# Patient Record
Sex: FEMALE | Race: BLACK OR AFRICAN AMERICAN | NOT HISPANIC OR LATINO | Employment: UNEMPLOYED | URBAN - METROPOLITAN AREA
[De-identification: names, ages, dates, MRNs, and addresses within clinical notes are randomized per-mention and may not be internally consistent; named-entity substitution may affect disease eponyms.]

---

## 2018-02-09 ENCOUNTER — HOSPITAL ENCOUNTER (INPATIENT)
Facility: HOSPITAL | Age: 78
LOS: 9 days | Discharge: NURSING, REHAB OR LONG-TERM CARE | End: 2018-02-19
Attending: EMERGENCY MEDICINE | Admitting: HOSPITALIST

## 2018-02-09 DIAGNOSIS — N18.9 ACUTE RENAL FAILURE SUPERIMPOSED ON CHRONIC KIDNEY DISEASE, UNSPECIFIED CKD STAGE, UNSPECIFIED ACUTE RENAL FAILURE TYPE (HCC): Primary | ICD-10-CM

## 2018-02-09 DIAGNOSIS — D64.9 ANEMIA, UNSPECIFIED TYPE: ICD-10-CM

## 2018-02-09 DIAGNOSIS — N17.9 ACUTE RENAL FAILURE SUPERIMPOSED ON CHRONIC KIDNEY DISEASE, UNSPECIFIED CKD STAGE, UNSPECIFIED ACUTE RENAL FAILURE TYPE (HCC): Primary | ICD-10-CM

## 2018-02-09 LAB
ALBUMIN SERPL-MCNC: 3.2 G/DL (ref 3.5–5.2)
ALBUMIN/GLOB SERPL: 0.9 G/DL
ALP SERPL-CCNC: 55 U/L (ref 39–117)
ALT SERPL W P-5'-P-CCNC: 13 U/L (ref 1–33)
ANION GAP SERPL CALCULATED.3IONS-SCNC: 10.4 MMOL/L
AST SERPL-CCNC: 11 U/L (ref 1–32)
BASOPHILS # BLD AUTO: 0.03 10*3/MM3 (ref 0–0.2)
BASOPHILS NFR BLD AUTO: 0.5 % (ref 0–1.5)
BILIRUB SERPL-MCNC: <0.2 MG/DL (ref 0.1–1.2)
BUN BLD-MCNC: 75 MG/DL (ref 8–23)
BUN/CREAT SERPL: 45.7 (ref 7–25)
CALCIUM SPEC-SCNC: 8.6 MG/DL (ref 8.6–10.5)
CHLORIDE SERPL-SCNC: 101 MMOL/L (ref 98–107)
CO2 SERPL-SCNC: 25.6 MMOL/L (ref 22–29)
CREAT BLD-MCNC: 1.64 MG/DL (ref 0.57–1)
DEPRECATED RDW RBC AUTO: 47.6 FL (ref 37–54)
EOSINOPHIL # BLD AUTO: 0.3 10*3/MM3 (ref 0–0.7)
EOSINOPHIL NFR BLD AUTO: 4.6 % (ref 0.3–6.2)
ERYTHROCYTE [DISTWIDTH] IN BLOOD BY AUTOMATED COUNT: 13.5 % (ref 11.7–13)
GFR SERPL CREATININE-BSD FRML MDRD: 37 ML/MIN/1.73
GLOBULIN UR ELPH-MCNC: 3.5 GM/DL
GLUCOSE BLD-MCNC: 284 MG/DL (ref 65–99)
HCT VFR BLD AUTO: 32.2 % (ref 35.6–45.5)
HGB BLD-MCNC: 10 G/DL (ref 11.9–15.5)
IMM GRANULOCYTES # BLD: 0.05 10*3/MM3 (ref 0–0.03)
IMM GRANULOCYTES NFR BLD: 0.8 % (ref 0–0.5)
LYMPHOCYTES # BLD AUTO: 3.49 10*3/MM3 (ref 0.9–4.8)
LYMPHOCYTES NFR BLD AUTO: 53.9 % (ref 19.6–45.3)
MCH RBC QN AUTO: 30 PG (ref 26.9–32)
MCHC RBC AUTO-ENTMCNC: 31.1 G/DL (ref 32.4–36.3)
MCV RBC AUTO: 96.7 FL (ref 80.5–98.2)
MONOCYTES # BLD AUTO: 0.33 10*3/MM3 (ref 0.2–1.2)
MONOCYTES NFR BLD AUTO: 5.1 % (ref 5–12)
NEUTROPHILS # BLD AUTO: 2.27 10*3/MM3 (ref 1.9–8.1)
NEUTROPHILS NFR BLD AUTO: 35.1 % (ref 42.7–76)
PLATELET # BLD AUTO: 170 10*3/MM3 (ref 140–500)
PMV BLD AUTO: 10.4 FL (ref 6–12)
POTASSIUM BLD-SCNC: 5.4 MMOL/L (ref 3.5–5.2)
PROT SERPL-MCNC: 6.7 G/DL (ref 6–8.5)
RBC # BLD AUTO: 3.33 10*6/MM3 (ref 3.9–5.2)
SODIUM BLD-SCNC: 137 MMOL/L (ref 136–145)
WBC NRBC COR # BLD: 6.47 10*3/MM3 (ref 4.5–10.7)

## 2018-02-09 PROCEDURE — 99284 EMERGENCY DEPT VISIT MOD MDM: CPT

## 2018-02-09 PROCEDURE — 80053 COMPREHEN METABOLIC PANEL: CPT | Performed by: EMERGENCY MEDICINE

## 2018-02-09 PROCEDURE — 85025 COMPLETE CBC W/AUTO DIFF WBC: CPT | Performed by: EMERGENCY MEDICINE

## 2018-02-09 PROCEDURE — 36415 COLL VENOUS BLD VENIPUNCTURE: CPT | Performed by: EMERGENCY MEDICINE

## 2018-02-09 RX ORDER — SODIUM CHLORIDE 0.9 % (FLUSH) 0.9 %
10 SYRINGE (ML) INJECTION AS NEEDED
Status: DISCONTINUED | OUTPATIENT
Start: 2018-02-09 | End: 2018-02-19 | Stop reason: HOSPADM

## 2018-02-10 ENCOUNTER — APPOINTMENT (OUTPATIENT)
Dept: ULTRASOUND IMAGING | Facility: HOSPITAL | Age: 78
End: 2018-02-10

## 2018-02-10 PROBLEM — N18.9 ACUTE RENAL FAILURE SUPERIMPOSED ON CHRONIC KIDNEY DISEASE (HCC): Status: ACTIVE | Noted: 2018-02-10

## 2018-02-10 PROBLEM — N17.9 ACUTE RENAL FAILURE SUPERIMPOSED ON CHRONIC KIDNEY DISEASE (HCC): Status: ACTIVE | Noted: 2018-02-10

## 2018-02-10 LAB
ANION GAP SERPL CALCULATED.3IONS-SCNC: 11.7 MMOL/L
BACTERIA UR QL AUTO: ABNORMAL /HPF
BILIRUB UR QL STRIP: NEGATIVE
BUN BLD-MCNC: 68 MG/DL (ref 8–23)
BUN/CREAT SERPL: 43.6 (ref 7–25)
CALCIUM SPEC-SCNC: 8.4 MG/DL (ref 8.6–10.5)
CHLORIDE SERPL-SCNC: 99 MMOL/L (ref 98–107)
CLARITY UR: CLEAR
CO2 SERPL-SCNC: 23.3 MMOL/L (ref 22–29)
COLOR UR: YELLOW
CREAT BLD-MCNC: 1.56 MG/DL (ref 0.57–1)
CREAT UR-MCNC: 18.9 MG/DL
DEPRECATED RDW RBC AUTO: 47.4 FL (ref 37–54)
ERYTHROCYTE [DISTWIDTH] IN BLOOD BY AUTOMATED COUNT: 13.7 % (ref 11.7–13)
GFR SERPL CREATININE-BSD FRML MDRD: 39 ML/MIN/1.73
GLUCOSE BLD-MCNC: 197 MG/DL (ref 65–99)
GLUCOSE BLDC GLUCOMTR-MCNC: 164 MG/DL (ref 70–130)
GLUCOSE BLDC GLUCOMTR-MCNC: 181 MG/DL (ref 70–130)
GLUCOSE BLDC GLUCOMTR-MCNC: 198 MG/DL (ref 70–130)
GLUCOSE BLDC GLUCOMTR-MCNC: 214 MG/DL (ref 70–130)
GLUCOSE UR STRIP-MCNC: NEGATIVE MG/DL
HCT VFR BLD AUTO: 31.7 % (ref 35.6–45.5)
HGB BLD-MCNC: 9.8 G/DL (ref 11.9–15.5)
HGB UR QL STRIP.AUTO: ABNORMAL
HYALINE CASTS UR QL AUTO: ABNORMAL /LPF
INR PPP: 2.36 (ref 0.9–1.1)
KETONES UR QL STRIP: NEGATIVE
LEUKOCYTE ESTERASE UR QL STRIP.AUTO: ABNORMAL
MCH RBC QN AUTO: 29.8 PG (ref 26.9–32)
MCHC RBC AUTO-ENTMCNC: 30.9 G/DL (ref 32.4–36.3)
MCV RBC AUTO: 96.4 FL (ref 80.5–98.2)
NITRITE UR QL STRIP: NEGATIVE
PH UR STRIP.AUTO: 5.5 [PH] (ref 5–8)
PLATELET # BLD AUTO: 177 10*3/MM3 (ref 140–500)
PMV BLD AUTO: 10.7 FL (ref 6–12)
POTASSIUM BLD-SCNC: 4.5 MMOL/L (ref 3.5–5.2)
PROT UR QL STRIP: ABNORMAL
PROT UR-MCNC: 65 MG/DL
PROTHROMBIN TIME: 25.4 SECONDS (ref 11.7–14.2)
RBC # BLD AUTO: 3.29 10*6/MM3 (ref 3.9–5.2)
RBC # UR: ABNORMAL /HPF
REF LAB TEST METHOD: ABNORMAL
SODIUM BLD-SCNC: 134 MMOL/L (ref 136–145)
SODIUM UR-SCNC: 100 MMOL/L
SP GR UR STRIP: 1.01 (ref 1–1.03)
SQUAMOUS #/AREA URNS HPF: ABNORMAL /HPF
UROBILINOGEN UR QL STRIP: ABNORMAL
WBC NRBC COR # BLD: 5.86 10*3/MM3 (ref 4.5–10.7)
WBC UR QL AUTO: ABNORMAL /HPF

## 2018-02-10 PROCEDURE — 81001 URINALYSIS AUTO W/SCOPE: CPT | Performed by: HOSPITALIST

## 2018-02-10 PROCEDURE — 76775 US EXAM ABDO BACK WALL LIM: CPT

## 2018-02-10 PROCEDURE — 82962 GLUCOSE BLOOD TEST: CPT

## 2018-02-10 PROCEDURE — 63710000001 INSULIN ASPART PER 5 UNITS: Performed by: HOSPITALIST

## 2018-02-10 PROCEDURE — 82570 ASSAY OF URINE CREATININE: CPT | Performed by: INTERNAL MEDICINE

## 2018-02-10 PROCEDURE — 93005 ELECTROCARDIOGRAM TRACING: CPT | Performed by: HOSPITALIST

## 2018-02-10 PROCEDURE — 80048 BASIC METABOLIC PNL TOTAL CA: CPT | Performed by: HOSPITALIST

## 2018-02-10 PROCEDURE — 85610 PROTHROMBIN TIME: CPT | Performed by: EMERGENCY MEDICINE

## 2018-02-10 PROCEDURE — 93010 ELECTROCARDIOGRAM REPORT: CPT | Performed by: INTERNAL MEDICINE

## 2018-02-10 PROCEDURE — 84156 ASSAY OF PROTEIN URINE: CPT | Performed by: INTERNAL MEDICINE

## 2018-02-10 PROCEDURE — 63710000001 INSULIN DETEMER PER 5 UNITS: Performed by: HOSPITALIST

## 2018-02-10 PROCEDURE — 85027 COMPLETE CBC AUTOMATED: CPT | Performed by: HOSPITALIST

## 2018-02-10 PROCEDURE — 84300 ASSAY OF URINE SODIUM: CPT | Performed by: INTERNAL MEDICINE

## 2018-02-10 RX ORDER — L. ACIDOPHILUS/L.BULGARICUS 1MM CELL
1 TABLET ORAL 2 TIMES DAILY
COMMUNITY
End: 2018-03-23

## 2018-02-10 RX ORDER — CLOPIDOGREL BISULFATE 75 MG/1
75 TABLET ORAL DAILY
COMMUNITY
End: 2018-03-23

## 2018-02-10 RX ORDER — MELATONIN
1000 DAILY
COMMUNITY

## 2018-02-10 RX ORDER — POLYETHYLENE GLYCOL 3350 17 G/17G
17 POWDER, FOR SOLUTION ORAL 2 TIMES DAILY
COMMUNITY
End: 2018-03-23

## 2018-02-10 RX ORDER — WARFARIN SODIUM 4 MG/1
4 TABLET ORAL
Status: DISCONTINUED | OUTPATIENT
Start: 2018-02-10 | End: 2018-02-12

## 2018-02-10 RX ORDER — BISACODYL 10 MG
10 SUPPOSITORY, RECTAL RECTAL AS NEEDED
COMMUNITY
End: 2018-02-19 | Stop reason: HOSPADM

## 2018-02-10 RX ORDER — BUMETANIDE 1 MG/1
1 TABLET ORAL 2 TIMES DAILY
COMMUNITY
End: 2020-01-01 | Stop reason: HOSPADM

## 2018-02-10 RX ORDER — L.ACID,PARA/B.BIFIDUM/S.THERM 8B CELL
1 CAPSULE ORAL DAILY
Status: DISCONTINUED | OUTPATIENT
Start: 2018-02-10 | End: 2018-02-19 | Stop reason: HOSPADM

## 2018-02-10 RX ORDER — CLONIDINE HYDROCHLORIDE 0.1 MG/1
0.3 TABLET ORAL 3 TIMES DAILY
Status: DISCONTINUED | OUTPATIENT
Start: 2018-02-10 | End: 2018-02-19 | Stop reason: HOSPADM

## 2018-02-10 RX ORDER — ACETAMINOPHEN 325 MG/1
650 TABLET ORAL EVERY 4 HOURS PRN
Status: DISCONTINUED | OUTPATIENT
Start: 2018-02-10 | End: 2018-02-19

## 2018-02-10 RX ORDER — SODIUM CHLORIDE 9 MG/ML
50 INJECTION, SOLUTION INTRAVENOUS CONTINUOUS
Status: DISCONTINUED | OUTPATIENT
Start: 2018-02-10 | End: 2018-02-14

## 2018-02-10 RX ORDER — HYDROCODONE BITARTRATE AND ACETAMINOPHEN 7.5; 325 MG/1; MG/1
1 TABLET ORAL EVERY 6 HOURS
COMMUNITY
End: 2018-02-19 | Stop reason: HOSPADM

## 2018-02-10 RX ORDER — MELATONIN
4000 DAILY
Status: DISCONTINUED | OUTPATIENT
Start: 2018-02-10 | End: 2018-02-19 | Stop reason: HOSPADM

## 2018-02-10 RX ORDER — AMOXICILLIN AND CLAVULANATE POTASSIUM 875; 125 MG/1; MG/1
1 TABLET, FILM COATED ORAL 2 TIMES DAILY
COMMUNITY
End: 2018-02-19 | Stop reason: HOSPADM

## 2018-02-10 RX ORDER — METOPROLOL TARTRATE 50 MG/1
50 TABLET, FILM COATED ORAL 3 TIMES DAILY
COMMUNITY
End: 2018-02-19 | Stop reason: HOSPADM

## 2018-02-10 RX ORDER — CLONIDINE HYDROCHLORIDE 0.3 MG/1
0.3 TABLET ORAL 3 TIMES DAILY
COMMUNITY
End: 2020-01-01 | Stop reason: HOSPADM

## 2018-02-10 RX ORDER — POTASSIUM CHLORIDE 750 MG/1
20 TABLET, FILM COATED, EXTENDED RELEASE ORAL
COMMUNITY
End: 2020-01-01 | Stop reason: HOSPADM

## 2018-02-10 RX ORDER — POLYETHYLENE GLYCOL 3350 17 G/17G
17 POWDER, FOR SOLUTION ORAL 2 TIMES DAILY
Status: DISCONTINUED | OUTPATIENT
Start: 2018-02-10 | End: 2018-02-19 | Stop reason: HOSPADM

## 2018-02-10 RX ORDER — NICOTINE POLACRILEX 4 MG
15 LOZENGE BUCCAL
Status: DISCONTINUED | OUTPATIENT
Start: 2018-02-10 | End: 2018-02-10

## 2018-02-10 RX ORDER — METOPROLOL TARTRATE 50 MG/1
50 TABLET, FILM COATED ORAL EVERY 12 HOURS SCHEDULED
Status: DISCONTINUED | OUTPATIENT
Start: 2018-02-10 | End: 2018-02-19 | Stop reason: HOSPADM

## 2018-02-10 RX ORDER — DEXTROSE MONOHYDRATE 25 G/50ML
25 INJECTION, SOLUTION INTRAVENOUS
Status: DISCONTINUED | OUTPATIENT
Start: 2018-02-10 | End: 2018-02-10

## 2018-02-10 RX ORDER — BACLOFEN 10 MG/1
5 TABLET ORAL EVERY 6 HOURS
COMMUNITY
End: 2018-03-23

## 2018-02-10 RX ORDER — AMLODIPINE BESYLATE 10 MG/1
10 TABLET ORAL DAILY
Status: DISCONTINUED | OUTPATIENT
Start: 2018-02-10 | End: 2018-02-19 | Stop reason: HOSPADM

## 2018-02-10 RX ORDER — HYDRALAZINE HYDROCHLORIDE 10 MG/1
10 TABLET, FILM COATED ORAL EVERY 8 HOURS SCHEDULED
Status: DISCONTINUED | OUTPATIENT
Start: 2018-02-10 | End: 2018-02-12

## 2018-02-10 RX ORDER — AMLODIPINE BESYLATE 2.5 MG/1
2.5 TABLET ORAL DAILY
Status: DISCONTINUED | OUTPATIENT
Start: 2018-02-10 | End: 2018-02-10

## 2018-02-10 RX ORDER — AMLODIPINE BESYLATE 2.5 MG/1
2.5 TABLET ORAL DAILY
COMMUNITY
End: 2018-02-19 | Stop reason: HOSPADM

## 2018-02-10 RX ORDER — BENAZEPRIL HYDROCHLORIDE 20 MG/1
40 TABLET ORAL 2 TIMES DAILY
COMMUNITY
End: 2018-02-19 | Stop reason: HOSPADM

## 2018-02-10 RX ORDER — ONDANSETRON 2 MG/ML
4 INJECTION INTRAMUSCULAR; INTRAVENOUS EVERY 4 HOURS PRN
Status: DISCONTINUED | OUTPATIENT
Start: 2018-02-10 | End: 2018-02-19

## 2018-02-10 RX ORDER — HYDROCODONE BITARTRATE AND ACETAMINOPHEN 7.5; 325 MG/1; MG/1
1 TABLET ORAL 2 TIMES DAILY PRN
COMMUNITY
End: 2018-02-19 | Stop reason: HOSPADM

## 2018-02-10 RX ORDER — BACLOFEN 10 MG/1
5 TABLET ORAL EVERY 6 HOURS
Status: DISCONTINUED | OUTPATIENT
Start: 2018-02-10 | End: 2018-02-10

## 2018-02-10 RX ORDER — VIT E ACET/GLY/DIMETH/WATER
1 LOTION (ML) TOPICAL DAILY
COMMUNITY
End: 2018-02-19 | Stop reason: HOSPADM

## 2018-02-10 RX ORDER — SERTRALINE HYDROCHLORIDE 100 MG/1
100 TABLET, FILM COATED ORAL DAILY
Status: DISCONTINUED | OUTPATIENT
Start: 2018-02-10 | End: 2018-02-11

## 2018-02-10 RX ORDER — WARFARIN SODIUM 4 MG/1
5 TABLET ORAL
Status: ON HOLD | COMMUNITY
End: 2020-01-01

## 2018-02-10 RX ORDER — DOCUSATE SODIUM 250 MG
250 CAPSULE ORAL DAILY
COMMUNITY

## 2018-02-10 RX ORDER — HYDRALAZINE HYDROCHLORIDE 50 MG/1
100 TABLET, FILM COATED ORAL 3 TIMES DAILY
COMMUNITY
End: 2018-02-19 | Stop reason: HOSPADM

## 2018-02-10 RX ORDER — AMOXICILLIN 250 MG
1 CAPSULE ORAL NIGHTLY
COMMUNITY

## 2018-02-10 RX ORDER — BACLOFEN 10 MG/1
10 TABLET ORAL EVERY 8 HOURS SCHEDULED
Status: DISCONTINUED | OUTPATIENT
Start: 2018-02-10 | End: 2018-02-19 | Stop reason: HOSPADM

## 2018-02-10 RX ORDER — PANTOPRAZOLE SODIUM 40 MG/1
40 TABLET, DELAYED RELEASE ORAL
Status: DISCONTINUED | OUTPATIENT
Start: 2018-02-11 | End: 2018-02-11

## 2018-02-10 RX ORDER — CLOPIDOGREL BISULFATE 75 MG/1
75 TABLET ORAL DAILY
Status: DISCONTINUED | OUTPATIENT
Start: 2018-02-10 | End: 2018-02-13

## 2018-02-10 RX ORDER — CEFTRIAXONE 1 G/1
1 INJECTION, POWDER, FOR SOLUTION INTRAMUSCULAR; INTRAVENOUS DAILY
COMMUNITY
Start: 2018-02-07 | End: 2018-02-19 | Stop reason: HOSPADM

## 2018-02-10 RX ORDER — ACETAMINOPHEN 325 MG/1
650 TABLET ORAL EVERY 4 HOURS PRN
Status: DISCONTINUED | OUTPATIENT
Start: 2018-02-10 | End: 2018-02-10

## 2018-02-10 RX ORDER — METOPROLOL TARTRATE 50 MG/1
50 TABLET, FILM COATED ORAL 3 TIMES DAILY
Status: DISCONTINUED | OUTPATIENT
Start: 2018-02-10 | End: 2018-02-10

## 2018-02-10 RX ADMIN — SODIUM CHLORIDE 500 ML: 9 INJECTION, SOLUTION INTRAVENOUS at 02:33

## 2018-02-10 RX ADMIN — ACETAMINOPHEN 650 MG: 325 TABLET, FILM COATED ORAL at 05:32

## 2018-02-10 RX ADMIN — CLONIDINE HYDROCHLORIDE 0.3 MG: 0.1 TABLET ORAL at 21:59

## 2018-02-10 RX ADMIN — INSULIN ASPART 2 UNITS: 100 INJECTION, SOLUTION INTRAVENOUS; SUBCUTANEOUS at 12:49

## 2018-02-10 RX ADMIN — CLOPIDOGREL 75 MG: 75 TABLET, FILM COATED ORAL at 22:01

## 2018-02-10 RX ADMIN — BACLOFEN 10 MG: 10 TABLET ORAL at 21:59

## 2018-02-10 RX ADMIN — SERTRALINE 100 MG: 100 TABLET, FILM COATED ORAL at 21:59

## 2018-02-10 RX ADMIN — Medication 1 CAPSULE: at 21:59

## 2018-02-10 RX ADMIN — ACETAMINOPHEN 650 MG: 325 TABLET, FILM COATED ORAL at 23:03

## 2018-02-10 RX ADMIN — INSULIN DETEMIR 10 UNITS: 100 INJECTION, SOLUTION SUBCUTANEOUS at 23:03

## 2018-02-10 RX ADMIN — AMLODIPINE BESYLATE 10 MG: 10 TABLET ORAL at 22:00

## 2018-02-10 RX ADMIN — INSULIN ASPART 2 UNITS: 100 INJECTION, SOLUTION INTRAVENOUS; SUBCUTANEOUS at 22:01

## 2018-02-10 RX ADMIN — SODIUM CHLORIDE 75 ML/HR: 9 INJECTION, SOLUTION INTRAVENOUS at 05:32

## 2018-02-10 RX ADMIN — VITAMIN D, TAB 1000IU (100/BT) 4000 UNITS: 25 TAB at 21:59

## 2018-02-10 RX ADMIN — WARFARIN SODIUM 4 MG: 4 TABLET ORAL at 22:01

## 2018-02-10 RX ADMIN — INSULIN ASPART 2 UNITS: 100 INJECTION, SOLUTION INTRAVENOUS; SUBCUTANEOUS at 08:42

## 2018-02-10 RX ADMIN — METOPROLOL TARTRATE 50 MG: 50 TABLET, FILM COATED ORAL at 22:00

## 2018-02-10 RX ADMIN — HYDRALAZINE HYDROCHLORIDE 10 MG: 10 TABLET, FILM COATED ORAL at 22:01

## 2018-02-10 NOTE — ED PROVIDER NOTES
"Discussed pt's case with Nicholas TAVERAS. Reviewed labs prior to evaluation.   The patient presents from NH facility via EMS complaining of an abnormal lab; elevated kidney function labs. Pt is currently taking coumadin. Pt states she feels normal at this time. Pt denies any acute pain and ha a hx of arthritis. Pt also c/o increased urinary frequency/ urgency secondary to diagnosed UTI, L ear ache, and weight loss over the course of 1 month, but denies cough, recent cold, fever, chills, N/V/D, or any other pertinent symptoms. Family is present at bedside and states that pt appears normal/ at baseline. Family denies recent confusion or changes in mental status. Family states that pt has not had a hospital admission in approximately 5 years. Pt states she has wounds on BLE's; wound care is taking care of pt's reported wounds. Pt is actively receiving Rocephin shots for UTI [this is the 3rd day that pt received an injection]. Pt is currently taking a diuretic medication. Family states they are unaware of any history of kidney insufficiency.     Limited physical exam:  Patient is nontoxic appearing and in no acute distress   Partial obscured TM's due to cerumen, the visible portion of TM\"s are normal   Lungs/cardiovascular: Good air movement bilaterally   Abdomen: Non-tender   Back/extremities: BLE extremities have ACE bandages in place overlying bandages, proximal aspect of wounds clean dry intact     Discussed previous lab results from old medical records are the same as today's lab work. Discussed that pt's kidney function labs are elevated; however, the labs have not changed since 1/28/18. Plan to admit pt.     I supervised care provided by the midlevel provider.  We have discussed this patient's history, physical exam, and treatment plan.  I have reviewed the note and personally saw and examined the patient and agree with the plan of care.    Documentation assistance provided by chiquita Montana.  Information " recorded by the scribe was done at my direction and has been verified and validated by me.         Kota Montana  02/10/18 0136       Shara Streeter MD  02/10/18 6240

## 2018-02-10 NOTE — CONSULTS
Kacy Mistry is a 77 y.o. female      Reason for Consultation: PEGGY        Chief complaint PEGGY    History of present illness:    This is a 76 y/o female with pmh of CVA left sided weakness brought from NH for generalized weakness  Scr 1.6 then 1.5 today prompted renal consult.  No previous Scr available  No c/o now except right LE pain  bumex and benazepril held          Review of Systems  .ro  Pertinent items are noted in HPI    Objective     Vital Signs  Temp:  [98 °F (36.7 °C)-99.7 °F (37.6 °C)] 98.3 °F (36.8 °C)  Heart Rate:  [66-74] 74  Resp:  [18] 18  BP: (125-162)/(78-98) 162/98    Intake/Output Summary (Last 24 hours) at 02/10/18 1501  Last data filed at 02/10/18 0721   Gross per 24 hour   Intake              710 ml   Output                0 ml   Net              710 ml         Physical Exam  Constitutional : well developed ,No acute distress  ENMT lips pink oral mucosa moist   Eyes sclerae white PERRL  Respiratory: Clear to auscultation , No crackles no wheezing respirations are even and un-labored  GI: Soft, Non tender, BS active in all 4 quadrants  CVS: S1 S2 regular, no rub murmur or gallops  Skin warm dry no rashes no petechiae  Neuro grossly nonfocal cranial nerves 2-12 grossly intact sensation intact  Ext: no LE b/l ace wrap edema, Peripheral pulses intact   Heme no cervical  lymphadenopathy no petechiae    Results Review:   I reviewed the patient's new clinical results.    WBC No results found for: WBCS   HGB Hemoglobin   Date Value Ref Range Status   02/10/2018 9.8 (L) 11.9 - 15.5 g/dL Final   02/09/2018 10.0 (L) 11.9 - 15.5 g/dL Final      HCT Hematocrit   Date Value Ref Range Status   02/10/2018 31.7 (L) 35.6 - 45.5 % Final   02/09/2018 32.2 (L) 35.6 - 45.5 % Final      Platlets No results found for: LABPLAT   MCV MCV   Date Value Ref Range Status   02/10/2018 96.4 80.5 - 98.2 fL Final   02/09/2018 96.7 80.5 - 98.2 fL Final          Sodium Sodium   Date Value Ref Range Status   02/10/2018 134 (L)  136 - 145 mmol/L Final   02/09/2018 137 136 - 145 mmol/L Final      Potassium Potassium   Date Value Ref Range Status   02/10/2018 4.5 3.5 - 5.2 mmol/L Final   02/09/2018 5.4 (H) 3.5 - 5.2 mmol/L Final      Chloride Chloride   Date Value Ref Range Status   02/10/2018 99 98 - 107 mmol/L Final   02/09/2018 101 98 - 107 mmol/L Final      CO2 CO2   Date Value Ref Range Status   02/10/2018 23.3 22.0 - 29.0 mmol/L Final   02/09/2018 25.6 22.0 - 29.0 mmol/L Final      BUN BUN   Date Value Ref Range Status   02/10/2018 68 (H) 8 - 23 mg/dL Final   02/09/2018 75 (H) 8 - 23 mg/dL Final      Creatinine Creatinine   Date Value Ref Range Status   02/10/2018 1.56 (H) 0.57 - 1.00 mg/dL Final   02/09/2018 1.64 (H) 0.57 - 1.00 mg/dL Final      Calcium Calcium   Date Value Ref Range Status   02/10/2018 8.4 (L) 8.6 - 10.5 mg/dL Final   02/09/2018 8.6 8.6 - 10.5 mg/dL Final      PO4 No results found for: CAPO4   Albumin Albumin   Date Value Ref Range Status   02/09/2018 3.20 (L) 3.50 - 5.20 g/dL Final      Magnesium No results found for: MG   Uric Acid No results found for: URICACID     Medications   Current Facility-Administered Medications   Medication Dose Route Frequency Provider Last Rate Last Dose   • amLODIPine (NORVASC) tablet 10 mg  10 mg Oral Daily Josias Razo MD       • baclofen (LIORESAL) tablet 10 mg  10 mg Oral Q8H Josias Razo MD       • cholecalciferol (VITAMIN D3) tablet 4,000 Units  4,000 Units Oral Daily Josias Razo MD       • CloNIDine (CATAPRES) tablet 0.3 mg  0.3 mg Oral TID Josias Razo MD       • clopidogrel (PLAVIX) tablet 75 mg  75 mg Oral Daily Josias Razo MD       • docusate sodium (COLACE) 250 mg  250 mg Oral Daily Josias Razo MD       • insulin aspart (novoLOG) injection 0-7 Units  0-7 Units Subcutaneous 4x Daily With Meals & Nightly Josias Razo MD   2 Units at 02/10/18 1249   • insulin aspart (novoLOG) injection 5 Units  5 Units Subcutaneous TID With Meals Josias Razo MD       • insulin detemir  (LEVEMIR) injection 10 Units  10 Units Subcutaneous Nightly Josias Razo MD       • lactobacillus acidophilus (RISAQUAD) capsule 1 capsule  1 capsule Oral Daily Josias Razo MD       • metoprolol tartrate (LOPRESSOR) tablet 50 mg  50 mg Oral Q12H Josias Razo MD       • ondansetron (ZOFRAN) injection 4 mg  4 mg Intravenous Q4H PRN Josias Razo MD       • [START ON 2/11/2018] pantoprazole (PROTONIX) EC tablet 40 mg  40 mg Oral Q AM Josias Razo MD       • polyethylene glycol (MIRALAX) powder 17 g  17 g Oral BID Josias Razo MD       • sertraline (ZOLOFT) tablet 100 mg  100 mg Oral Daily Josias Razo MD       • sodium chloride 0.9 % flush 10 mL  10 mL Intravenous PRN Shara Streeter MD       • sodium chloride 0.9 % infusion  75 mL/hr Intravenous Continuous Josias Razo MD 75 mL/hr at 02/10/18 0532 75 mL/hr at 02/10/18 0532   • warfarin (COUMADIN) tablet 4 mg  4 mg Oral Daily Josias Razo MD             sodium chloride 75 mL/hr Last Rate: 75 mL/hr (02/10/18 0532)       Imaging studies: I have personally reviewed the pertinent imaging studies    2D Echo:    Assessment/Plan     Active Problems:    Acute renal failure superimposed on chronic kidney disease      PEGGY vs CKD around baseline  Hyponatremia  HTN  HF  Anemia  DM  CVA    Renal US UA FENA  Hold benazepril and bumex for now  Can use prn loop diuretic if hypervolemia  Check a spot up/cr too  Avoid nephrotoxins  bp high add hydralazine            The above assessment and plan was discussed at length with the patient who voiced understanding and agrees to proceed with the plan as outlined above.All the patient's questions were answered to his/her satisfaction.  Thank you very much for allowing me to participate in the care of this patient.Please do not hesitate to call if you have any questions or concerns.  Chart reviewed.  Jefe Collins MD  02/10/18  @NOW

## 2018-02-10 NOTE — ED NOTES
"Pt to room 40 via stretcher for c/o abnormal labs.  Family states her \"kidney was low\" so she was sent from nursing home.  BUN/Creatnine elevated per NH labs and labs drawn since arrival, otherwise, pt has no complaints. Pt is alert and oriented X4, PERRLA, respirations are even and unlabored, chest rise and fall is equal in expansion.  Pt does not appear to be in distress at this time.  Bed in low position, call light within reach, side rails up X2.      Shasta Rodriguez RN  02/09/18 9226    "

## 2018-02-10 NOTE — PLAN OF CARE
Problem: Patient Care Overview (Adult)  Goal: Plan of Care Review  Outcome: Ongoing (interventions implemented as appropriate)   02/10/18 0636   Coping/Psychosocial Response Interventions   Plan Of Care Reviewed With patient   Patient Care Overview   Progress improving   Outcome Evaluation   Outcome Summary/Follow up Plan Patient admitted to unit during shift. Daughter at bedside. Rested well. Medication and IVF administered per orders. VSS. No s/s of distress at this time. Will continue to monitor.      Goal: Adult Individualization and Mutuality  Outcome: Ongoing (interventions implemented as appropriate)    Goal: Discharge Needs Assessment  Outcome: Ongoing (interventions implemented as appropriate)      Problem: Fall Risk (Adult)  Goal: Identify Related Risk Factors and Signs and Symptoms  Outcome: Outcome(s) achieved Date Met: 02/10/18    Goal: Absence of Falls  Outcome: Ongoing (interventions implemented as appropriate)      Problem: Skin Integrity Impairment, Risk/Actual (Adult)  Goal: Identify Related Risk Factors and Signs and Symptoms  Outcome: Outcome(s) achieved Date Met: 02/10/18    Goal: Skin Integrity/Wound Healing  Outcome: Ongoing (interventions implemented as appropriate)      Problem: Renal Failure/Kidney Injury, Acute (Adult)  Goal: Signs and Symptoms of Listed Potential Problems Will be Absent or Manageable (Renal Failure/Kidney Injury, Acute)  Outcome: Ongoing (interventions implemented as appropriate)

## 2018-02-10 NOTE — ED PROVIDER NOTES
" EMERGENCY DEPARTMENT ENCOUNTER    CHIEF COMPLAINT  Chief Complaint: abnormal lab  History given by: patient  History limited by: nothing  Room Number: 40/40  PMD: Yousuf Corrigan MD      HPI:  Pt is a 77 y.o. female who presents with an abnormal lab. Pt was advised to come here for abnormal kidney labs as her BUN was elevated (earlier today it was 76 and Creatinine was 1.72, but on 2/4/18 her BUN was 87). She endorses dry mouth and frequent urination. She had a UTI a couple of days ago. She currently resides at a [facility]. Pt reports to chronic swelling and lymphedema. She had UTI sx such as frequent urination and dyspnea, but UTI has since been resolved after taking an abx. When asked how she was feeling, pt reported \"I'm wonderful\".    Duration:  unknown  Onset: gradual  Timing: constant  Intensity/Severity: not severe  Progression: improving  Associated Symptoms: dry mouth, BLE edema (chronic)  Aggravating Factors: none  Alleviating Factors: none  Previous Episodes: Pt does not report any previous episodes.  Treatment before arrival: Pt recently took abx for a UTI that has been resolved.    PAST MEDICAL HISTORY  Active Ambulatory Problems     Diagnosis Date Noted   • No Active Ambulatory Problems     Resolved Ambulatory Problems     Diagnosis Date Noted   • No Resolved Ambulatory Problems     Past Medical History:   Diagnosis Date   • Anemia    • Anxiety    • Arthritis    • Cellulitis of left lower extremity    • CHF (congestive heart failure)    • Depression    • Diabetes mellitus    • DVT (deep venous thrombosis)    • GERD (gastroesophageal reflux disease)    • Hyperlipidemia    • Hypertension    • Lymphedema    • Obesity    • Osteoporosis    • Renal disorder    • Stroke    • Venous insufficiency (chronic) (peripheral)        PAST SURGICAL HISTORY  History reviewed. No pertinent surgical history.    FAMILY HISTORY  History reviewed. No pertinent family history.    SOCIAL HISTORY  Social " History     Social History   • Marital status:      Spouse name: N/A   • Number of children: N/A   • Years of education: N/A     Occupational History   • Not on file.     Social History Main Topics   • Smoking status: Unknown If Ever Smoked   • Smokeless tobacco: Not on file   • Alcohol use No   • Drug use: Defer   • Sexual activity: Defer     Other Topics Concern   • Not on file     Social History Narrative   • No narrative on file       ALLERGIES  Contrast dye and Iodine    REVIEW OF SYSTEMS  Review of Systems   Constitutional: Negative for fever.   HENT: Negative for sore throat.         Dry mouth   Eyes: Negative.    Respiratory: Negative for cough and shortness of breath.    Cardiovascular: Positive for leg swelling (BLE, chronic). Negative for chest pain.   Gastrointestinal: Negative for abdominal pain, diarrhea and vomiting.   Genitourinary: Positive for frequency. Negative for dysuria.   Musculoskeletal: Negative for neck pain.   Skin: Negative for rash.   Allergic/Immunologic: Negative.    Neurological: Negative for weakness, numbness and headaches.   Hematological: Negative.    Psychiatric/Behavioral: Negative.    All other systems reviewed and are negative.      PHYSICAL EXAM  ED Triage Vitals   Temp Heart Rate Resp BP SpO2   02/09/18 1934 02/09/18 1934 02/09/18 1934 02/09/18 2058 02/09/18 1934   98 °F (36.7 °C) 72 18 125/87 98 %      Temp src Heart Rate Source Patient Position BP Location FiO2 (%)   02/09/18 1934 02/09/18 1934 -- -- --   Tympanic Monitor          Physical Exam   Constitutional: She is oriented to person, place, and time and well-developed, well-nourished, and in no distress. No distress.   HENT:   Head: Normocephalic and atraumatic.   Eyes: EOM are normal. Pupils are equal, round, and reactive to light.   Neck: Normal range of motion. Neck supple.   Cardiovascular: Normal rate, regular rhythm and normal heart sounds.    Pulmonary/Chest: Effort normal and breath sounds normal. No  respiratory distress.   Abdominal: Soft. There is no tenderness. There is no rebound and no guarding.   Obese   Musculoskeletal: Normal range of motion. She exhibits edema (BLE).   Neurological: She is alert and oriented to person, place, and time. She has normal sensation and normal strength.   Skin: Skin is warm and dry. No rash noted.   Psychiatric: Mood and affect normal.   Nursing note and vitals reviewed.      LAB RESULTS  Lab Results (last 24 hours)     Procedure Component Value Units Date/Time    CBC & Differential [[id number]] Collected:  02/09/18 2046    Specimen:  Blood Updated:  02/09/18 2100    Narrative:       The following orders were created for panel order CBC & Differential.  Procedure                               Abnormality         Status                     ---------                               -----------         ------                     CBC Auto Differential[[id number]]         Abnormal            Final result                 Please view results for these tests on the individual orders.    Comprehensive Metabolic Panel [[id number]]  (Abnormal) Collected:  02/09/18 2046    Specimen:  Blood Updated:  02/09/18 2120     Glucose 284 (H) mg/dL      BUN 75 (H) mg/dL      Creatinine 1.64 (H) mg/dL      Sodium 137 mmol/L      Potassium 5.4 (H) mmol/L      Chloride 101 mmol/L      CO2 25.6 mmol/L      Calcium 8.6 mg/dL      Total Protein 6.7 g/dL      Albumin 3.20 (L) g/dL      ALT (SGPT) 13 U/L      AST (SGOT) 11 U/L      Alkaline Phosphatase 55 U/L      Total Bilirubin <0.2 mg/dL      eGFR  African Amer 37 (L) mL/min/1.73      Globulin 3.5 gm/dL      A/G Ratio 0.9 g/dL      BUN/Creatinine Ratio 45.7 (H)     Anion Gap 10.4 mmol/L     Narrative:       The MDRD GFR formula is only valid for adults with stable renal function between ages 18 and 70.    CBC Auto Differential [[id number]]  (Abnormal) Collected:  02/09/18 2046    Specimen:  Blood Updated:  02/09/18 2100     WBC 6.47 10*3/mm3      RBC 3.33 (L)  10*6/mm3      Hemoglobin 10.0 (L) g/dL      Hematocrit 32.2 (L) %      MCV 96.7 fL      MCH 30.0 pg      MCHC 31.1 (L) g/dL      RDW 13.5 (H) %      RDW-SD 47.6 fl      MPV 10.4 fL      Platelets 170 10*3/mm3      Neutrophil % 35.1 (L) %      Lymphocyte % 53.9 (H) %      Monocyte % 5.1 %      Eosinophil % 4.6 %      Basophil % 0.5 %      Immature Grans % 0.8 (H) %      Neutrophils, Absolute 2.27 10*3/mm3      Lymphocytes, Absolute 3.49 10*3/mm3      Monocytes, Absolute 0.33 10*3/mm3      Eosinophils, Absolute 0.30 10*3/mm3      Basophils, Absolute 0.03 10*3/mm3      Immature Grans, Absolute 0.05 (H) 10*3/mm3     Protime-INR [[id number]]  (Abnormal) Collected:  02/10/18 0042    Specimen:  Blood from Arm, Right Updated:  02/10/18 0101     Protime 25.4 (H) Seconds      INR 2.36 (H)          I ordered the above labs and reviewed the results    RADIOLOGY  No orders to display        I ordered the above noted radiological studies. Interpreted by radiologist. Reviewed by me in PACS.       PROCEDURES  Procedures      PROGRESS AND CONSULTS  ED Course     2326- Initial evaluation of the pt. I endorsed that the pt kidney related blood levels are elevated, but I will check her previous records to view what baseline is. I will f/u with results.    2356- Reviewed pt previous labs. On 1/28 her BUN was 80. We will plan to order her IV fluid.     2358- Protime-INR ordered.    0227- Ordered pt NaCl Bolus.    0245- Placed consult to [facility] for pt.    0250- Received a call from Dr. Razo and discussed pt's case. Dr. Razo agreed with plan to admit the pt.      MEDICAL DECISION MAKING  Results were reviewed/discussed with the patient and they were also made aware of online access. Pt also made aware that some labs, such as cultures, will not be resulted during ER visit and follow up with PMD is necessary.     MDM  Number of Diagnoses or Management Options  Acute renal failure superimposed on chronic kidney disease, unspecified CKD stage,  unspecified acute renal failure type:      Amount and/or Complexity of Data Reviewed  Clinical lab tests: ordered and reviewed (BUN- 75, Creatinine- 1.64, Albumin- 3.2, Glucose- 284, Hgb- 10.0, Hematocrit- 32.2, Protime- 25.4, INR- 2.36)  Decide to obtain previous medical records or to obtain history from someone other than the patient: yes  Obtain history from someone other than the patient: yes (Pt daughter)  Review and summarize past medical records: yes (Hx of chronic kidney disease. 2010: BUN-34, Creatinine-1.43)  Discuss the patient with other providers: yes (Dr. Razo)    Patient Progress  Patient progress: stable         DIAGNOSIS  Final diagnoses:   Acute renal failure superimposed on chronic kidney disease, unspecified CKD stage, unspecified acute renal failure type       DISPOSITION  ADMISSION    Discussed treatment plan and reason for admission with pt/family and admitting physician.  Pt/family voiced understanding of the plan for admission for further testing/treatment as needed.         Latest Documented Vital Signs:  As of 3:24 AM  BP- 138/81 HR- 66 Temp- 98.1 °F (36.7 °C) (Oral) O2 sat- 98%    --  Documentation assistance provided by chiquita Lala for Kwasi Peterson.  Information recorded by the chiquita was done at my direction and has been verified and validated by me.       Marshal Lala  02/10/18 0252       BEV Bourne  02/10/18 0324

## 2018-02-10 NOTE — H&P
History and physical    Primary care physician  Dr. Corrigan    Chief complaint  Generalized weakness    History of present illness  77-year-old -American female with history of CVA with left hemiparesis also have diabetes hypertension hyperlipidemia lymphedema morbidly obese anxiety depression chronic kidney disease stage III with a nursing home resident presented to [facility] emergency room with generalized weakness.  Patient evaluated found to be in acute kidney injury on top of chronic kidney disease admitted for management.  Patient denies any headache chest pain shortness of breath abdominal pain vomiting diarrhea.  Patient does have nausea but denies fever chills night sweats weight loss.    PAST MEDICAL HISTORY    • Anemia     • Anxiety     • Arthritis     • Cellulitis of left lower extremity     • CHF (congestive heart failure)     • Depression     • Diabetes mellitus     • DVT (deep venous thrombosis)     • GERD (gastroesophageal reflux disease)     • Hyperlipidemia     • Hypertension     • Lymphedema     • Obesity     • Osteoporosis     • Renal disorder     • Stroke     • Venous insufficiency (chronic) (peripheral)        PAST SURGICAL HISTORY   Surgical History    History reviewed. No pertinent surgical history.     FAMILY HISTORY  History reviewed. No pertinent family history.     SOCIAL HISTORY   Social History    Social History            Social History   • Marital status:        Spouse name: N/A   • Number of children: N/A   • Years of education: N/A          Occupational History   • Not on file.           Social History Main Topics   • Smoking status: Unknown If Ever Smoked   • Smokeless tobacco: Not on file   • Alcohol use No   • Drug use: Defer   • Sexual activity: Defer           Other Topics Concern   • Not on file          Social History Narrative   • No narrative on file         ALLERGIES  Contrast dye and Iodine    Nursing home medications reviewed     REVIEW OF  "SYSTEMS  Review of Systems   Constitutional: Negative for fever.   HENT: Negative for sore throat.         Dry mouth   Eyes: Negative.    Respiratory: Negative for cough and shortness of breath.    Cardiovascular: Positive for leg swelling (BLE, chronic). Negative for chest pain.   Gastrointestinal: Negative for abdominal pain, diarrhea and vomiting.   Genitourinary: Positive for frequency. Negative for dysuria.   Musculoskeletal: Negative for neck pain.   Skin: Negative for rash.   Allergic/Immunologic: Negative.    Neurological: Negative for weakness, numbness and headaches.   Hematological: Negative.    Psychiatric/Behavioral: Negative.    All other systems reviewed and are negative.     PHYSICAL EXAM  Blood pressure 162/98, pulse 74, temperature 98.3 °F (36.8 °C), temperature source Oral, resp. rate 18, height 175.3 cm (69\"), weight 116 kg (256 lb 6.4 oz), SpO2 97 %.    Constitutional: She is oriented to person, place, and time and well-developed, well-nourished, and in no distress. No distress.   Head: Normocephalic and atraumatic.   Eyes: EOM are normal. Pupils are equal, round, and reactive to light.   Neck: Normal range of motion. Neck supple.   Cardiovascular: Normal rate, regular rhythm and normal heart sounds.    Pulmonary/Chest: Effort normal and breath sounds normal. No respiratory distress.   Abdominal: Soft. There is no tenderness. There is no rebound and no guarding.   Musculoskeletal: Normal range of motion. She exhibits edema (BLE).   Neurological: She is alert and oriented to person, place, and time. She has normal sensation and normal strength.   Skin: Skin is warm and dry. No rash noted.   Psychiatric: Mood and affect normal.     LAB RESULTS  Lab Results (last 24 hours)     Procedure Component Value Units Date/Time    CBC & Differential [[id number]] Collected:  02/09/18 2046    Specimen:  Blood Updated:  02/09/18 2100    Narrative:       The following orders were created for panel order CBC & " Differential.  Procedure                               Abnormality         Status                     ---------                               -----------         ------                     CBC Auto Differential[[id number]]         Abnormal            Final result                 Please view results for these tests on the individual orders.    CBC Auto Differential [[id number]]  (Abnormal) Collected:  02/09/18 2046    Specimen:  Blood Updated:  02/09/18 2100     WBC 6.47 10*3/mm3      RBC 3.33 (L) 10*6/mm3      Hemoglobin 10.0 (L) g/dL      Hematocrit 32.2 (L) %      MCV 96.7 fL      MCH 30.0 pg      MCHC 31.1 (L) g/dL      RDW 13.5 (H) %      RDW-SD 47.6 fl      MPV 10.4 fL      Platelets 170 10*3/mm3      Neutrophil % 35.1 (L) %      Lymphocyte % 53.9 (H) %      Monocyte % 5.1 %      Eosinophil % 4.6 %      Basophil % 0.5 %      Immature Grans % 0.8 (H) %      Neutrophils, Absolute 2.27 10*3/mm3      Lymphocytes, Absolute 3.49 10*3/mm3      Monocytes, Absolute 0.33 10*3/mm3      Eosinophils, Absolute 0.30 10*3/mm3      Basophils, Absolute 0.03 10*3/mm3      Immature Grans, Absolute 0.05 (H) 10*3/mm3     Comprehensive Metabolic Panel [[id number]]  (Abnormal) Collected:  02/09/18 2046    Specimen:  Blood Updated:  02/09/18 2120     Glucose 284 (H) mg/dL      BUN 75 (H) mg/dL      Creatinine 1.64 (H) mg/dL      Sodium 137 mmol/L      Potassium 5.4 (H) mmol/L      Chloride 101 mmol/L      CO2 25.6 mmol/L      Calcium 8.6 mg/dL      Total Protein 6.7 g/dL      Albumin 3.20 (L) g/dL      ALT (SGPT) 13 U/L      AST (SGOT) 11 U/L      Alkaline Phosphatase 55 U/L      Total Bilirubin <0.2 mg/dL      eGFR  African Amer 37 (L) mL/min/1.73      Globulin 3.5 gm/dL      A/G Ratio 0.9 g/dL      BUN/Creatinine Ratio 45.7 (H)     Anion Gap 10.4 mmol/L     Narrative:       The MDRD GFR formula is only valid for adults with stable renal function between ages 18 and 70.    Protime-INR [[id number]]  (Abnormal) Collected:  02/10/18 0042     Specimen:  Blood from Arm, Right Updated:  02/10/18 0101     Protime 25.4 (H) Seconds      INR 2.36 (H)    CBC (No Diff) [[id number]]  (Abnormal) Collected:  02/10/18 0543    Specimen:  Blood Updated:  02/10/18 0646     WBC 5.86 10*3/mm3      RBC 3.29 (L) 10*6/mm3      Hemoglobin 9.8 (L) g/dL      Hematocrit 31.7 (L) %      MCV 96.4 fL      MCH 29.8 pg      MCHC 30.9 (L) g/dL      RDW 13.7 (H) %      RDW-SD 47.4 fl      MPV 10.7 fL      Platelets 177 10*3/mm3     Basic Metabolic Panel [[id number]]  (Abnormal) Collected:  02/10/18 0543    Specimen:  Blood Updated:  02/10/18 0712     Glucose 197 (H) mg/dL      BUN 68 (H) mg/dL      Creatinine 1.56 (H) mg/dL      Sodium 134 (L) mmol/L      Potassium 4.5 mmol/L      Chloride 99 mmol/L      CO2 23.3 mmol/L      Calcium 8.4 (L) mg/dL      eGFR   Amer 39 (L) mL/min/1.73      BUN/Creatinine Ratio 43.6 (H)     Anion Gap 11.7 mmol/L     Narrative:       The MDRD GFR formula is only valid for adults with stable renal function between ages 18 and 70.    POC Glucose Once [[id number]]  (Abnormal) Collected:  02/10/18 0728    Specimen:  Blood Updated:  02/10/18 0729     Glucose 198 (H) mg/dL     Narrative:       Meter: [id number] : [id number] Natalia STEWART    POC Glucose Once [[id number]]  (Abnormal) Collected:  02/10/18 1141    Specimen:  Blood Updated:  02/10/18 1142     Glucose 181 (H) mg/dL     Narrative:       Meter: [id number] : [id number] Julius STEWART        Imaging Results (last 24 hours)     ** No results found for the last 24 hours. **          Current Facility-Administered Medications:   •  acetaminophen (TYLENOL) tablet 650 mg, 650 mg, Oral, Q4H PRN, Josias Razo MD, 650 mg at 02/10/18 0532  •  amLODIPine (NORVASC) tablet 10 mg, 10 mg, Oral, Daily, Josias Razo MD  •  baclofen (LIORESAL) tablet 10 mg, 10 mg, Oral, Q8H, Josias Razo MD  •  cholecalciferol (VITAMIN D3) tablet 4,000 Units, 4,000 Units, Oral, Daily, Josias Razo MD  •  CloNIDine (CATAPRES) tablet  0.3 mg, 0.3 mg, Oral, TID, Josias Razo MD  •  clopidogrel (PLAVIX) tablet 75 mg, 75 mg, Oral, Daily, Josias Razo MD  •  dextrose (D50W) solution 25 g, 25 g, Intravenous, Q15 Min PRN, Josias Razo MD  •  dextrose (GLUTOSE) oral gel 15 g, 15 g, Oral, Q15 Min PRN, Josias Razo MD  •  docusate sodium (COLACE) capsule 250 mg, 250 mg, Oral, Daily, Josias Razo MD  •  glucagon (human recombinant) (GLUCAGEN DIAGNOSTIC) injection 1 mg, 1 mg, Subcutaneous, PRN, Josias Razo MD  •  insulin aspart (novoLOG) injection 0-7 Units, 0-7 Units, Subcutaneous, 4x Daily With Meals & Nightly, Josias Razo MD, 2 Units at 02/10/18 1249  •  insulin aspart (novoLOG) injection 5 Units, 5 Units, Subcutaneous, TID With Meals, Josias Razo MD  •  insulin detemir (LEVEMIR) injection 10 Units, 10 Units, Subcutaneous, Nightly, Josias Razo MD  •  lactobacillus acidophilus (RISAQUAD) capsule 1 capsule, 1 capsule, Oral, Daily, Josias Razo MD  •  metoprolol tartrate (LOPRESSOR) tablet 50 mg, 50 mg, Oral, Q12H, Josias Razo MD  •  [START ON 2/11/2018] pantoprazole (PROTONIX) EC tablet 40 mg, 40 mg, Oral, Q AM, Josias Razo MD  •  polyethylene glycol (MIRALAX) powder 17 g, 17 g, Oral, BID, Josias Razo MD  •  sertraline (ZOLOFT) tablet 100 mg, 100 mg, Oral, Daily, Josias Razo MD  •  Insert peripheral IV, , , Once **AND** sodium chloride 0.9 % flush 10 mL, 10 mL, Intravenous, PRN, Shara Streeter MD  •  sodium chloride 0.9 % infusion, 75 mL/hr, Intravenous, Continuous, Josias Razo MD, Last Rate: 75 mL/hr at 02/10/18 0532, 75 mL/hr at 02/10/18 0532  •  warfarin (COUMADIN) tablet 4 mg, 4 mg, Oral, Daily, Josias Razo MD     ASSESSMENT  Acute kidney injury with hyperkalemia  Chronic kidney disease stage III  Insulin-dependent diabetes mellitus  Hypertension  Hyperlipidemia  Chronic anemia  Gastroesophageal reflux disease  Chronic lymphedema  Status post CVA with left jaja-  Chronic anticoagulation  Immobilization syndrome    PLAN  Admit  IV fluid  Stop  all diuretics and ACE inhibitor  Nephrology consult  Check renal ultrasound and 2-D echo  Continue nursing home meds except for diuretics and ACE inhibitor  Supportive care  Stress ulcer DVT prophylaxis  Continue nursing home  dose of Coumadin  Recheck labs in the morning  PTOT  Follow closely further recommendation according to course    MELODY JOHNSON MD

## 2018-02-10 NOTE — NURSING NOTE
Per Linda from u/s there were two orders placed for renal ultrasound they only need the bilateral not the limited. D/C limited u/s.

## 2018-02-11 ENCOUNTER — APPOINTMENT (OUTPATIENT)
Dept: GENERAL RADIOLOGY | Facility: HOSPITAL | Age: 78
End: 2018-02-11

## 2018-02-11 LAB
ALBUMIN SERPL-MCNC: 3.4 G/DL (ref 3.5–5.2)
ALBUMIN/GLOB SERPL: 0.9 G/DL
ALP SERPL-CCNC: 55 U/L (ref 39–117)
ALT SERPL W P-5'-P-CCNC: 11 U/L (ref 1–33)
ANION GAP SERPL CALCULATED.3IONS-SCNC: 12.1 MMOL/L
AST SERPL-CCNC: 16 U/L (ref 1–32)
BASOPHILS # BLD AUTO: 0.03 10*3/MM3 (ref 0–0.2)
BASOPHILS NFR BLD AUTO: 0.4 % (ref 0–1.5)
BILIRUB SERPL-MCNC: 0.2 MG/DL (ref 0.1–1.2)
BUN BLD-MCNC: 47 MG/DL (ref 8–23)
BUN/CREAT SERPL: 38.8 (ref 7–25)
CALCIUM SPEC-SCNC: 8.7 MG/DL (ref 8.6–10.5)
CHLORIDE SERPL-SCNC: 102 MMOL/L (ref 98–107)
CHOLEST SERPL-MCNC: 235 MG/DL (ref 0–200)
CO2 SERPL-SCNC: 24.9 MMOL/L (ref 22–29)
CREAT BLD-MCNC: 1.21 MG/DL (ref 0.57–1)
DEPRECATED RDW RBC AUTO: 47 FL (ref 37–54)
EOSINOPHIL # BLD AUTO: 0.24 10*3/MM3 (ref 0–0.7)
EOSINOPHIL NFR BLD AUTO: 3.2 % (ref 0.3–6.2)
ERYTHROCYTE [DISTWIDTH] IN BLOOD BY AUTOMATED COUNT: 13.6 % (ref 11.7–13)
GFR SERPL CREATININE-BSD FRML MDRD: 52 ML/MIN/1.73
GLOBULIN UR ELPH-MCNC: 4 GM/DL
GLUCOSE BLD-MCNC: 129 MG/DL (ref 65–99)
GLUCOSE BLDC GLUCOMTR-MCNC: 142 MG/DL (ref 70–130)
GLUCOSE BLDC GLUCOMTR-MCNC: 169 MG/DL (ref 70–130)
GLUCOSE BLDC GLUCOMTR-MCNC: 172 MG/DL (ref 70–130)
GLUCOSE BLDC GLUCOMTR-MCNC: 176 MG/DL (ref 70–130)
HBA1C MFR BLD: 8.7 % (ref 4.8–5.6)
HCT VFR BLD AUTO: 35.5 % (ref 35.6–45.5)
HDLC SERPL-MCNC: 27 MG/DL (ref 40–60)
HGB BLD-MCNC: 11.3 G/DL (ref 11.9–15.5)
IMM GRANULOCYTES # BLD: 0.05 10*3/MM3 (ref 0–0.03)
IMM GRANULOCYTES NFR BLD: 0.7 % (ref 0–0.5)
INR PPP: 2.15 (ref 0.9–1.1)
LDLC SERPL CALC-MCNC: 154 MG/DL (ref 0–100)
LDLC/HDLC SERPL: 5.69 {RATIO}
LYMPHOCYTES # BLD AUTO: 3.38 10*3/MM3 (ref 0.9–4.8)
LYMPHOCYTES NFR BLD AUTO: 45.4 % (ref 19.6–45.3)
MCH RBC QN AUTO: 30.5 PG (ref 26.9–32)
MCHC RBC AUTO-ENTMCNC: 31.8 G/DL (ref 32.4–36.3)
MCV RBC AUTO: 95.9 FL (ref 80.5–98.2)
MONOCYTES # BLD AUTO: 0.45 10*3/MM3 (ref 0.2–1.2)
MONOCYTES NFR BLD AUTO: 6 % (ref 5–12)
NEUTROPHILS # BLD AUTO: 3.29 10*3/MM3 (ref 1.9–8.1)
NEUTROPHILS NFR BLD AUTO: 44.3 % (ref 42.7–76)
NT-PROBNP SERPL-MCNC: 941.3 PG/ML (ref 0–1800)
PLATELET # BLD AUTO: 186 10*3/MM3 (ref 140–500)
PMV BLD AUTO: 10.4 FL (ref 6–12)
POTASSIUM BLD-SCNC: 4 MMOL/L (ref 3.5–5.2)
PROT SERPL-MCNC: 7.4 G/DL (ref 6–8.5)
PROTHROMBIN TIME: 23.6 SECONDS (ref 11.7–14.2)
RBC # BLD AUTO: 3.7 10*6/MM3 (ref 3.9–5.2)
SODIUM BLD-SCNC: 139 MMOL/L (ref 136–145)
TRIGL SERPL-MCNC: 272 MG/DL (ref 0–150)
TSH SERPL DL<=0.05 MIU/L-ACNC: 0.93 MIU/ML (ref 0.27–4.2)
VLDLC SERPL-MCNC: 54.4 MG/DL (ref 5–40)
WBC NRBC COR # BLD: 7.44 10*3/MM3 (ref 4.5–10.7)

## 2018-02-11 PROCEDURE — 85025 COMPLETE CBC W/AUTO DIFF WBC: CPT | Performed by: HOSPITALIST

## 2018-02-11 PROCEDURE — 85610 PROTHROMBIN TIME: CPT | Performed by: HOSPITALIST

## 2018-02-11 PROCEDURE — 80053 COMPREHEN METABOLIC PANEL: CPT | Performed by: HOSPITALIST

## 2018-02-11 PROCEDURE — 84443 ASSAY THYROID STIM HORMONE: CPT | Performed by: HOSPITALIST

## 2018-02-11 PROCEDURE — 82962 GLUCOSE BLOOD TEST: CPT

## 2018-02-11 PROCEDURE — 71045 X-RAY EXAM CHEST 1 VIEW: CPT

## 2018-02-11 PROCEDURE — 83036 HEMOGLOBIN GLYCOSYLATED A1C: CPT | Performed by: HOSPITALIST

## 2018-02-11 PROCEDURE — 80061 LIPID PANEL: CPT | Performed by: HOSPITALIST

## 2018-02-11 PROCEDURE — 83880 ASSAY OF NATRIURETIC PEPTIDE: CPT | Performed by: HOSPITALIST

## 2018-02-11 PROCEDURE — 63710000001 INSULIN ASPART PER 5 UNITS: Performed by: HOSPITALIST

## 2018-02-11 PROCEDURE — 63710000001 INSULIN DETEMER PER 5 UNITS: Performed by: HOSPITALIST

## 2018-02-11 RX ORDER — ATORVASTATIN CALCIUM 20 MG/1
40 TABLET, FILM COATED ORAL NIGHTLY
Status: DISCONTINUED | OUTPATIENT
Start: 2018-02-11 | End: 2018-02-11

## 2018-02-11 RX ORDER — ATORVASTATIN CALCIUM 80 MG/1
80 TABLET, FILM COATED ORAL NIGHTLY
Status: DISCONTINUED | OUTPATIENT
Start: 2018-02-11 | End: 2018-02-19 | Stop reason: HOSPADM

## 2018-02-11 RX ORDER — ALLOPURINOL 100 MG/1
100 TABLET ORAL DAILY
Status: DISCONTINUED | OUTPATIENT
Start: 2018-02-11 | End: 2018-02-19 | Stop reason: HOSPADM

## 2018-02-11 RX ORDER — SERTRALINE HYDROCHLORIDE 100 MG/1
100 TABLET, FILM COATED ORAL NIGHTLY
Status: DISCONTINUED | OUTPATIENT
Start: 2018-02-11 | End: 2018-02-19 | Stop reason: HOSPADM

## 2018-02-11 RX ORDER — PANTOPRAZOLE SODIUM 40 MG/1
40 TABLET, DELAYED RELEASE ORAL
Status: DISCONTINUED | OUTPATIENT
Start: 2018-02-11 | End: 2018-02-19

## 2018-02-11 RX ADMIN — INSULIN ASPART 5 UNITS: 100 INJECTION, SOLUTION INTRAVENOUS; SUBCUTANEOUS at 12:17

## 2018-02-11 RX ADMIN — INSULIN ASPART 5 UNITS: 100 INJECTION, SOLUTION INTRAVENOUS; SUBCUTANEOUS at 09:15

## 2018-02-11 RX ADMIN — HYDRALAZINE HYDROCHLORIDE 10 MG: 10 TABLET, FILM COATED ORAL at 17:41

## 2018-02-11 RX ADMIN — ATORVASTATIN CALCIUM 80 MG: 80 TABLET, FILM COATED ORAL at 20:47

## 2018-02-11 RX ADMIN — INSULIN ASPART 5 UNITS: 100 INJECTION, SOLUTION INTRAVENOUS; SUBCUTANEOUS at 17:42

## 2018-02-11 RX ADMIN — HYDRALAZINE HYDROCHLORIDE 10 MG: 10 TABLET, FILM COATED ORAL at 20:46

## 2018-02-11 RX ADMIN — Medication 1 CAPSULE: at 09:12

## 2018-02-11 RX ADMIN — SERTRALINE 100 MG: 100 TABLET, FILM COATED ORAL at 09:12

## 2018-02-11 RX ADMIN — ACETAMINOPHEN 650 MG: 325 TABLET, FILM COATED ORAL at 04:50

## 2018-02-11 RX ADMIN — CLONIDINE HYDROCHLORIDE 0.3 MG: 0.1 TABLET ORAL at 20:47

## 2018-02-11 RX ADMIN — CLOPIDOGREL 75 MG: 75 TABLET, FILM COATED ORAL at 09:14

## 2018-02-11 RX ADMIN — AMLODIPINE BESYLATE 10 MG: 10 TABLET ORAL at 09:11

## 2018-02-11 RX ADMIN — INSULIN ASPART 2 UNITS: 100 INJECTION, SOLUTION INTRAVENOUS; SUBCUTANEOUS at 17:40

## 2018-02-11 RX ADMIN — POLYETHYLENE GLYCOL 3350 17 G: 17 POWDER, FOR SOLUTION ORAL at 22:18

## 2018-02-11 RX ADMIN — HYDRALAZINE HYDROCHLORIDE 10 MG: 10 TABLET, FILM COATED ORAL at 05:41

## 2018-02-11 RX ADMIN — METOPROLOL TARTRATE 50 MG: 50 TABLET, FILM COATED ORAL at 20:47

## 2018-02-11 RX ADMIN — POLYETHYLENE GLYCOL 3350 17 G: 17 POWDER, FOR SOLUTION ORAL at 09:10

## 2018-02-11 RX ADMIN — METOPROLOL TARTRATE 50 MG: 50 TABLET, FILM COATED ORAL at 09:12

## 2018-02-11 RX ADMIN — INSULIN ASPART 2 UNITS: 100 INJECTION, SOLUTION INTRAVENOUS; SUBCUTANEOUS at 22:20

## 2018-02-11 RX ADMIN — PANTOPRAZOLE SODIUM 40 MG: 40 TABLET, DELAYED RELEASE ORAL at 05:42

## 2018-02-11 RX ADMIN — INSULIN ASPART 2 UNITS: 100 INJECTION, SOLUTION INTRAVENOUS; SUBCUTANEOUS at 12:17

## 2018-02-11 RX ADMIN — BACLOFEN 10 MG: 10 TABLET ORAL at 17:41

## 2018-02-11 RX ADMIN — VITAMIN D, TAB 1000IU (100/BT) 4000 UNITS: 25 TAB at 09:12

## 2018-02-11 RX ADMIN — SERTRALINE 100 MG: 100 TABLET, FILM COATED ORAL at 20:47

## 2018-02-11 RX ADMIN — BACLOFEN 10 MG: 10 TABLET ORAL at 20:47

## 2018-02-11 RX ADMIN — PANTOPRAZOLE SODIUM 40 MG: 40 TABLET, DELAYED RELEASE ORAL at 17:40

## 2018-02-11 RX ADMIN — INSULIN DETEMIR 15 UNITS: 100 INJECTION, SOLUTION SUBCUTANEOUS at 22:19

## 2018-02-11 RX ADMIN — WARFARIN SODIUM 4 MG: 4 TABLET ORAL at 17:40

## 2018-02-11 RX ADMIN — BACLOFEN 10 MG: 10 TABLET ORAL at 09:11

## 2018-02-11 RX ADMIN — ACETAMINOPHEN 650 MG: 325 TABLET, FILM COATED ORAL at 09:10

## 2018-02-11 RX ADMIN — ALLOPURINOL 100 MG: 100 TABLET ORAL at 17:40

## 2018-02-11 RX ADMIN — DOCUSATE SODIUM 250 MG: 50 CAPSULE, LIQUID FILLED ORAL at 09:14

## 2018-02-11 RX ADMIN — CLONIDINE HYDROCHLORIDE 0.3 MG: 0.1 TABLET ORAL at 09:11

## 2018-02-11 RX ADMIN — CLONIDINE HYDROCHLORIDE 0.3 MG: 0.1 TABLET ORAL at 17:40

## 2018-02-11 NOTE — PROGRESS NOTES
Subjective no c/o no cp/sob   Chief complaint:         Objective:nad      Vital Signs  Temp:  [98.1 °F (36.7 °C)-98.7 °F (37.1 °C)] 98.3 °F (36.8 °C)  Heart Rate:  [] 82  Resp:  [14-16] 14  BP: (143-181)/() 143/93        Intake/Output Summary (Last 24 hours) at 02/11/18 1750  Last data filed at 02/11/18 0700   Gross per 24 hour   Intake              240 ml   Output                0 ml   Net              240 ml           Physical Exam    Constitutional : well developed ,No acute distress  ENMT lips pink oral mucosa moist   Eyes sclerae white PERRL  Respiratory: Clear to auscultation , No crackles no wheezing respirations are even and un-labored  GI: Soft, Non tender, BS active in all 4 quadrants  CVS: S1 S2 regular, no rub murmur or gallops  Skin warm dry no rashes no petechiae  Neuro left weakness cranial nerves 2-12 grossly intact sensation intact  Ext:no LE edema, Peripheral pulses intact   Heme no cervical  lymphadenopathy no petechiae         Results Review:      Lab Results   Component Value Date    GLUCOSE 129 (H) 02/11/2018    CALCIUM 8.7 02/11/2018     02/11/2018    K 4.0 02/11/2018    CO2 24.9 02/11/2018     02/11/2018    BUN 47 (H) 02/11/2018    CREATININE 1.21 (H) 02/11/2018    EGFRIFAFRI 52 (L) 02/11/2018    BCR 38.8 (H) 02/11/2018    ANIONGAP 12.1 02/11/2018       Lab Results   Component Value Date    WBC 7.44 02/11/2018    HGB 11.3 (L) 02/11/2018    HCT 35.5 (L) 02/11/2018    MCV 95.9 02/11/2018     02/11/2018       Pertinent Imaging studies were reviewed      Medication Review:       Current Facility-Administered Medications:   •  acetaminophen (TYLENOL) tablet 650 mg, 650 mg, Oral, Q4H PRN, Josias Razo MD, 650 mg at 02/11/18 0910  •  allopurinol (ZYLOPRIM) tablet 100 mg, 100 mg, Oral, Daily, Josias Razo MD, 100 mg at 02/11/18 1740  •  amLODIPine (NORVASC) tablet 10 mg, 10 mg, Oral, Daily, Josias Razo MD, 10 mg at 02/11/18 0911  •  atorvastatin (LIPITOR) tablet 80 mg,  80 mg, Oral, Nightly, Josias Razo MD  •  baclofen (LIORESAL) tablet 10 mg, 10 mg, Oral, Q8H, Josias Razo MD, 10 mg at 02/11/18 1741  •  cholecalciferol (VITAMIN D3) tablet 4,000 Units, 4,000 Units, Oral, Daily, Josias Razo MD, 4,000 Units at 02/11/18 0912  •  CloNIDine (CATAPRES) tablet 0.3 mg, 0.3 mg, Oral, TID, Josias Razo MD, 0.3 mg at 02/11/18 1740  •  clopidogrel (PLAVIX) tablet 75 mg, 75 mg, Oral, Daily, Josias Razo MD, 75 mg at 02/11/18 0914  •  docusate sodium (COLACE) 250 mg, 250 mg, Oral, Daily, Josias Razo MD, 250 mg at 02/11/18 0914  •  hydrALAZINE (APRESOLINE) tablet 10 mg, 10 mg, Oral, Q8H, Jefe Collins MD, 10 mg at 02/11/18 1741  •  insulin aspart (novoLOG) injection 0-7 Units, 0-7 Units, Subcutaneous, 4x Daily With Meals & Nightly, Josias Razo MD, 2 Units at 02/11/18 1740  •  insulin aspart (novoLOG) injection 5 Units, 5 Units, Subcutaneous, TID With Meals, Josias Razo MD, 5 Units at 02/11/18 1742  •  insulin detemir (LEVEMIR) injection 15 Units, 15 Units, Subcutaneous, Nightly, Josias Razo MD  •  lactobacillus acidophilus (RISAQUAD) capsule 1 capsule, 1 capsule, Oral, Daily, Josias Raoz MD, 1 capsule at 02/11/18 0912  •  metoprolol tartrate (LOPRESSOR) tablet 50 mg, 50 mg, Oral, Q12H, Josias Razo MD, 50 mg at 02/11/18 0912  •  ondansetron (ZOFRAN) injection 4 mg, 4 mg, Intravenous, Q4H PRN, Josias Razo MD  •  pantoprazole (PROTONIX) EC tablet 40 mg, 40 mg, Oral, BID AC, Josias Razo MD, 40 mg at 02/11/18 1740  •  polyethylene glycol (MIRALAX) powder 17 g, 17 g, Oral, BID, Josias Razo MD, 17 g at 02/11/18 0910  •  sertraline (ZOLOFT) tablet 100 mg, 100 mg, Oral, Nightly, Josias Razo MD  •  Insert peripheral IV, , , Once **AND** sodium chloride 0.9 % flush 10 mL, 10 mL, Intravenous, PRN, Shara Streeter MD  •  sodium chloride 0.9 % infusion, 50 mL/hr, Intravenous, Continuous, Josias Razo MD, Last Rate: 50 mL/hr at 02/11/18 1700, 50 mL/hr at 02/11/18 1700  •  warfarin (COUMADIN) tablet 4  mg, 4 mg, Oral, Daily, Josias Razo MD, 4 mg at 02/11/18 1740    sodium chloride 50 mL/hr Last Rate: 50 mL/hr (02/11/18 1700)       Assesment    PEGGY with CKD   Renal cysts  Hyponatremia  HTN  HF  Anemia  DM  CVA      Plan:      Scr 1.21 better  Renal cysts will need f/u in 3 months  US suggestive of CKD  Urology tomorrow for poorly defined cyst  Sna mormalized  Bp ok    Jefe Collins MD  02/11/18  5:50 PM  Tel: [phone]  Fax: [phone]

## 2018-02-11 NOTE — PLAN OF CARE
Problem: Patient Care Overview (Adult)  Goal: Plan of Care Review  Outcome: Ongoing (interventions implemented as appropriate)   02/11/18 3905   Coping/Psychosocial Response Interventions   Plan Of Care Reviewed With patient;family   Patient Care Overview   Progress no change   Outcome Evaluation   Outcome Summary/Follow up Plan c/o generalized ha pain, tylenol for pain, family stayed at bedside all day, pt independent feed, BS mgmt, monitoring     Goal: Adult Individualization and Mutuality  Outcome: Ongoing (interventions implemented as appropriate)    Goal: Discharge Needs Assessment  Outcome: Ongoing (interventions implemented as appropriate)      Problem: Fall Risk (Adult)  Goal: Absence of Falls  Outcome: Ongoing (interventions implemented as appropriate)      Problem: Skin Integrity Impairment, Risk/Actual (Adult)  Goal: Skin Integrity/Wound Healing  Outcome: Ongoing (interventions implemented as appropriate)      Problem: Renal Failure/Kidney Injury, Acute (Adult)  Goal: Signs and Symptoms of Listed Potential Problems Will be Absent or Manageable (Renal Failure/Kidney Injury, Acute)  Outcome: Ongoing (interventions implemented as appropriate)

## 2018-02-11 NOTE — PROGRESS NOTES
"Daily progress note    Chief complaint  Doing same  Complain of joint pain     History of present illness  77-year-old -American female with history of CVA with left hemiparesis also have diabetes hypertension hyperlipidemia lymphedema morbidly obese anxiety depression chronic kidney disease stage III with a nursing home resident presented to [facility] emergency room with generalized weakness.  Patient evaluated found to be in acute kidney injury on top of chronic kidney disease admitted for management.  Patient denies any headache chest pain shortness of breath abdominal pain vomiting diarrhea.  Patient does have nausea but denies fever chills night sweats weight loss.     REVIEW OF SYSTEMS  Review of Systems   Constitutional: Negative for fever.   HENT: Negative for sore throat.         Dry mouth   Eyes: Negative.    Respiratory: Negative for cough and shortness of breath.    Cardiovascular: Positive for leg swelling (BLE, chronic). Negative for chest pain.   Gastrointestinal: Negative for abdominal pain, diarrhea and vomiting.   Genitourinary: Positive for frequency. Negative for dysuria.   Musculoskeletal: Negative for neck pain.   Skin: Negative for rash.   Allergic/Immunologic: Negative.    Neurological: Negative for weakness, numbness and headaches.   Hematological: Negative.    Psychiatric/Behavioral: Negative.    All other systems reviewed and are negative.     PHYSICAL EXAM  Blood pressure 143/93, pulse 82, temperature 98.3 °F (36.8 °C), temperature source Oral, resp. rate 14, height 175.3 cm (69\"), weight 116 kg (256 lb 6.4 oz), SpO2 98 %.    Constitutional: She is oriented to person, place, and time and well-developed, well-nourished, and in no distress. No distress.   Head: Normocephalic and atraumatic.   Eyes: EOM are normal. Pupils are equal, round, and reactive to light.   Neck: Normal range of motion. Neck supple.   Cardiovascular: Normal rate, regular rhythm and normal heart sounds.  "   Pulmonary/Chest: Effort normal and breath sounds normal. No respiratory distress.   Abdominal: Soft. There is no tenderness. There is no rebound and no guarding.   Musculoskeletal: Normal range of motion. She exhibits edema (BLE).   Neurological: She is alert and oriented to person, place, and time. She has normal sensation and normal strength.   Skin: Skin is warm and dry. No rash noted.   Psychiatric: Mood and affect normal.     LAB RESULTS  Lab Results (last 24 hours)     Procedure Component Value Units Date/Time    POC Glucose Once [[id number]]  (Abnormal) Collected:  02/10/18 1604    Specimen:  Blood Updated:  02/10/18 1606     Glucose 214 (H) mg/dL     Narrative:       Meter: [id number] : [id number] Ba Carina NA    Urinalysis With / Culture If Indicated - Urine, Clean Catch [[id number]]  (Abnormal) Collected:  02/10/18 2006    Specimen:  Urine from Urine, Clean Catch Updated:  02/10/18 2043     Color, UA Yellow     Appearance, UA Clear     pH, UA 5.5     Specific Gravity, UA 1.010     Glucose, UA Negative     Ketones, UA Negative     Bilirubin, UA Negative     Blood, UA Trace (A)     Protein,  mg/dL (2+) (A)     Leuk Esterase, UA Small (1+) (A)     Nitrite, UA Negative     Urobilinogen, UA 0.2 E.U./dL    Urinalysis, Microscopic Only - Urine, Clean Catch [[id number]]  (Abnormal) Collected:  02/10/18 2006    Specimen:  Urine from Urine, Clean Catch Updated:  02/10/18 2043     RBC, UA 0-2 /HPF      WBC, UA 3-5 (A) /HPF      Bacteria, UA None Seen /HPF      Squamous Epithelial Cells, UA 3-6 (A) /HPF      Hyaline Casts, UA 0-2 /LPF      Methodology Manual Light Microscopy    Creatinine, Urine, Random - [[id number]] Collected:  02/10/18 2006    Specimen:  Urine Updated:  02/10/18 2050     Creatinine, Urine 18.9 mg/dL     Narrative:       Reference intervals for random urine have not been established.  Clinical usage is dependent upon physician's interpretation in combination with other laboratory tests.      Protein, Urine, Random - [[id number]] Collected:  02/10/18 2006    Specimen:  Urine Updated:  02/10/18 2050     Total Protein, Urine 65.0 mg/dL     Narrative:       Reference intervals for random urine have not been established.  Clinical usage is dependent upon physician's interpretation in combination with other laboratory tests.     Sodium, Urine, Random - [[id number]] Collected:  02/10/18 2006    Specimen:  Urine Updated:  02/10/18 2050     Sodium, Urine 100 mmol/L     Narrative:       Reference intervals for random urine have not been established.  Clinical usage is dependent upon physician's interpretation in combination with other laboratory tests.     POC Glucose Once [[id number]]  (Abnormal) Collected:  02/10/18 2139    Specimen:  Blood Updated:  02/10/18 2148     Glucose 164 (H) mg/dL     Narrative:       Meter: [id number] : [id number] Jamesberenice Pavictor hugo HERNANDEZ    CBC & Differential [[id number]] Collected:  02/11/18 0543    Specimen:  Blood Updated:  02/11/18 0631    Narrative:       The following orders were created for panel order CBC & Differential.  Procedure                               Abnormality         Status                     ---------                               -----------         ------                     CBC Auto Differential[[id number]]        Abnormal            Final result                 Please view results for these tests on the individual orders.    CBC Auto Differential [[id number]]  (Abnormal) Collected:  02/11/18 0543    Specimen:  Blood Updated:  02/11/18 0631     WBC 7.44 10*3/mm3      RBC 3.70 (L) 10*6/mm3      Hemoglobin 11.3 (L) g/dL      Hematocrit 35.5 (L) %      MCV 95.9 fL      MCH 30.5 pg      MCHC 31.8 (L) g/dL      RDW 13.6 (H) %      RDW-SD 47.0 fl      MPV 10.4 fL      Platelets 186 10*3/mm3      Neutrophil % 44.3 %      Lymphocyte % 45.4 (H) %      Monocyte % 6.0 %      Eosinophil % 3.2 %      Basophil % 0.4 %      Immature Grans % 0.7 (H) %      Neutrophils, Absolute  3.29 10*3/mm3      Lymphocytes, Absolute 3.38 10*3/mm3      Monocytes, Absolute 0.45 10*3/mm3      Eosinophils, Absolute 0.24 10*3/mm3      Basophils, Absolute 0.03 10*3/mm3      Immature Grans, Absolute 0.05 (H) 10*3/mm3     Hemoglobin A1c [[id number]]  (Abnormal) Collected:  02/11/18 0543    Specimen:  Blood Updated:  02/11/18 0638     Hemoglobin A1C 8.70 (H) %     Narrative:       Hemoglobin A1C Ranges:    Increased Risk for Diabetes  5.7% to 6.4%  Diabetes                     >= 6.5%  Diabetic Goal                < 7.0%    Protime-INR [[id number]]  (Abnormal) Collected:  02/11/18 0543    Specimen:  Blood Updated:  02/11/18 0644     Protime 23.6 (H) Seconds      INR 2.15 (H)    Comprehensive Metabolic Panel [[id number]]  (Abnormal) Collected:  02/11/18 0543    Specimen:  Blood Updated:  02/11/18 0651     Glucose 129 (H) mg/dL      BUN 47 (H) mg/dL      Creatinine 1.21 (H) mg/dL      Sodium 139 mmol/L      Potassium 4.0 mmol/L      Chloride 102 mmol/L      CO2 24.9 mmol/L      Calcium 8.7 mg/dL      Total Protein 7.4 g/dL      Albumin 3.40 (L) g/dL      ALT (SGPT) 11 U/L      AST (SGOT) 16 U/L      Alkaline Phosphatase 55 U/L      Total Bilirubin 0.2 mg/dL      eGFR  African Amer 52 (L) mL/min/1.73      Globulin 4.0 gm/dL      A/G Ratio 0.9 g/dL      BUN/Creatinine Ratio 38.8 (H)     Anion Gap 12.1 mmol/L     Narrative:       The MDRD GFR formula is only valid for adults with stable renal function between ages 18 and 70.    Lipid Panel [[id number]]  (Abnormal) Collected:  02/11/18 0543    Specimen:  Blood Updated:  02/11/18 0651     Total Cholesterol 235 (H) mg/dL      Triglycerides 272 (H) mg/dL      HDL Cholesterol 27 (L) mg/dL      LDL Cholesterol  154 (H) mg/dL      VLDL Cholesterol 54.4 (H) mg/dL      LDL/HDL Ratio 5.69    Narrative:       Cholesterol Reference Ranges  (U.S. Department of Health and Human Services ATP III Classifications)    Desirable          <200 mg/dL  Borderline High    200-239 mg/dL  High  Risk          >240 mg/dL      Triglyceride Reference Ranges  (U.S. Department of Health and Human Services ATP III Classifications)    Normal           <150 mg/dL  Borderline High  150-199 mg/dL  High             200-499 mg/dL  Very High        >500 mg/dL    HDL Reference Ranges  (U.S. Department of Health and Human Services ATP III Classifcations)    Low     <40 mg/dl (major risk factor for CHD)  High    >60 mg/dl ('negative' risk factor for CHD)        LDL Reference Ranges  (U.S. Department of Health and Human Services ATP III Classifcations)    Optimal          <100 mg/dL  Near Optimal     100-129 mg/dL  Borderline High  130-159 mg/dL  High             160-189 mg/dL  Very High        >189 mg/dL    BNP [[id number]]  (Normal) Collected:  02/11/18 0543    Specimen:  Blood Updated:  02/11/18 0703     proBNP 941.3 pg/mL     Narrative:       Among patients with dyspnea, NT-proBNP is highly sensitive for the detection of acute congestive heart failure. In addition NT-proBNP of <300 pg/ml effectively rules out acute congestive heart failure with 99% negative predictive value.    TSH [[id number]]  (Normal) Collected:  02/11/18 0543    Specimen:  Blood Updated:  02/11/18 0703     TSH 0.933 mIU/mL     POC Glucose Once [[id number]]  (Abnormal) Collected:  02/11/18 0720    Specimen:  Blood Updated:  02/11/18 0722     Glucose 142 (H) mg/dL     Narrative:       Meter: [id number] : [id number] [facility] Sonya TERRY    POC Glucose Once [[id number]]  (Abnormal) Collected:  02/11/18 1144    Specimen:  Blood Updated:  02/11/18 1146     Glucose 169 (H) mg/dL     Narrative:       Meter: [id number] : [id number] [facility] NA        Imaging Results (last 24 hours)     Procedure Component Value Units Date/Time    US Renal Bilateral [[id number]] Collected:  02/10/18 1942     Updated:  02/10/18 1942    Narrative:       ULTRASOUND RENAL BILATERAL.     TECHNIQUE: Grayscale and color images of the kidneys were obtained.     HISTORY: Acute  renal insufficiency.     COMPARISON: No prior studies for comparison.     FINDINGS:  Right kidney measures 8.6 x 3.9 x 4.2 cm, cortical thickness measures  1.1 cm. 1.2 x 1.3 x 1.1 cm cyst at the inferior pole right kidney. Left  kidney measures 8.8 x 4.6 x 4.2 cm, cortical thickness measures 1.2 cm.  At the inferior pole there is poorly visualized 2 cm lesion likely to be  a cyst however follow-up is recommended. There is no hydronephrosis,  stone or mass.     Urinary bladder is distended otherwise unremarkable. No calculus, sludge  or mass.       Impression:       1.  No acute findings, no hydronephrosis.  2.  1.3 cm cyst of the right kidney.  3.  Poorly visualized suspected 2 cm cyst at the inferior pole left  kidney. Follow-up is recommended.        XR Chest 1 View [[id number]] Collected:  02/11/18 0533     Updated:  02/11/18 0533    Narrative:       X-RAY CHEST 1 VIEW.     HISTORY: CHF, hypertension.     COMPARISON: 09/17/2009.     FINDINGS:  Cardiomediastinal silhouette is within normal limits.         There is no consolidation or effusion.              Impression:       No acute findings. No significant change.             Current Facility-Administered Medications:   •  acetaminophen (TYLENOL) tablet 650 mg, 650 mg, Oral, Q4H PRN, Josias Razo MD, 650 mg at 02/11/18 0910  •  amLODIPine (NORVASC) tablet 10 mg, 10 mg, Oral, Daily, Josias Razo MD, 10 mg at 02/11/18 0911  •  baclofen (LIORESAL) tablet 10 mg, 10 mg, Oral, Q8H, Josias Razo MD, 10 mg at 02/11/18 0911  •  cholecalciferol (VITAMIN D3) tablet 4,000 Units, 4,000 Units, Oral, Daily, Josias Razo MD, 4,000 Units at 02/11/18 0912  •  CloNIDine (CATAPRES) tablet 0.3 mg, 0.3 mg, Oral, TID, Josias Razo MD, 0.3 mg at 02/11/18 0911  •  clopidogrel (PLAVIX) tablet 75 mg, 75 mg, Oral, Daily, Josias Razo MD, 75 mg at 02/11/18 0914  •  docusate sodium (COLACE) 250 mg, 250 mg, Oral, Daily, Josias Razo MD, 250 mg at 02/11/18 0914  •  hydrALAZINE (APRESOLINE)  tablet 10 mg, 10 mg, Oral, Q8H, Jefe Collins MD, 10 mg at 02/11/18 0541  •  insulin aspart (novoLOG) injection 0-7 Units, 0-7 Units, Subcutaneous, 4x Daily With Meals & Nightly, Josias Razo MD, 2 Units at 02/11/18 1217  •  insulin aspart (novoLOG) injection 5 Units, 5 Units, Subcutaneous, TID With Meals, Josias Razo MD, 5 Units at 02/11/18 1217  •  insulin detemir (LEVEMIR) injection 10 Units, 10 Units, Subcutaneous, Nightly, Josias Razo MD, 10 Units at 02/10/18 2303  •  lactobacillus acidophilus (RISAQUAD) capsule 1 capsule, 1 capsule, Oral, Daily, Josias Razo MD, 1 capsule at 02/11/18 0912  •  metoprolol tartrate (LOPRESSOR) tablet 50 mg, 50 mg, Oral, Q12H, Josias Razo MD, 50 mg at 02/11/18 0912  •  ondansetron (ZOFRAN) injection 4 mg, 4 mg, Intravenous, Q4H PRN, Josias Razo MD  •  pantoprazole (PROTONIX) EC tablet 40 mg, 40 mg, Oral, Q AM, Josias Razo MD, 40 mg at 02/11/18 0542  •  polyethylene glycol (MIRALAX) powder 17 g, 17 g, Oral, BID, Josias Razo MD, 17 g at 02/11/18 0910  •  sertraline (ZOLOFT) tablet 100 mg, 100 mg, Oral, Daily, Josias Razo MD, 100 mg at 02/11/18 0912  •  Insert peripheral IV, , , Once **AND** sodium chloride 0.9 % flush 10 mL, 10 mL, Intravenous, PRN, Shara Streeter MD  •  sodium chloride 0.9 % infusion, 75 mL/hr, Intravenous, Continuous, Josias Razo MD, Last Rate: 75 mL/hr at 02/10/18 0532, 75 mL/hr at 02/10/18 0532  •  warfarin (COUMADIN) tablet 4 mg, 4 mg, Oral, Daily, Josias Razo MD, 4 mg at 02/10/18 2201     ASSESSMENT  Acute kidney injury with hyperkalemia  Chronic kidney disease stage III  Insulin-dependent diabetes mellitus  Hypertension  Hyperlipidemia  Chronic anemia  Gastroesophageal reflux disease  Chronic lymphedema  Status post CVA with left jaja-  Chronic anticoagulation  Immobilization syndrome    PLAN  CPM  IV fluid  Stop all diuretics and ACE inhibitor  Nephrology consult appreciated  Continue nursing home meds except for diuretics and ACE  inhibitor  Supportive care  Stress ulcer DVT prophylaxis  Continue nursing home  dose of Coumadin  PTOT  Follow closely further recommendation according to course    MELODY JOHNSON MD

## 2018-02-11 NOTE — PLAN OF CARE
Problem: Patient Care Overview (Adult)  Goal: Plan of Care Review  Outcome: Ongoing (interventions implemented as appropriate)   02/10/18 2046   Coping/Psychosocial Response Interventions   Plan Of Care Reviewed With patient   Patient Care Overview   Progress no change   Outcome Evaluation   Outcome Summary/Follow up Plan no c/o pain, renal us today, urine sent to lab, BS mgmt, left side residual, pt sleep most of day monitoring     Goal: Adult Individualization and Mutuality  Outcome: Ongoing (interventions implemented as appropriate)    Goal: Discharge Needs Assessment  Outcome: Ongoing (interventions implemented as appropriate)      Problem: Fall Risk (Adult)  Goal: Absence of Falls  Outcome: Ongoing (interventions implemented as appropriate)      Problem: Skin Integrity Impairment, Risk/Actual (Adult)  Goal: Skin Integrity/Wound Healing  Outcome: Ongoing (interventions implemented as appropriate)      Problem: Renal Failure/Kidney Injury, Acute (Adult)  Goal: Signs and Symptoms of Listed Potential Problems Will be Absent or Manageable (Renal Failure/Kidney Injury, Acute)  Outcome: Ongoing (interventions implemented as appropriate)

## 2018-02-11 NOTE — PLAN OF CARE
Problem: Patient Care Overview (Adult)  Goal: Plan of Care Review  Outcome: Ongoing (interventions implemented as appropriate)   02/11/18 0310   Coping/Psychosocial Response Interventions   Plan Of Care Reviewed With patient   Patient Care Overview   Progress improving   Outcome Evaluation   Outcome Summary/Follow up Plan Medicated once for pain with relief. Rested well. Daughter at bedside. Medications and IVF adminstered per orders. No s/s of distress at this time. Will continue to monitor.      Goal: Adult Individualization and Mutuality  Outcome: Ongoing (interventions implemented as appropriate)    Goal: Discharge Needs Assessment  Outcome: Ongoing (interventions implemented as appropriate)      Problem: Fall Risk (Adult)  Goal: Absence of Falls  Outcome: Ongoing (interventions implemented as appropriate)      Problem: Skin Integrity Impairment, Risk/Actual (Adult)  Goal: Skin Integrity/Wound Healing  Outcome: Ongoing (interventions implemented as appropriate)      Problem: Renal Failure/Kidney Injury, Acute (Adult)  Goal: Signs and Symptoms of Listed Potential Problems Will be Absent or Manageable (Renal Failure/Kidney Injury, Acute)  Outcome: Ongoing (interventions implemented as appropriate)

## 2018-02-12 ENCOUNTER — APPOINTMENT (OUTPATIENT)
Dept: CT IMAGING | Facility: HOSPITAL | Age: 78
End: 2018-02-12
Attending: UROLOGY

## 2018-02-12 ENCOUNTER — APPOINTMENT (OUTPATIENT)
Dept: CARDIOLOGY | Facility: HOSPITAL | Age: 78
End: 2018-02-12
Attending: HOSPITALIST

## 2018-02-12 LAB
ANION GAP SERPL CALCULATED.3IONS-SCNC: 9.6 MMOL/L
AORTIC DIMENSIONLESS INDEX: 0.8 (DI)
BASOPHILS # BLD AUTO: 0.02 10*3/MM3 (ref 0–0.2)
BASOPHILS NFR BLD AUTO: 0.3 % (ref 0–1.5)
BH CV ECHO MEAS - ACS: 1.9 CM
BH CV ECHO MEAS - AO MAX PG (FULL): 0.89 MMHG
BH CV ECHO MEAS - AO MAX PG: 8.2 MMHG
BH CV ECHO MEAS - AO MEAN PG (FULL): 1 MMHG
BH CV ECHO MEAS - AO MEAN PG: 5 MMHG
BH CV ECHO MEAS - AO ROOT AREA (BSA CORRECTED): 1.8
BH CV ECHO MEAS - AO ROOT AREA: 13.9 CM^2
BH CV ECHO MEAS - AO ROOT DIAM: 4.2 CM
BH CV ECHO MEAS - AO V2 MAX: 143 CM/SEC
BH CV ECHO MEAS - AO V2 MEAN: 106 CM/SEC
BH CV ECHO MEAS - AO V2 VTI: 34 CM
BH CV ECHO MEAS - ASC AORTA: 3.6 CM
BH CV ECHO MEAS - AVA(I,A): 3.1 CM^2
BH CV ECHO MEAS - AVA(I,D): 3.1 CM^2
BH CV ECHO MEAS - AVA(V,A): 3.6 CM^2
BH CV ECHO MEAS - AVA(V,D): 3.6 CM^2
BH CV ECHO MEAS - BSA(HAYCOCK): 2.4 M^2
BH CV ECHO MEAS - BSA: 2.3 M^2
BH CV ECHO MEAS - BZI_BMI: 37.9 KILOGRAMS/M^2
BH CV ECHO MEAS - BZI_METRIC_HEIGHT: 175 CM
BH CV ECHO MEAS - BZI_METRIC_WEIGHT: 116 KG
BH CV ECHO MEAS - CONTRAST EF (2CH): 65.4 ML/M^2
BH CV ECHO MEAS - CONTRAST EF 4CH: 71.9 ML/M^2
BH CV ECHO MEAS - EDV(CUBED): 50.7 ML
BH CV ECHO MEAS - EDV(MOD-SP2): 52 ML
BH CV ECHO MEAS - EDV(MOD-SP4): 64 ML
BH CV ECHO MEAS - EDV(TEICH): 58.1 ML
BH CV ECHO MEAS - EF(CUBED): 65.3 %
BH CV ECHO MEAS - EF(MOD-SP2): 65.4 %
BH CV ECHO MEAS - EF(MOD-SP4): 71.9 %
BH CV ECHO MEAS - EF(TEICH): 57.7 %
BH CV ECHO MEAS - ESV(CUBED): 17.6 ML
BH CV ECHO MEAS - ESV(MOD-SP2): 18 ML
BH CV ECHO MEAS - ESV(MOD-SP4): 18 ML
BH CV ECHO MEAS - ESV(TEICH): 24.6 ML
BH CV ECHO MEAS - FS: 29.7 %
BH CV ECHO MEAS - IVS/LVPW: 0.89
BH CV ECHO MEAS - IVSD: 1.6 CM
BH CV ECHO MEAS - LAT PEAK E' VEL: 5 CM/SEC
BH CV ECHO MEAS - LV DIASTOLIC VOL/BSA (35-75): 27.9 ML/M^2
BH CV ECHO MEAS - LV MASS(C)D: 256.2 GRAMS
BH CV ECHO MEAS - LV MASS(C)DI: 111.9 GRAMS/M^2
BH CV ECHO MEAS - LV MAX PG: 7.3 MMHG
BH CV ECHO MEAS - LV MEAN PG: 4 MMHG
BH CV ECHO MEAS - LV SYSTOLIC VOL/BSA (12-30): 7.9 ML/M^2
BH CV ECHO MEAS - LV V1 MAX: 135 CM/SEC
BH CV ECHO MEAS - LV V1 MEAN: 95.6 CM/SEC
BH CV ECHO MEAS - LV V1 VTI: 28.1 CM
BH CV ECHO MEAS - LVIDD: 3.7 CM
BH CV ECHO MEAS - LVIDS: 2.6 CM
BH CV ECHO MEAS - LVLD AP2: 6.1 CM
BH CV ECHO MEAS - LVLD AP4: 6.9 CM
BH CV ECHO MEAS - LVLS AP2: 4.5 CM
BH CV ECHO MEAS - LVLS AP4: 5.5 CM
BH CV ECHO MEAS - LVOT AREA (M): 3.8 CM^2
BH CV ECHO MEAS - LVOT AREA: 3.8 CM^2
BH CV ECHO MEAS - LVOT DIAM: 2.2 CM
BH CV ECHO MEAS - LVPWD: 1.8 CM
BH CV ECHO MEAS - MED PEAK E' VEL: 3 CM/SEC
BH CV ECHO MEAS - MV A DUR: 158 SEC
BH CV ECHO MEAS - MV A MAX VEL: 99.4 CM/SEC
BH CV ECHO MEAS - MV DEC SLOPE: 247 CM/SEC^2
BH CV ECHO MEAS - MV DEC TIME: 303 SEC
BH CV ECHO MEAS - MV E MAX VEL: 61.6 CM/SEC
BH CV ECHO MEAS - MV E/A: 0.62
BH CV ECHO MEAS - MV MAX PG: 5.7 MMHG
BH CV ECHO MEAS - MV MEAN PG: 2 MMHG
BH CV ECHO MEAS - MV P1/2T MAX VEL: 74.7 CM/SEC
BH CV ECHO MEAS - MV P1/2T: 88.6 MSEC
BH CV ECHO MEAS - MV V2 MAX: 119 CM/SEC
BH CV ECHO MEAS - MV V2 MEAN: 66.8 CM/SEC
BH CV ECHO MEAS - MV V2 VTI: 30 CM
BH CV ECHO MEAS - MVA P1/2T LCG: 2.9 CM^2
BH CV ECHO MEAS - MVA(P1/2T): 2.5 CM^2
BH CV ECHO MEAS - MVA(VTI): 3.6 CM^2
BH CV ECHO MEAS - PA ACC TIME: 0.1 SEC
BH CV ECHO MEAS - PA MAX PG (FULL): 1.4 MMHG
BH CV ECHO MEAS - PA MAX PG: 2.5 MMHG
BH CV ECHO MEAS - PA PR(ACCEL): 33.1 MMHG
BH CV ECHO MEAS - PA V2 MAX: 78.7 CM/SEC
BH CV ECHO MEAS - PULM A REVS DUR: 123 SEC
BH CV ECHO MEAS - PULM A REVS VEL: 31 CM/SEC
BH CV ECHO MEAS - PULM DIAS VEL: 40.3 CM/SEC
BH CV ECHO MEAS - PULM S/D: 1.7
BH CV ECHO MEAS - PULM SYS VEL: 68.4 CM/SEC
BH CV ECHO MEAS - PVA(V,A): 3.3 CM^2
BH CV ECHO MEAS - PVA(V,D): 3.3 CM^2
BH CV ECHO MEAS - QP/QS: 0.49
BH CV ECHO MEAS - RAP SYSTOLE: 3 MMHG
BH CV ECHO MEAS - RV MAX PG: 1.1 MMHG
BH CV ECHO MEAS - RV MEAN PG: 1 MMHG
BH CV ECHO MEAS - RV V1 MAX: 52.4 CM/SEC
BH CV ECHO MEAS - RV V1 MEAN: 35.4 CM/SEC
BH CV ECHO MEAS - RV V1 VTI: 10.6 CM
BH CV ECHO MEAS - RVOT AREA: 4.9 CM^2
BH CV ECHO MEAS - RVOT DIAM: 2.5 CM
BH CV ECHO MEAS - RVSP: 9 MMHG
BH CV ECHO MEAS - SI(AO): 205.6 ML/M^2
BH CV ECHO MEAS - SI(CUBED): 14.4 ML/M^2
BH CV ECHO MEAS - SI(LVOT): 46.6 ML/M^2
BH CV ECHO MEAS - SI(MOD-SP2): 14.8 ML/M^2
BH CV ECHO MEAS - SI(MOD-SP4): 20.1 ML/M^2
BH CV ECHO MEAS - SI(TEICH): 14.6 ML/M^2
BH CV ECHO MEAS - SV(AO): 471.1 ML
BH CV ECHO MEAS - SV(CUBED): 33.1 ML
BH CV ECHO MEAS - SV(LVOT): 106.8 ML
BH CV ECHO MEAS - SV(MOD-SP2): 34 ML
BH CV ECHO MEAS - SV(MOD-SP4): 46 ML
BH CV ECHO MEAS - SV(RVOT): 52 ML
BH CV ECHO MEAS - SV(TEICH): 33.5 ML
BH CV ECHO MEAS - TR MAX VEL: 126 CM/SEC
BH CV VAS BP RIGHT ARM: NORMAL MMHG
BH CV XLRA - RV BASE: 3 CM
BH CV XLRA - TDI S': 10 CM/SEC
BUN BLD-MCNC: 36 MG/DL (ref 8–23)
BUN/CREAT SERPL: 29.3 (ref 7–25)
CALCIUM SPEC-SCNC: 8.4 MG/DL (ref 8.6–10.5)
CHLORIDE SERPL-SCNC: 105 MMOL/L (ref 98–107)
CO2 SERPL-SCNC: 26.4 MMOL/L (ref 22–29)
CREAT BLD-MCNC: 1.23 MG/DL (ref 0.57–1)
DEPRECATED RDW RBC AUTO: 47.9 FL (ref 37–54)
E/E' RATIO: 15
EOSINOPHIL # BLD AUTO: 0.19 10*3/MM3 (ref 0–0.7)
EOSINOPHIL NFR BLD AUTO: 2.9 % (ref 0.3–6.2)
ERYTHROCYTE [DISTWIDTH] IN BLOOD BY AUTOMATED COUNT: 13.9 % (ref 11.7–13)
GFR SERPL CREATININE-BSD FRML MDRD: 51 ML/MIN/1.73
GLUCOSE BLD-MCNC: 164 MG/DL (ref 65–99)
GLUCOSE BLDC GLUCOMTR-MCNC: 157 MG/DL (ref 70–130)
GLUCOSE BLDC GLUCOMTR-MCNC: 166 MG/DL (ref 70–130)
GLUCOSE BLDC GLUCOMTR-MCNC: 169 MG/DL (ref 70–130)
GLUCOSE BLDC GLUCOMTR-MCNC: 187 MG/DL (ref 70–130)
HCT VFR BLD AUTO: 32.7 % (ref 35.6–45.5)
HGB BLD-MCNC: 10.3 G/DL (ref 11.9–15.5)
IMM GRANULOCYTES # BLD: 0.05 10*3/MM3 (ref 0–0.03)
IMM GRANULOCYTES NFR BLD: 0.8 % (ref 0–0.5)
INR PPP: 1.98 (ref 0.9–1.1)
LEFT ATRIUM VOLUME INDEX: 15.9 ML/M2
LYMPHOCYTES # BLD AUTO: 3.35 10*3/MM3 (ref 0.9–4.8)
LYMPHOCYTES NFR BLD AUTO: 51.9 % (ref 19.6–45.3)
MAXIMAL PREDICTED HEART RATE: 144 BPM
MCH RBC QN AUTO: 30.1 PG (ref 26.9–32)
MCHC RBC AUTO-ENTMCNC: 31.5 G/DL (ref 32.4–36.3)
MCV RBC AUTO: 95.6 FL (ref 80.5–98.2)
MONOCYTES # BLD AUTO: 0.41 10*3/MM3 (ref 0.2–1.2)
MONOCYTES NFR BLD AUTO: 6.3 % (ref 5–12)
NEUTROPHILS # BLD AUTO: 2.44 10*3/MM3 (ref 1.9–8.1)
NEUTROPHILS NFR BLD AUTO: 37.8 % (ref 42.7–76)
PLATELET # BLD AUTO: 181 10*3/MM3 (ref 140–500)
PMV BLD AUTO: 10.4 FL (ref 6–12)
POTASSIUM BLD-SCNC: 4.2 MMOL/L (ref 3.5–5.2)
PROTHROMBIN TIME: 22.2 SECONDS (ref 11.7–14.2)
RBC # BLD AUTO: 3.42 10*6/MM3 (ref 3.9–5.2)
SODIUM BLD-SCNC: 141 MMOL/L (ref 136–145)
STRESS TARGET HR: 122 BPM
URATE SERPL-MCNC: 6.9 MG/DL (ref 2.4–5.7)
WBC NRBC COR # BLD: 6.46 10*3/MM3 (ref 4.5–10.7)

## 2018-02-12 PROCEDURE — 85610 PROTHROMBIN TIME: CPT | Performed by: HOSPITALIST

## 2018-02-12 PROCEDURE — 82962 GLUCOSE BLOOD TEST: CPT

## 2018-02-12 PROCEDURE — 93306 TTE W/DOPPLER COMPLETE: CPT

## 2018-02-12 PROCEDURE — 85025 COMPLETE CBC W/AUTO DIFF WBC: CPT | Performed by: HOSPITALIST

## 2018-02-12 PROCEDURE — 63710000001 INSULIN ASPART PER 5 UNITS: Performed by: HOSPITALIST

## 2018-02-12 PROCEDURE — 84550 ASSAY OF BLOOD/URIC ACID: CPT | Performed by: HOSPITALIST

## 2018-02-12 PROCEDURE — 80048 BASIC METABOLIC PNL TOTAL CA: CPT | Performed by: HOSPITALIST

## 2018-02-12 PROCEDURE — 74176 CT ABD & PELVIS W/O CONTRAST: CPT

## 2018-02-12 PROCEDURE — 93306 TTE W/DOPPLER COMPLETE: CPT | Performed by: INTERNAL MEDICINE

## 2018-02-12 PROCEDURE — 99222 1ST HOSP IP/OBS MODERATE 55: CPT | Performed by: INTERNAL MEDICINE

## 2018-02-12 RX ORDER — DOCUSATE SODIUM 100 MG/1
200 CAPSULE, LIQUID FILLED ORAL 2 TIMES DAILY
Status: DISCONTINUED | OUTPATIENT
Start: 2018-02-12 | End: 2018-02-19 | Stop reason: HOSPADM

## 2018-02-12 RX ORDER — HYDRALAZINE HYDROCHLORIDE 25 MG/1
25 TABLET, FILM COATED ORAL EVERY 8 HOURS SCHEDULED
Status: DISCONTINUED | OUTPATIENT
Start: 2018-02-12 | End: 2018-02-13

## 2018-02-12 RX ORDER — WARFARIN SODIUM 7.5 MG/1
7.5 TABLET ORAL
Status: COMPLETED | OUTPATIENT
Start: 2018-02-12 | End: 2018-02-12

## 2018-02-12 RX ORDER — DOCUSATE SODIUM 100 MG/1
200 CAPSULE, LIQUID FILLED ORAL DAILY
Status: DISCONTINUED | OUTPATIENT
Start: 2018-02-13 | End: 2018-02-12

## 2018-02-12 RX ADMIN — PANTOPRAZOLE SODIUM 40 MG: 40 TABLET, DELAYED RELEASE ORAL at 08:02

## 2018-02-12 RX ADMIN — CLOPIDOGREL 75 MG: 75 TABLET, FILM COATED ORAL at 10:28

## 2018-02-12 RX ADMIN — CLONIDINE HYDROCHLORIDE 0.3 MG: 0.1 TABLET ORAL at 10:28

## 2018-02-12 RX ADMIN — AMLODIPINE BESYLATE 10 MG: 10 TABLET ORAL at 10:29

## 2018-02-12 RX ADMIN — HYDRALAZINE HYDROCHLORIDE 10 MG: 10 TABLET, FILM COATED ORAL at 14:10

## 2018-02-12 RX ADMIN — Medication 1 CAPSULE: at 10:29

## 2018-02-12 RX ADMIN — PANTOPRAZOLE SODIUM 40 MG: 40 TABLET, DELAYED RELEASE ORAL at 18:32

## 2018-02-12 RX ADMIN — SODIUM CHLORIDE 75 ML/HR: 9 INJECTION, SOLUTION INTRAVENOUS at 14:06

## 2018-02-12 RX ADMIN — CLONIDINE HYDROCHLORIDE 0.3 MG: 0.1 TABLET ORAL at 21:21

## 2018-02-12 RX ADMIN — ATORVASTATIN CALCIUM 80 MG: 80 TABLET, FILM COATED ORAL at 21:21

## 2018-02-12 RX ADMIN — BACLOFEN 10 MG: 10 TABLET ORAL at 05:19

## 2018-02-12 RX ADMIN — SERTRALINE 100 MG: 100 TABLET, FILM COATED ORAL at 21:21

## 2018-02-12 RX ADMIN — WARFARIN SODIUM 7.5 MG: 7.5 TABLET ORAL at 18:32

## 2018-02-12 RX ADMIN — ALLOPURINOL 100 MG: 100 TABLET ORAL at 10:29

## 2018-02-12 RX ADMIN — INSULIN ASPART 2 UNITS: 100 INJECTION, SOLUTION INTRAVENOUS; SUBCUTANEOUS at 08:08

## 2018-02-12 RX ADMIN — VITAMIN D, TAB 1000IU (100/BT) 4000 UNITS: 25 TAB at 10:29

## 2018-02-12 RX ADMIN — HYDRALAZINE HYDROCHLORIDE 25 MG: 25 TABLET, FILM COATED ORAL at 21:20

## 2018-02-12 RX ADMIN — DOCUSATE SODIUM 200 MG: 100 CAPSULE, LIQUID FILLED ORAL at 21:21

## 2018-02-12 RX ADMIN — INSULIN ASPART 5 UNITS: 100 INJECTION, SOLUTION INTRAVENOUS; SUBCUTANEOUS at 18:33

## 2018-02-12 RX ADMIN — METOPROLOL TARTRATE 50 MG: 50 TABLET, FILM COATED ORAL at 10:29

## 2018-02-12 RX ADMIN — METOPROLOL TARTRATE 50 MG: 50 TABLET, FILM COATED ORAL at 21:21

## 2018-02-12 RX ADMIN — INSULIN ASPART 2 UNITS: 100 INJECTION, SOLUTION INTRAVENOUS; SUBCUTANEOUS at 22:52

## 2018-02-12 RX ADMIN — INSULIN ASPART 2 UNITS: 100 INJECTION, SOLUTION INTRAVENOUS; SUBCUTANEOUS at 18:33

## 2018-02-12 RX ADMIN — INSULIN ASPART 5 UNITS: 100 INJECTION, SOLUTION INTRAVENOUS; SUBCUTANEOUS at 12:21

## 2018-02-12 RX ADMIN — BACLOFEN 10 MG: 10 TABLET ORAL at 21:20

## 2018-02-12 RX ADMIN — CLONIDINE HYDROCHLORIDE 0.3 MG: 0.1 TABLET ORAL at 16:38

## 2018-02-12 RX ADMIN — INSULIN ASPART 5 UNITS: 100 INJECTION, SOLUTION INTRAVENOUS; SUBCUTANEOUS at 08:07

## 2018-02-12 RX ADMIN — POLYETHYLENE GLYCOL 3350 17 G: 17 POWDER, FOR SOLUTION ORAL at 21:21

## 2018-02-12 RX ADMIN — HYDRALAZINE HYDROCHLORIDE 10 MG: 10 TABLET, FILM COATED ORAL at 05:19

## 2018-02-12 RX ADMIN — BACLOFEN 10 MG: 10 TABLET ORAL at 14:10

## 2018-02-12 RX ADMIN — INSULIN ASPART 2 UNITS: 100 INJECTION, SOLUTION INTRAVENOUS; SUBCUTANEOUS at 12:22

## 2018-02-12 RX ADMIN — DOCUSATE SODIUM 250 MG: 50 CAPSULE, LIQUID FILLED ORAL at 10:30

## 2018-02-12 NOTE — CONSULTS
Referring ProviderCosmin  Reason for Consultation: renal cyst    Patient Care Team:  Yousuf Corrigan MD as PCP - General (Internal Medicine)    Chief complaint generalized weakness    Subjective .     History of present illness:  77 year old  female with history of CVA and left hemiparesis  Admitted with weakness  Kidney disease   Creat now 1.5  US shows a renal cyst and another probable cyst poorly seen on US  These cysts are 2cm or less in size  No flank pain  No palpable mass  No fever or chills  No weight loss    Review of Systems  gen no fever or chills  Skin dry and no rash  GI neg   renal cysts, mild renal insufficiency  Neuro left hemiparesis  Psych neg  Hematological neg  Endocrine diabetes  Cardiovascular  Lower ext edema, HTN history  Pulm no SOB    History  CHF history  Diabetes  GERD  HTN  Obesity  Previous stroke  Left hemiparesis    Allergies contrast dye and iodine  Social History  Lives in Nursing home  Non smoker,        Objective     Vital Signs   Temp:  [97.9 °F (36.6 °C)-98.3 °F (36.8 °C)] 97.9 °F (36.6 °C)  Heart Rate:  [] 78  Resp:  [14-16] 16  BP: (143-181)/() 150/98    Physical Exam:  Alert and oriented  Head ATNC  Neck supple  Eyes EOM normal pupils reactive  Heart RRR  Lungs normal effort, clear  Abd soft and nt  Ext edema of LE  Neuro alert and oriented, left hemiparesis  Skin warm and dry  Psych normal    Results Review:   Creat 1.5              prob cysts on US 2cm and less in size              Hgb 10.0     Hct 32.2      Assessment/Plan     Active Problems:    Acute renal failure superimposed on chronic kidney disease  renal cysts  Diabetes  Left hemiparesis  Chronic anticoagulation  Immobilization syndrome  GERD  Hypertension    Will get CT as next step (without contrast due to allergy and CKD)    I discussed the patients findings and my recommendations with patient    Laron Schuster Jr., MD  02/11/18  7:14 PM    Time: 30 min

## 2018-02-12 NOTE — DISCHARGE PLACEMENT REQUEST
"Kacy Mistry (77 y.o. Female)     Date of Birth Social Security Number Address Home Phone MRN    1940  [facility]  [address] [address] [phone] [id number]    [place] Marital Status          None        Admission Date Admission Type Admitting Provider Attending Provider Department, Room/Bed    2/9/18 Emergency Josias Razo MD Ahmed, Aftab, MD [address], 648/1    Discharge Date Discharge Disposition Discharge Destination                      Attending Provider: Josias Razo MD     Allergies:  Contrast Dye, Iodine    Isolation:  None   Infection:  None   Code Status:  Not on file    Ht:  175.3 cm (69.02\")   Wt:  116 kg (255 lb 11.7 oz)    Admission Cmt:  None   Principal Problem:  None                Active Insurance as of 2/9/2018     Primary Coverage     Payor Plan Insurance Group Employer/Plan Group    MEDICARE MEDICARE A & B      Payor Plan Address Payor Plan Phone Number Effective From Effective To    [address] [phone] 2/1/2006     [place] [address]       Subscriber Name Subscriber Birth Date Member ID       KACY MISTRY 1940 [id number]           Secondary Coverage     Payor Plan Insurance Group Employer/Plan Group    [place]      Payor Plan Address Payor Plan Phone Number Effective From Effective To    [address] [phone] 2/9/2018     [place] [address]       Subscriber Name Subscriber Birth Date Member ID       KACY MISTRY 1940 [id number]                 Emergency Contacts      (Rel.) Home Phone Work Phone Mobile Phone    Cindy Teague (Sister) (Power of ) [phone] -- --    NadeemJosé Manuel (Daughter) [phone] -- --          "

## 2018-02-12 NOTE — PROGRESS NOTES
Discharge Planning Assessment  [facility]     Patient Name: Kacy Mistry  MRN: [id number]  Today's Date: 2/12/2018    Admit Date: 2/9/2018          Discharge Needs Assessment       02/12/18 0941    Living Environment    Lives With other (see comments)   Resident of [facility]    Living Arrangements extended care facility   Resident of [facility]    Home Accessibility no concerns    Stair Railings at Home none    Type of Financial/Environmental Concern none    Transportation Available ambulance    Living Environment    Provides Primary Care For no one    Primary Care Provided By other (see comments)   Staff at [facility]    Quality Of Family Relationships supportive    Able to Return to Prior Living Arrangements yes    Living Arrangement Comments Pt is from [facility]    Discharge Needs Assessment    Concerns To Be Addressed denies needs/concerns at this time    Anticipated Changes Related to Illness none    Equipment Currently Used at Home other (see comments)   Pt from [facility]    Equipment Needed After Discharge other (see comments)   [facility]    Discharge Disposition nursing facility            Discharge Plan       02/12/18 0926    Case Management/Social Work Plan    Plan Plan return to [facility].   AMY Taylor    Additional Comments FACE SHEET VERIFIED/ IM LETTER SIGNED.  Spoke to pt's  (Sister/POA  Cindy Mcdermotter [phone]) per phone.  She states pt is a resident of [facility] and would return at discharge.   [facility]  ([phone]) called and she states pt is from LT with paid Medicaid Bed Hold.  Plan return to [facility].  AMY Taylor        Discharge Placement     Facility/Agency Request Status Selected? Address Phone Number Fax Number    [facility] Pending - Request Sent     [address] [address] [phone]  [phone]                Demographic Summary       02/12/18 0939    Referral Information    Admission Type inpatient    Arrived From nursing facility    Contact Information    Permission Granted to Share Information With family/designee    Comments POA / Sister   (Cindy Teague  [phone])    Primary Care Physician Information    Name Dr. Yousuf Corrigan            Functional Status       02/12/18 0941    Functional Status Current    Ambulation 4-->completely dependent    Transferring 4-->completely dependent    Toileting 4-->completely dependent    Bathing 4-->completely dependent    Dressing 4-->completely dependent    Eating 0-->independent    Communication 0-->understands/communicates without difficulty    Functional Status Prior    Ambulation 4-->completely dependent    Transferring 4-->completely dependent    Toileting 4-->completely dependent    Bathing 4-->completely dependent    Dressing 4-->completely dependent    Eating 0-->independent    Communication 0-->understands/communicates without difficulty            Psychosocial     None            Abuse/Neglect     None            Legal     None            Substance Abuse     None            Patient Forms     None          Radha Chu RN

## 2018-02-12 NOTE — CONSULTS
[facility]  Initial Inpatient Consult Note    Referring Provider: ALEX     Reason for Consultation: anemia and abn imaging    Subjective     History of present illness:    77 y.o. female  with history of CVA with left hemiparesis also have diabetes hypertension hyperlipidemia lymphedema morbidly obese anxiety depression chronic kidney disease stage III with a nursing home resident presented to [facility], [place], operated by [facility] emergency room with generalized weakness. Found to   have acute kidney injury.  Nephrology is following.  Found to be constipated, MiraLAX initiated, had a bowel movement yesterday.  CAT scan ordered showing possible ulcer in the rectum, fecal impaction.  Patient's had no previous colonoscopy EGD.  Also found to be anemic.  Today she did not denies nausea, vomiting or abdominal pain.    Past Medical History:  Past Medical History:   Diagnosis Date   • Anemia    • Anxiety    • Arthritis    • Cellulitis of left lower extremity    • CHF (congestive heart failure)    • Depression    • Diabetes mellitus    • DVT (deep venous thrombosis)    • GERD (gastroesophageal reflux disease)    • Hyperlipidemia    • Hypertension    • Lymphedema    • Obesity    • Osteoporosis    • Renal disorder     chronic kidney disease   • Stroke     with left hemiplegia and hemiparesis   • Venous insufficiency (chronic) (peripheral)      Past Surgical History:  History reviewed. No pertinent surgical history.   Social History:   Social History   Substance Use Topics   • Smoking status: Unknown If Ever Smoked   • Smokeless tobacco: Not on file   • Alcohol use No      Family History:  History reviewed. No pertinent family history.    Home Meds:  Prescriptions Prior to Admission   Medication Sig Dispense Refill Last Dose   • acidophilus (FLORANEX) tablet tablet Take 1 tablet by mouth 2 (Two) Times a Day.      • amLODIPine (NORVASC) 2.5 MG tablet Take 2.5 mg by mouth Daily.      • baclofen (LIORESAL) 10 MG tablet Take  5 mg by mouth Every 6 (Six) Hours.      • benazepril (LOTENSIN) 20 MG tablet Take 40 mg by mouth 2 (Two) Times a Day.      • bisacodyl (BISACODYL LAXATIVE) 10 MG suppository Insert 10 mg into the rectum As Needed for Constipation.      • bumetanide (BUMEX) 1 MG tablet Take 1 mg by mouth 2 (Two) Times a Day.      • [] cefTRIAXone (ROCEPHIN) 1 g injection Inject 1 g into the shoulder, thigh, or buttocks Daily.      • cetaphil (CETAPHIL) lotion Apply 1 application topically Daily.      • cholecalciferol (VITAMIN D3) 1000 units tablet Take 4,000 Units by mouth Daily.      • CloNIDine (CATAPRES) 0.3 MG tablet Take 0.3 mg by mouth 3 (Three) Times a Day.      • clopidogrel (PLAVIX) 75 MG tablet Take 75 mg by mouth Daily.      • diclofenac (VOLTAREN) 1 % gel gel Apply 4 g topically 2 (Two) Times a Day. Apply to bilateral knees and hips      • docusate sodium (COLACE) 250 MG capsule Take 250 mg by mouth Daily.      • hydrALAZINE (APRESOLINE) 50 MG tablet Take 100 mg by mouth 3 (Three) Times a Day. Hold if SBP <110 OR pulse <50      • HYDROcodone-acetaminophen (NORCO) 7.5-325 MG per tablet Take 1 tablet by mouth 2 (Two) Times a Day As Needed for Moderate Pain .      • HYDROcodone-acetaminophen (NORCO) 7.5-325 MG per tablet Take 1 tablet by mouth Every 6 (Six) Hours.      • Insulin Glargine (TOUJEO SOLOSTAR) 300 UNIT/ML solution pen-injector Inject 75 Units under the skin Daily.      • linagliptin (TRADJENTA) 5 MG tablet tablet Take 5 mg by mouth Daily.      • metoprolol tartrate (LOPRESSOR) 50 MG tablet Take 50 mg by mouth 3 (Three) Times a Day. Call for SBP <110 OR DBP <50 OR pulse <50      • polyethylene glycol (MIRALAX) packet Take 17 g by mouth 2 (Two) Times a Day.      • potassium chloride (K-DUR) 10 MEQ CR tablet Take 20 mEq by mouth 3 (Three) Times a Day With Meals.      • senna-docusate (SENNALAX-S) 8.6-50 MG per tablet Take 1 tablet by mouth 2 (Two) Times a Day.      • sertraline (ZOLOFT) 50 MG tablet Take 100  mg by mouth Daily.      • warfarin (COUMADIN) 4 MG tablet Take 4 mg by mouth Daily.      • amoxicillin-clavulanate (AUGMENTIN) 875-125 MG per tablet Take 1 tablet by mouth 2 (Two) Times a Day.        Current Meds:     allopurinol 100 mg Oral Daily   amLODIPine 10 mg Oral Daily   atorvastatin 80 mg Oral Nightly   baclofen 10 mg Oral Q8H   cholecalciferol 4,000 Units Oral Daily   CloNIDine 0.3 mg Oral TID   clopidogrel 75 mg Oral Daily   docusate sodium 200 mg Oral BID   hydrALAZINE 25 mg Oral Q8H   insulin aspart 0-7 Units Subcutaneous 4x Daily With Meals & Nightly   insulin aspart 5 Units Subcutaneous TID With Meals   insulin detemir 20 Units Subcutaneous Nightly   lactobacillus acidophilus 1 capsule Oral Daily   metoprolol tartrate 50 mg Oral Q12H   pantoprazole 40 mg Oral BID AC   polyethylene glycol 17 g Oral BID   sertraline 100 mg Oral Nightly   warfarin 7.5 mg Oral Once     Allergies:  Allergies   Allergen Reactions   • Contrast Dye Unknown (See Comments)     n/a   • Iodine Unknown (See Comments)     unknown     Review of Systems  She has pain in her left foot, weakness all other systems reviewed and negative     Objective     Vital Signs  Temp:  [97.9 °F (36.6 °C)-98.8 °F (37.1 °C)] 98.2 °F (36.8 °C)  Heart Rate:  [61-78] 61  Resp:  [16] 16  BP: (132-151)/(81-98) 136/81  Physical Exam:  General Appearance:    Alert, cooperative, in no acute distress   Head:    Normocephalic, without obvious abnormality, atraumatic   Eyes:            Lids and lashes normal, conjunctivae and sclerae normal, no   icterus   Throat:   No oral lesions, no thrush, oral mucosa moist   Neck:   No adenopathy, supple, trachea midline, no thyromegaly, no   carotid bruit, no JVD   Lungs:     Clear to auscultation,respirations regular, even and                   unlabored    Heart:    Regular rhythm and normal rate, normal S1 and S2, no            murmur, no gallop, no rub, no click   Chest Wall:    No abnormalities observed   Abdomen:      Normal bowel sounds, no masses, no organomegaly, soft        non-tender, non-distended, no guarding, no rebound                 tenderness   Rectal:     Deferred   Extremities:   no edema, no cyanosis, no redness   Skin:   No bleeding, bruising or rash   Lymph nodes:   No palpable adenopathy   Psychiatric:  Judgement and insight: normal   Orientation to person place and time: normal   Mood and affect: normal   Results Review:   I reviewed the patient's new clinical results.      Results from last 7 days  Lab Units 02/12/18  0654 02/11/18  0543 02/10/18  0543   WBC 10*3/mm3 6.46 7.44 5.86   HEMOGLOBIN g/dL 10.3* 11.3* 9.8*   HEMATOCRIT % 32.7* 35.5* 31.7*   PLATELETS 10*3/mm3 181 186 177       Results from last 7 days  Lab Units 02/12/18  0654 02/11/18  0543 02/10/18  0543 02/09/18  2046   SODIUM mmol/L 141 139 134* 137   POTASSIUM mmol/L 4.2 4.0 4.5 5.4*   CHLORIDE mmol/L 105 102 99 101   CO2 mmol/L 26.4 24.9 23.3 25.6   BUN mg/dL 36* 47* 68* 75*   CREATININE mg/dL 1.23* 1.21* 1.56* 1.64*   CALCIUM mg/dL 8.4* 8.7 8.4* 8.6   BILIRUBIN mg/dL  --  0.2  --  <0.2   ALK PHOS U/L  --  55  --  55   ALT (SGPT) U/L  --  11  --  13   AST (SGOT) U/L  --  16  --  11   GLUCOSE mg/dL 164* 129* 197* 284*       Results from last 7 days  Lab Units 02/12/18  0654 02/11/18  0543 02/10/18  0042   INR  1.98* 2.15* 2.36*     No results found for: LIPASE    Radiology:  CT Abdomen Pelvis Without Contrast   Preliminary Result   1. Limited secondary to absence of IV contrast and motion artifact. The   hypoechoic lesion seen on the prior sonogram is not convincingly   characterized on this current CT. The recommendation would be to follow   up with sonogram in 3 months to reevaluate for size and internal   character.   2. Cholelithiasis.   3. Large amount of stool is seen within the rectal wall with mild   circumferential rectal wall thickening. This could represent stercoral   colitis.       Radiation dose reduction techniques were utilized,  including automated   exposure control and exposure modulation based on body size.              XR Chest 1 View   Final Result   No acute findings. No significant change.       This report was finalized on 2/11/2018 9:49 PM by Dr. Robinson Stallworth MD.          US Renal Bilateral   Final Result   1.  No acute findings, no hydronephrosis.   2.  1.3 cm cyst of the right kidney.   3.  Poorly visualized suspected 2 cm cyst at the inferior pole left   kidney. CT scan is recommended for further evaluation.        This report was finalized on 2/11/2018 7:11 PM by Dr. Robinson Stallworth MD.              Assessment/Plan   Patient Active Problem List   Diagnosis   • Acute renal failure superimposed on chronic kidney disease     Acute kidney injury  Chronic kidney disease  Anemia  Abnormal imaging  Colitis found on imaging  Patient on Plavix    Plan:    This patient will need endoscopic evaluation in the form of upper endoscopy and colonoscopy to evaluate abnormal imaging, anemia.  I would only do these procedures after she has been off Plavix 5 days.  I defer to the primary team, Dr. Razo, to stop plavix. If the procedures are to done as an inpatient he should stop the Plavix today.  If he wants her evaluated as an outpatient we can see her in the office and determine timing of upper and lower endoscopy at that time.  We will await Dr. Razo's decision on inpatient versus outpatient endoscopy/colonoscopy.    We will check iron studies, B12 and folate.  Follow hemoglobin    I discussed the patients findings and my recommendations with patient and nursing staff.    You Shaw MD

## 2018-02-12 NOTE — PROGRESS NOTES
Continued Stay Note  [facility]     Patient Name: Kacy Mistry  MRN: [id number]  Today's Date: 2/12/2018    Admit Date: 2/9/2018          Discharge Plan       02/12/18 0944    Case Management/Social Work Plan    Plan Plan return to [facility].   AMY Taylor    Additional Comments FACE SHEET VERIFIED/ IM LETTER SIGNED.  Spoke to pt's  (Sister/POA  Cindy Jumper [phone]) per phone.  She states pt is a resident of [facility] and would return at discharge.   [facility]  [phone]) called and she states pt is from LT with paid Medicaid Bed Hold.  Plan return to [facility].  Brandon RN              Discharge Codes     None            Radha Chu, RN

## 2018-02-12 NOTE — PLAN OF CARE
Problem: Skin Integrity Impairment, Risk/Actual (Adult)  Intervention: Promote/Optimize Nutrition  Ordered Glucerna daily to promote wound healing.    Also recommend addition of MVI with minerals to promote wound healing.

## 2018-02-12 NOTE — PROGRESS NOTES
Patient is off the floor.Scr 1.2 stable likely her baseline CKD.Check bmp in am  CT reviewed nonconclusive.Will reevaluate in am.

## 2018-02-12 NOTE — PROGRESS NOTES
"Daily progress note    Chief complaint  Doing better  No new complaints    History of present illness  77-year-old -American female with history of CVA with left hemiparesis also have diabetes hypertension hyperlipidemia lymphedema morbidly obese anxiety depression chronic kidney disease stage III with a nursing home resident presented to [facility] emergency room with generalized weakness.  Patient evaluated found to be in acute kidney injury on top of chronic kidney disease admitted for management.  Patient denies any headache chest pain shortness of breath abdominal pain vomiting diarrhea.  Patient does have nausea but denies fever chills night sweats weight loss.     REVIEW OF SYSTEMS  Review of Systems   Constitutional: Negative for fever.   HENT: Negative for sore throat.         Dry mouth   Eyes: Negative.    Respiratory: Negative for cough and shortness of breath.    Cardiovascular: Positive for leg swelling (BLE, chronic). Negative for chest pain.   Gastrointestinal: Negative for abdominal pain, diarrhea and vomiting.   Genitourinary: Positive for frequency. Negative for dysuria.   Musculoskeletal: Negative for neck pain.   Skin: Negative for rash.   Allergic/Immunologic: Negative.    Neurological: Negative for weakness, numbness and headaches.   Hematological: Negative.    Psychiatric/Behavioral: Negative.    All other systems reviewed and are negative.     PHYSICAL EXAM  Blood pressure 151/98, pulse 71, temperature 98.2 °F (36.8 °C), temperature source Oral, resp. rate 16, height 175.3 cm (69.02\"), weight 116 kg (255 lb 11.7 oz), SpO2 95 %.    Constitutional: She is oriented to person, place, and time and well-developed, well-nourished, and in no distress. No distress.   Head: Normocephalic and atraumatic.   Eyes: EOM are normal. Pupils are equal, round, and reactive to light.   Neck: Normal range of motion. Neck supple.   Cardiovascular: Normal rate, regular rhythm and normal heart sounds.  "   Pulmonary/Chest: Effort normal and breath sounds normal. No respiratory distress.   Abdominal: Soft. There is no tenderness. There is no rebound and no guarding.   Musculoskeletal: Normal range of motion. She exhibits edema (BLE).   Neurological: She is alert and oriented to person, place, and time. She has normal sensation and normal strength.   Skin: Skin is warm and dry. No rash noted.   Psychiatric: Mood and affect normal.     LAB RESULTS  Lab Results (last 24 hours)     Procedure Component Value Units Date/Time    POC Glucose Once [[id number]]  (Abnormal) Collected:  02/11/18 1639    Specimen:  Blood Updated:  02/11/18 1641     Glucose 176 (H) mg/dL     Narrative:       Meter: [id number] : [id number] Levy STEWART    POC Glucose Once [[id number]]  (Abnormal) Collected:  02/11/18 2057    Specimen:  Blood Updated:  02/11/18 2059     Glucose 172 (H) mg/dL     Narrative:       Meter: [id number] : [id number] Dane STEWART    CBC & Differential [[id number]] Collected:  02/12/18 0654    Specimen:  Blood Updated:  02/12/18 0724    Narrative:       The following orders were created for panel order CBC & Differential.  Procedure                               Abnormality         Status                     ---------                               -----------         ------                     CBC Auto Differential[[id number]]        Abnormal            Final result                 Please view results for these tests on the individual orders.    CBC Auto Differential [[id number]]  (Abnormal) Collected:  02/12/18 0654    Specimen:  Blood Updated:  02/12/18 0724     WBC 6.46 10*3/mm3      RBC 3.42 (L) 10*6/mm3      Hemoglobin 10.3 (L) g/dL      Hematocrit 32.7 (L) %      MCV 95.6 fL      MCH 30.1 pg      MCHC 31.5 (L) g/dL      RDW 13.9 (H) %      RDW-SD 47.9 fl      MPV 10.4 fL      Platelets 181 10*3/mm3      Neutrophil % 37.8 (L) %      Lymphocyte % 51.9 (H) %      Monocyte % 6.3 %      Eosinophil % 2.9 %       Basophil % 0.3 %      Immature Grans % 0.8 (H) %      Neutrophils, Absolute 2.44 10*3/mm3      Lymphocytes, Absolute 3.35 10*3/mm3      Monocytes, Absolute 0.41 10*3/mm3      Eosinophils, Absolute 0.19 10*3/mm3      Basophils, Absolute 0.02 10*3/mm3      Immature Grans, Absolute 0.05 (H) 10*3/mm3     Protime-INR [[id number]]  (Abnormal) Collected:  02/12/18 0654    Specimen:  Blood Updated:  02/12/18 0733     Protime 22.2 (H) Seconds      INR 1.98 (H)    Basic Metabolic Panel [[id number]]  (Abnormal) Collected:  02/12/18 0654    Specimen:  Blood Updated:  02/12/18 0751     Glucose 164 (H) mg/dL      BUN 36 (H) mg/dL      Creatinine 1.23 (H) mg/dL      Sodium 141 mmol/L      Potassium 4.2 mmol/L      Chloride 105 mmol/L      CO2 26.4 mmol/L      Calcium 8.4 (L) mg/dL      eGFR   Amer 51 (L) mL/min/1.73      BUN/Creatinine Ratio 29.3 (H)     Anion Gap 9.6 mmol/L     Narrative:       The MDRD GFR formula is only valid for adults with stable renal function between ages 18 and 70.    Uric Acid [[id number]]  (Abnormal) Collected:  02/12/18 0654    Specimen:  Blood Updated:  02/12/18 0751     Uric Acid 6.9 (H) mg/dL     POC Glucose Once [[id number]]  (Abnormal) Collected:  02/12/18 0802    Specimen:  Blood Updated:  02/12/18 0804     Glucose 169 (H) mg/dL     Narrative:       Meter: [id number] : [id number] Ba Carina NA    POC Glucose Once [[id number]]  (Abnormal) Collected:  02/12/18 1135    Specimen:  Blood Updated:  02/12/18 1137     Glucose 166 (H) mg/dL     Narrative:       Meter: [id number] : [id number] Ba Carina NA        Imaging Results (last 24 hours)     Procedure Component Value Units Date/Time    US Renal Bilateral [[id number]] Collected:  02/10/18 1942     Updated:  02/11/18 1914    Narrative:       ULTRASOUND RENAL BILATERAL.     TECHNIQUE: Grayscale and color images of the kidneys were obtained.     HISTORY: Acute renal insufficiency.     COMPARISON: No prior studies for comparison.     FINDINGS:  Right  kidney measures 8.6 x 3.9 x 4.2 cm, cortical thickness measures  1.1 cm. 1.2 x 1.3 x 1.1 cm cyst at the inferior pole right kidney.      Left kidney measures 8.8 x 4.6 x 4.2 cm, cortical thickness measures 1.2  cm. At the inferior pole there is poorly visualized 2 cm lesion likely  to be a cyst however follow-up is recommended. There is no  hydronephrosis, stone or mass.     Urinary bladder is distended otherwise unremarkable. No calculus, sludge  or mass.       Impression:       1.  No acute findings, no hydronephrosis.  2.  1.3 cm cyst of the right kidney.  3.  Poorly visualized suspected 2 cm cyst at the inferior pole left  kidney. CT scan is recommended for further evaluation.      This report was finalized on 2/11/2018 7:11 PM by Dr. Robinson Stallworth MD.       XR Chest 1 View [[id number]] Collected:  02/11/18 0533     Updated:  02/11/18 2152    Narrative:       X-RAY CHEST 1 VIEW.     HISTORY: CHF, hypertension.     COMPARISON: 09/17/2009.     FINDINGS:  Cardiomediastinal silhouette is within normal limits.         There is no consolidation or effusion.              Impression:       No acute findings. No significant change.     This report was finalized on 2/11/2018 9:49 PM by Dr. Robinson Stallworth MD.       CT Abdomen Pelvis Without Contrast [[id number]] Collected:  02/12/18 0901     Updated:  02/12/18 0901    Narrative:       CT ABDOMEN AND PELVIS WITHOUT CONTRAST     HISTORY: Rule out renal cyst and mass.     TECHNIQUE: Axial CT images of the abdomen and pelvis were obtained  without administration of intravenous contrast. The patient was not  given oral contrast. Coronal and sagittal reformats were obtained.     COMPARISON: Renal sonogram from 02/11/2018.     FINDINGS: The study is limited due to absence of IV contrast and motion  artifact. Bilateral adrenal glands are normal. Both kidneys are normal  in size and attenuation. The previously identified cystic lesion at the  midpole of the left kidney is poorly  imaged currently secondary to  extensive motion artifact at this level. The attenuation measurements  are not diagnostic of a simple cyst.     The visualized lung bases are normal. The liver demonstrates normal  noncontrast attenuation. Cholelithiasis is present. The spleen and the  pancreas are normal. The small and large bowel loops demonstrate normal  caliber. Moderate amount of stool is seen throughout the colon with  fecal impaction in the rectal vault. Mild circumferential wall  thickening of the rectum is seen at this level. No pathological  retroperitoneal lymphadenopathy. Moderate calcified atherosclerotic  plaque is seen within the abdominal aorta and its branches. No ascites  is seen. The uterus is not identified and is likely surgically absent.  Significant osteopenia is identified. Advanced degenerative arthritis is  seen within the right greater than left hips.       Impression:       1. Limited secondary to absence of IV contrast and motion artifact. The  hypoechoic lesion seen on the prior sonogram is not convincingly  characterized on this current CT. The recommendation would be to follow  up with sonogram in 3 months to reevaluate for size and internal  character.  2. Cholelithiasis.  3. Large amount of stool is seen within the rectal wall with mild  circumferential rectal wall thickening. This could represent stercoral  colitis.     Radiation dose reduction techniques were utilized, including automated  exposure control and exposure modulation based on body size.                Current Facility-Administered Medications:   •  acetaminophen (TYLENOL) tablet 650 mg, 650 mg, Oral, Q4H PRN, Josias Razo MD, 650 mg at 02/11/18 0910  •  allopurinol (ZYLOPRIM) tablet 100 mg, 100 mg, Oral, Daily, Josias Razo MD, 100 mg at 02/12/18 1029  •  amLODIPine (NORVASC) tablet 10 mg, 10 mg, Oral, Daily, Josias Razo MD, 10 mg at 02/12/18 1029  •  atorvastatin (LIPITOR) tablet 80 mg, 80 mg, Oral, Nightly, Josias  MD Dung, 80 mg at 02/11/18 2047  •  baclofen (LIORESAL) tablet 10 mg, 10 mg, Oral, Q8H, Josias Razo MD, 10 mg at 02/12/18 0519  •  cholecalciferol (VITAMIN D3) tablet 4,000 Units, 4,000 Units, Oral, Daily, Josias Razo MD, 4,000 Units at 02/12/18 1029  •  CloNIDine (CATAPRES) tablet 0.3 mg, 0.3 mg, Oral, TID, Josias Razo MD, 0.3 mg at 02/12/18 1028  •  clopidogrel (PLAVIX) tablet 75 mg, 75 mg, Oral, Daily, Josias Razo MD, 75 mg at 02/12/18 1028  •  [START ON 2/13/2018] docusate sodium (COLACE) capsule 200 mg, 200 mg, Oral, Daily **AND** [START ON 2/13/2018] docusate sodium (COLACE) capsule 50 mg, 50 mg, Oral, Daily, Josias Razo MD  •  hydrALAZINE (APRESOLINE) tablet 10 mg, 10 mg, Oral, Q8H, Jefe Collins MD, 10 mg at 02/12/18 0519  •  insulin aspart (novoLOG) injection 0-7 Units, 0-7 Units, Subcutaneous, 4x Daily With Meals & Nightly, Josias Razo MD, 2 Units at 02/12/18 1222  •  insulin aspart (novoLOG) injection 5 Units, 5 Units, Subcutaneous, TID With Meals, Josias Razo MD, 5 Units at 02/12/18 1221  •  insulin detemir (LEVEMIR) injection 15 Units, 15 Units, Subcutaneous, Nightly, Josias Razo MD, 15 Units at 02/11/18 2219  •  lactobacillus acidophilus (RISAQUAD) capsule 1 capsule, 1 capsule, Oral, Daily, Josias Razo MD, 1 capsule at 02/12/18 1029  •  metoprolol tartrate (LOPRESSOR) tablet 50 mg, 50 mg, Oral, Q12H, Josias Razo MD, 50 mg at 02/12/18 1029  •  ondansetron (ZOFRAN) injection 4 mg, 4 mg, Intravenous, Q4H PRN, Josias Razo MD  •  pantoprazole (PROTONIX) EC tablet 40 mg, 40 mg, Oral, BID AC, Josias Razo MD, 40 mg at 02/12/18 0802  •  polyethylene glycol (MIRALAX) powder 17 g, 17 g, Oral, BID, Josias Razo MD, 17 g at 02/11/18 2218  •  sertraline (ZOLOFT) tablet 100 mg, 100 mg, Oral, Nightly, Josias Razo MD, 100 mg at 02/11/18 2047  •  Insert peripheral IV, , , Once **AND** sodium chloride 0.9 % flush 10 mL, 10 mL, Intravenous, PRN, Shara Streeter MD  •  sodium chloride 0.9 % infusion, 50 mL/hr,  Intravenous, Continuous, Melody Razo MD, Last Rate: 50 mL/hr at 02/11/18 1700, 50 mL/hr at 02/11/18 1700  •  warfarin (COUMADIN) tablet 4 mg, 4 mg, Oral, Daily, Melody Razo MD, 4 mg at 02/11/18 1740     ASSESSMENT  Acute kidney injury with hyperkalemia  Chronic kidney disease stage III  Renal cyst  Questionable colitis will get GI consult  Insulin-dependent diabetes mellitus  Hypertension  Hyperlipidemia  Chronic anemia  Gastroesophageal reflux disease  Chronic lymphedema  Status post CVA with left jaja-  Chronic anticoagulation  Immobilization syndrome    PLAN  CPM  IV fluid  Stop all diuretics and ACE inhibitor  GI CONSULT  Continue nursing home meds except for diuretics and ACE inhibitor  Supportive care  Stress ulcer DVT prophylaxis  Continue nursing home  dose of Coumadin  PTOT  Follow closely further recommendation according to course    MELODY RAZO MD

## 2018-02-12 NOTE — PROGRESS NOTES
"Adult Nutrition  Assessment/PES    Patient Name:  Kacy Mistry  YOB: 1940  MRN: [id number]  Admit Date:  2/9/2018    Assessment Date:  2/12/2018    Comments:  Assessment triggered by skin report.  Patient with stage I pressure ulcer to buttock.  Increased kcal and protein needs r/t this.  Patient sleeping, but family at bedside reports she is eating well.  88% x 4 meals per chart PO data.  Agree to Glucerna daily to promote wound healing.      Also recommend MVI with minerals to promote wound healing.    Will continue to follow.          Reason for Assessment       02/12/18 1534    Reason for Assessment    Reason For Assessment/Visit identified at risk by screening criteria;skin risk    Diagnosis Diagnosis    Cardiac HTN;CHF    Endocrine DM    Gastrointestinal GERD/Reflux    Neurological CVA    Ortho Osteoarthritis;Osteoporosis    Psychosocial Depression    Renal ARF;CKD    Skin Pressure ulcer              Nutrition/Diet History       02/12/18 1540    Nutrition/Diet History    Typical Food/Fluid Intake patient sleeping, family at bedside says patient ate very well today            Anthropometrics       02/12/18 1540    Anthropometrics    Height 175.3 cm (69.02\")    Weight 116 kg (255 lb 11.7 oz)    RD Documented Current Weight  116 kg (255 lb 11.7 oz)    Anthropometrics (Special Considerations)    RD Calculated BMI (kg/m2) 37.74    Ideal Body Weight (IBW)    Ideal Body Weight (IBW), Female 66.87    % Ideal Body Weight 173.83    Body Mass Index (BMI)    BMI (kg/m2) 37.83    BMI Grade 35 - 39.9 - obesity - grade II            Labs/Tests/Procedures/Meds       02/12/18 1541    Labs/Tests/Procedures/Meds    Diagnostic Test/Procedure Review reviewed, pertinent    Labs/Tests Review Reviewed;Glucose;Creat;BUN;GFR;Hgb Hct    Medication Review Reviewed, pertinent;Insulin;Antacid;Laxative;Anticoag   vit D3    Significant Vitals reviewed, pertinent            Physical Findings       02/12/18 1542    Physical " "Findings/Assessment    Additional Documentation Physical Appearance (Group)    Physical Appearance    Overall Physical Appearance obese    Skin pressure ulcer(s)            Estimated/Assessed Needs       02/12/18 1543    Calculation Measurements    Weight Used For Calculations 66 kg (145 lb 8.1 oz)   IBW    Height Used for Calculations 1.753 m (5' 9.02\")    Estimated/Assessed Energy Needs    Energy Need Method Kcal/kg    kcal/kg 35    35 Kcal/Kg (kcal) 2310    Estimated Kcal Range  1799-7918    Estimated/Assessed Protein Needs    Weight Used for Protein Calculation 116 kg (255 lb 11.7 oz)    Protein (gm/kg) 0.8    0.8 Gm Protein (gm) 92.8    Estimated Protein Range 93    Estimated/Assessed Fluid Needs    Fluid Need Method RDA method    RDA Method (mL)  1980            Nutrition Prescription Ordered       02/12/18 1544    Nutrition Prescription PO    Current PO Diet Regular    Common Modifiers Consistent Carbohydrate            Evaluation of Received Nutrient/Fluid Intake       02/12/18 1545    PO Evaluation    Number of Meals 4    % PO Intake 88            Problem/Interventions:        Problem 1       02/12/18 1545    Nutrition Diagnoses Problem 1    Problem 1 Increased Nutrient Needs    Macronutrient Kcal;Protein    Etiology (related to) Medical Diagnosis    Skin Pressure ulcer    Signs/Symptoms (evidenced by) Report/Observation                    Intervention Goal       02/12/18 1547    Intervention Goal    General Maintain nutrition;Disease management/therapy;Reduce/improve symptoms    PO Maintain intake    Weight No significant weight loss            Nutrition Intervention       02/12/18 1547    Nutrition Intervention    RD/Tech Action Follow Tx progress;Care plan reviewd;Recommend/ordered    Recommended/Ordered Supplement            Nutrition Prescription       02/12/18 1548    Nutrition Prescription PO    PO Prescription Begin/change supplement    Supplement Glucerna Shake    Supplement Frequency Daily    New " PO Prescription Ordered? Yes            Education/Evaluation       02/12/18 1548    Education    Education Will Instruct as appropriate    Monitor/Evaluation    Monitor Per protocol;PO intake;Supplement intake;Pertinent labs;Weight;Skin status        Electronically signed by:  Aida Roman RD  02/12/18 3:48 PM

## 2018-02-12 NOTE — NURSING NOTE
WOCN consult: Patient states she has legs wrapped daily per HH agency. HIstory of lymphedema.  Pedal edema 3-4. Vaseline gauze applied to discoloration fragile skin areas of shins.   Wrapped Kerlix gauze and ace wraps. Change every day     02/12/18 1439   Pressure Ulcer 02/10/18 0400 Left other (see comments) Stage I   Date first assessed/Time first assessed: 02/10/18 0400   Present On Admission (Pressure Ulcer): yes  Side: Left  Location: (c) other (see comments)  Stage: Stage I   Dressing Appearance dry;intact   Pressure Ulcer Appearance dry  (old pressure injury/ light pigmented skin)   Periwound Area pink   Length (Pressure Ulcer) (cm) 4   Width (Pressure Ulcer) (cm) 4   Pressure Ulcer Therapeutic Interventions (apply silicone border dressing)   Low air loss mattress ordered

## 2018-02-12 NOTE — PLAN OF CARE
Problem: Patient Care Overview (Adult)  Goal: Plan of Care Review  Outcome: Ongoing (interventions implemented as appropriate)   02/12/18 0357   Coping/Psychosocial Response Interventions   Plan Of Care Reviewed With patient;daughter   Patient Care Overview   Progress no change   Outcome Evaluation   Outcome Summary/Follow up Plan VSS, IVF infusing, monitoring labs, ace wrap to BLE CDI, mepilex to Left buttock CDI, prefers to stay in one position even after skin risks explained, Left side flaccid, family at bedside, will continue to monitor.     Goal: Adult Individualization and Mutuality  Outcome: Ongoing (interventions implemented as appropriate)    Goal: Discharge Needs Assessment  Outcome: Ongoing (interventions implemented as appropriate)      Problem: Fall Risk (Adult)  Goal: Absence of Falls  Outcome: Ongoing (interventions implemented as appropriate)      Problem: Skin Integrity Impairment, Risk/Actual (Adult)  Goal: Skin Integrity/Wound Healing  Outcome: Ongoing (interventions implemented as appropriate)      Problem: Renal Failure/Kidney Injury, Acute (Adult)  Goal: Signs and Symptoms of Listed Potential Problems Will be Absent or Manageable (Renal Failure/Kidney Injury, Acute)  Outcome: Ongoing (interventions implemented as appropriate)

## 2018-02-13 LAB
ALBUMIN SERPL-MCNC: 2.6 G/DL (ref 3.5–5.2)
ALBUMIN/GLOB SERPL: 0.7 G/DL
ALP SERPL-CCNC: 47 U/L (ref 39–117)
ALT SERPL W P-5'-P-CCNC: 9 U/L (ref 1–33)
ANION GAP SERPL CALCULATED.3IONS-SCNC: 6.3 MMOL/L
APTT PPP: 32.4 SECONDS (ref 22.7–35.4)
AST SERPL-CCNC: 13 U/L (ref 1–32)
BASOPHILS # BLD AUTO: 0.03 10*3/MM3 (ref 0–0.2)
BASOPHILS NFR BLD AUTO: 0.5 % (ref 0–1.5)
BILIRUB SERPL-MCNC: 0.2 MG/DL (ref 0.1–1.2)
BUN BLD-MCNC: 32 MG/DL (ref 8–23)
BUN/CREAT SERPL: 28.8 (ref 7–25)
CALCIUM SPEC-SCNC: 8.1 MG/DL (ref 8.6–10.5)
CHLORIDE SERPL-SCNC: 105 MMOL/L (ref 98–107)
CO2 SERPL-SCNC: 28.7 MMOL/L (ref 22–29)
CREAT BLD-MCNC: 1.11 MG/DL (ref 0.57–1)
DEPRECATED RDW RBC AUTO: 47.3 FL (ref 37–54)
EOSINOPHIL # BLD AUTO: 0.22 10*3/MM3 (ref 0–0.7)
EOSINOPHIL NFR BLD AUTO: 3.5 % (ref 0.3–6.2)
ERYTHROCYTE [DISTWIDTH] IN BLOOD BY AUTOMATED COUNT: 13.6 % (ref 11.7–13)
FERRITIN SERPL-MCNC: 87.04 NG/ML (ref 13–150)
FOLATE SERPL-MCNC: 14.05 NG/ML (ref 4.78–24.2)
GFR SERPL CREATININE-BSD FRML MDRD: 58 ML/MIN/1.73
GLOBULIN UR ELPH-MCNC: 3.8 GM/DL
GLUCOSE BLD-MCNC: 160 MG/DL (ref 65–99)
GLUCOSE BLDC GLUCOMTR-MCNC: 155 MG/DL (ref 70–130)
GLUCOSE BLDC GLUCOMTR-MCNC: 163 MG/DL (ref 70–130)
GLUCOSE BLDC GLUCOMTR-MCNC: 169 MG/DL (ref 70–130)
GLUCOSE BLDC GLUCOMTR-MCNC: 195 MG/DL (ref 70–130)
HCT VFR BLD AUTO: 31.4 % (ref 35.6–45.5)
HGB BLD-MCNC: 9.7 G/DL (ref 11.9–15.5)
IMM GRANULOCYTES # BLD: 0.03 10*3/MM3 (ref 0–0.03)
IMM GRANULOCYTES NFR BLD: 0.5 % (ref 0–0.5)
INR PPP: 1.92 (ref 0.9–1.1)
INR PPP: 1.95 (ref 0.9–1.1)
IRON 24H UR-MRATE: 52 MCG/DL (ref 37–145)
IRON SATN MFR SERPL: 24 % (ref 20–50)
LYMPHOCYTES # BLD AUTO: 2.98 10*3/MM3 (ref 0.9–4.8)
LYMPHOCYTES NFR BLD AUTO: 47.5 % (ref 19.6–45.3)
MCH RBC QN AUTO: 29.8 PG (ref 26.9–32)
MCHC RBC AUTO-ENTMCNC: 30.9 G/DL (ref 32.4–36.3)
MCV RBC AUTO: 96.6 FL (ref 80.5–98.2)
MONOCYTES # BLD AUTO: 0.58 10*3/MM3 (ref 0.2–1.2)
MONOCYTES NFR BLD AUTO: 9.2 % (ref 5–12)
NEUTROPHILS # BLD AUTO: 2.44 10*3/MM3 (ref 1.9–8.1)
NEUTROPHILS NFR BLD AUTO: 38.8 % (ref 42.7–76)
PLATELET # BLD AUTO: 162 10*3/MM3 (ref 140–500)
PMV BLD AUTO: 10.3 FL (ref 6–12)
POTASSIUM BLD-SCNC: 3.9 MMOL/L (ref 3.5–5.2)
PROT SERPL-MCNC: 6.4 G/DL (ref 6–8.5)
PROTHROMBIN TIME: 21.7 SECONDS (ref 11.7–14.2)
PROTHROMBIN TIME: 21.9 SECONDS (ref 11.7–14.2)
RBC # BLD AUTO: 3.25 10*6/MM3 (ref 3.9–5.2)
SODIUM BLD-SCNC: 140 MMOL/L (ref 136–145)
TIBC SERPL-MCNC: 219 MCG/DL (ref 298–536)
TRANSFERRIN SERPL-MCNC: 147 MG/DL (ref 200–360)
VIT B12 BLD-MCNC: 918 PG/ML (ref 211–946)
WBC NRBC COR # BLD: 6.28 10*3/MM3 (ref 4.5–10.7)

## 2018-02-13 PROCEDURE — 82728 ASSAY OF FERRITIN: CPT | Performed by: INTERNAL MEDICINE

## 2018-02-13 PROCEDURE — 85730 THROMBOPLASTIN TIME PARTIAL: CPT | Performed by: INTERNAL MEDICINE

## 2018-02-13 PROCEDURE — 83540 ASSAY OF IRON: CPT | Performed by: INTERNAL MEDICINE

## 2018-02-13 PROCEDURE — 82746 ASSAY OF FOLIC ACID SERUM: CPT | Performed by: INTERNAL MEDICINE

## 2018-02-13 PROCEDURE — 85610 PROTHROMBIN TIME: CPT | Performed by: HOSPITALIST

## 2018-02-13 PROCEDURE — 80053 COMPREHEN METABOLIC PANEL: CPT | Performed by: INTERNAL MEDICINE

## 2018-02-13 PROCEDURE — 99232 SBSQ HOSP IP/OBS MODERATE 35: CPT | Performed by: INTERNAL MEDICINE

## 2018-02-13 PROCEDURE — 84466 ASSAY OF TRANSFERRIN: CPT | Performed by: INTERNAL MEDICINE

## 2018-02-13 PROCEDURE — 82962 GLUCOSE BLOOD TEST: CPT

## 2018-02-13 PROCEDURE — 63710000001 INSULIN ASPART PER 5 UNITS: Performed by: HOSPITALIST

## 2018-02-13 PROCEDURE — 82607 VITAMIN B-12: CPT | Performed by: INTERNAL MEDICINE

## 2018-02-13 PROCEDURE — 85025 COMPLETE CBC W/AUTO DIFF WBC: CPT | Performed by: INTERNAL MEDICINE

## 2018-02-13 RX ORDER — WARFARIN SODIUM 7.5 MG/1
7.5 TABLET ORAL
Status: DISCONTINUED | OUTPATIENT
Start: 2018-02-13 | End: 2018-02-13

## 2018-02-13 RX ORDER — HYDRALAZINE HYDROCHLORIDE 50 MG/1
50 TABLET, FILM COATED ORAL EVERY 8 HOURS SCHEDULED
Status: DISCONTINUED | OUTPATIENT
Start: 2018-02-13 | End: 2018-02-17

## 2018-02-13 RX ADMIN — INSULIN ASPART 2 UNITS: 100 INJECTION, SOLUTION INTRAVENOUS; SUBCUTANEOUS at 21:04

## 2018-02-13 RX ADMIN — HYDRALAZINE HYDROCHLORIDE 25 MG: 25 TABLET, FILM COATED ORAL at 06:43

## 2018-02-13 RX ADMIN — POLYETHYLENE GLYCOL 3350 17 G: 17 POWDER, FOR SOLUTION ORAL at 10:00

## 2018-02-13 RX ADMIN — PANTOPRAZOLE SODIUM 40 MG: 40 TABLET, DELAYED RELEASE ORAL at 17:26

## 2018-02-13 RX ADMIN — Medication 1 CAPSULE: at 10:09

## 2018-02-13 RX ADMIN — ATORVASTATIN CALCIUM 80 MG: 80 TABLET, FILM COATED ORAL at 21:03

## 2018-02-13 RX ADMIN — DOCUSATE SODIUM 200 MG: 100 CAPSULE, LIQUID FILLED ORAL at 21:14

## 2018-02-13 RX ADMIN — ALLOPURINOL 100 MG: 100 TABLET ORAL at 10:07

## 2018-02-13 RX ADMIN — AMLODIPINE BESYLATE 10 MG: 10 TABLET ORAL at 10:07

## 2018-02-13 RX ADMIN — PANTOPRAZOLE SODIUM 40 MG: 40 TABLET, DELAYED RELEASE ORAL at 10:08

## 2018-02-13 RX ADMIN — POLYETHYLENE GLYCOL 3350 17 G: 17 POWDER, FOR SOLUTION ORAL at 21:14

## 2018-02-13 RX ADMIN — VITAMIN D, TAB 1000IU (100/BT) 4000 UNITS: 25 TAB at 10:07

## 2018-02-13 RX ADMIN — INSULIN ASPART 5 UNITS: 100 INJECTION, SOLUTION INTRAVENOUS; SUBCUTANEOUS at 10:08

## 2018-02-13 RX ADMIN — DOCUSATE SODIUM 200 MG: 100 CAPSULE, LIQUID FILLED ORAL at 10:06

## 2018-02-13 RX ADMIN — INSULIN ASPART 2 UNITS: 100 INJECTION, SOLUTION INTRAVENOUS; SUBCUTANEOUS at 17:26

## 2018-02-13 RX ADMIN — CLONIDINE HYDROCHLORIDE 0.3 MG: 0.1 TABLET ORAL at 21:04

## 2018-02-13 RX ADMIN — METOPROLOL TARTRATE 50 MG: 50 TABLET, FILM COATED ORAL at 10:07

## 2018-02-13 RX ADMIN — BACLOFEN 10 MG: 10 TABLET ORAL at 21:03

## 2018-02-13 RX ADMIN — HYDRALAZINE HYDROCHLORIDE 50 MG: 50 TABLET, FILM COATED ORAL at 21:03

## 2018-02-13 RX ADMIN — INSULIN ASPART 2 UNITS: 100 INJECTION, SOLUTION INTRAVENOUS; SUBCUTANEOUS at 10:08

## 2018-02-13 RX ADMIN — BACLOFEN 10 MG: 10 TABLET ORAL at 15:27

## 2018-02-13 RX ADMIN — BACLOFEN 10 MG: 10 TABLET ORAL at 06:43

## 2018-02-13 RX ADMIN — INSULIN ASPART 2 UNITS: 100 INJECTION, SOLUTION INTRAVENOUS; SUBCUTANEOUS at 12:57

## 2018-02-13 RX ADMIN — CLONIDINE HYDROCHLORIDE 0.3 MG: 0.1 TABLET ORAL at 17:26

## 2018-02-13 RX ADMIN — METOPROLOL TARTRATE 50 MG: 50 TABLET, FILM COATED ORAL at 21:03

## 2018-02-13 RX ADMIN — CLONIDINE HYDROCHLORIDE 0.3 MG: 0.1 TABLET ORAL at 10:06

## 2018-02-13 RX ADMIN — INSULIN ASPART 5 UNITS: 100 INJECTION, SOLUTION INTRAVENOUS; SUBCUTANEOUS at 12:58

## 2018-02-13 RX ADMIN — INSULIN ASPART 5 UNITS: 100 INJECTION, SOLUTION INTRAVENOUS; SUBCUTANEOUS at 17:26

## 2018-02-13 RX ADMIN — SERTRALINE 100 MG: 100 TABLET, FILM COATED ORAL at 21:02

## 2018-02-13 NOTE — PLAN OF CARE
Problem: Patient Care Overview (Adult)  Goal: Plan of Care Review  Outcome: Ongoing (interventions implemented as appropriate)   02/12/18 2103   Coping/Psychosocial Response Interventions   Plan Of Care Reviewed With patient   Patient Care Overview   Progress improving   Outcome Evaluation   Outcome Summary/Follow up Plan Wound care consult with tonia changes. CT and Echo done. Left side flaccid but able to do many ADL's. Denies pain. Telemtry NSR. Will continue to monitor.       Problem: Fall Risk (Adult)  Goal: Absence of Falls  Outcome: Ongoing (interventions implemented as appropriate)      Problem: Skin Integrity Impairment, Risk/Actual (Adult)  Goal: Skin Integrity/Wound Healing  Outcome: Ongoing (interventions implemented as appropriate)      Problem: Renal Failure/Kidney Injury, Acute (Adult)  Goal: Signs and Symptoms of Listed Potential Problems Will be Absent or Manageable (Renal Failure/Kidney Injury, Acute)  Outcome: Ongoing (interventions implemented as appropriate)   02/12/18 2103   Renal Failure/Kidney Injury, Acute   Problems Assessed (Acute Renal Failure/Kidney Injury) all   Problems Present (Acute Renal Failure/Kidney Injury) electrolyte imbalance;fluid imbalance;hypertension;situational response

## 2018-02-13 NOTE — PROGRESS NOTES
2-13-18  Urology  Urologic issue: renal cyst on US  S resting comfortably  O  160/90  75   16   98.4  Alert and oriented  Left hemiparesis  abd soft and nt  CT without IV contrast (allergic to contrast): inconclusive cystic area of kidney  rec repeat US in 3 months    A: renal cyst      CKD, creat down to 1.2      Hemiparesis    Plan  Follow up with repeat US in 3 months

## 2018-02-13 NOTE — PROGRESS NOTES
Subjective no c/o no cp/sob   Chief complaint:         Objective:nad      Vital Signs  Temp:  [98.3 °F (36.8 °C)-98.6 °F (37 °C)] 98.4 °F (36.9 °C)  Heart Rate:  [58-75] 75  Resp:  [16] 16  BP: (130-161)/(81-97) 161/97      No intake or output data in the 24 hours ending 02/13/18 0934        Physical Exam    Constitutional : well developed ,No acute distress  ENMT lips pink oral mucosa moist   Eyes sclerae white PERRL  Respiratory: Clear to auscultation , No crackles no wheezing respirations are even and un-labored  GI: Soft, Non tender, BS active in all 4 quadrants  CVS: S1 S2 regular, no rub murmur or gallops  Skin warm dry no rashes no petechiae  Neuro left weakness cranial nerves 2-12 grossly intact sensation intact  Ext:no LE edema, Peripheral pulses intact   Heme no cervical  lymphadenopathy no petechiae         Results Review:      Lab Results   Component Value Date    GLUCOSE 160 (H) 02/13/2018    CALCIUM 8.1 (L) 02/13/2018     02/13/2018    K 3.9 02/13/2018    CO2 28.7 02/13/2018     02/13/2018    BUN 32 (H) 02/13/2018    CREATININE 1.11 (H) 02/13/2018    EGFRIFAFRI 58 (L) 02/13/2018    BCR 28.8 (H) 02/13/2018    ANIONGAP 6.3 02/13/2018       Lab Results   Component Value Date    WBC 6.28 02/13/2018    HGB 9.7 (L) 02/13/2018    HCT 31.4 (L) 02/13/2018    MCV 96.6 02/13/2018     02/13/2018       Pertinent Imaging studies were reviewed      Medication Review:       Current Facility-Administered Medications:   •  acetaminophen (TYLENOL) tablet 650 mg, 650 mg, Oral, Q4H PRN, Josias Razo MD, 650 mg at 02/11/18 0910  •  allopurinol (ZYLOPRIM) tablet 100 mg, 100 mg, Oral, Daily, Josias Razo MD, 100 mg at 02/12/18 1029  •  amLODIPine (NORVASC) tablet 10 mg, 10 mg, Oral, Daily, Josias Razo MD, 10 mg at 02/12/18 1029  •  atorvastatin (LIPITOR) tablet 80 mg, 80 mg, Oral, Nightly, Josias Razo MD, 80 mg at 02/12/18 2121  •  baclofen (LIORESAL) tablet 10 mg, 10 mg, Oral, Q8H, Josias Razo MD, 10 mg  at 02/13/18 0643  •  cholecalciferol (VITAMIN D3) tablet 4,000 Units, 4,000 Units, Oral, Daily, Josias Razo MD, 4,000 Units at 02/12/18 1029  •  CloNIDine (CATAPRES) tablet 0.3 mg, 0.3 mg, Oral, TID, Josias Razo MD, 0.3 mg at 02/12/18 2121  •  clopidogrel (PLAVIX) tablet 75 mg, 75 mg, Oral, Daily, Josias Razo MD, 75 mg at 02/12/18 1028  •  docusate sodium (COLACE) capsule 200 mg, 200 mg, Oral, BID, 200 mg at 02/12/18 2121 **AND** [DISCONTINUED] docusate sodium (COLACE) capsule 50 mg, 50 mg, Oral, Daily, Josias Razo MD  •  hydrALAZINE (APRESOLINE) tablet 25 mg, 25 mg, Oral, Q8H, Josias Razo MD, 25 mg at 02/13/18 0643  •  insulin aspart (novoLOG) injection 0-7 Units, 0-7 Units, Subcutaneous, 4x Daily With Meals & Nightly, Josias Razo MD, 2 Units at 02/12/18 2252  •  insulin aspart (novoLOG) injection 5 Units, 5 Units, Subcutaneous, TID With Meals, Josias Razo MD, 5 Units at 02/12/18 1833  •  insulin detemir (LEVEMIR) injection 20 Units, 20 Units, Subcutaneous, Nightly, Josias Razo MD, 20 Units at 02/12/18 2253  •  lactobacillus acidophilus (RISAQUAD) capsule 1 capsule, 1 capsule, Oral, Daily, Josias Razo MD, 1 capsule at 02/12/18 1029  •  metoprolol tartrate (LOPRESSOR) tablet 50 mg, 50 mg, Oral, Q12H, Josias Razo MD, 50 mg at 02/12/18 2121  •  ondansetron (ZOFRAN) injection 4 mg, 4 mg, Intravenous, Q4H PRN, Josias Razo MD  •  pantoprazole (PROTONIX) EC tablet 40 mg, 40 mg, Oral, BID AC, Josias Razo MD, 40 mg at 02/12/18 1832  •  polyethylene glycol (MIRALAX) powder 17 g, 17 g, Oral, BID, Josias Razo MD, 17 g at 02/12/18 2121  •  sertraline (ZOLOFT) tablet 100 mg, 100 mg, Oral, Nightly, Josias Razo MD, 100 mg at 02/12/18 2121  •  Insert peripheral IV, , , Once **AND** sodium chloride 0.9 % flush 10 mL, 10 mL, Intravenous, PRN, Shara Streeter MD  •  sodium chloride 0.9 % infusion, 75 mL/hr, Intravenous, Continuous, oJsias Razo MD, Last Rate: 75 mL/hr at 02/12/18 1406, 75 mL/hr at 02/12/18 1406    sodium  chloride 75 mL/hr Last Rate: 75 mL/hr (02/12/18 1406)       Assesment    PEGGY resolved   CKD   Renal cysts  HTN  HF  Anemia  DM  CVA      Plan:      Scr 1.11 better   Urology f/u in 3 months with repeat renal US  Increase hydralazine if confirmed high outliers manually by RN    Jefe Collins MD  02/13/18  9:34 AM  Tel: [phone]  Fax: [phone]

## 2018-02-13 NOTE — PROGRESS NOTES
"Daily progress note    Chief complaint  Doing better  No new complaints  Wants endoscopy done prior to discharge back to nursing home    History of present illness  77-year-old -American female with history of CVA with left hemiparesis also have diabetes hypertension hyperlipidemia lymphedema morbidly obese anxiety depression chronic kidney disease stage III with a nursing home resident presented to [facility] emergency room with generalized weakness.  Patient evaluated found to be in acute kidney injury on top of chronic kidney disease admitted for management.  Patient denies any headache chest pain shortness of breath abdominal pain vomiting diarrhea.  Patient does have nausea but denies fever chills night sweats weight loss.     REVIEW OF SYSTEMS  Review of Systems   Constitutional: Negative for fever.   HENT: Negative for sore throat.         Dry mouth   Eyes: Negative.    Respiratory: Negative for cough and shortness of breath.    Cardiovascular: Positive for leg swelling (BLE, chronic). Negative for chest pain.   Gastrointestinal: Negative for abdominal pain, diarrhea and vomiting.   Genitourinary: Positive for frequency. Negative for dysuria.   Musculoskeletal: Negative for neck pain.   Skin: Negative for rash.   Allergic/Immunologic: Negative.    Neurological: Negative for weakness, numbness and headaches.   Hematological: Negative.    Psychiatric/Behavioral: Negative.    All other systems reviewed and are negative.     PHYSICAL EXAM  Blood pressure 161/97, pulse 75, temperature 98.4 °F (36.9 °C), temperature source Oral, resp. rate 16, height 175.3 cm (69.02\"), weight 121 kg (267 lb 1.6 oz), SpO2 95 %.    Constitutional: She is oriented to person, place, and time and well-developed, well-nourished, and in no distress. No distress.   Head: Normocephalic and atraumatic.   Eyes: EOM are normal. Pupils are equal, round, and reactive to light.   Neck: Normal range of motion. Neck supple. "   Cardiovascular: Normal rate, regular rhythm and normal heart sounds.    Pulmonary/Chest: Effort normal and breath sounds normal. No respiratory distress.   Abdominal: Soft. There is no tenderness. There is no rebound and no guarding.   Musculoskeletal: Normal range of motion. She exhibits edema (BLE).   Neurological: She is alert and oriented to person, place, and time. She has normal sensation and normal strength.   Skin: Skin is warm and dry. No rash noted.   Psychiatric: Mood and affect normal.     LAB RESULTS  Lab Results (last 24 hours)     Procedure Component Value Units Date/Time    POC Glucose Once [[id number]]  (Abnormal) Collected:  02/12/18 1648    Specimen:  Blood Updated:  02/12/18 1650     Glucose 157 (H) mg/dL     Narrative:       Meter: [id number] : [id number] Julius STEWART    POC Glucose Once [[id number]]  (Abnormal) Collected:  02/12/18 2244    Specimen:  Blood Updated:  02/12/18 2245     Glucose 187 (H) mg/dL     Narrative:       Meter: [id number] : [id number] Paty STEWART    CBC & Differential [[id number]] Collected:  02/13/18 0549    Specimen:  Blood Updated:  02/13/18 0637    Narrative:       The following orders were created for panel order CBC & Differential.  Procedure                               Abnormality         Status                     ---------                               -----------         ------                     CBC Auto Differential[[id number]]        Abnormal            Final result                 Please view results for these tests on the individual orders.    CBC Auto Differential [[id number]]  (Abnormal) Collected:  02/13/18 0549    Specimen:  Blood Updated:  02/13/18 0637     WBC 6.28 10*3/mm3      RBC 3.25 (L) 10*6/mm3      Hemoglobin 9.7 (L) g/dL      Hematocrit 31.4 (L) %      MCV 96.6 fL      MCH 29.8 pg      MCHC 30.9 (L) g/dL      RDW 13.6 (H) %      RDW-SD 47.3 fl      MPV 10.3 fL      Platelets 162 10*3/mm3      Neutrophil % 38.8 (L) %      Lymphocyte %  47.5 (H) %      Monocyte % 9.2 %      Eosinophil % 3.5 %      Basophil % 0.5 %      Immature Grans % 0.5 %      Neutrophils, Absolute 2.44 10*3/mm3      Lymphocytes, Absolute 2.98 10*3/mm3      Monocytes, Absolute 0.58 10*3/mm3      Eosinophils, Absolute 0.22 10*3/mm3      Basophils, Absolute 0.03 10*3/mm3      Immature Grans, Absolute 0.03 10*3/mm3     Protime-INR [[id number]]  (Abnormal) Collected:  02/13/18 0549    Specimen:  Blood Updated:  02/13/18 0646     Protime 21.9 (H) Seconds      INR 1.95 (H)    aPTT [[id number]]  (Normal) Collected:  02/13/18 0549    Specimen:  Blood Updated:  02/13/18 0646     PTT 32.4 seconds     Comprehensive Metabolic Panel [[id number]]  (Abnormal) Collected:  02/13/18 0549    Specimen:  Blood Updated:  02/13/18 0701     Glucose 160 (H) mg/dL      BUN 32 (H) mg/dL      Creatinine 1.11 (H) mg/dL      Sodium 140 mmol/L      Potassium 3.9 mmol/L      Chloride 105 mmol/L      CO2 28.7 mmol/L      Calcium 8.1 (L) mg/dL      Total Protein 6.4 g/dL      Albumin 2.60 (L) g/dL      ALT (SGPT) 9 U/L      AST (SGOT) 13 U/L      Alkaline Phosphatase 47 U/L      Total Bilirubin 0.2 mg/dL      eGFR   Amer 58 (L) mL/min/1.73      Globulin 3.8 gm/dL      A/G Ratio 0.7 g/dL      BUN/Creatinine Ratio 28.8 (H)     Anion Gap 6.3 mmol/L     Narrative:       The MDRD GFR formula is only valid for adults with stable renal function between ages 18 and 70.    Iron Profile [[id number]]  (Abnormal) Collected:  02/13/18 0549    Specimen:  Blood Updated:  02/13/18 0701     Iron 52 mcg/dL      Iron Saturation 24 %      Transferrin 147 (L) mg/dL      TIBC 219 mcg/dL     Ferritin [[id number]]  (Normal) Collected:  02/13/18 0549    Specimen:  Blood Updated:  02/13/18 0707     Ferritin 87.04 ng/mL     Folate [[id number]]  (Normal) Collected:  02/13/18 0549    Specimen:  Blood Updated:  02/13/18 0715     Folate 14.05 ng/mL     Vitamin B12 [[id number]]  (Normal) Collected:  02/13/18 0549    Specimen:  Blood Updated:   02/13/18 0715     Vitamin B-12 918 pg/mL     POC Glucose Once [[id number]]  (Abnormal) Collected:  02/13/18 0726    Specimen:  Blood Updated:  02/13/18 0727     Glucose 163 (H) mg/dL     Narrative:       Meter: [id number] : [id number] Robert Gaines    POC Glucose Once [[id number]]  (Abnormal) Collected:  02/13/18 1128    Specimen:  Blood Updated:  02/13/18 1129     Glucose 155 (H) mg/dL     Narrative:       Meter: [id number] : [id number] Robert Gaines        Imaging Results (last 24 hours)     Procedure Component Value Units Date/Time    CT Abdomen Pelvis Without Contrast [[id number]] Collected:  02/12/18 0901     Updated:  02/13/18 1325    Narrative:       CT ABDOMEN AND PELVIS WITHOUT CONTRAST     HISTORY: Rule out renal cyst and mass.     TECHNIQUE: Axial CT images of the abdomen and pelvis were obtained  without administration of intravenous contrast. The patient was not  given oral contrast. Coronal and sagittal reformats were obtained.     COMPARISON: Renal sonogram from 02/11/2018.     FINDINGS: The study is limited due to absence of IV contrast and motion  artifact. Bilateral adrenal glands are normal. Both kidneys are normal  in size and attenuation. The previously identified cystic lesion at the  midpole of the left kidney is poorly imaged currently secondary to  extensive motion artifact at this level. The attenuation measurements  are not diagnostic of a simple cyst.     The visualized lung bases are normal. The liver demonstrates normal  noncontrast attenuation. Cholelithiasis is present. The spleen and the  pancreas are normal. The small and large bowel loops demonstrate normal  caliber. Moderate amount of stool is seen throughout the colon with  fecal impaction in the rectal vault. Mild circumferential wall  thickening of the rectum is seen at this level. No pathological  retroperitoneal lymphadenopathy. Moderate calcified atherosclerotic  plaque is seen within the abdominal aorta and  its branches. No ascites  is seen. The uterus is not identified and is likely surgically absent.  Significant osteopenia is identified. Advanced degenerative arthritis is  seen within the right greater than left hips.       Impression:       1. Limited secondary to absence of IV contrast and motion artifact. The  hypoechoic lesion seen on the prior sonogram is not convincingly  characterized on this current CT. The recommendation would be to follow  up with sonogram in 3 months to reevaluate for size and internal  character.  2. Cholelithiasis.  3. Large amount of stool is seen within the rectal wall with mild  circumferential rectal wall thickening. This could represent stercoral  colitis.     Radiation dose reduction techniques were utilized, including automated  exposure control and exposure modulation based on body size.     This report was finalized on 2/13/2018 1:22 PM by Dr. Sofie Wooten MD.             Current Facility-Administered Medications:   •  acetaminophen (TYLENOL) tablet 650 mg, 650 mg, Oral, Q4H PRN, Josias Razo MD, 650 mg at 02/11/18 0910  •  allopurinol (ZYLOPRIM) tablet 100 mg, 100 mg, Oral, Daily, Josias Razo MD, 100 mg at 02/13/18 1007  •  amLODIPine (NORVASC) tablet 10 mg, 10 mg, Oral, Daily, Josias Razo MD, 10 mg at 02/13/18 1007  •  atorvastatin (LIPITOR) tablet 80 mg, 80 mg, Oral, Nightly, Josias Razo MD, 80 mg at 02/12/18 2121  •  baclofen (LIORESAL) tablet 10 mg, 10 mg, Oral, Q8H, Josias Razo MD, 10 mg at 02/13/18 0643  •  cholecalciferol (VITAMIN D3) tablet 4,000 Units, 4,000 Units, Oral, Daily, Josias Razo MD, 4,000 Units at 02/13/18 1007  •  CloNIDine (CATAPRES) tablet 0.3 mg, 0.3 mg, Oral, TID, Josias Razo MD, 0.3 mg at 02/13/18 1006  •  clopidogrel (PLAVIX) tablet 75 mg, 75 mg, Oral, Daily, Josias Razo MD, Stopped at 02/13/18 1004  •  docusate sodium (COLACE) capsule 200 mg, 200 mg, Oral, BID, 200 mg at 02/13/18 1006 **AND** [DISCONTINUED] docusate sodium (COLACE)  capsule 50 mg, 50 mg, Oral, Daily, Josias Razo MD  •  hydrALAZINE (APRESOLINE) tablet 25 mg, 25 mg, Oral, Q8H, Josias Razo MD, 25 mg at 02/13/18 0643  •  insulin aspart (novoLOG) injection 0-7 Units, 0-7 Units, Subcutaneous, 4x Daily With Meals & Nightly, Josias Razo MD, 2 Units at 02/13/18 1257  •  insulin aspart (novoLOG) injection 5 Units, 5 Units, Subcutaneous, TID With Meals, Josias Razo MD, 5 Units at 02/13/18 1258  •  insulin detemir (LEVEMIR) injection 20 Units, 20 Units, Subcutaneous, Nightly, Josias Razo MD, 20 Units at 02/12/18 2253  •  lactobacillus acidophilus (RISAQUAD) capsule 1 capsule, 1 capsule, Oral, Daily, Josias Razo MD, 1 capsule at 02/13/18 1009  •  metoprolol tartrate (LOPRESSOR) tablet 50 mg, 50 mg, Oral, Q12H, Josias Razo MD, 50 mg at 02/13/18 1007  •  ondansetron (ZOFRAN) injection 4 mg, 4 mg, Intravenous, Q4H PRN, Josias Razo MD  •  pantoprazole (PROTONIX) EC tablet 40 mg, 40 mg, Oral, BID AC, Josias Razo MD, 40 mg at 02/13/18 1008  •  pneumococcal polysaccharide 23-valent (PNEUMOVAX-23) vaccine 0.5 mL, 0.5 mL, Intramuscular, During Hospitalization, Josias Razo MD  •  polyethylene glycol (MIRALAX) powder 17 g, 17 g, Oral, BID, Josias Razo MD, 17 g at 02/13/18 1000  •  sertraline (ZOLOFT) tablet 100 mg, 100 mg, Oral, Nightly, Josias Razo MD, 100 mg at 02/12/18 2121  •  Insert peripheral IV, , , Once **AND** sodium chloride 0.9 % flush 10 mL, 10 mL, Intravenous, PRN, Shara Streeter MD  •  sodium chloride 0.9 % infusion, 75 mL/hr, Intravenous, Continuous, Josias Razo MD, Last Rate: 75 mL/hr at 02/12/18 1406, 75 mL/hr at 02/12/18 1406     ASSESSMENT  Acute kidney injury with hyperkalemia  Chronic kidney disease stage III  Renal cyst  Questionable colitis will get GI consult  Insulin-dependent diabetes mellitus  Hypertension  Hyperlipidemia  Chronic anemia  Gastroesophageal reflux disease  Chronic lymphedema  Status post CVA with left jaja-  Chronic anticoagulation  Immobilization  syndrome    PLAN  CPM  Hold aspirin and Plavix  GI consult appreciated  Continue nursing home meds except for diuretics and ACE inhibitor  Upper and lower endoscopy on 2/17  Supportive care  Stress ulcer DVT prophylaxis  PTOT  Follow closely further recommendation according to course    MELODY JOHNSON MD

## 2018-02-13 NOTE — PLAN OF CARE
Problem: Patient Care Overview (Adult)  Goal: Plan of Care Review  Outcome: Ongoing (interventions implemented as appropriate)   02/13/18 0831   Coping/Psychosocial Response Interventions   Plan Of Care Reviewed With patient   Patient Care Overview   Progress improving   Outcome Evaluation   Outcome Summary/Follow up Plan Pt denies pain/ discomfort. Denies SOA, chest discomfort. Continues on NS @ 75. No acute s/s of distress noted.      Goal: Adult Individualization and Mutuality  Outcome: Ongoing (interventions implemented as appropriate)    Goal: Discharge Needs Assessment  Outcome: Ongoing (interventions implemented as appropriate)      Problem: Fall Risk (Adult)  Goal: Absence of Falls  Outcome: Ongoing (interventions implemented as appropriate)      Problem: Skin Integrity Impairment, Risk/Actual (Adult)  Goal: Skin Integrity/Wound Healing  Outcome: Ongoing (interventions implemented as appropriate)      Problem: Renal Failure/Kidney Injury, Acute (Adult)  Goal: Signs and Symptoms of Listed Potential Problems Will be Absent or Manageable (Renal Failure/Kidney Injury, Acute)  Outcome: Ongoing (interventions implemented as appropriate)

## 2018-02-13 NOTE — PROGRESS NOTES
BGA/GI Progress Note   Chief Complaint:  Anemia, abnormal CT abd/pelvis    Subjective     Interval History: No abdominal pain, no n/v.  Hx of chronic constipation, has had two BM's since admission.  Discussed with pt and family possible endoscopy procedures and would prefer being done during hospitalization and she resides in nursing facility and transportation and administration of bowel prep a concern if done in pt.  DIscussed with RN and will hold this am dose of Plavix until Dr. Razo gives ok to stop AC.    History taken from: patient chart family RN    Review of Systems:    All systems were reviewed and negative except for:  Gastrointestinal: postitive for  constipation    Objective     Vital Signs  Temp:  [98.3 °F (36.8 °C)-98.6 °F (37 °C)] 98.4 °F (36.9 °C)  Heart Rate:  [58-75] 75  Resp:  [16] 16  BP: (130-161)/(81-97) 161/97  Body mass index is 39.43 kg/(m^2).  No intake or output data in the 24 hours ending 02/13/18 1010       Physical Exam:   General: patient awake, alert and cooperative   Eyes: Normal lids and lashes, no scleral icterus   Neck: supple, normal ROM, no tracheal deviation   Skin: warm and dry, not jaundiced   Cardiovascular: regular rhythm and rate, no murmurs auscultated   Pulm: clear to auscultation bilaterally, regular and unlabored   Abdomen: morbidly obese, soft, nontender, nondistended; normal bowel sounds   Rectal: deferred   Extremities: no rash or edema   Neurologic: Normal mood and behavior, left hemiparesis from prior CVA    All Medications Have Been Reviewed     Results Review:       Results from last 7 days  Lab Units 02/13/18  0549 02/12/18  0654 02/11/18  0543   WBC 10*3/mm3 6.28 6.46 7.44   HEMOGLOBIN g/dL 9.7* 10.3* 11.3*   HEMATOCRIT % 31.4* 32.7* 35.5*   PLATELETS 10*3/mm3 162 181 186         Results from last 7 days  Lab Units 02/13/18  0549 02/12/18  0654 02/11/18  0543  02/09/18  2046   SODIUM mmol/L 140 141 139  < > 137   POTASSIUM mmol/L 3.9 4.2 4.0  < >  5.4*   CHLORIDE mmol/L 105 105 102  < > 101   CO2 mmol/L 28.7 26.4 24.9  < > 25.6   BUN mg/dL 32* 36* 47*  < > 75*   CREATININE mg/dL 1.11* 1.23* 1.21*  < > 1.64*   CALCIUM mg/dL 8.1* 8.4* 8.7  < > 8.6   BILIRUBIN mg/dL 0.2  --  0.2  --  <0.2   ALK PHOS U/L 47  --  55  --  55   ALT (SGPT) U/L 9  --  11  --  13   AST (SGOT) U/L 13  --  16  --  11   GLUCOSE mg/dL 160* 164* 129*  < > 284*   < > = values in this interval not displayed.      Results from last 7 days  Lab Units 02/13/18  0549 02/12/18  0654 02/11/18  0543   INR  1.95* 1.98* 2.15*       RADIOLOGY:    Imaging Results (last 72 hours)     Procedure Component Value Units Date/Time    US Renal Bilateral [[id number]] Collected:  02/10/18 1942     Updated:  02/11/18 1914    Narrative:       ULTRASOUND RENAL BILATERAL.     TECHNIQUE: Grayscale and color images of the kidneys were obtained.     HISTORY: Acute renal insufficiency.     COMPARISON: No prior studies for comparison.     FINDINGS:  Right kidney measures 8.6 x 3.9 x 4.2 cm, cortical thickness measures  1.1 cm. 1.2 x 1.3 x 1.1 cm cyst at the inferior pole right kidney.      Left kidney measures 8.8 x 4.6 x 4.2 cm, cortical thickness measures 1.2  cm. At the inferior pole there is poorly visualized 2 cm lesion likely  to be a cyst however follow-up is recommended. There is no  hydronephrosis, stone or mass.     Urinary bladder is distended otherwise unremarkable. No calculus, sludge  or mass.       Impression:       1.  No acute findings, no hydronephrosis.  2.  1.3 cm cyst of the right kidney.  3.  Poorly visualized suspected 2 cm cyst at the inferior pole left  kidney. CT scan is recommended for further evaluation.      This report was finalized on 2/11/2018 7:11 PM by Dr. Robinson Stallworth MD.       XR Chest 1 View [[id number]] Collected:  02/11/18 0533     Updated:  02/11/18 2152    Narrative:       X-RAY CHEST 1 VIEW.     HISTORY: CHF, hypertension.     COMPARISON: 09/17/2009.      FINDINGS:  Cardiomediastinal silhouette is within normal limits.         There is no consolidation or effusion.              Impression:       No acute findings. No significant change.     This report was finalized on 2/11/2018 9:49 PM by Dr. Robinson Stallworth MD.       CT Abdomen Pelvis Without Contrast [[id number]] Collected:  02/12/18 0901     Updated:  02/12/18 0901    Narrative:       CT ABDOMEN AND PELVIS WITHOUT CONTRAST     HISTORY: Rule out renal cyst and mass.     TECHNIQUE: Axial CT images of the abdomen and pelvis were obtained  without administration of intravenous contrast. The patient was not  given oral contrast. Coronal and sagittal reformats were obtained.     COMPARISON: Renal sonogram from 02/11/2018.     FINDINGS: The study is limited due to absence of IV contrast and motion  artifact. Bilateral adrenal glands are normal. Both kidneys are normal  in size and attenuation. The previously identified cystic lesion at the  midpole of the left kidney is poorly imaged currently secondary to  extensive motion artifact at this level. The attenuation measurements  are not diagnostic of a simple cyst.     The visualized lung bases are normal. The liver demonstrates normal  noncontrast attenuation. Cholelithiasis is present. The spleen and the  pancreas are normal. The small and large bowel loops demonstrate normal  caliber. Moderate amount of stool is seen throughout the colon with  fecal impaction in the rectal vault. Mild circumferential wall  thickening of the rectum is seen at this level. No pathological  retroperitoneal lymphadenopathy. Moderate calcified atherosclerotic  plaque is seen within the abdominal aorta and its branches. No ascites  is seen. The uterus is not identified and is likely surgically absent.  Significant osteopenia is identified. Advanced degenerative arthritis is  seen within the right greater than left hips.       Impression:       1. Limited secondary to absence of IV contrast  and motion artifact. The  hypoechoic lesion seen on the prior sonogram is not convincingly  characterized on this current CT. The recommendation would be to follow  up with sonogram in 3 months to reevaluate for size and internal  character.  2. Cholelithiasis.  3. Large amount of stool is seen within the rectal wall with mild  circumferential rectal wall thickening. This could represent stercoral  colitis.     Radiation dose reduction techniques were utilized, including automated  exposure control and exposure modulation based on body size.                Assessment/Plan     Patient Active Problem List   Diagnosis Code   • Acute renal failure superimposed on chronic kidney disease N17.9, N18.9     Cate NURIS Ceja, APRN  02/13/18  10:10 AM        We are following for anemia, abnormal imaging        General Appearance:    Alert, cooperative, in no acute distress   Head:    Normocephalic, without obvious abnormality, atraumatic   Eyes:            Lids and lashes normal, conjunctivae and sclerae normal, no   icterus   Throat:   No oral lesions, no thrush, oral mucosa moist   Neck:   No adenopathy, supple, trachea midline, no thyromegaly, no   carotid bruit, no JVD   Lungs:     Clear to auscultation,respirations regular, even and                   unlabored    Heart:    Regular rhythm and normal rate, normal S1 and S2, no            murmur, no gallop, no rub, no click   Chest Wall:    No abnormalities observed   Abdomen:     Normal bowel sounds, no masses, no organomegaly, soft        non-tender, non-distended, no guarding, no rebound                 tenderness   Rectal:     Deferred   Extremities:   no edema, no cyanosis, no redness   Skin:   No bleeding, bruising or rash   Lymph nodes:   No palpable adenopathy   Psychiatric:  Judgement and insight: normal   Orientation to person place and time: normal   Mood and affect: normal     Assessment:  1) Anemia- in the setting of Plavix.  Not iron deficient per lab review.   No overt GI bleeding  2) Abnormal imaging- Abd/pelvis CT w/o contrast with moderate amount of stool in colon, fecal impaction in rectal vault, mild circumferential wall thickening of the rectum, possible rectal ulcer  3) Acute on CKD- renal following  4) CVA- on Plavix as out pt, currently receiving as in pt    - Monitor H and H  - if OK with Dr. Razo hold Plavix and plan for in pt endoscopy procedures, last dose of Plavix 2/12, have asked RN to hold this am's dose until she speaks with Dr. Razo  - increase Miralax with hx of chronic constipation in prep for possible endoscopy    Family is  agreeable to holding Plavix 5 days and colonoscopy and egd  befor transfer back to nursing home.  That would be scopes on February 17 or 18

## 2018-02-14 LAB
ANION GAP SERPL CALCULATED.3IONS-SCNC: 7.4 MMOL/L
BASOPHILS # BLD AUTO: 0.03 10*3/MM3 (ref 0–0.2)
BASOPHILS NFR BLD AUTO: 0.4 % (ref 0–1.5)
BUN BLD-MCNC: 26 MG/DL (ref 8–23)
BUN/CREAT SERPL: 22.2 (ref 7–25)
CALCIUM SPEC-SCNC: 8.8 MG/DL (ref 8.6–10.5)
CHLORIDE SERPL-SCNC: 104 MMOL/L (ref 98–107)
CO2 SERPL-SCNC: 28.6 MMOL/L (ref 22–29)
CREAT BLD-MCNC: 1.17 MG/DL (ref 0.57–1)
DEPRECATED RDW RBC AUTO: 48.3 FL (ref 37–54)
EOSINOPHIL # BLD AUTO: 0.26 10*3/MM3 (ref 0–0.7)
EOSINOPHIL NFR BLD AUTO: 3.8 % (ref 0.3–6.2)
ERYTHROCYTE [DISTWIDTH] IN BLOOD BY AUTOMATED COUNT: 13.9 % (ref 11.7–13)
GFR SERPL CREATININE-BSD FRML MDRD: 55 ML/MIN/1.73
GLUCOSE BLD-MCNC: 192 MG/DL (ref 65–99)
GLUCOSE BLDC GLUCOMTR-MCNC: 175 MG/DL (ref 70–130)
GLUCOSE BLDC GLUCOMTR-MCNC: 207 MG/DL (ref 70–130)
GLUCOSE BLDC GLUCOMTR-MCNC: 209 MG/DL (ref 70–130)
GLUCOSE BLDC GLUCOMTR-MCNC: 219 MG/DL (ref 70–130)
HCT VFR BLD AUTO: 30.1 % (ref 35.6–45.5)
HGB BLD-MCNC: 9.5 G/DL (ref 11.9–15.5)
IMM GRANULOCYTES # BLD: 0.04 10*3/MM3 (ref 0–0.03)
IMM GRANULOCYTES NFR BLD: 0.6 % (ref 0–0.5)
INR PPP: 1.99 (ref 0.9–1.1)
LYMPHOCYTES # BLD AUTO: 3.36 10*3/MM3 (ref 0.9–4.8)
LYMPHOCYTES NFR BLD AUTO: 48.9 % (ref 19.6–45.3)
MCH RBC QN AUTO: 30.2 PG (ref 26.9–32)
MCHC RBC AUTO-ENTMCNC: 31.6 G/DL (ref 32.4–36.3)
MCV RBC AUTO: 95.6 FL (ref 80.5–98.2)
MONOCYTES # BLD AUTO: 0.45 10*3/MM3 (ref 0.2–1.2)
MONOCYTES NFR BLD AUTO: 6.6 % (ref 5–12)
NEUTROPHILS # BLD AUTO: 2.73 10*3/MM3 (ref 1.9–8.1)
NEUTROPHILS NFR BLD AUTO: 39.7 % (ref 42.7–76)
PLATELET # BLD AUTO: 170 10*3/MM3 (ref 140–500)
PMV BLD AUTO: 10.4 FL (ref 6–12)
POTASSIUM BLD-SCNC: 4.1 MMOL/L (ref 3.5–5.2)
PROTHROMBIN TIME: 22.2 SECONDS (ref 11.7–14.2)
RBC # BLD AUTO: 3.15 10*6/MM3 (ref 3.9–5.2)
SODIUM BLD-SCNC: 140 MMOL/L (ref 136–145)
WBC NRBC COR # BLD: 6.87 10*3/MM3 (ref 4.5–10.7)

## 2018-02-14 PROCEDURE — 85610 PROTHROMBIN TIME: CPT | Performed by: HOSPITALIST

## 2018-02-14 PROCEDURE — 85025 COMPLETE CBC W/AUTO DIFF WBC: CPT | Performed by: HOSPITALIST

## 2018-02-14 PROCEDURE — 99232 SBSQ HOSP IP/OBS MODERATE 35: CPT | Performed by: INTERNAL MEDICINE

## 2018-02-14 PROCEDURE — 80048 BASIC METABOLIC PNL TOTAL CA: CPT | Performed by: INTERNAL MEDICINE

## 2018-02-14 PROCEDURE — 82962 GLUCOSE BLOOD TEST: CPT

## 2018-02-14 PROCEDURE — 63710000001 INSULIN ASPART PER 5 UNITS: Performed by: HOSPITALIST

## 2018-02-14 RX ADMIN — INSULIN ASPART 5 UNITS: 100 INJECTION, SOLUTION INTRAVENOUS; SUBCUTANEOUS at 07:43

## 2018-02-14 RX ADMIN — POLYETHYLENE GLYCOL 3350 17 G: 17 POWDER, FOR SOLUTION ORAL at 20:37

## 2018-02-14 RX ADMIN — ALLOPURINOL 100 MG: 100 TABLET ORAL at 09:04

## 2018-02-14 RX ADMIN — HYDRALAZINE HYDROCHLORIDE 50 MG: 50 TABLET, FILM COATED ORAL at 22:15

## 2018-02-14 RX ADMIN — CLONIDINE HYDROCHLORIDE 0.3 MG: 0.1 TABLET ORAL at 17:38

## 2018-02-14 RX ADMIN — SERTRALINE 100 MG: 100 TABLET, FILM COATED ORAL at 20:37

## 2018-02-14 RX ADMIN — CLONIDINE HYDROCHLORIDE 0.3 MG: 0.1 TABLET ORAL at 20:37

## 2018-02-14 RX ADMIN — BACLOFEN 10 MG: 10 TABLET ORAL at 06:32

## 2018-02-14 RX ADMIN — PANTOPRAZOLE SODIUM 40 MG: 40 TABLET, DELAYED RELEASE ORAL at 06:32

## 2018-02-14 RX ADMIN — METOPROLOL TARTRATE 50 MG: 50 TABLET, FILM COATED ORAL at 20:37

## 2018-02-14 RX ADMIN — POLYETHYLENE GLYCOL 3350 17 G: 17 POWDER, FOR SOLUTION ORAL at 09:04

## 2018-02-14 RX ADMIN — AMLODIPINE BESYLATE 10 MG: 10 TABLET ORAL at 09:04

## 2018-02-14 RX ADMIN — INSULIN ASPART 2 UNITS: 100 INJECTION, SOLUTION INTRAVENOUS; SUBCUTANEOUS at 17:39

## 2018-02-14 RX ADMIN — DOCUSATE SODIUM 200 MG: 100 CAPSULE, LIQUID FILLED ORAL at 20:37

## 2018-02-14 RX ADMIN — INSULIN ASPART 3 UNITS: 100 INJECTION, SOLUTION INTRAVENOUS; SUBCUTANEOUS at 12:14

## 2018-02-14 RX ADMIN — BACLOFEN 10 MG: 10 TABLET ORAL at 14:23

## 2018-02-14 RX ADMIN — INSULIN ASPART 7 UNITS: 100 INJECTION, SOLUTION INTRAVENOUS; SUBCUTANEOUS at 17:39

## 2018-02-14 RX ADMIN — VITAMIN D, TAB 1000IU (100/BT) 4000 UNITS: 25 TAB at 09:04

## 2018-02-14 RX ADMIN — PANTOPRAZOLE SODIUM 40 MG: 40 TABLET, DELAYED RELEASE ORAL at 17:38

## 2018-02-14 RX ADMIN — CLONIDINE HYDROCHLORIDE 0.3 MG: 0.1 TABLET ORAL at 09:03

## 2018-02-14 RX ADMIN — BACLOFEN 10 MG: 10 TABLET ORAL at 22:15

## 2018-02-14 RX ADMIN — Medication 1 CAPSULE: at 09:03

## 2018-02-14 RX ADMIN — ATORVASTATIN CALCIUM 80 MG: 80 TABLET, FILM COATED ORAL at 20:37

## 2018-02-14 RX ADMIN — INSULIN ASPART 3 UNITS: 100 INJECTION, SOLUTION INTRAVENOUS; SUBCUTANEOUS at 21:43

## 2018-02-14 RX ADMIN — INSULIN ASPART 3 UNITS: 100 INJECTION, SOLUTION INTRAVENOUS; SUBCUTANEOUS at 07:42

## 2018-02-14 RX ADMIN — HYDRALAZINE HYDROCHLORIDE 50 MG: 50 TABLET, FILM COATED ORAL at 14:23

## 2018-02-14 RX ADMIN — METOPROLOL TARTRATE 50 MG: 50 TABLET, FILM COATED ORAL at 09:03

## 2018-02-14 RX ADMIN — Medication 10 ML: at 20:39

## 2018-02-14 RX ADMIN — INSULIN ASPART 5 UNITS: 100 INJECTION, SOLUTION INTRAVENOUS; SUBCUTANEOUS at 12:15

## 2018-02-14 RX ADMIN — DOCUSATE SODIUM 200 MG: 100 CAPSULE, LIQUID FILLED ORAL at 09:04

## 2018-02-14 RX ADMIN — HYDRALAZINE HYDROCHLORIDE 50 MG: 50 TABLET, FILM COATED ORAL at 06:33

## 2018-02-14 NOTE — PROGRESS NOTES
Subjective no c/o no cp/sob   Chief complaint: Acute renal failure/chronic kidney disease         Objective:nad      Vital Signs  Temp:  [97.6 °F (36.4 °C)-98.2 °F (36.8 °C)] 97.6 °F (36.4 °C)  Heart Rate:  [66-98] 70  Resp:  [16-20] 20  BP: (136-164)/(50-97) 164/84      No intake or output data in the 24 hours ending 02/14/18 1313        Physical Exam    Constitutional : well developed ,No acute distress  ENMT lips pink oral mucosa moist   Eyes sclerae white PERRL  Respiratory: Clear to auscultation , No crackles no wheezing respirations are even and un-labored  GI: Soft, Non tender, BS active in all 4 quadrants  CVS: S1 S2 regular, no rub murmur or gallops  Skin warm dry no rashes no petechiae  Neuro left weakness cranial nerves 2-12 grossly intact sensation intact  Ext:no LE edema, Peripheral pulses intact   Heme no cervical  lymphadenopathy no petechiae         Results Review:      Lab Results   Component Value Date    GLUCOSE 192 (H) 02/14/2018    CALCIUM 8.8 02/14/2018     02/14/2018    K 4.1 02/14/2018    CO2 28.6 02/14/2018     02/14/2018    BUN 26 (H) 02/14/2018    CREATININE 1.17 (H) 02/14/2018    EGFRIFAFRI 55 (L) 02/14/2018    BCR 22.2 02/14/2018    ANIONGAP 7.4 02/14/2018       Lab Results   Component Value Date    WBC 6.87 02/14/2018    HGB 9.5 (L) 02/14/2018    HCT 30.1 (L) 02/14/2018    MCV 95.6 02/14/2018     02/14/2018       Pertinent Imaging studies were reviewed      Medication Review:       Current Facility-Administered Medications:   •  acetaminophen (TYLENOL) tablet 650 mg, 650 mg, Oral, Q4H PRN, Josias Razo MD, 650 mg at 02/11/18 0910  •  allopurinol (ZYLOPRIM) tablet 100 mg, 100 mg, Oral, Daily, Josias Razo MD, 100 mg at 02/14/18 0904  •  amLODIPine (NORVASC) tablet 10 mg, 10 mg, Oral, Daily, Josias Razo MD, 10 mg at 02/14/18 0904  •  atorvastatin (LIPITOR) tablet 80 mg, 80 mg, Oral, Nightly, Josias Razo MD, 80 mg at 02/13/18 2103  •  baclofen (LIORESAL) tablet 10 mg,  10 mg, Oral, Q8H, Josias Razo MD, 10 mg at 02/14/18 0632  •  cholecalciferol (VITAMIN D3) tablet 4,000 Units, 4,000 Units, Oral, Daily, Josias Razo MD, 4,000 Units at 02/14/18 0904  •  CloNIDine (CATAPRES) tablet 0.3 mg, 0.3 mg, Oral, TID, Josias Razo MD, 0.3 mg at 02/14/18 0903  •  docusate sodium (COLACE) capsule 200 mg, 200 mg, Oral, BID, 200 mg at 02/14/18 0904 **AND** [DISCONTINUED] docusate sodium (COLACE) capsule 50 mg, 50 mg, Oral, Daily, Josias Razo MD  •  hydrALAZINE (APRESOLINE) tablet 50 mg, 50 mg, Oral, Q8H, Josias Razo MD, 50 mg at 02/14/18 0633  •  insulin aspart (novoLOG) injection 0-7 Units, 0-7 Units, Subcutaneous, 4x Daily With Meals & Nightly, Josias Razo MD, 3 Units at 02/14/18 1214  •  insulin aspart (novoLOG) injection 5 Units, 5 Units, Subcutaneous, TID With Meals, Josias Razo MD, 5 Units at 02/14/18 1215  •  insulin detemir (LEVEMIR) injection 25 Units, 25 Units, Subcutaneous, Nightly, Josias Razo MD, 25 Units at 02/13/18 2114  •  lactobacillus acidophilus (RISAQUAD) capsule 1 capsule, 1 capsule, Oral, Daily, Josias Razo MD, 1 capsule at 02/14/18 0903  •  metoprolol tartrate (LOPRESSOR) tablet 50 mg, 50 mg, Oral, Q12H, Josias Razo MD, 50 mg at 02/14/18 0903  •  ondansetron (ZOFRAN) injection 4 mg, 4 mg, Intravenous, Q4H PRN, Josias Razo MD  •  pantoprazole (PROTONIX) EC tablet 40 mg, 40 mg, Oral, BID AC, Josias Razo MD, 40 mg at 02/14/18 0632  •  pneumococcal polysaccharide 23-valent (PNEUMOVAX-23) vaccine 0.5 mL, 0.5 mL, Intramuscular, During Hospitalization, Josias Razo MD  •  polyethylene glycol (MIRALAX) powder 17 g, 17 g, Oral, BID, Josias Razo MD, 17 g at 02/14/18 0904  •  sertraline (ZOLOFT) tablet 100 mg, 100 mg, Oral, Nightly, Josias Razo MD, 100 mg at 02/13/18 2102  •  Insert peripheral IV, , , Once **AND** sodium chloride 0.9 % flush 10 mL, 10 mL, Intravenous, PRN, Shara Streeter MD       Assesment    PEGGY resolved   CKD , creatinine stable and near baseline  Renal  cysts  HTN  HF  Anemia  DM  CVA      Plan:  Her renal function is improved, stable and near baseline  Monitor blood pressure off IV fluids  Discontinue IV fluids, encourage oral fluid intake  Continue current antihypertensive regimen      Sourav Doe MD  02/14/18  1:13 PM  Tel: [phone]  Fax: [phone]

## 2018-02-14 NOTE — PROGRESS NOTES
"Daily progress note    Chief complaint  Doing better  No new complaints      History of present illness  77-year-old -American female with history of CVA with left hemiparesis also have diabetes hypertension hyperlipidemia lymphedema morbidly obese anxiety depression chronic kidney disease stage III with a nursing home resident presented to [facility] emergency room with generalized weakness.  Patient evaluated found to be in acute kidney injury on top of chronic kidney disease admitted for management.  Patient denies any headache chest pain shortness of breath abdominal pain vomiting diarrhea.  Patient does have nausea but denies fever chills night sweats weight loss.     REVIEW OF SYSTEMS  Review of Systems   Constitutional: Negative for fever.   HENT: Negative for sore throat.         Dry mouth   Eyes: Negative.    Respiratory: Negative for cough and shortness of breath.    Cardiovascular: Positive for leg swelling (BLE, chronic). Negative for chest pain.   Gastrointestinal: Negative for abdominal pain, diarrhea and vomiting.   Genitourinary: Positive for frequency. Negative for dysuria.   Musculoskeletal: Negative for neck pain.   Skin: Negative for rash.   Allergic/Immunologic: Negative.    Neurological: Negative for weakness, numbness and headaches.   Hematological: Negative.    Psychiatric/Behavioral: Negative.    All other systems reviewed and are negative.     PHYSICAL EXAM  Blood pressure 164/84, pulse 68, temperature (P) 98.6 °F (37 °C), temperature source (P) Oral, resp. rate 20, height 175.3 cm (69.02\"), weight 121 kg (267 lb 1.6 oz), SpO2 96 %.    Constitutional: She is oriented to person, place, and time and well-developed, well-nourished, and in no distress. No distress.   Head: Normocephalic and atraumatic.   Eyes: EOM are normal. Pupils are equal, round, and reactive to light.   Neck: Normal range of motion. Neck supple.   Cardiovascular: Normal rate, regular rhythm and normal heart " sounds.    Pulmonary/Chest: Effort normal and breath sounds normal. No respiratory distress.   Abdominal: Soft. There is no tenderness. There is no rebound and no guarding.   Musculoskeletal: Normal range of motion. She exhibits edema (BLE).   Neurological: She is alert and oriented to person, place, and time. She has normal sensation and normal strength.   Skin: Skin is warm and dry. No rash noted.   Psychiatric: Mood and affect normal.     LAB RESULTS  Lab Results (last 24 hours)     Procedure Component Value Units Date/Time    Protime-INR [[id number]]  (Abnormal) Collected:  02/13/18 1543    Specimen:  Blood Updated:  02/13/18 1607     Protime 21.7 (H) Seconds      INR 1.92 (H)    POC Glucose Once [[id number]]  (Abnormal) Collected:  02/13/18 1654    Specimen:  Blood Updated:  02/13/18 1659     Glucose 169 (H) mg/dL     Narrative:       Meter: [id number] : [id number] Marleny Ruiz    POC Glucose Once [[id number]]  (Abnormal) Collected:  02/13/18 2050    Specimen:  Blood Updated:  02/13/18 2052     Glucose 195 (H) mg/dL     Narrative:       Meter: [id number] : [id number] Roderick STEWART    Protime-INR [[id number]]  (Abnormal) Collected:  02/14/18 0446    Specimen:  Blood Updated:  02/14/18 0613     Protime 22.2 (H) Seconds      INR 1.99 (H)    CBC & Differential [[id number]] Collected:  02/14/18 0446    Specimen:  Blood Updated:  02/14/18 0620    Narrative:       The following orders were created for panel order CBC & Differential.  Procedure                               Abnormality         Status                     ---------                               -----------         ------                     CBC Auto Differential[[id number]]        Abnormal            Final result                 Please view results for these tests on the individual orders.    CBC Auto Differential [[id number]]  (Abnormal) Collected:  02/14/18 0446    Specimen:  Blood Updated:  02/14/18 0620     WBC 6.87 10*3/mm3      RBC 3.15 (L)  10*6/mm3      Hemoglobin 9.5 (L) g/dL      Hematocrit 30.1 (L) %      MCV 95.6 fL      MCH 30.2 pg      MCHC 31.6 (L) g/dL      RDW 13.9 (H) %      RDW-SD 48.3 fl      MPV 10.4 fL      Platelets 170 10*3/mm3      Neutrophil % 39.7 (L) %      Lymphocyte % 48.9 (H) %      Monocyte % 6.6 %      Eosinophil % 3.8 %      Basophil % 0.4 %      Immature Grans % 0.6 (H) %      Neutrophils, Absolute 2.73 10*3/mm3      Lymphocytes, Absolute 3.36 10*3/mm3      Monocytes, Absolute 0.45 10*3/mm3      Eosinophils, Absolute 0.26 10*3/mm3      Basophils, Absolute 0.03 10*3/mm3      Immature Grans, Absolute 0.04 (H) 10*3/mm3     Basic Metabolic Panel [[id number]]  (Abnormal) Collected:  02/14/18 0446    Specimen:  Blood Updated:  02/14/18 0648     Glucose 192 (H) mg/dL      BUN 26 (H) mg/dL      Creatinine 1.17 (H) mg/dL      Sodium 140 mmol/L      Potassium 4.1 mmol/L      Chloride 104 mmol/L      CO2 28.6 mmol/L      Calcium 8.8 mg/dL      eGFR  African Amer 55 (L) mL/min/1.73      BUN/Creatinine Ratio 22.2     Anion Gap 7.4 mmol/L     Narrative:       The MDRD GFR formula is only valid for adults with stable renal function between ages 18 and 70.    POC Glucose Once [[id number]]  (Abnormal) Collected:  02/14/18 0728    Specimen:  Blood Updated:  02/14/18 0730     Glucose 209 (H) mg/dL     Narrative:       Meter: [id number] : [id number] Exarao CNA    POC Glucose Once [[id number]]  (Abnormal) Collected:  02/14/18 1201    Specimen:  Blood Updated:  02/14/18 1203     Glucose 219 (H) mg/dL     Narrative:       Meter: [id number] : [id number] Exarao CNA        Imaging Results (last 24 hours)     ** No results found for the last 24 hours. **          Current Facility-Administered Medications:   •  acetaminophen (TYLENOL) tablet 650 mg, 650 mg, Oral, Q4H PRN, Josias Ahmed, MD, 650 mg at 02/11/18 0910  •  allopurinol (ZYLOPRIM) tablet 100 mg, 100 mg, Oral, Daily, Josias Razo MD, 100 mg at 02/14/18 0904  •   amLODIPine (NORVASC) tablet 10 mg, 10 mg, Oral, Daily, Josias Razo MD, 10 mg at 02/14/18 0904  •  atorvastatin (LIPITOR) tablet 80 mg, 80 mg, Oral, Nightly, Josias Razo MD, 80 mg at 02/13/18 2103  •  baclofen (LIORESAL) tablet 10 mg, 10 mg, Oral, Q8H, Josias Razo MD, 10 mg at 02/14/18 0632  •  cholecalciferol (VITAMIN D3) tablet 4,000 Units, 4,000 Units, Oral, Daily, Josias Razo MD, 4,000 Units at 02/14/18 0904  •  CloNIDine (CATAPRES) tablet 0.3 mg, 0.3 mg, Oral, TID, Josias Razo MD, 0.3 mg at 02/14/18 0903  •  docusate sodium (COLACE) capsule 200 mg, 200 mg, Oral, BID, 200 mg at 02/14/18 0904 **AND** [DISCONTINUED] docusate sodium (COLACE) capsule 50 mg, 50 mg, Oral, Daily, Josias Razo MD  •  hydrALAZINE (APRESOLINE) tablet 50 mg, 50 mg, Oral, Q8H, Josias Razo MD, 50 mg at 02/14/18 0633  •  insulin aspart (novoLOG) injection 0-7 Units, 0-7 Units, Subcutaneous, 4x Daily With Meals & Nightly, Josias Razo MD, 3 Units at 02/14/18 1214  •  insulin aspart (novoLOG) injection 5 Units, 5 Units, Subcutaneous, TID With Meals, Josias Razo MD, 5 Units at 02/14/18 1215  •  insulin detemir (LEVEMIR) injection 25 Units, 25 Units, Subcutaneous, Nightly, Josias Razo MD, 25 Units at 02/13/18 2114  •  lactobacillus acidophilus (RISAQUAD) capsule 1 capsule, 1 capsule, Oral, Daily, Josias Razo MD, 1 capsule at 02/14/18 0903  •  metoprolol tartrate (LOPRESSOR) tablet 50 mg, 50 mg, Oral, Q12H, Josias Razo MD, 50 mg at 02/14/18 0903  •  ondansetron (ZOFRAN) injection 4 mg, 4 mg, Intravenous, Q4H PRN, Josias Razo MD  •  pantoprazole (PROTONIX) EC tablet 40 mg, 40 mg, Oral, BID AC, Josias Razo MD, 40 mg at 02/14/18 0632  •  pneumococcal polysaccharide 23-valent (PNEUMOVAX-23) vaccine 0.5 mL, 0.5 mL, Intramuscular, During Hospitalization, Josias Razo MD  •  polyethylene glycol (MIRALAX) powder 17 g, 17 g, Oral, BID, Josias Razo MD, 17 g at 02/14/18 0904  •  sertraline (ZOLOFT) tablet 100 mg, 100 mg, Oral, Nightly, Josias  MD Dung, 100 mg at 02/13/18 2102  •  Insert peripheral IV, , , Once **AND** sodium chloride 0.9 % flush 10 mL, 10 mL, Intravenous, PRN, Shara Streeter MD     ASSESSMENT  Acute kidney injury with hyperkalemia  Chronic kidney disease stage III  Renal cyst  Questionable colitis will get GI consult  Insulin-dependent diabetes mellitus  Hypertension  Hyperlipidemia  Chronic anemia  Gastroesophageal reflux disease  Chronic lymphedema  Status post CVA with left jaja-  Chronic anticoagulation  Immobilization syndrome    PLAN  CPM  Hold Coumadin and Plavix  GI consult appreciated  Continue nursing home meds except for diuretics and ACE inhibitor  Upper and lower endoscopy on 2/17  Supportive care  Stress ulcer DVT prophylaxis  PTOT  Follow closely further recommendation according to course    MELODY JOHNSON MD

## 2018-02-14 NOTE — PLAN OF CARE
Problem: Patient Care Overview (Adult)  Goal: Plan of Care Review  Outcome: Ongoing (interventions implemented as appropriate)   02/14/18 0506   Coping/Psychosocial Response Interventions   Plan Of Care Reviewed With patient;daughter     Goal: Adult Individualization and Mutuality  Outcome: Ongoing (interventions implemented as appropriate)    Goal: Discharge Needs Assessment  Outcome: Ongoing (interventions implemented as appropriate)      Problem: Fall Risk (Adult)  Goal: Absence of Falls  Outcome: Ongoing (interventions implemented as appropriate)      Problem: Skin Integrity Impairment, Risk/Actual (Adult)  Goal: Skin Integrity/Wound Healing  Outcome: Ongoing (interventions implemented as appropriate)      Problem: Renal Failure/Kidney Injury, Acute (Adult)  Goal: Signs and Symptoms of Listed Potential Problems Will be Absent or Manageable (Renal Failure/Kidney Injury, Acute)  Outcome: Ongoing (interventions implemented as appropriate)      Problem: Pressure Ulcer (Adult)  Goal: Signs and Symptoms of Listed Potential Problems Will be Absent or Manageable (Pressure Ulcer)  Outcome: Ongoing (interventions implemented as appropriate)

## 2018-02-14 NOTE — PLAN OF CARE
Problem: Patient Care Overview (Adult)  Goal: Plan of Care Review  Outcome: Ongoing (interventions implemented as appropriate)   02/13/18 8730   Coping/Psychosocial Response Interventions   Plan Of Care Reviewed With patient   Patient Care Overview   Progress improving   Outcome Evaluation   Outcome Summary/Follow up Plan patient comfortable, NS at 50cc hr, dressing changes done to bilateral legs. holding plavix and keeping until satuarday to scope per GI. no c/o pain, nausea. family helps at BS, aides in care. refuses turns at times. waffle boots in place, accumax on bed. VSS. will cont to monitor

## 2018-02-14 NOTE — PROGRESS NOTES
Continued Stay Note  [facility]     Patient Name: Kacy Mistry  MRN: [id number]  Today's Date: 2/14/2018    Admit Date: 2/9/2018          Discharge Plan       02/14/18 1321    Case Management/Social Work Plan    Plan Plan return to [facility].   AMY Taylor    Additional Comments Spoke with pt at bedside.  Packet in CCP office.   Plan return to [facility].   AMY Taylor              Discharge Codes     None            Radha Chu RN

## 2018-02-14 NOTE — PROGRESS NOTES
"        BGA/GI Progress Note   Chief Complaint:  Anemia, abnormal abd/pelvis CT    Subjective     Interval History: No abdominal pain, no n/v.  Small BM\"s with hx of chronic constipation, on bowel regimen.  Plan for EGD and c-scope 2/17/18, last dose of Plavix 2/12/18    History taken from: patient chart family    Review of Systems:    The following systems were reviewed and negative;  gastrointestinal    Objective     Vital Signs  Temp:  [97.6 °F (36.4 °C)-98.2 °F (36.8 °C)] 97.6 °F (36.4 °C)  Heart Rate:  [66-98] 70  Resp:  [16-20] 20  BP: (136-164)/(50-97) 164/84  Body mass index is 39.43 kg/(m^2).    Intake/Output Summary (Last 24 hours) at 02/14/18 0918  Last data filed at 02/13/18 1300   Gross per 24 hour   Intake              240 ml   Output                0 ml   Net              240 ml          Physical Exam:   General: patient awake, alert and cooperative.  Lying in bed, no distress   Eyes: Normal lids and lashes, no scleral icterus   Neck: supple, normal ROM, no tracheal deviation   Skin: warm and dry, not jaundiced, dressings to bilateral LE's   Cardiovascular: regular rhythm and rate, no murmurs auscultated   Pulm: clear to auscultation bilaterally, regular and unlabored   Abdomen: morbidly obese, round, soft, nontender, nondistended; normal bowel sounds   Rectal: deferred   Extremities:  Edema bilateral LE's   Neurologic: Normal mood and behavior, left jaja from prior CVA    All Medications Have Been Reviewed     Results Review:       Results from last 7 days  Lab Units 02/14/18  0446 02/13/18  0549 02/12/18  0654   WBC 10*3/mm3 6.87 6.28 6.46   HEMOGLOBIN g/dL 9.5* 9.7* 10.3*   HEMATOCRIT % 30.1* 31.4* 32.7*   PLATELETS 10*3/mm3 170 162 181         Results from last 7 days  Lab Units 02/14/18  0446 02/13/18  0549 02/12/18  0654 02/11/18  0543  02/09/18  2046   SODIUM mmol/L 140 140 141 139  < > 137   POTASSIUM mmol/L 4.1 3.9 4.2 4.0  < > 5.4*   CHLORIDE mmol/L 104 105 105 102  < > 101   CO2 mmol/L 28.6 " 28.7 26.4 24.9  < > 25.6   BUN mg/dL 26* 32* 36* 47*  < > 75*   CREATININE mg/dL 1.17* 1.11* 1.23* 1.21*  < > 1.64*   CALCIUM mg/dL 8.8 8.1* 8.4* 8.7  < > 8.6   BILIRUBIN mg/dL  --  0.2  --  0.2  --  <0.2   ALK PHOS U/L  --  47  --  55  --  55   ALT (SGPT) U/L  --  9  --  11  --  13   AST (SGOT) U/L  --  13  --  16  --  11   GLUCOSE mg/dL 192* 160* 164* 129*  < > 284*   < > = values in this interval not displayed.      Results from last 7 days  Lab Units 02/14/18  0446 02/13/18  1543 02/13/18  0549   INR  1.99* 1.92* 1.95*       RADIOLOGY:    Imaging Results (last 72 hours)     Procedure Component Value Units Date/Time    US Renal Bilateral [[id number]] Collected:  02/10/18 1942     Updated:  02/11/18 1914    Narrative:       ULTRASOUND RENAL BILATERAL.     TECHNIQUE: Grayscale and color images of the kidneys were obtained.     HISTORY: Acute renal insufficiency.     COMPARISON: No prior studies for comparison.     FINDINGS:  Right kidney measures 8.6 x 3.9 x 4.2 cm, cortical thickness measures  1.1 cm. 1.2 x 1.3 x 1.1 cm cyst at the inferior pole right kidney.      Left kidney measures 8.8 x 4.6 x 4.2 cm, cortical thickness measures 1.2  cm. At the inferior pole there is poorly visualized 2 cm lesion likely  to be a cyst however follow-up is recommended. There is no  hydronephrosis, stone or mass.     Urinary bladder is distended otherwise unremarkable. No calculus, sludge  or mass.       Impression:       1.  No acute findings, no hydronephrosis.  2.  1.3 cm cyst of the right kidney.  3.  Poorly visualized suspected 2 cm cyst at the inferior pole left  kidney. CT scan is recommended for further evaluation.      This report was finalized on 2/11/2018 7:11 PM by Dr. Robinson Stallworth MD.       XR Chest 1 View [[id number]] Collected:  02/11/18 0533     Updated:  02/11/18 2152    Narrative:       X-RAY CHEST 1 VIEW.     HISTORY: CHF, hypertension.     COMPARISON: 09/17/2009.     FINDINGS:  Cardiomediastinal silhouette is  within normal limits.         There is no consolidation or effusion.              Impression:       No acute findings. No significant change.     This report was finalized on 2/11/2018 9:49 PM by Dr. Robinson Stallworth MD.       CT Abdomen Pelvis Without Contrast [[id number]] Collected:  02/12/18 0901     Updated:  02/13/18 1325    Narrative:       CT ABDOMEN AND PELVIS WITHOUT CONTRAST     HISTORY: Rule out renal cyst and mass.     TECHNIQUE: Axial CT images of the abdomen and pelvis were obtained  without administration of intravenous contrast. The patient was not  given oral contrast. Coronal and sagittal reformats were obtained.     COMPARISON: Renal sonogram from 02/11/2018.     FINDINGS: The study is limited due to absence of IV contrast and motion  artifact. Bilateral adrenal glands are normal. Both kidneys are normal  in size and attenuation. The previously identified cystic lesion at the  midpole of the left kidney is poorly imaged currently secondary to  extensive motion artifact at this level. The attenuation measurements  are not diagnostic of a simple cyst.     The visualized lung bases are normal. The liver demonstrates normal  noncontrast attenuation. Cholelithiasis is present. The spleen and the  pancreas are normal. The small and large bowel loops demonstrate normal  caliber. Moderate amount of stool is seen throughout the colon with  fecal impaction in the rectal vault. Mild circumferential wall  thickening of the rectum is seen at this level. No pathological  retroperitoneal lymphadenopathy. Moderate calcified atherosclerotic  plaque is seen within the abdominal aorta and its branches. No ascites  is seen. The uterus is not identified and is likely surgically absent.  Significant osteopenia is identified. Advanced degenerative arthritis is  seen within the right greater than left hips.       Impression:       1. Limited secondary to absence of IV contrast and motion artifact. The  hypoechoic lesion  seen on the prior sonogram is not convincingly  characterized on this current CT. The recommendation would be to follow  up with sonogram in 3 months to reevaluate for size and internal  character.  2. Cholelithiasis.  3. Large amount of stool is seen within the rectal wall with mild  circumferential rectal wall thickening. This could represent stercoral  colitis.     Radiation dose reduction techniques were utilized, including automated  exposure control and exposure modulation based on body size.     This report was finalized on 2/13/2018 1:22 PM by Dr. Sofie Wooten MD.             Assessment/Plan     Patient Active Problem List   Diagnosis Code   • Acute renal failure superimposed on chronic kidney disease N17.9, N18.9     Cate Ceja, APRN  02/14/18  9:18 AM      We are following for anemia, abnormal imaging, proctitis    Constitutional: She is oriented to person, place, and time. She appears well-developed and well-nourished.   HENT: anicteric, no thyromegaly  Head: Normocephalic and atraumatic.   Eyes: Conjunctivae and EOM are normal.   Neck: Normal range of motion. No tracheal deviation present. Cardiovascular: Normal rate and regular rhythm.    Pulmonary/Chest: Effort normal and breath sounds normal. No respiratory distress.   Abdominal: Soft. Bowel sounds are normal. She exhibits no distension and no mass. There is no tenderness. There is no rebound and no guarding.   Musculoskeletal: Normal range of motion.   Neurological: She is alert and oriented to person, place, and time.   Skin: Skin is warm and dry.   Psychiatric: She has a normal mood and affect. Judgment normal.   Nursing note and vitals reviewed.    Assessment:  1) Anemia- in the setting of Plavix.  Not iron deficient per lab review.  No overt GI bleeding.  Hemoglobin stable  2) Abnormal imaging- Abd/pelvis CT w/o contrast with moderate amount of stool in colon, fecal impaction in rectal vault, mild circumferential wall thickening of the  rectum, possible rectal ulcer/ stercoral ulcer  3) Acute on CKD- renal following  4) CVA- on Plavix as out pt, last dose 2/12/18    - Monitor H and H, no indication for transfusion today  - continue Miralax with hx of chronic constipation in prep for endoscopy, would also place on clear liquid day prior to planned procedure.  Egd and colonoscopy planned 2/17/18

## 2018-02-14 NOTE — PLAN OF CARE
Problem: Patient Care Overview (Adult)  Goal: Plan of Care Review  Outcome: Ongoing (interventions implemented as appropriate)   02/14/18 0439   Coping/Psychosocial Response Interventions   Plan Of Care Reviewed With patient;daughter   Patient Care Overview   Progress progress toward functional goals as expected   Outcome Evaluation   Outcome Summary/Follow up Plan Patient is alert, oriented and cooperative with care. Her daughter is at bedside and staying overnight with patient. Patient has vital signs within normal limits and denies pain except for chronic right hip discomfort with repositioning /movement . Her lungs are clear to diminished bases with +bowel sounds in all [id number]. She continues with 3+ edema to bilateral lower extremeties. Dressings to all prior skin issues are clean ,dry, and intact .     Goal: Adult Individualization and Mutuality  Outcome: Ongoing (interventions implemented as appropriate)    Goal: Discharge Needs Assessment  Outcome: Ongoing (interventions implemented as appropriate)      Problem: Fall Risk (Adult)  Goal: Absence of Falls  Outcome: Ongoing (interventions implemented as appropriate)      Problem: Skin Integrity Impairment, Risk/Actual (Adult)  Goal: Skin Integrity/Wound Healing  Outcome: Ongoing (interventions implemented as appropriate)      Problem: Renal Failure/Kidney Injury, Acute (Adult)  Goal: Signs and Symptoms of Listed Potential Problems Will be Absent or Manageable (Renal Failure/Kidney Injury, Acute)  Outcome: Ongoing (interventions implemented as appropriate)      Problem: Pressure Ulcer (Adult)  Goal: Signs and Symptoms of Listed Potential Problems Will be Absent or Manageable (Pressure Ulcer)  Outcome: Ongoing (interventions implemented as appropriate)

## 2018-02-15 LAB
ANION GAP SERPL CALCULATED.3IONS-SCNC: 9.6 MMOL/L
BASOPHILS # BLD AUTO: 0.03 10*3/MM3 (ref 0–0.2)
BASOPHILS NFR BLD AUTO: 0.4 % (ref 0–1.5)
BUN BLD-MCNC: 26 MG/DL (ref 8–23)
BUN/CREAT SERPL: 24.1 (ref 7–25)
CALCIUM SPEC-SCNC: 8.2 MG/DL (ref 8.6–10.5)
CHLORIDE SERPL-SCNC: 104 MMOL/L (ref 98–107)
CO2 SERPL-SCNC: 26.4 MMOL/L (ref 22–29)
CREAT BLD-MCNC: 1.08 MG/DL (ref 0.57–1)
DEPRECATED RDW RBC AUTO: 47.6 FL (ref 37–54)
EOSINOPHIL # BLD AUTO: 0.24 10*3/MM3 (ref 0–0.7)
EOSINOPHIL NFR BLD AUTO: 3.5 % (ref 0.3–6.2)
ERYTHROCYTE [DISTWIDTH] IN BLOOD BY AUTOMATED COUNT: 13.7 % (ref 11.7–13)
GFR SERPL CREATININE-BSD FRML MDRD: 60 ML/MIN/1.73
GLUCOSE BLD-MCNC: 157 MG/DL (ref 65–99)
GLUCOSE BLDC GLUCOMTR-MCNC: 158 MG/DL (ref 70–130)
GLUCOSE BLDC GLUCOMTR-MCNC: 166 MG/DL (ref 70–130)
GLUCOSE BLDC GLUCOMTR-MCNC: 169 MG/DL (ref 70–130)
GLUCOSE BLDC GLUCOMTR-MCNC: 185 MG/DL (ref 70–130)
HCT VFR BLD AUTO: 31.5 % (ref 35.6–45.5)
HGB BLD-MCNC: 9.7 G/DL (ref 11.9–15.5)
IMM GRANULOCYTES # BLD: 0.04 10*3/MM3 (ref 0–0.03)
IMM GRANULOCYTES NFR BLD: 0.6 % (ref 0–0.5)
INR PPP: 1.7 (ref 0.9–1.1)
LYMPHOCYTES # BLD AUTO: 3.29 10*3/MM3 (ref 0.9–4.8)
LYMPHOCYTES NFR BLD AUTO: 48.6 % (ref 19.6–45.3)
MCH RBC QN AUTO: 29.8 PG (ref 26.9–32)
MCHC RBC AUTO-ENTMCNC: 30.8 G/DL (ref 32.4–36.3)
MCV RBC AUTO: 96.6 FL (ref 80.5–98.2)
MONOCYTES # BLD AUTO: 0.39 10*3/MM3 (ref 0.2–1.2)
MONOCYTES NFR BLD AUTO: 5.8 % (ref 5–12)
NEUTROPHILS # BLD AUTO: 2.78 10*3/MM3 (ref 1.9–8.1)
NEUTROPHILS NFR BLD AUTO: 41.1 % (ref 42.7–76)
PLATELET # BLD AUTO: 151 10*3/MM3 (ref 140–500)
PMV BLD AUTO: 10 FL (ref 6–12)
POTASSIUM BLD-SCNC: 4.2 MMOL/L (ref 3.5–5.2)
PROTHROMBIN TIME: 19.7 SECONDS (ref 11.7–14.2)
RBC # BLD AUTO: 3.26 10*6/MM3 (ref 3.9–5.2)
SODIUM BLD-SCNC: 140 MMOL/L (ref 136–145)
WBC NRBC COR # BLD: 6.77 10*3/MM3 (ref 4.5–10.7)

## 2018-02-15 PROCEDURE — 90732 PPSV23 VACC 2 YRS+ SUBQ/IM: CPT | Performed by: HOSPITALIST

## 2018-02-15 PROCEDURE — 63710000001 INSULIN ASPART PER 5 UNITS: Performed by: HOSPITALIST

## 2018-02-15 PROCEDURE — 85610 PROTHROMBIN TIME: CPT | Performed by: HOSPITALIST

## 2018-02-15 PROCEDURE — 85025 COMPLETE CBC W/AUTO DIFF WBC: CPT | Performed by: NURSE PRACTITIONER

## 2018-02-15 PROCEDURE — 99232 SBSQ HOSP IP/OBS MODERATE 35: CPT | Performed by: INTERNAL MEDICINE

## 2018-02-15 PROCEDURE — G0009 ADMIN PNEUMOCOCCAL VACCINE: HCPCS | Performed by: HOSPITALIST

## 2018-02-15 PROCEDURE — 25010000002 PNEUMOCOCCAL VAC POLYVALENT PER 0.5 ML: Performed by: HOSPITALIST

## 2018-02-15 PROCEDURE — 80048 BASIC METABOLIC PNL TOTAL CA: CPT | Performed by: HOSPITALIST

## 2018-02-15 PROCEDURE — 82962 GLUCOSE BLOOD TEST: CPT

## 2018-02-15 RX ADMIN — AMLODIPINE BESYLATE 10 MG: 10 TABLET ORAL at 09:39

## 2018-02-15 RX ADMIN — BACLOFEN 10 MG: 10 TABLET ORAL at 06:32

## 2018-02-15 RX ADMIN — POLYETHYLENE GLYCOL 3350 17 G: 17 POWDER, FOR SOLUTION ORAL at 09:39

## 2018-02-15 RX ADMIN — INSULIN ASPART 2 UNITS: 100 INJECTION, SOLUTION INTRAVENOUS; SUBCUTANEOUS at 07:56

## 2018-02-15 RX ADMIN — VITAMIN D, TAB 1000IU (100/BT) 4000 UNITS: 25 TAB at 09:39

## 2018-02-15 RX ADMIN — CLONIDINE HYDROCHLORIDE 0.3 MG: 0.1 TABLET ORAL at 09:39

## 2018-02-15 RX ADMIN — INSULIN ASPART 2 UNITS: 100 INJECTION, SOLUTION INTRAVENOUS; SUBCUTANEOUS at 21:08

## 2018-02-15 RX ADMIN — PANTOPRAZOLE SODIUM 40 MG: 40 TABLET, DELAYED RELEASE ORAL at 07:56

## 2018-02-15 RX ADMIN — CLONIDINE HYDROCHLORIDE 0.3 MG: 0.1 TABLET ORAL at 21:07

## 2018-02-15 RX ADMIN — HYDRALAZINE HYDROCHLORIDE 50 MG: 50 TABLET, FILM COATED ORAL at 14:37

## 2018-02-15 RX ADMIN — HYDRALAZINE HYDROCHLORIDE 50 MG: 50 TABLET, FILM COATED ORAL at 06:32

## 2018-02-15 RX ADMIN — PNEUMOCOCCAL VACCINE POLYVALENT 0.5 ML
25; 25; 25; 25; 25; 25; 25; 25; 25; 25; 25; 25; 25; 25; 25; 25; 25; 25; 25; 25; 25; 25; 25 INJECTION, SOLUTION INTRAMUSCULAR; SUBCUTANEOUS at 12:02

## 2018-02-15 RX ADMIN — POLYETHYLENE GLYCOL 3350 17 G: 17 POWDER, FOR SOLUTION ORAL at 21:08

## 2018-02-15 RX ADMIN — DOCUSATE SODIUM 200 MG: 100 CAPSULE, LIQUID FILLED ORAL at 09:39

## 2018-02-15 RX ADMIN — INSULIN ASPART 7 UNITS: 100 INJECTION, SOLUTION INTRAVENOUS; SUBCUTANEOUS at 07:56

## 2018-02-15 RX ADMIN — DOCUSATE SODIUM 200 MG: 100 CAPSULE, LIQUID FILLED ORAL at 21:07

## 2018-02-15 RX ADMIN — METOPROLOL TARTRATE 50 MG: 50 TABLET, FILM COATED ORAL at 09:39

## 2018-02-15 RX ADMIN — BACLOFEN 10 MG: 10 TABLET ORAL at 22:55

## 2018-02-15 RX ADMIN — CLONIDINE HYDROCHLORIDE 0.3 MG: 0.1 TABLET ORAL at 16:53

## 2018-02-15 RX ADMIN — INSULIN ASPART 7 UNITS: 100 INJECTION, SOLUTION INTRAVENOUS; SUBCUTANEOUS at 12:02

## 2018-02-15 RX ADMIN — INSULIN ASPART 2 UNITS: 100 INJECTION, SOLUTION INTRAVENOUS; SUBCUTANEOUS at 12:01

## 2018-02-15 RX ADMIN — HYDRALAZINE HYDROCHLORIDE 50 MG: 50 TABLET, FILM COATED ORAL at 21:07

## 2018-02-15 RX ADMIN — METOPROLOL TARTRATE 50 MG: 50 TABLET, FILM COATED ORAL at 21:07

## 2018-02-15 RX ADMIN — ALLOPURINOL 100 MG: 100 TABLET ORAL at 09:39

## 2018-02-15 RX ADMIN — Medication 1 CAPSULE: at 09:39

## 2018-02-15 RX ADMIN — BACLOFEN 10 MG: 10 TABLET ORAL at 14:37

## 2018-02-15 RX ADMIN — SERTRALINE 100 MG: 100 TABLET, FILM COATED ORAL at 21:07

## 2018-02-15 RX ADMIN — ATORVASTATIN CALCIUM 80 MG: 80 TABLET, FILM COATED ORAL at 21:07

## 2018-02-15 RX ADMIN — PANTOPRAZOLE SODIUM 40 MG: 40 TABLET, DELAYED RELEASE ORAL at 16:53

## 2018-02-15 NOTE — PROGRESS NOTES
Subjective no c/o no cp/sob   Chief complaint: Acute renal failure/chronic kidney disease         Objective:nad      Vital Signs  Temp:  [97.9 °F (36.6 °C)-99.8 °F (37.7 °C)] 99.1 °F (37.3 °C)  Heart Rate:  [61-76] 61  Resp:  [18] 18  BP: (128-168)/(80-96) 147/88        Intake/Output Summary (Last 24 hours) at 02/15/18 1523  Last data filed at 02/15/18 1326   Gross per 24 hour   Intake              810 ml   Output                0 ml   Net              810 ml           Physical Exam    Constitutional : well developed ,No acute distress  ENMT lips pink oral mucosa moist   Eyes sclerae white PERRL  Respiratory: Clear to auscultation , No crackles no wheezing respirations are even and un-labored  GI: Soft, Non tender, BS active in all 4 quadrants  CVS: S1 S2 regular, no rub murmur or gallops  Skin warm dry no rashes no petechiae  Neuro left weakness cranial nerves 2-12 grossly intact sensation intact  Ext:no LE edema, Peripheral pulses intact   Heme no cervical  lymphadenopathy no petechiae         Results Review:    The following labs personally reviewed by me  Interval chart and notes reviewed  Old records reviewed showing stage III chronic kidney disease  Discussed with family at bedside, additional history obtained from them regarding patient's symptoms overnight    Lab Results   Component Value Date    GLUCOSE 157 (H) 02/15/2018    CALCIUM 8.2 (L) 02/15/2018     02/15/2018    K 4.2 02/15/2018    CO2 26.4 02/15/2018     02/15/2018    BUN 26 (H) 02/15/2018    CREATININE 1.08 (H) 02/15/2018    EGFRIFAFRI 60 (L) 02/15/2018    BCR 24.1 02/15/2018    ANIONGAP 9.6 02/15/2018       Lab Results   Component Value Date    WBC 6.77 02/15/2018    HGB 9.7 (L) 02/15/2018    HCT 31.5 (L) 02/15/2018    MCV 96.6 02/15/2018     02/15/2018       Pertinent Imaging studies were reviewed      Medication Review:       Current Facility-Administered Medications:   •  acetaminophen (TYLENOL) tablet 650 mg, 650 mg, Oral,  Q4H PRN, Josias Razo MD, 650 mg at 02/11/18 0910  •  allopurinol (ZYLOPRIM) tablet 100 mg, 100 mg, Oral, Daily, Josias Razo MD, 100 mg at 02/15/18 0939  •  amLODIPine (NORVASC) tablet 10 mg, 10 mg, Oral, Daily, Josias Razo MD, 10 mg at 02/15/18 0939  •  atorvastatin (LIPITOR) tablet 80 mg, 80 mg, Oral, Nightly, Josias Razo MD, 80 mg at 02/14/18 2037  •  baclofen (LIORESAL) tablet 10 mg, 10 mg, Oral, Q8H, Josias Razo MD, 10 mg at 02/15/18 1437  •  cholecalciferol (VITAMIN D3) tablet 4,000 Units, 4,000 Units, Oral, Daily, Josias Razo MD, 4,000 Units at 02/15/18 0939  •  CloNIDine (CATAPRES) tablet 0.3 mg, 0.3 mg, Oral, TID, Josias Razo MD, 0.3 mg at 02/15/18 0939  •  docusate sodium (COLACE) capsule 200 mg, 200 mg, Oral, BID, 200 mg at 02/15/18 0939 **AND** [DISCONTINUED] docusate sodium (COLACE) capsule 50 mg, 50 mg, Oral, Daily, Josias Razo MD  •  hydrALAZINE (APRESOLINE) tablet 50 mg, 50 mg, Oral, Q8H, Josias Razo MD, 50 mg at 02/15/18 1437  •  insulin aspart (novoLOG) injection 0-7 Units, 0-7 Units, Subcutaneous, 4x Daily With Meals & Nightly, Josias Razo MD, 2 Units at 02/15/18 1201  •  insulin aspart (novoLOG) injection 7 Units, 7 Units, Subcutaneous, TID With Meals, Josias Razo MD, 7 Units at 02/15/18 1202  •  insulin detemir (LEVEMIR) injection 35 Units, 35 Units, Subcutaneous, Nightly, Josias Razo MD  •  lactobacillus acidophilus (RISAQUAD) capsule 1 capsule, 1 capsule, Oral, Daily, Josias Razo MD, 1 capsule at 02/15/18 0939  •  metoprolol tartrate (LOPRESSOR) tablet 50 mg, 50 mg, Oral, Q12H, Josias Razo MD, 50 mg at 02/15/18 0939  •  ondansetron (ZOFRAN) injection 4 mg, 4 mg, Intravenous, Q4H PRN, Josias Razo MD  •  pantoprazole (PROTONIX) EC tablet 40 mg, 40 mg, Oral, BID AC, Jsoias Razo MD, 40 mg at 02/15/18 0756  •  polyethylene glycol (MIRALAX) powder 17 g, 17 g, Oral, BID, Josias Razo MD, 17 g at 02/15/18 0939  •  sertraline (ZOLOFT) tablet 100 mg, 100 mg, Oral, Nightly, Josias Razo,  MD, 100 mg at 02/14/18 2037  •  Insert peripheral IV, , , Once **AND** sodium chloride 0.9 % flush 10 mL, 10 mL, Intravenous, PRN, Shara Streeter MD, 10 mL at 02/14/18 2039       Assesment    PEGGY resolved   CKD stage III , creatinine stable and near baseline  Renal cysts  Acute colitis, plans for colonoscopy per GI  HTN  HF  Anemia  DM  CVA      Plan:  Her renal function is improved, stable and near baseline  Monitor blood pressure off IV fluids, encourage oral fluid intake  Continue current antihypertensive regimen  Will restart IV fluids tomorrow night during bowel prep      Sourav Doe MD  02/15/18  3:23 PM  Tel: [phone]  Fax: [phone]

## 2018-02-15 NOTE — PROGRESS NOTES
"Daily progress note    Chief complaint  Doing better  No new complaints    History of present illness  77-year-old -American female with history of CVA with left hemiparesis also have diabetes hypertension hyperlipidemia lymphedema morbidly obese anxiety depression chronic kidney disease stage III with a nursing home resident presented to [facility] emergency room with generalized weakness.  Patient evaluated found to be in acute kidney injury on top of chronic kidney disease admitted for management.  Patient denies any headache chest pain shortness of breath abdominal pain vomiting diarrhea.  Patient does have nausea but denies fever chills night sweats weight loss.     REVIEW OF SYSTEMS  Review of Systems   Constitutional: Negative for fever.   HENT: Negative for sore throat.         Dry mouth   Eyes: Negative.    Respiratory: Negative for cough and shortness of breath.    Cardiovascular: Positive for leg swelling (BLE, chronic). Negative for chest pain.   Gastrointestinal: Negative for abdominal pain, diarrhea and vomiting.   Genitourinary: Positive for frequency. Negative for dysuria.   Musculoskeletal: Negative for neck pain.   Skin: Negative for rash.   Allergic/Immunologic: Negative.    Neurological: Negative for weakness, numbness and headaches.   Hematological: Negative.    Psychiatric/Behavioral: Negative.    All other systems reviewed and are negative.     PHYSICAL EXAM  Blood pressure 168/96, pulse 71, temperature 97.9 °F (36.6 °C), temperature source Oral, resp. rate 18, height 175.3 cm (69.02\"), weight 117 kg (257 lb 12.8 oz), SpO2 95 %.    Constitutional: She is oriented to person, place, and time and well-developed, well-nourished, and in no distress. No distress.   Head: Normocephalic and atraumatic.   Eyes: EOM are normal. Pupils are equal, round, and reactive to light.   Neck: Normal range of motion. Neck supple.   Cardiovascular: Normal rate, regular rhythm and normal heart sounds.  "   Pulmonary/Chest: Effort normal and breath sounds normal. No respiratory distress.   Abdominal: Soft. There is no tenderness. There is no rebound and no guarding.   Musculoskeletal: Normal range of motion. She exhibits edema (BLE).   Neurological: She is alert and oriented to person, place, and time. She has normal sensation and normal strength.   Skin: Skin is warm and dry. No rash noted.   Psychiatric: Mood and affect normal.     LAB RESULTS  Lab Results (last 24 hours)     Procedure Component Value Units Date/Time    POC Glucose Once [[id number]]  (Abnormal) Collected:  02/14/18 1626    Specimen:  Blood Updated:  02/14/18 1627     Glucose 175 (H) mg/dL     Narrative:       Meter: [id number] : [id number] Charlie STEWART    POC Glucose Once [[id number]]  (Abnormal) Collected:  02/14/18 2025    Specimen:  Blood Updated:  02/14/18 2026     Glucose 207 (H) mg/dL     Narrative:       Meter: [id number] : [id number] Roderick STEWART    CBC & Differential [[id number]] Collected:  02/15/18 0513    Specimen:  Blood Updated:  02/15/18 0551    Narrative:       The following orders were created for panel order CBC & Differential.  Procedure                               Abnormality         Status                     ---------                               -----------         ------                     CBC Auto Differential[[id number]]        Abnormal            Final result                 Please view results for these tests on the individual orders.    CBC Auto Differential [[id number]]  (Abnormal) Collected:  02/15/18 0513    Specimen:  Blood Updated:  02/15/18 0551     WBC 6.77 10*3/mm3      RBC 3.26 (L) 10*6/mm3      Hemoglobin 9.7 (L) g/dL      Hematocrit 31.5 (L) %      MCV 96.6 fL      MCH 29.8 pg      MCHC 30.8 (L) g/dL      RDW 13.7 (H) %      RDW-SD 47.6 fl      MPV 10.0 fL      Platelets 151 10*3/mm3      Neutrophil % 41.1 (L) %      Lymphocyte % 48.6 (H) %      Monocyte % 5.8 %      Eosinophil % 3.5 %       Basophil % 0.4 %      Immature Grans % 0.6 (H) %      Neutrophils, Absolute 2.78 10*3/mm3      Lymphocytes, Absolute 3.29 10*3/mm3      Monocytes, Absolute 0.39 10*3/mm3      Eosinophils, Absolute 0.24 10*3/mm3      Basophils, Absolute 0.03 10*3/mm3      Immature Grans, Absolute 0.04 (H) 10*3/mm3     Protime-INR [[id number]]  (Abnormal) Collected:  02/15/18 0513    Specimen:  Blood Updated:  02/15/18 0601     Protime 19.7 (H) Seconds      INR 1.70 (H)    Basic Metabolic Panel [[id number]]  (Abnormal) Collected:  02/15/18 0513    Specimen:  Blood Updated:  02/15/18 0612     Glucose 157 (H) mg/dL      BUN 26 (H) mg/dL      Creatinine 1.08 (H) mg/dL      Sodium 140 mmol/L      Potassium 4.2 mmol/L      Chloride 104 mmol/L      CO2 26.4 mmol/L      Calcium 8.2 (L) mg/dL      eGFR  African Amer 60 (L) mL/min/1.73      BUN/Creatinine Ratio 24.1     Anion Gap 9.6 mmol/L     Narrative:       The MDRD GFR formula is only valid for adults with stable renal function between ages 18 and 70.    POC Glucose Once [[id number]]  (Abnormal) Collected:  02/15/18 0715    Specimen:  Blood Updated:  02/15/18 0717     Glucose 166 (H) mg/dL     Narrative:       Meter: [id number] : [id number] Natalia STEWART    POC Glucose Once [[id number]]  (Abnormal) Collected:  02/15/18 1114    Specimen:  Blood Updated:  02/15/18 1115     Glucose 169 (H) mg/dL     Narrative:       Meter: [id number] : [id number] Natalia STEWART        Imaging Results (last 24 hours)     ** No results found for the last 24 hours. **          Current Facility-Administered Medications:   •  acetaminophen (TYLENOL) tablet 650 mg, 650 mg, Oral, Q4H PRN, Josias Razo MD, 650 mg at 02/11/18 0910  •  allopurinol (ZYLOPRIM) tablet 100 mg, 100 mg, Oral, Daily, Josias Razo MD, 100 mg at 02/15/18 0939  •  amLODIPine (NORVASC) tablet 10 mg, 10 mg, Oral, Daily, Josias Razo MD, 10 mg at 02/15/18 0939  •  atorvastatin (LIPITOR) tablet 80 mg, 80 mg, Oral, Nightly, Josias Razo MD, 80  mg at 02/14/18 2037  •  baclofen (LIORESAL) tablet 10 mg, 10 mg, Oral, Q8H, Josias Razo MD, 10 mg at 02/15/18 0632  •  cholecalciferol (VITAMIN D3) tablet 4,000 Units, 4,000 Units, Oral, Daily, Josias Razo MD, 4,000 Units at 02/15/18 0939  •  CloNIDine (CATAPRES) tablet 0.3 mg, 0.3 mg, Oral, TID, Josias Razo MD, 0.3 mg at 02/15/18 0939  •  docusate sodium (COLACE) capsule 200 mg, 200 mg, Oral, BID, 200 mg at 02/15/18 0939 **AND** [DISCONTINUED] docusate sodium (COLACE) capsule 50 mg, 50 mg, Oral, Daily, Josias Razo MD  •  hydrALAZINE (APRESOLINE) tablet 50 mg, 50 mg, Oral, Q8H, Josias Razo MD, 50 mg at 02/15/18 0632  •  insulin aspart (novoLOG) injection 0-7 Units, 0-7 Units, Subcutaneous, 4x Daily With Meals & Nightly, Josias Razo MD, 2 Units at 02/15/18 1201  •  insulin aspart (novoLOG) injection 7 Units, 7 Units, Subcutaneous, TID With Meals, Josias Razo MD, 7 Units at 02/15/18 1202  •  insulin detemir (LEVEMIR) injection 30 Units, 30 Units, Subcutaneous, Nightly, Josias Razo MD, 30 Units at 02/14/18 2144  •  lactobacillus acidophilus (RISAQUAD) capsule 1 capsule, 1 capsule, Oral, Daily, Josias Razo MD, 1 capsule at 02/15/18 0939  •  metoprolol tartrate (LOPRESSOR) tablet 50 mg, 50 mg, Oral, Q12H, Josias Razo MD, 50 mg at 02/15/18 0939  •  ondansetron (ZOFRAN) injection 4 mg, 4 mg, Intravenous, Q4H PRN, Josias Razo MD  •  pantoprazole (PROTONIX) EC tablet 40 mg, 40 mg, Oral, BID AC, Josias Razo MD, 40 mg at 02/15/18 0756  •  polyethylene glycol (MIRALAX) powder 17 g, 17 g, Oral, BID, Josias Razo MD, 17 g at 02/15/18 0939  •  sertraline (ZOLOFT) tablet 100 mg, 100 mg, Oral, Nightly, Josias Razo MD, 100 mg at 02/14/18 2037  •  Insert peripheral IV, , , Once **AND** sodium chloride 0.9 % flush 10 mL, 10 mL, Intravenous, PRN, Shara Streeter MD, 10 mL at 02/14/18 2039     ASSESSMENT  Acute kidney injury with hyperkalemia  Chronic kidney disease stage III  Renal cyst  Questionable colitis will get GI  consult  Insulin-dependent diabetes mellitus  Hypertension  Hyperlipidemia  Chronic anemia  Gastroesophageal reflux disease  Chronic lymphedema  Status post CVA with left jaja-  Chronic anticoagulation  Immobilization syndrome    PLAN  CPM  Hold Coumadin and Plavix  GI consult appreciated  Continue nursing home meds except for diuretics and ACE inhibitor  Upper and lower endoscopy on 2/17  Supportive care  Stress ulcer DVT prophylaxis  PTOT  Follow closely further recommendation according to course    MELODY JOHNSON MD

## 2018-02-15 NOTE — PLAN OF CARE
Problem: Patient Care Overview (Adult)  Goal: Plan of Care Review   02/14/18 1956   Coping/Psychosocial Response Interventions   Plan Of Care Reviewed With patient   Patient Care Overview   Progress improving   Outcome Evaluation   Outcome Summary/Follow up Plan Left side flaccid from old CVA. Lies supine in bed. Pt refuses turns. Anticoags held for future scopes. Edema of lower extremities. Refused leg drsgs. changed. Telemtetry NSR.       Problem: Fall Risk (Adult)  Goal: Absence of Falls  Outcome: Unable to achieve outcome(s) by discharge Date Met: 02/14/18      Problem: Skin Integrity Impairment, Risk/Actual (Adult)  Goal: Skin Integrity/Wound Healing  Outcome: Outcome(s) achieved Date Met: 02/14/18      Problem: Renal Failure/Kidney Injury, Acute (Adult)  Goal: Signs and Symptoms of Listed Potential Problems Will be Absent or Manageable (Renal Failure/Kidney Injury, Acute)  Outcome: Outcome(s) achieved Date Met: 02/14/18 02/14/18 1956   Renal Failure/Kidney Injury, Acute   Problems Assessed (Acute Renal Failure/Kidney Injury) all   Problems Present (Acute Renal Failure/Kidney Injury) hypertension;situational response       Problem: Pressure Ulcer (Adult)  Goal: Signs and Symptoms of Listed Potential Problems Will be Absent or Manageable (Pressure Ulcer)  Outcome: Outcome(s) achieved Date Met: 02/14/18

## 2018-02-15 NOTE — PROGRESS NOTES
[facility]/GI Progress Note   Chief Complaint:  Anemia, abnormal CT    Subjective     Interval History: Dozing, awakens easily.  No GI complaints, no bleeding reported.  Plavix on hold and still planning for endoscopy on 2/17.    History taken from: patient chart    Review of Systems:    The following systems were reviewed and negative;  gastrointestinal    Objective     Vital Signs  Temp:  [97.9 °F (36.6 °C)-99.8 °F (37.7 °C)] 97.9 °F (36.6 °C)  Heart Rate:  [68-76] 71  Resp:  [18-21] 18  BP: (128-168)/(72-96) 168/96  Body mass index is 38.05 kg/(m^2).    Intake/Output Summary (Last 24 hours) at 02/15/18 1037  Last data filed at 02/15/18 0922   Gross per 24 hour   Intake              570 ml   Output                0 ml   Net              570 ml     I/O this shift:  In: 210 [P.O.:210]  Out: -     Physical Exam:   General: patient awake, alert and cooperative   Eyes: Normal lids and lashes, no scleral icterus   Neck: supple, normal ROM, no tracheal deviation   Skin: warm and dry, not jaundiced   Cardiovascular: regular rhythm and rate, no murmurs auscultated   Pulm: clear to auscultation bilaterally, regular and unlabored   Abdomen: obese, round, soft, nontender, nondistended; normal bowel sounds   Rectal: deferred   Extremities: edema bilateral LE   Neurologic: Normal mood and behavior, left hemiparesis from prior CVA    All Medications Have Been Reviewed     Results Review:       Results from last 7 days  Lab Units 02/15/18  0513 02/14/18  0446 02/13/18  0549   WBC 10*3/mm3 6.77 6.87 6.28   HEMOGLOBIN g/dL 9.7* 9.5* 9.7*   HEMATOCRIT % 31.5* 30.1* 31.4*   PLATELETS 10*3/mm3 151 170 162         Results from last 7 days  Lab Units 02/15/18  0513 02/14/18  0446 02/13/18  0549  02/11/18  0543  02/09/18  2046   SODIUM mmol/L 140 140 140  < > 139  < > 137   POTASSIUM mmol/L 4.2 4.1 3.9  < > 4.0  < > 5.4*   CHLORIDE mmol/L 104 104 105  < > 102  < > 101   CO2 mmol/L 26.4 28.6 28.7  < > 24.9  < > 25.6   BUN mg/dL 26* 26*  32*  < > 47*  < > 75*   CREATININE mg/dL 1.08* 1.17* 1.11*  < > 1.21*  < > 1.64*   CALCIUM mg/dL 8.2* 8.8 8.1*  < > 8.7  < > 8.6   BILIRUBIN mg/dL  --   --  0.2  --  0.2  --  <0.2   ALK PHOS U/L  --   --  47  --  55  --  55   ALT (SGPT) U/L  --   --  9  --  11  --  13   AST (SGOT) U/L  --   --  13  --  16  --  11   GLUCOSE mg/dL 157* 192* 160*  < > 129*  < > 284*   < > = values in this interval not displayed.      Results from last 7 days  Lab Units 02/15/18  0513 02/14/18  0446 02/13/18  1543   INR  1.70* 1.99* 1.92*       RADIOLOGY:    Imaging Results (last 72 hours)     Procedure Component Value Units Date/Time    CT Abdomen Pelvis Without Contrast [[id number]] Collected:  02/12/18 0901     Updated:  02/13/18 1325    Narrative:       CT ABDOMEN AND PELVIS WITHOUT CONTRAST     HISTORY: Rule out renal cyst and mass.     TECHNIQUE: Axial CT images of the abdomen and pelvis were obtained  without administration of intravenous contrast. The patient was not  given oral contrast. Coronal and sagittal reformats were obtained.     COMPARISON: Renal sonogram from 02/11/2018.     FINDINGS: The study is limited due to absence of IV contrast and motion  artifact. Bilateral adrenal glands are normal. Both kidneys are normal  in size and attenuation. The previously identified cystic lesion at the  midpole of the left kidney is poorly imaged currently secondary to  extensive motion artifact at this level. The attenuation measurements  are not diagnostic of a simple cyst.     The visualized lung bases are normal. The liver demonstrates normal  noncontrast attenuation. Cholelithiasis is present. The spleen and the  pancreas are normal. The small and large bowel loops demonstrate normal  caliber. Moderate amount of stool is seen throughout the colon with  fecal impaction in the rectal vault. Mild circumferential wall  thickening of the rectum is seen at this level. No pathological  retroperitoneal lymphadenopathy. Moderate calcified  atherosclerotic  plaque is seen within the abdominal aorta and its branches. No ascites  is seen. The uterus is not identified and is likely surgically absent.  Significant osteopenia is identified. Advanced degenerative arthritis is  seen within the right greater than left hips.       Impression:       1. Limited secondary to absence of IV contrast and motion artifact. The  hypoechoic lesion seen on the prior sonogram is not convincingly  characterized on this current CT. The recommendation would be to follow  up with sonogram in 3 months to reevaluate for size and internal  character.  2. Cholelithiasis.  3. Large amount of stool is seen within the rectal wall with mild  circumferential rectal wall thickening. This could represent stercoral  colitis.     Radiation dose reduction techniques were utilized, including automated  exposure control and exposure modulation based on body size.     This report was finalized on 2/13/2018 1:22 PM by Dr. Sofie Wooten MD.             Assessment/Plan     Patient Active Problem List   Diagnosis Code   • Acute renal failure superimposed on chronic kidney disease N17.9, N18.9     Cate Ceja, APRN  02/15/18  10:37 AM    Denies abd pain - having BMs with miralax - now on clears in preparation for c/s Sat due to hx chronic constipation    abd - obese, soft, nontender    Labs reviewed by me      Assessment:  1) Anemia- in the setting of Plavix.  Not iron deficient per lab review.  No overt GI bleeding  2) Abnormal imaging- Abd/pelvis CT w/o contrast with moderate amount of stool in colon, fecal impaction in rectal vault, mild circumferential wall thickening of the rectum, possible rectal ulcer  3) Acute on CKD- renal following  4) CVA- on Plavix as out pt, last dose 2/12/18    Plan:  - Hb stable - continue to follow  - continue to hold plavix/coumaind with plans for egd/colonoscopy 2/17  - continue Miralax BID with hx of chronic constipation in prep for endoscopy

## 2018-02-15 NOTE — PLAN OF CARE
Problem: Patient Care Overview (Adult)  Goal: Plan of Care Review  Outcome: Ongoing (interventions implemented as appropriate)   02/15/18 1371   Coping/Psychosocial Response Interventions   Plan Of Care Reviewed With patient;family   Patient Care Overview   Progress improving   Outcome Evaluation   Outcome Summary/Follow up Plan No complaints of pain, flaccid on left side, prefers to stay in one postion after skin care education, patient will drink and eat laying back in bed, patient to have EGD and colonoscopy on Saturday, refuses drsg changes to BLE, VSS, turned every 2 hours, will continue to monitor.     Goal: Adult Individualization and Mutuality  Outcome: Ongoing (interventions implemented as appropriate)    Goal: Discharge Needs Assessment  Outcome: Ongoing (interventions implemented as appropriate)

## 2018-02-15 NOTE — PLAN OF CARE
Problem: Patient Care Overview (Adult)  Goal: Plan of Care Review  Outcome: Ongoing (interventions implemented as appropriate)   02/15/18 1713   Coping/Psychosocial Response Interventions   Plan Of Care Reviewed With patient;family   Patient Care Overview   Progress no change   Outcome Evaluation   Outcome Summary/Follow up Plan Anticoags on hold for pending scopes on Sat. Started on clear liqs. Refuses turns. Drsg changes done. Telemetry NSR. Will continue to monitor.       Problem: Pressure Ulcer (Adult)  Goal: Signs and Symptoms of Listed Potential Problems Will be Absent or Manageable (Pressure Ulcer)  Outcome: Ongoing (interventions implemented as appropriate)   02/15/18 1713   Pressure Ulcer   Problems Assessed (Pressure Ulcer) all   Problems Present (Pressure Ulcer) none

## 2018-02-16 PROBLEM — D64.9 ANEMIA: Status: ACTIVE | Noted: 2018-02-09

## 2018-02-16 LAB
ANION GAP SERPL CALCULATED.3IONS-SCNC: 6 MMOL/L
BASOPHILS # BLD AUTO: 0.02 10*3/MM3 (ref 0–0.2)
BASOPHILS NFR BLD AUTO: 0.3 % (ref 0–1.5)
BUN BLD-MCNC: 21 MG/DL (ref 8–23)
BUN/CREAT SERPL: 20 (ref 7–25)
CALCIUM SPEC-SCNC: 8.5 MG/DL (ref 8.6–10.5)
CHLORIDE SERPL-SCNC: 103 MMOL/L (ref 98–107)
CO2 SERPL-SCNC: 31 MMOL/L (ref 22–29)
CREAT BLD-MCNC: 1.05 MG/DL (ref 0.57–1)
DEPRECATED RDW RBC AUTO: 47.2 FL (ref 37–54)
EOSINOPHIL # BLD AUTO: 0.32 10*3/MM3 (ref 0–0.7)
EOSINOPHIL NFR BLD AUTO: 4.6 % (ref 0.3–6.2)
ERYTHROCYTE [DISTWIDTH] IN BLOOD BY AUTOMATED COUNT: 13.8 % (ref 11.7–13)
GFR SERPL CREATININE-BSD FRML MDRD: 62 ML/MIN/1.73
GLUCOSE BLD-MCNC: 77 MG/DL (ref 65–99)
GLUCOSE BLDC GLUCOMTR-MCNC: 117 MG/DL (ref 70–130)
GLUCOSE BLDC GLUCOMTR-MCNC: 128 MG/DL (ref 70–130)
GLUCOSE BLDC GLUCOMTR-MCNC: 165 MG/DL (ref 70–130)
GLUCOSE BLDC GLUCOMTR-MCNC: 80 MG/DL (ref 70–130)
HCT VFR BLD AUTO: 32.8 % (ref 35.6–45.5)
HGB BLD-MCNC: 10.3 G/DL (ref 11.9–15.5)
IMM GRANULOCYTES # BLD: 0.03 10*3/MM3 (ref 0–0.03)
IMM GRANULOCYTES NFR BLD: 0.4 % (ref 0–0.5)
INR PPP: 1.48 (ref 0.9–1.1)
LYMPHOCYTES # BLD AUTO: 3.33 10*3/MM3 (ref 0.9–4.8)
LYMPHOCYTES NFR BLD AUTO: 47.9 % (ref 19.6–45.3)
MCH RBC QN AUTO: 29.9 PG (ref 26.9–32)
MCHC RBC AUTO-ENTMCNC: 31.4 G/DL (ref 32.4–36.3)
MCV RBC AUTO: 95.3 FL (ref 80.5–98.2)
MONOCYTES # BLD AUTO: 0.46 10*3/MM3 (ref 0.2–1.2)
MONOCYTES NFR BLD AUTO: 6.6 % (ref 5–12)
NEUTROPHILS # BLD AUTO: 2.79 10*3/MM3 (ref 1.9–8.1)
NEUTROPHILS NFR BLD AUTO: 40.2 % (ref 42.7–76)
PLATELET # BLD AUTO: 173 10*3/MM3 (ref 140–500)
PMV BLD AUTO: 10.2 FL (ref 6–12)
POTASSIUM BLD-SCNC: 3.8 MMOL/L (ref 3.5–5.2)
PROTHROMBIN TIME: 17.7 SECONDS (ref 11.7–14.2)
RBC # BLD AUTO: 3.44 10*6/MM3 (ref 3.9–5.2)
SODIUM BLD-SCNC: 140 MMOL/L (ref 136–145)
WBC NRBC COR # BLD: 6.95 10*3/MM3 (ref 4.5–10.7)

## 2018-02-16 PROCEDURE — 85610 PROTHROMBIN TIME: CPT | Performed by: HOSPITALIST

## 2018-02-16 PROCEDURE — 63710000001 INSULIN ASPART PER 5 UNITS: Performed by: HOSPITALIST

## 2018-02-16 PROCEDURE — 80048 BASIC METABOLIC PNL TOTAL CA: CPT | Performed by: HOSPITALIST

## 2018-02-16 PROCEDURE — 99232 SBSQ HOSP IP/OBS MODERATE 35: CPT | Performed by: INTERNAL MEDICINE

## 2018-02-16 PROCEDURE — 82962 GLUCOSE BLOOD TEST: CPT

## 2018-02-16 PROCEDURE — 85025 COMPLETE CBC W/AUTO DIFF WBC: CPT | Performed by: INTERNAL MEDICINE

## 2018-02-16 RX ORDER — SODIUM CHLORIDE 9 MG/ML
75 INJECTION, SOLUTION INTRAVENOUS CONTINUOUS
Status: DISCONTINUED | OUTPATIENT
Start: 2018-02-16 | End: 2018-02-18

## 2018-02-16 RX ORDER — POLYETHYLENE GLYCOL 3350, SODIUM CHLORIDE, POTASSIUM CHLORIDE, SODIUM BICARBONATE, AND SODIUM SULFATE 240; 5.84; 2.98; 6.72; 22.72 G/4L; G/4L; G/4L; G/4L; G/4L
2000 POWDER, FOR SOLUTION ORAL 2 TIMES DAILY
Status: DISCONTINUED | OUTPATIENT
Start: 2018-02-16 | End: 2018-02-17

## 2018-02-16 RX ADMIN — ALLOPURINOL 100 MG: 100 TABLET ORAL at 08:06

## 2018-02-16 RX ADMIN — BACLOFEN 10 MG: 10 TABLET ORAL at 22:12

## 2018-02-16 RX ADMIN — HYDRALAZINE HYDROCHLORIDE 50 MG: 50 TABLET, FILM COATED ORAL at 08:07

## 2018-02-16 RX ADMIN — HYDRALAZINE HYDROCHLORIDE 50 MG: 50 TABLET, FILM COATED ORAL at 22:12

## 2018-02-16 RX ADMIN — METOPROLOL TARTRATE 50 MG: 50 TABLET, FILM COATED ORAL at 20:40

## 2018-02-16 RX ADMIN — PANTOPRAZOLE SODIUM 40 MG: 40 TABLET, DELAYED RELEASE ORAL at 07:58

## 2018-02-16 RX ADMIN — POLYETHYLENE GLYCOL 3350 17 G: 17 POWDER, FOR SOLUTION ORAL at 08:08

## 2018-02-16 RX ADMIN — SERTRALINE 100 MG: 100 TABLET, FILM COATED ORAL at 20:40

## 2018-02-16 RX ADMIN — BACLOFEN 10 MG: 10 TABLET ORAL at 13:47

## 2018-02-16 RX ADMIN — POLYETHYLENE GLYCOL 3350 17 G: 17 POWDER, FOR SOLUTION ORAL at 20:41

## 2018-02-16 RX ADMIN — DOCUSATE SODIUM 200 MG: 100 CAPSULE, LIQUID FILLED ORAL at 08:05

## 2018-02-16 RX ADMIN — METOPROLOL TARTRATE 50 MG: 50 TABLET, FILM COATED ORAL at 08:06

## 2018-02-16 RX ADMIN — Medication 1 CAPSULE: at 08:06

## 2018-02-16 RX ADMIN — AMLODIPINE BESYLATE 10 MG: 10 TABLET ORAL at 08:06

## 2018-02-16 RX ADMIN — CLONIDINE HYDROCHLORIDE 0.3 MG: 0.1 TABLET ORAL at 08:06

## 2018-02-16 RX ADMIN — SODIUM CHLORIDE 75 ML/HR: 9 INJECTION, SOLUTION INTRAVENOUS at 18:26

## 2018-02-16 RX ADMIN — INSULIN ASPART 7 UNITS: 100 INJECTION, SOLUTION INTRAVENOUS; SUBCUTANEOUS at 17:20

## 2018-02-16 RX ADMIN — PANTOPRAZOLE SODIUM 40 MG: 40 TABLET, DELAYED RELEASE ORAL at 17:15

## 2018-02-16 RX ADMIN — CLONIDINE HYDROCHLORIDE 0.3 MG: 0.1 TABLET ORAL at 16:44

## 2018-02-16 RX ADMIN — HYDRALAZINE HYDROCHLORIDE 50 MG: 50 TABLET, FILM COATED ORAL at 13:46

## 2018-02-16 RX ADMIN — CLONIDINE HYDROCHLORIDE 0.3 MG: 0.1 TABLET ORAL at 20:40

## 2018-02-16 RX ADMIN — ACETAMINOPHEN 650 MG: 325 TABLET, FILM COATED ORAL at 13:34

## 2018-02-16 RX ADMIN — POLYETHYLENE GLYCOL 3350, SODIUM CHLORIDE, POTASSIUM CHLORIDE, SODIUM BICARBONATE, AND SODIUM SULFATE 2000 ML: 240; 5.84; 2.98; 6.72; 22.72 POWDER, FOR SOLUTION ORAL at 16:06

## 2018-02-16 RX ADMIN — ATORVASTATIN CALCIUM 80 MG: 80 TABLET, FILM COATED ORAL at 20:40

## 2018-02-16 RX ADMIN — VITAMIN D, TAB 1000IU (100/BT) 4000 UNITS: 25 TAB at 08:05

## 2018-02-16 RX ADMIN — DOCUSATE SODIUM 200 MG: 100 CAPSULE, LIQUID FILLED ORAL at 20:40

## 2018-02-16 RX ADMIN — BACLOFEN 10 MG: 10 TABLET ORAL at 08:06

## 2018-02-16 NOTE — PLAN OF CARE
Problem: Patient Care Overview (Adult)  Goal: Plan of Care Review  Outcome: Ongoing (interventions implemented as appropriate)   02/16/18 0556   Coping/Psychosocial Response Interventions   Plan Of Care Reviewed With patient   Patient Care Overview   Progress no change   Outcome Evaluation   Outcome Summary/Follow up Plan VSS and on RA. NSR. Bedrest. Refused turns during the night. No BM. Possible EGD and Colonoscopy on Saturday. Patient denies pain. No distress noted. Will continue to monitor.

## 2018-02-16 NOTE — PROGRESS NOTES
BGA/GI Progress Note   Chief Complaint:  Anemia, abnormal CT    Subjective     Interval History: Sleeping, awakens easily.  Plans for EGD and c-scope tomorrow discussed with pt and remains agreeable.  Has had no GI bleeding since arrival, Hgb has remained stable.  No BM yesterday, one day before.  Started on clears yesterday in prep for bowel prep with hx of constipation.    History taken from: patient chart    Review of Systems:    All systems were reviewed and negative except for:  Gastrointestinal: postitive for  hx on constipation  Neurological: positive for  deficits related to prior CVA    Objective     Vital Signs  Temp:  [97.9 °F (36.6 °C)-99.1 °F (37.3 °C)] 98.4 °F (36.9 °C)  Heart Rate:  [61-70] 70  Resp:  [18] 18  BP: (138-160)/(77-96) 138/77  Body mass index is 38.05 kg/(m^2).    Intake/Output Summary (Last 24 hours) at 02/16/18 0727  Last data filed at 02/15/18 1326   Gross per 24 hour   Intake              570 ml   Output                0 ml   Net              570 ml          Physical Exam:   General: patient dozing but awakens easily to voice   Eyes: Normal lids and lashes, no scleral icterus, no conjunctival pallor   Neck: supple, normal ROM, no tracheal deviation   Skin: warm and dry, not jaundiced   Cardiovascular: regular rhythm and rate, no murmurs auscultated   Pulm: clear to auscultation bilaterally, regular and unlabored   Abdomen: obese, round, soft, nontender, nondistended; normal bowel sounds   Rectal: deferred   Extremities:  Edema bilateral LE's   Neurologic: Normal mood and behavior, left hemiparesis from prior CVA    All Medications Have Been Reviewed     Results Review:       Results from last 7 days  Lab Units 02/16/18 0533 02/15/18  0513 02/14/18  0446   WBC 10*3/mm3 6.95 6.77 6.87   HEMOGLOBIN g/dL 10.3* 9.7* 9.5*   HEMATOCRIT % 32.8* 31.5* 30.1*   PLATELETS 10*3/mm3 173 151 170         Results from last 7 days  Lab Units 02/16/18  0533 02/15/18  0513 02/14/18  0446  02/13/18  0549  02/11/18  0543  02/09/18  2046   SODIUM mmol/L 140 140 140 140  < > 139  < > 137   POTASSIUM mmol/L 3.8 4.2 4.1 3.9  < > 4.0  < > 5.4*   CHLORIDE mmol/L 103 104 104 105  < > 102  < > 101   CO2 mmol/L 31.0* 26.4 28.6 28.7  < > 24.9  < > 25.6   BUN mg/dL 21 26* 26* 32*  < > 47*  < > 75*   CREATININE mg/dL 1.05* 1.08* 1.17* 1.11*  < > 1.21*  < > 1.64*   CALCIUM mg/dL 8.5* 8.2* 8.8 8.1*  < > 8.7  < > 8.6   BILIRUBIN mg/dL  --   --   --  0.2  --  0.2  --  <0.2   ALK PHOS U/L  --   --   --  47  --  55  --  55   ALT (SGPT) U/L  --   --   --  9  --  11  --  13   AST (SGOT) U/L  --   --   --  13  --  16  --  11   GLUCOSE mg/dL 77 157* 192* 160*  < > 129*  < > 284*   < > = values in this interval not displayed.      Results from last 7 days  Lab Units 02/16/18  0532 02/15/18  0513 02/14/18  0446   INR  1.48* 1.70* 1.99*       RADIOLOGY:    Imaging Results (last 72 hours)     Procedure Component Value Units Date/Time    CT Abdomen Pelvis Without Contrast [[id number]] Collected:  02/12/18 0901     Updated:  02/13/18 1325    Narrative:       CT ABDOMEN AND PELVIS WITHOUT CONTRAST     HISTORY: Rule out renal cyst and mass.     TECHNIQUE: Axial CT images of the abdomen and pelvis were obtained  without administration of intravenous contrast. The patient was not  given oral contrast. Coronal and sagittal reformats were obtained.     COMPARISON: Renal sonogram from 02/11/2018.     FINDINGS: The study is limited due to absence of IV contrast and motion  artifact. Bilateral adrenal glands are normal. Both kidneys are normal  in size and attenuation. The previously identified cystic lesion at the  midpole of the left kidney is poorly imaged currently secondary to  extensive motion artifact at this level. The attenuation measurements  are not diagnostic of a simple cyst.     The visualized lung bases are normal. The liver demonstrates normal  noncontrast attenuation. Cholelithiasis is present. The spleen and the  pancreas  are normal. The small and large bowel loops demonstrate normal  caliber. Moderate amount of stool is seen throughout the colon with  fecal impaction in the rectal vault. Mild circumferential wall  thickening of the rectum is seen at this level. No pathological  retroperitoneal lymphadenopathy. Moderate calcified atherosclerotic  plaque is seen within the abdominal aorta and its branches. No ascites  is seen. The uterus is not identified and is likely surgically absent.  Significant osteopenia is identified. Advanced degenerative arthritis is  seen within the right greater than left hips.       Impression:       1. Limited secondary to absence of IV contrast and motion artifact. The  hypoechoic lesion seen on the prior sonogram is not convincingly  characterized on this current CT. The recommendation would be to follow  up with sonogram in 3 months to reevaluate for size and internal  character.  2. Cholelithiasis.  3. Large amount of stool is seen within the rectal wall with mild  circumferential rectal wall thickening. This could represent stercoral  colitis.     Radiation dose reduction techniques were utilized, including automated  exposure control and exposure modulation based on body size.     This report was finalized on 2/13/2018 1:22 PM by Dr. Sofie Wooten MD.             Assessment/Plan     Patient Active Problem List   Diagnosis Code   • Acute renal failure superimposed on chronic kidney disease N17.9, N18.9     Cate Ceja, APRN  02/16/18  7:27 AM    No overnight events - had a BM this am - no abd pain    abd - obese, nt. Soft    Labs reviewed by me      Assessment:  1) Anemia- in the setting of Plavix.  Not iron deficient per lab review.  No overt GI bleeding  2) Abnormal imaging- Abd/pelvis CT w/o contrast with moderate amount of stool in colon, fecal impaction in rectal vault, mild circumferential wall thickening of the rectum, possible rectal ulcer  3) Acute on CKD- renal following  4) CVA-  on Plavix as out pt, last dose 2/12/18     Plan:  - Hb stable - continue to follow  - continue to hold plavix/coumain with plans for egd/colonoscopy 2/17 by Dr Villavicencio - will be off plavix x 5 days at this point  - prep to being today

## 2018-02-16 NOTE — PLAN OF CARE
Problem: Patient Care Overview (Adult)  Goal: Plan of Care Review  Outcome: Ongoing (interventions implemented as appropriate)   02/16/18 1652   Coping/Psychosocial Response Interventions   Plan Of Care Reviewed With patient   Patient Care Overview   Progress no change   Outcome Evaluation   Outcome Summary/Follow up Plan Patient complained of HA. Tylenol was given. Patient flaccid on left side. EGD and colonoscopy tomorrow. Vital signs stable. Normal Sinus on monitor. No distress noted. Will continue to monitor.        Problem: Fall Risk (Adult)  Goal: Absence of Falls  Outcome: Ongoing (interventions implemented as appropriate)      Problem: Pressure Ulcer (Adult)  Goal: Signs and Symptoms of Listed Potential Problems Will be Absent or Manageable (Pressure Ulcer)  Outcome: Ongoing (interventions implemented as appropriate)

## 2018-02-16 NOTE — PROGRESS NOTES
"Daily progress note    Chief complaint  Doing better  No new complaints    History of present illness  77-year-old -American female with history of CVA with left hemiparesis also have diabetes hypertension hyperlipidemia lymphedema morbidly obese anxiety depression chronic kidney disease stage III with a nursing home resident presented to [facility] emergency room with generalized weakness.  Patient evaluated found to be in acute kidney injury on top of chronic kidney disease admitted for management.  Patient denies any headache chest pain shortness of breath abdominal pain vomiting diarrhea.  Patient does have nausea but denies fever chills night sweats weight loss.     REVIEW OF SYSTEMS  Review of Systems   Constitutional: Negative for fever.   HENT: Negative for sore throat.         Dry mouth   Eyes: Negative.    Respiratory: Negative for cough and shortness of breath.    Cardiovascular: Positive for leg swelling (BLE, chronic). Negative for chest pain.   Gastrointestinal: Negative for abdominal pain, diarrhea and vomiting.   Genitourinary: Positive for frequency. Negative for dysuria.   Musculoskeletal: Negative for neck pain.   Skin: Negative for rash.   Allergic/Immunologic: Negative.    Neurological: Negative for weakness, numbness and headaches.   Hematological: Negative.    Psychiatric/Behavioral: Negative.    All other systems reviewed and are negative.     PHYSICAL EXAM  Blood pressure 144/89, pulse 71, temperature 98.5 °F (36.9 °C), temperature source Oral, resp. rate 16, height 175.3 cm (69.02\"), weight 117 kg (257 lb 12.8 oz), SpO2 95 %.    Constitutional: She is oriented to person, place, and time and well-developed, well-nourished, and in no distress. No distress.   Head: Normocephalic and atraumatic.   Eyes: EOM are normal. Pupils are equal, round, and reactive to light.   Neck: Normal range of motion. Neck supple.   Cardiovascular: Normal rate, regular rhythm and normal heart sounds.  "   Pulmonary/Chest: Effort normal and breath sounds normal. No respiratory distress.   Abdominal: Soft. There is no tenderness. There is no rebound and no guarding.   Musculoskeletal: Normal range of motion. She exhibits edema (BLE).   Neurological: She is alert and oriented to person, place, and time. She has normal sensation and normal strength.   Skin: Skin is warm and dry. No rash noted.   Psychiatric: Mood and affect normal.     LAB RESULTS  Lab Results (last 24 hours)     Procedure Component Value Units Date/Time    POC Glucose Once [[id number]]  (Abnormal) Collected:  02/15/18 1613    Specimen:  Blood Updated:  02/15/18 1615     Glucose 158 (H) mg/dL     Narrative:       Meter: [id number] : [id number] Marcos Ian CNA    POC Glucose Once [[id number]]  (Abnormal) Collected:  02/15/18 2036    Specimen:  Blood Updated:  02/15/18 2037     Glucose 185 (H) mg/dL     Narrative:       Meter: [id number] : [id number] Roderick STEWART    CBC & Differential [[id number]] Collected:  02/16/18 0533    Specimen:  Blood Updated:  02/16/18 0638    Narrative:       The following orders were created for panel order CBC & Differential.  Procedure                               Abnormality         Status                     ---------                               -----------         ------                     CBC Auto Differential[[id number]]        Abnormal            Final result                 Please view results for these tests on the individual orders.    CBC Auto Differential [[id number]]  (Abnormal) Collected:  02/16/18 0533    Specimen:  Blood Updated:  02/16/18 0638     WBC 6.95 10*3/mm3      RBC 3.44 (L) 10*6/mm3      Hemoglobin 10.3 (L) g/dL      Hematocrit 32.8 (L) %      MCV 95.3 fL      MCH 29.9 pg      MCHC 31.4 (L) g/dL      RDW 13.8 (H) %      RDW-SD 47.2 fl      MPV 10.2 fL      Platelets 173 10*3/mm3      Neutrophil % 40.2 (L) %      Lymphocyte % 47.9 (H) %      Monocyte % 6.6 %      Eosinophil % 4.6 %       Basophil % 0.3 %      Immature Grans % 0.4 %      Neutrophils, Absolute 2.79 10*3/mm3      Lymphocytes, Absolute 3.33 10*3/mm3      Monocytes, Absolute 0.46 10*3/mm3      Eosinophils, Absolute 0.32 10*3/mm3      Basophils, Absolute 0.02 10*3/mm3      Immature Grans, Absolute 0.03 10*3/mm3     Protime-INR [[id number]]  (Abnormal) Collected:  02/16/18 0532    Specimen:  Blood Updated:  02/16/18 0646     Protime 17.7 (H) Seconds      INR 1.48 (H)    Basic Metabolic Panel [[id number]]  (Abnormal) Collected:  02/16/18 0533    Specimen:  Blood Updated:  02/16/18 0658     Glucose 77 mg/dL      BUN 21 mg/dL      Creatinine 1.05 (H) mg/dL      Sodium 140 mmol/L      Potassium 3.8 mmol/L      Chloride 103 mmol/L      CO2 31.0 (H) mmol/L      Calcium 8.5 (L) mg/dL      eGFR   Amer 62 mL/min/1.73      BUN/Creatinine Ratio 20.0     Anion Gap 6.0 mmol/L     Narrative:       The MDRD GFR formula is only valid for adults with stable renal function between ages 18 and 70.    POC Glucose Once [[id number]]  (Normal) Collected:  02/16/18 0721    Specimen:  Blood Updated:  02/16/18 0725     Glucose 80 mg/dL     Narrative:       Meter: [id number] : [id number] Natalia STEWART    POC Glucose Once [[id number]]  (Normal) Collected:  02/16/18 1140    Specimen:  Blood Updated:  02/16/18 1143     Glucose 128 mg/dL     Narrative:       Meter: [id number] : [id number] Natalia STEWART        Imaging Results (last 24 hours)     ** No results found for the last 24 hours. **          Current Facility-Administered Medications:   •  acetaminophen (TYLENOL) tablet 650 mg, 650 mg, Oral, Q4H PRN, Josias Razo MD, 650 mg at 02/16/18 1334  •  allopurinol (ZYLOPRIM) tablet 100 mg, 100 mg, Oral, Daily, Josias Razo MD, 100 mg at 02/16/18 0806  •  amLODIPine (NORVASC) tablet 10 mg, 10 mg, Oral, Daily, Josias Razo MD, 10 mg at 02/16/18 0806  •  atorvastatin (LIPITOR) tablet 80 mg, 80 mg, Oral, Nightly, Josias Razo MD, 80 mg at 02/15/18 210  •   baclofen (LIORESAL) tablet 10 mg, 10 mg, Oral, Q8H, Josias Razo MD, 10 mg at 02/16/18 1347  •  cholecalciferol (VITAMIN D3) tablet 4,000 Units, 4,000 Units, Oral, Daily, Josias Razo MD, 4,000 Units at 02/16/18 0805  •  CloNIDine (CATAPRES) tablet 0.3 mg, 0.3 mg, Oral, TID, Josias Razo MD, 0.3 mg at 02/16/18 0806  •  docusate sodium (COLACE) capsule 200 mg, 200 mg, Oral, BID, 200 mg at 02/16/18 0805 **AND** [DISCONTINUED] docusate sodium (COLACE) capsule 50 mg, 50 mg, Oral, Daily, Josias Razo MD  •  hydrALAZINE (APRESOLINE) tablet 50 mg, 50 mg, Oral, Q8H, Josias Razo MD, 50 mg at 02/16/18 1346  •  insulin aspart (novoLOG) injection 0-7 Units, 0-7 Units, Subcutaneous, 4x Daily With Meals & Nightly, Josias Razo MD, 2 Units at 02/15/18 2108  •  insulin aspart (novoLOG) injection 7 Units, 7 Units, Subcutaneous, TID With Meals, Josias Razo MD, 7 Units at 02/15/18 1202  •  insulin detemir (LEVEMIR) injection 35 Units, 35 Units, Subcutaneous, Nightly, Josias Razo MD, 35 Units at 02/15/18 2110  •  lactobacillus acidophilus (RISAQUAD) capsule 1 capsule, 1 capsule, Oral, Daily, Josias Razo MD, 1 capsule at 02/16/18 0806  •  metoprolol tartrate (LOPRESSOR) tablet 50 mg, 50 mg, Oral, Q12H, Josias Razo MD, 50 mg at 02/16/18 0806  •  ondansetron (ZOFRAN) injection 4 mg, 4 mg, Intravenous, Q4H PRN, Josias Razo MD  •  pantoprazole (PROTONIX) EC tablet 40 mg, 40 mg, Oral, BID AC, Josias Razo MD, 40 mg at 02/16/18 0758  •  polyethylene glycol (MIRALAX) powder 17 g, 17 g, Oral, BID, Josias Razo MD, 17 g at 02/16/18 0808  •  polyethylene glycol with electrolytes (GOLYTELY) solution 2,000 mL, 2,000 mL, Oral, BID, Cate Ceja, APRN  •  sertraline (ZOLOFT) tablet 100 mg, 100 mg, Oral, Nightly, Josias Razo MD, 100 mg at 02/15/18 2107  •  Insert peripheral IV, , , Once **AND** sodium chloride 0.9 % flush 10 mL, 10 mL, Intravenous, PRN, Shara Streeter MD, 10 mL at 02/14/18 2039     ASSESSMENT  Acute kidney injury with  hyperkalemia  Chronic kidney disease stage III  Renal cyst  Questionable colitis will get GI consult  Insulin-dependent diabetes mellitus  Hypertension  Hyperlipidemia  Chronic anemia  Gastroesophageal reflux disease  Chronic lymphedema  Status post CVA with left jaja-  Chronic anticoagulation  Immobilization syndrome    PLAN  CPM  Hold Coumadin and Plavix  GI consult appreciated  Continue nursing home meds except for diuretics and ACE inhibitor  Upper and lower endoscopy IN AM  Supportive care  Stress ulcer DVT prophylaxis  PTOT  Follow closely further recommendation according to course    MELODY JOHNSON MD

## 2018-02-16 NOTE — PROGRESS NOTES
"   LOS: 6 days   Patient Care Team:  Yousuf Corrigan MD as PCP - General (Internal Medicine)    Chief Complaint/ Reason for encounter: Acute renal failure/dehydration  Chief Complaint   Patient presents with   • Abnormal Lab         Subjective     History of Present Illness    Subjective:  Symptoms:  Improved.  No shortness of breath or chest pain.  (No new complaints  Resting  Plans for colonoscopy tomorrow, bowel prep tonight).    Diet:  Adequate intake.  No nausea.    Activity level: Returning to normal.    Pain:  She reports no pain.          History taken from: Patient and chart    Objective     Vital Signs  Temp:  [97.9 °F (36.6 °C)-98.5 °F (36.9 °C)] 98.5 °F (36.9 °C)  Heart Rate:  [61-71] 70  Resp:  [16-18] 16  BP: (138-164)/() 160/104       Wt Readings from Last 1 Encounters:   02/15/18 0651 117 kg (257 lb 12.8 oz)   02/13/18 0625 121 kg (267 lb 1.6 oz)   02/12/18 1540 116 kg (255 lb 11.7 oz)   02/12/18 0839 116 kg (255 lb 11.7 oz)   02/10/18 0411 116 kg (256 lb 6.4 oz)   02/09/18 1934 104 kg (230 lb)       Objective:  General Appearance:  Comfortable, well-appearing, in no acute distress and not in pain.    Vital signs: (most recent): Blood pressure (!) 160/104, pulse 70, temperature 98.5 °F (36.9 °C), temperature source Oral, resp. rate 16, height 175.3 cm (69.02\"), weight 117 kg (257 lb 12.8 oz), SpO2 95 %.  Vital signs are normal.  No fever.    Output: Producing urine.    HEENT: Normal HEENT exam.    Lungs:  Normal respiratory rate and normal effort.  She is not in respiratory distress.  Breath sounds clear to auscultation.  No wheezes.    Heart: Normal rate.  Regular rhythm.    Abdomen: Abdomen is soft and non-distended.  Bowel sounds are normal.   There is no abdominal tenderness.  There is no epigastric area or no suprapubic area tenderness.     Extremities: Normal range of motion.  There is no deformity or dependent edema.    Pulses: Distal pulses are intact.    Neurological: Patient is " alert and oriented to person, place and time.    Skin:  Warm and dry.  No rash or cyanosis.             Results Review:    Past Medical History: reviewed and updated  Past Medical History:   Diagnosis Date   • Anemia    • Anxiety    • Arthritis    • Cellulitis of left lower extremity    • CHF (congestive heart failure)    • Depression    • Diabetes mellitus    • DVT (deep venous thrombosis)    • GERD (gastroesophageal reflux disease)    • Hyperlipidemia    • Hypertension    • Lymphedema    • Obesity    • Osteoporosis    • Renal disorder     chronic kidney disease   • Stroke     with left hemiplegia and hemiparesis   • Venous insufficiency (chronic) (peripheral)          Allergies:  Allergies   Allergen Reactions   • Contrast Dye Unknown (See Comments)     n/a   • Iodine Unknown (See Comments)     unknown       Intake/Output:     Intake/Output Summary (Last 24 hours) at 02/16/18 1741  Last data filed at 02/16/18 1700   Gross per 24 hour   Intake              720 ml   Output                0 ml   Net              720 ml               Labs:   Recent Results (from the past 24 hour(s))   POC Glucose Once    Collection Time: 02/15/18  8:36 PM   Result Value Ref Range    Glucose 185 (H) 70 - 130 mg/dL   Protime-INR    Collection Time: 02/16/18  5:32 AM   Result Value Ref Range    Protime 17.7 (H) 11.7 - 14.2 Seconds    INR 1.48 (H) 0.90 - 1.10   Basic Metabolic Panel    Collection Time: 02/16/18  5:33 AM   Result Value Ref Range    Glucose 77 65 - 99 mg/dL    BUN 21 8 - 23 mg/dL    Creatinine 1.05 (H) 0.57 - 1.00 mg/dL    Sodium 140 136 - 145 mmol/L    Potassium 3.8 3.5 - 5.2 mmol/L    Chloride 103 98 - 107 mmol/L    CO2 31.0 (H) 22.0 - 29.0 mmol/L    Calcium 8.5 (L) 8.6 - 10.5 mg/dL    eGFR  African Amer 62 >60 mL/min/1.73    BUN/Creatinine Ratio 20.0 7.0 - 25.0    Anion Gap 6.0 mmol/L   CBC Auto Differential    Collection Time: 02/16/18  5:33 AM   Result Value Ref Range    WBC 6.95 4.50 - 10.70 10*3/mm3    RBC 3.44 (L)  3.90 - 5.20 10*6/mm3    Hemoglobin 10.3 (L) 11.9 - 15.5 g/dL    Hematocrit 32.8 (L) 35.6 - 45.5 %    MCV 95.3 80.5 - 98.2 fL    MCH 29.9 26.9 - 32.0 pg    MCHC 31.4 (L) 32.4 - 36.3 g/dL    RDW 13.8 (H) 11.7 - 13.0 %    RDW-SD 47.2 37.0 - 54.0 fl    MPV 10.2 6.0 - 12.0 fL    Platelets 173 140 - 500 10*3/mm3    Neutrophil % 40.2 (L) 42.7 - 76.0 %    Lymphocyte % 47.9 (H) 19.6 - 45.3 %    Monocyte % 6.6 5.0 - 12.0 %    Eosinophil % 4.6 0.3 - 6.2 %    Basophil % 0.3 0.0 - 1.5 %    Immature Grans % 0.4 0.0 - 0.5 %    Neutrophils, Absolute 2.79 1.90 - 8.10 10*3/mm3    Lymphocytes, Absolute 3.33 0.90 - 4.80 10*3/mm3    Monocytes, Absolute 0.46 0.20 - 1.20 10*3/mm3    Eosinophils, Absolute 0.32 0.00 - 0.70 10*3/mm3    Basophils, Absolute 0.02 0.00 - 0.20 10*3/mm3    Immature Grans, Absolute 0.03 0.00 - 0.03 10*3/mm3   POC Glucose Once    Collection Time: 02/16/18  7:21 AM   Result Value Ref Range    Glucose 80 70 - 130 mg/dL   POC Glucose Once    Collection Time: 02/16/18 11:40 AM   Result Value Ref Range    Glucose 128 70 - 130 mg/dL   POC Glucose Once    Collection Time: 02/16/18  4:34 PM   Result Value Ref Range    Glucose 165 (H) 70 - 130 mg/dL       Radiology:  Imaging Results (last 24 hours)     ** No results found for the last 24 hours. **             Medications have been reviewed:  Current Facility-Administered Medications   Medication Dose Route Frequency Provider Last Rate Last Dose   • acetaminophen (TYLENOL) tablet 650 mg  650 mg Oral Q4H PRN Josias Razo MD   650 mg at 02/16/18 1334   • allopurinol (ZYLOPRIM) tablet 100 mg  100 mg Oral Daily Josias Razo MD   100 mg at 02/16/18 0806   • amLODIPine (NORVASC) tablet 10 mg  10 mg Oral Daily Josias Razo MD   10 mg at 02/16/18 0806   • atorvastatin (LIPITOR) tablet 80 mg  80 mg Oral Nightly Josias Razo MD   80 mg at 02/15/18 2107   • baclofen (LIORESAL) tablet 10 mg  10 mg Oral Q8H Josias Razo MD   10 mg at 02/16/18 1347   • cholecalciferol (VITAMIN D3) tablet  4,000 Units  4,000 Units Oral Daily Josias Razo MD   4,000 Units at 02/16/18 0805   • CloNIDine (CATAPRES) tablet 0.3 mg  0.3 mg Oral TID Josias Razo MD   0.3 mg at 02/16/18 1644   • docusate sodium (COLACE) capsule 200 mg  200 mg Oral BID Josias Razo MD   200 mg at 02/16/18 0805   • hydrALAZINE (APRESOLINE) tablet 50 mg  50 mg Oral Q8H Josias Razo MD   50 mg at 02/16/18 1346   • insulin aspart (novoLOG) injection 0-7 Units  0-7 Units Subcutaneous 4x Daily With Meals & Nightly Josias Razo MD   2 Units at 02/15/18 2108   • insulin aspart (novoLOG) injection 7 Units  7 Units Subcutaneous TID With Meals Josias Razo MD   7 Units at 02/16/18 1720   • insulin detemir (LEVEMIR) injection 35 Units  35 Units Subcutaneous Nightly Josias Razo MD   35 Units at 02/15/18 2110   • lactobacillus acidophilus (RISAQUAD) capsule 1 capsule  1 capsule Oral Daily Josias Razo MD   1 capsule at 02/16/18 0806   • metoprolol tartrate (LOPRESSOR) tablet 50 mg  50 mg Oral Q12H Josias Razo MD   50 mg at 02/16/18 0806   • ondansetron (ZOFRAN) injection 4 mg  4 mg Intravenous Q4H PRN Josias Razo MD       • pantoprazole (PROTONIX) EC tablet 40 mg  40 mg Oral BID AC Josias Razo MD   40 mg at 02/16/18 1715   • polyethylene glycol (MIRALAX) powder 17 g  17 g Oral BID Josias Razo MD   17 g at 02/16/18 0808   • polyethylene glycol with electrolytes (GOLYTELY) solution 2,000 mL  2,000 mL Oral BID DANISH Bingham   2,000 mL at 02/16/18 1606   • sertraline (ZOLOFT) tablet 100 mg  100 mg Oral Nightly Josias Razo MD   100 mg at 02/15/18 2107   • sodium chloride 0.9 % flush 10 mL  10 mL Intravenous PRN Shara Streeter MD   10 mL at 02/14/18 2039   • sodium chloride 0.9 % infusion  75 mL/hr Intravenous Continuous Sourav Doe MD           Assessment/Plan     Principal Problem:    Anemia  Active Problems:    Acute renal failure superimposed on chronic kidney disease      Assessment:  (  Assesment    PEGGY resolved   CKD stage III ,  creatinine stable and near baseline  Renal cysts  Acute colitis, plans for colonoscopy per GI  HTN  HF  Anemia  DM  CVA      Plan:  Her renal function is improved, stable and near baseline  Resume IV fluids tonight during bowel prep  Continue current antihypertensive regimen  Bowel prep tonight with colonoscopy scheduled for tomorrow).             Continue to monitor renal function, electroytes and volume closely   Please call me with any questions or concerns      Sourav Doe MD   [facility]   [phone]    02/16/18  5:41 PM      Dictation performed using Dragon dictation software

## 2018-02-16 NOTE — PROGRESS NOTES
Continued Stay Note  [facility]     Patient Name: Kacy Misrty  MRN: [id number]  Today's Date: 2/16/2018    Admit Date: 2/9/2018          Discharge Plan       02/16/18 1139    Case Management/Social Work Plan    Plan Augustaliza campbell [place].      Additional Comments Spoke with Susanne.  Bed is available over the weekend.  Packet placed on chart.              Marilin Boogie RN

## 2018-02-17 ENCOUNTER — ANESTHESIA EVENT (OUTPATIENT)
Dept: GASTROENTEROLOGY | Facility: HOSPITAL | Age: 78
End: 2018-02-17

## 2018-02-17 ENCOUNTER — ANESTHESIA (OUTPATIENT)
Dept: GASTROENTEROLOGY | Facility: HOSPITAL | Age: 78
End: 2018-02-17

## 2018-02-17 ENCOUNTER — INPATIENT HOSPITAL (OUTPATIENT)
Dept: URBAN - METROPOLITAN AREA HOSPITAL 113 | Facility: HOSPITAL | Age: 78
End: 2018-02-17

## 2018-02-17 DIAGNOSIS — D64.9 ANEMIA, UNSPECIFIED: ICD-10-CM

## 2018-02-17 DIAGNOSIS — K63.5 POLYP OF COLON: ICD-10-CM

## 2018-02-17 DIAGNOSIS — K63.89 OTHER SPECIFIED DISEASES OF INTESTINE: ICD-10-CM

## 2018-02-17 DIAGNOSIS — L81.4 OTHER MELANIN HYPERPIGMENTATION: ICD-10-CM

## 2018-02-17 DIAGNOSIS — K21.0 GASTRO-ESOPHAGEAL REFLUX DISEASE WITH ESOPHAGITIS: ICD-10-CM

## 2018-02-17 LAB
ANION GAP SERPL CALCULATED.3IONS-SCNC: 9.5 MMOL/L
BASOPHILS # BLD AUTO: 0.03 10*3/MM3 (ref 0–0.2)
BASOPHILS NFR BLD AUTO: 0.5 % (ref 0–1.5)
BUN BLD-MCNC: 17 MG/DL (ref 8–23)
BUN/CREAT SERPL: 15.9 (ref 7–25)
CALCIUM SPEC-SCNC: 8.5 MG/DL (ref 8.6–10.5)
CHLORIDE SERPL-SCNC: 102 MMOL/L (ref 98–107)
CO2 SERPL-SCNC: 28.5 MMOL/L (ref 22–29)
CREAT BLD-MCNC: 1.07 MG/DL (ref 0.57–1)
DEPRECATED RDW RBC AUTO: 48.6 FL (ref 37–54)
EOSINOPHIL # BLD AUTO: 0.25 10*3/MM3 (ref 0–0.7)
EOSINOPHIL NFR BLD AUTO: 3.9 % (ref 0.3–6.2)
ERYTHROCYTE [DISTWIDTH] IN BLOOD BY AUTOMATED COUNT: 14 % (ref 11.7–13)
GFR SERPL CREATININE-BSD FRML MDRD: 60 ML/MIN/1.73
GLUCOSE BLD-MCNC: 80 MG/DL (ref 65–99)
GLUCOSE BLDC GLUCOMTR-MCNC: 106 MG/DL (ref 70–130)
GLUCOSE BLDC GLUCOMTR-MCNC: 121 MG/DL (ref 70–130)
GLUCOSE BLDC GLUCOMTR-MCNC: 131 MG/DL (ref 70–130)
GLUCOSE BLDC GLUCOMTR-MCNC: 146 MG/DL (ref 70–130)
GLUCOSE BLDC GLUCOMTR-MCNC: 75 MG/DL (ref 70–130)
GLUCOSE BLDC GLUCOMTR-MCNC: 81 MG/DL (ref 70–130)
HCT VFR BLD AUTO: 33 % (ref 35.6–45.5)
HGB BLD-MCNC: 10.3 G/DL (ref 11.9–15.5)
IMM GRANULOCYTES # BLD: 0 10*3/MM3 (ref 0–0.03)
IMM GRANULOCYTES NFR BLD: 0 % (ref 0–0.5)
INR PPP: 1.36 (ref 0.9–1.1)
LYMPHOCYTES # BLD AUTO: 3 10*3/MM3 (ref 0.9–4.8)
LYMPHOCYTES NFR BLD AUTO: 47.4 % (ref 19.6–45.3)
MCH RBC QN AUTO: 29.9 PG (ref 26.9–32)
MCHC RBC AUTO-ENTMCNC: 31.2 G/DL (ref 32.4–36.3)
MCV RBC AUTO: 95.9 FL (ref 80.5–98.2)
MONOCYTES # BLD AUTO: 0.43 10*3/MM3 (ref 0.2–1.2)
MONOCYTES NFR BLD AUTO: 6.8 % (ref 5–12)
NEUTROPHILS # BLD AUTO: 2.62 10*3/MM3 (ref 1.9–8.1)
NEUTROPHILS NFR BLD AUTO: 41.4 % (ref 42.7–76)
PLATELET # BLD AUTO: 167 10*3/MM3 (ref 140–500)
PMV BLD AUTO: 10.2 FL (ref 6–12)
POTASSIUM BLD-SCNC: 3.9 MMOL/L (ref 3.5–5.2)
PROTHROMBIN TIME: 16.6 SECONDS (ref 11.7–14.2)
RBC # BLD AUTO: 3.44 10*6/MM3 (ref 3.9–5.2)
SODIUM BLD-SCNC: 140 MMOL/L (ref 136–145)
WBC NRBC COR # BLD: 6.33 10*3/MM3 (ref 4.5–10.7)

## 2018-02-17 PROCEDURE — 0DBN8ZX EXCISION OF SIGMOID COLON, VIA NATURAL OR ARTIFICIAL OPENING ENDOSCOPIC, DIAGNOSTIC: ICD-10-PCS | Performed by: INTERNAL MEDICINE

## 2018-02-17 PROCEDURE — 80048 BASIC METABOLIC PNL TOTAL CA: CPT | Performed by: NURSE PRACTITIONER

## 2018-02-17 PROCEDURE — 43235 EGD DIAGNOSTIC BRUSH WASH: CPT

## 2018-02-17 PROCEDURE — 0DJ08ZZ INSPECTION OF UPPER INTESTINAL TRACT, VIA NATURAL OR ARTIFICIAL OPENING ENDOSCOPIC: ICD-10-PCS | Performed by: INTERNAL MEDICINE

## 2018-02-17 PROCEDURE — 25010000002 FENTANYL CITRATE (PF) 100 MCG/2ML SOLUTION: Performed by: ANESTHESIOLOGY

## 2018-02-17 PROCEDURE — 85025 COMPLETE CBC W/AUTO DIFF WBC: CPT | Performed by: NURSE PRACTITIONER

## 2018-02-17 PROCEDURE — 25010000002 PROPOFOL 10 MG/ML EMULSION: Performed by: ANESTHESIOLOGY

## 2018-02-17 PROCEDURE — 85610 PROTHROMBIN TIME: CPT | Performed by: HOSPITALIST

## 2018-02-17 PROCEDURE — 45385 COLONOSCOPY W/LESION REMOVAL: CPT

## 2018-02-17 PROCEDURE — 25010000002 MIDAZOLAM PER 1 MG: Performed by: ANESTHESIOLOGY

## 2018-02-17 PROCEDURE — 88305 TISSUE EXAM BY PATHOLOGIST: CPT | Performed by: INTERNAL MEDICINE

## 2018-02-17 PROCEDURE — 63710000001 INSULIN ASPART PER 5 UNITS: Performed by: HOSPITALIST

## 2018-02-17 PROCEDURE — 82962 GLUCOSE BLOOD TEST: CPT

## 2018-02-17 RX ORDER — SODIUM CHLORIDE 0.9 % (FLUSH) 0.9 %
3 SYRINGE (ML) INJECTION AS NEEDED
Status: DISCONTINUED | OUTPATIENT
Start: 2018-02-17 | End: 2018-02-17

## 2018-02-17 RX ORDER — SODIUM CHLORIDE, SODIUM LACTATE, POTASSIUM CHLORIDE, CALCIUM CHLORIDE 600; 310; 30; 20 MG/100ML; MG/100ML; MG/100ML; MG/100ML
1000 INJECTION, SOLUTION INTRAVENOUS CONTINUOUS PRN
Status: DISCONTINUED | OUTPATIENT
Start: 2018-02-17 | End: 2018-02-19

## 2018-02-17 RX ORDER — MIDAZOLAM HYDROCHLORIDE 1 MG/ML
INJECTION INTRAMUSCULAR; INTRAVENOUS AS NEEDED
Status: DISCONTINUED | OUTPATIENT
Start: 2018-02-17 | End: 2018-02-17 | Stop reason: SURG

## 2018-02-17 RX ORDER — PROPOFOL 10 MG/ML
VIAL (ML) INTRAVENOUS AS NEEDED
Status: DISCONTINUED | OUTPATIENT
Start: 2018-02-17 | End: 2018-02-17 | Stop reason: SURG

## 2018-02-17 RX ORDER — LIDOCAINE HYDROCHLORIDE 20 MG/ML
INJECTION, SOLUTION INFILTRATION; PERINEURAL AS NEEDED
Status: DISCONTINUED | OUTPATIENT
Start: 2018-02-17 | End: 2018-02-17 | Stop reason: SURG

## 2018-02-17 RX ORDER — WARFARIN SODIUM 5 MG/1
5 TABLET ORAL
Status: COMPLETED | OUTPATIENT
Start: 2018-02-17 | End: 2018-02-17

## 2018-02-17 RX ORDER — FENTANYL CITRATE 50 UG/ML
INJECTION, SOLUTION INTRAMUSCULAR; INTRAVENOUS AS NEEDED
Status: DISCONTINUED | OUTPATIENT
Start: 2018-02-17 | End: 2018-02-17 | Stop reason: SURG

## 2018-02-17 RX ADMIN — LIDOCAINE HYDROCHLORIDE 40 MG: 20 INJECTION, SOLUTION INFILTRATION; PERINEURAL at 11:50

## 2018-02-17 RX ADMIN — Medication 1 CAPSULE: at 13:57

## 2018-02-17 RX ADMIN — METOPROLOL TARTRATE 50 MG: 50 TABLET, FILM COATED ORAL at 20:27

## 2018-02-17 RX ADMIN — METOPROLOL TARTRATE 50 MG: 50 TABLET, FILM COATED ORAL at 08:49

## 2018-02-17 RX ADMIN — BACLOFEN 10 MG: 10 TABLET ORAL at 13:56

## 2018-02-17 RX ADMIN — BACLOFEN 10 MG: 10 TABLET ORAL at 22:03

## 2018-02-17 RX ADMIN — POLYETHYLENE GLYCOL 3350 17 G: 17 POWDER, FOR SOLUTION ORAL at 20:26

## 2018-02-17 RX ADMIN — CLONIDINE HYDROCHLORIDE 0.3 MG: 0.1 TABLET ORAL at 20:27

## 2018-02-17 RX ADMIN — WARFARIN SODIUM 5 MG: 5 TABLET ORAL at 17:54

## 2018-02-17 RX ADMIN — SERTRALINE 100 MG: 100 TABLET, FILM COATED ORAL at 20:27

## 2018-02-17 RX ADMIN — INSULIN ASPART 7 UNITS: 100 INJECTION, SOLUTION INTRAVENOUS; SUBCUTANEOUS at 17:55

## 2018-02-17 RX ADMIN — ALLOPURINOL 100 MG: 100 TABLET ORAL at 13:56

## 2018-02-17 RX ADMIN — SODIUM CHLORIDE, POTASSIUM CHLORIDE, SODIUM LACTATE AND CALCIUM CHLORIDE 1000 ML: 600; 310; 30; 20 INJECTION, SOLUTION INTRAVENOUS at 11:09

## 2018-02-17 RX ADMIN — VITAMIN D, TAB 1000IU (100/BT) 4000 UNITS: 25 TAB at 13:56

## 2018-02-17 RX ADMIN — SODIUM CHLORIDE, POTASSIUM CHLORIDE, SODIUM LACTATE AND CALCIUM CHLORIDE: 600; 310; 30; 20 INJECTION, SOLUTION INTRAVENOUS at 11:50

## 2018-02-17 RX ADMIN — CLONIDINE HYDROCHLORIDE 0.3 MG: 0.1 TABLET ORAL at 16:00

## 2018-02-17 RX ADMIN — HYDRALAZINE HYDROCHLORIDE 75 MG: 50 TABLET, FILM COATED ORAL at 22:03

## 2018-02-17 RX ADMIN — FENTANYL CITRATE 25 MCG: 50 INJECTION INTRAMUSCULAR; INTRAVENOUS at 11:50

## 2018-02-17 RX ADMIN — MIDAZOLAM 0.5 MG: 1 INJECTION INTRAMUSCULAR; INTRAVENOUS at 11:50

## 2018-02-17 RX ADMIN — AMLODIPINE BESYLATE 10 MG: 10 TABLET ORAL at 08:50

## 2018-02-17 RX ADMIN — PANTOPRAZOLE SODIUM 40 MG: 40 TABLET, DELAYED RELEASE ORAL at 17:54

## 2018-02-17 RX ADMIN — SODIUM CHLORIDE 75 ML/HR: 9 INJECTION, SOLUTION INTRAVENOUS at 06:36

## 2018-02-17 RX ADMIN — DOCUSATE SODIUM 200 MG: 100 CAPSULE, LIQUID FILLED ORAL at 20:27

## 2018-02-17 RX ADMIN — PROPOFOL 100 MG: 10 INJECTION, EMULSION INTRAVENOUS at 12:15

## 2018-02-17 RX ADMIN — PROPOFOL 100 MG: 10 INJECTION, EMULSION INTRAVENOUS at 11:50

## 2018-02-17 RX ADMIN — ATORVASTATIN CALCIUM 80 MG: 80 TABLET, FILM COATED ORAL at 20:27

## 2018-02-17 RX ADMIN — CLONIDINE HYDROCHLORIDE 0.3 MG: 0.1 TABLET ORAL at 08:50

## 2018-02-17 RX ADMIN — HYDRALAZINE HYDROCHLORIDE 50 MG: 50 TABLET, FILM COATED ORAL at 13:56

## 2018-02-17 NOTE — ANESTHESIA PREPROCEDURE EVALUATION
Anesthesia Evaluation     Patient summary reviewed and Nursing notes reviewed                Airway   Mallampati: III  Dental      Pulmonary - negative pulmonary ROS   Cardiovascular     ECG reviewed  Rhythm: regular  Rate: normal    (+) hypertension, CHF, PVD, DVT, hyperlipidemia      Neuro/Psych  (+) CVA residual symptoms, psychiatric history Depression,     GI/Hepatic/Renal/Endo    (+) morbid obesity, GERD, renal disease CRI, diabetes mellitus using insulin,     Musculoskeletal (-) negative ROS    Abdominal    Substance History - negative use     OB/GYN negative ob/gyn ROS         Other                      Anesthesia Plan    ASA 4     MAC   (Hemiplegia  Hemiparesis  Multiple medical pathologies  Higher risk for anesthetic complications)  intravenous induction   Anesthetic plan and risks discussed with patient.

## 2018-02-17 NOTE — PLAN OF CARE
Problem: Patient Care Overview (Adult)  Goal: Plan of Care Review  Outcome: Ongoing (interventions implemented as appropriate)   02/17/18 0634   Coping/Psychosocial Response Interventions   Plan Of Care Reviewed With patient   Outcome Evaluation   Outcome Summary/Follow up Plan Pt venancio bowel prep well. Pt only consented to 1 leg being wrapped for this shift. Pt has started on second half of bowel brep. Will cont to monitor. AMY Jansen

## 2018-02-17 NOTE — PLAN OF CARE
Problem: GI Endoscopy (Adult)  Intervention: Monitor/Manage Procedure Recovery   02/17/18 1051   Respiratory Interventions   Airway/Ventilation Management airway patency maintained;calming measures promoted   Coping/Psychosocial Interventions   Environmental Support calm environment promoted;distractions minimized   Activity   Activity Type activity adjusted per tolerance   Cardiac Interventions   Warming Thermoregulation Maintenance warm blankets applied       Goal: Signs and Symptoms of Listed Potential Problems Will be Absent or Manageable (GI Endoscopy)  Outcome: Ongoing (interventions implemented as appropriate)   02/17/18 1051   GI Endoscopy   Problems Assessed (GI Endoscopy) all   Problems Present (GI Endoscopy) none

## 2018-02-17 NOTE — PROGRESS NOTES
"Daily progress note    Chief complaint  Doing better  No new complaints    History of present illness  77-year-old -American female with history of CVA with left hemiparesis also have diabetes hypertension hyperlipidemia lymphedema morbidly obese anxiety depression chronic kidney disease stage III with a nursing home resident presented to [facility] emergency room with generalized weakness.  Patient evaluated found to be in acute kidney injury on top of chronic kidney disease admitted for management.  Patient denies any headache chest pain shortness of breath abdominal pain vomiting diarrhea.  Patient does have nausea but denies fever chills night sweats weight loss.     REVIEW OF SYSTEMS  Review of Systems   Constitutional: Negative for fever.   HENT: Negative for sore throat.         Dry mouth   Eyes: Negative.    Respiratory: Negative for cough and shortness of breath.    Cardiovascular: Positive for leg swelling (BLE, chronic). Negative for chest pain.   Gastrointestinal: Negative for abdominal pain, diarrhea and vomiting.   Genitourinary: Positive for frequency. Negative for dysuria.   Musculoskeletal: Negative for neck pain.   Skin: Negative for rash.   Allergic/Immunologic: Negative.    Neurological: Negative for weakness, numbness and headaches.   Hematological: Negative.    Psychiatric/Behavioral: Negative.    All other systems reviewed and are negative.     PHYSICAL EXAM  Blood pressure 164/93, pulse 68, temperature 97.7 °F (36.5 °C), temperature source Oral, resp. rate 14, height 175.3 cm (69.02\"), weight 117 kg (257 lb 12.8 oz), SpO2 100 %.    Constitutional: She is oriented to person, place, and time and well-developed, well-nourished, and in no distress. No distress.   Head: Normocephalic and atraumatic.   Eyes: EOM are normal. Pupils are equal, round, and reactive to light.   Neck: Normal range of motion. Neck supple.   Cardiovascular: Normal rate, regular rhythm and normal heart sounds.  "   Pulmonary/Chest: Effort normal and breath sounds normal. No respiratory distress.   Abdominal: Soft. There is no tenderness. There is no rebound and no guarding.   Musculoskeletal: Normal range of motion. She exhibits edema (BLE).   Neurological: She is alert and oriented to person, place, and time. She has normal sensation and normal strength.   Skin: Skin is warm and dry. No rash noted.   Psychiatric: Mood and affect normal.     LAB RESULTS  Lab Results (last 24 hours)     Procedure Component Value Units Date/Time    POC Glucose Once [[id number]]  (Abnormal) Collected:  02/16/18 1634    Specimen:  Blood Updated:  02/16/18 1636     Glucose 165 (H) mg/dL     Narrative:       Meter: [id number] : [id number] Natalia STEWART    POC Glucose Once [[id number]]  (Normal) Collected:  02/16/18 2116    Specimen:  Blood Updated:  02/16/18 2118     Glucose 117 mg/dL     Narrative:       Meter: [id number] : [id number] Carlitos STEWART    POC Glucose Once [[id number]]  (Normal) Collected:  02/17/18 0128    Specimen:  Blood Updated:  02/17/18 0128     Glucose 121 mg/dL     Narrative:       Meter: [id number] : [id number] Vielka Bishop RN    POC Glucose Once [[id number]]  (Normal) Collected:  02/17/18 0402    Specimen:  Blood Updated:  02/17/18 0403     Glucose 106 mg/dL     Narrative:       Meter: [id number] : [id number] Keyshawn Barnes    CBC & Differential [[id number]] Collected:  02/17/18 0528    Specimen:  Blood Updated:  02/17/18 0623    Narrative:       The following orders were created for panel order CBC & Differential.  Procedure                               Abnormality         Status                     ---------                               -----------         ------                     CBC Auto Differential[[id number]]        Abnormal            Final result                 Please view results for these tests on the individual orders.    CBC Auto Differential [[id number]]  (Abnormal) Collected:   02/17/18 0528    Specimen:  Blood Updated:  02/17/18 0623     WBC 6.33 10*3/mm3      RBC 3.44 (L) 10*6/mm3      Hemoglobin 10.3 (L) g/dL      Hematocrit 33.0 (L) %      MCV 95.9 fL      MCH 29.9 pg      MCHC 31.2 (L) g/dL      RDW 14.0 (H) %      RDW-SD 48.6 fl      MPV 10.2 fL      Platelets 167 10*3/mm3      Neutrophil % 41.4 (L) %      Lymphocyte % 47.4 (H) %      Monocyte % 6.8 %      Eosinophil % 3.9 %      Basophil % 0.5 %      Immature Grans % 0.0 %      Neutrophils, Absolute 2.62 10*3/mm3      Lymphocytes, Absolute 3.00 10*3/mm3      Monocytes, Absolute 0.43 10*3/mm3      Eosinophils, Absolute 0.25 10*3/mm3      Basophils, Absolute 0.03 10*3/mm3      Immature Grans, Absolute 0.00 10*3/mm3     Protime-INR [[id number]]  (Abnormal) Collected:  02/17/18 0528    Specimen:  Blood Updated:  02/17/18 0626     Protime 16.6 (H) Seconds      INR 1.36 (H)    Basic Metabolic Panel [[id number]]  (Abnormal) Collected:  02/17/18 0528    Specimen:  Blood Updated:  02/17/18 0636     Glucose 80 mg/dL      BUN 17 mg/dL      Creatinine 1.07 (H) mg/dL      Sodium 140 mmol/L      Potassium 3.9 mmol/L      Chloride 102 mmol/L      CO2 28.5 mmol/L      Calcium 8.5 (L) mg/dL      eGFR  African Amer 60 (L) mL/min/1.73      BUN/Creatinine Ratio 15.9     Anion Gap 9.5 mmol/L     Narrative:       The MDRD GFR formula is only valid for adults with stable renal function between ages 18 and 70.    POC Glucose Once [[id number]]  (Normal) Collected:  02/17/18 0719    Specimen:  Blood Updated:  02/17/18 0720     Glucose 75 mg/dL     Narrative:       Meter: [id number] : [id number] Levy STEWART    POC Glucose Once [[id number]]  (Normal) Collected:  02/17/18 1405    Specimen:  Blood Updated:  02/17/18 1407     Glucose 81 mg/dL     Narrative:       Meter: [id number] : [id number] Dane STEWART        Imaging Results (last 24 hours)     ** No results found for the last 24 hours. **      Upper and lower endoscopy noted.  Discussed with  patient    Current Facility-Administered Medications:   •  acetaminophen (TYLENOL) tablet 650 mg, 650 mg, Oral, Q4H PRN, Josias Razo MD, 650 mg at 02/16/18 1334  •  allopurinol (ZYLOPRIM) tablet 100 mg, 100 mg, Oral, Daily, Josias Razo MD, 100 mg at 02/17/18 1356  •  amLODIPine (NORVASC) tablet 10 mg, 10 mg, Oral, Daily, Josias Razo MD, 10 mg at 02/17/18 0850  •  atorvastatin (LIPITOR) tablet 80 mg, 80 mg, Oral, Nightly, Josias Razo MD, 80 mg at 02/16/18 2040  •  baclofen (LIORESAL) tablet 10 mg, 10 mg, Oral, Q8H, Josias Razo MD, 10 mg at 02/17/18 1356  •  cholecalciferol (VITAMIN D3) tablet 4,000 Units, 4,000 Units, Oral, Daily, Josias Razo MD, 4,000 Units at 02/17/18 1356  •  CloNIDine (CATAPRES) tablet 0.3 mg, 0.3 mg, Oral, TID, Josias Razo MD, 0.3 mg at 02/17/18 0850  •  docusate sodium (COLACE) capsule 200 mg, 200 mg, Oral, BID, 200 mg at 02/16/18 2040 **AND** [DISCONTINUED] docusate sodium (COLACE) capsule 50 mg, 50 mg, Oral, Daily, Josias Razo MD  •  hydrALAZINE (APRESOLINE) tablet 50 mg, 50 mg, Oral, Q8H, Josias Razo MD, 50 mg at 02/17/18 1356  •  insulin aspart (novoLOG) injection 0-7 Units, 0-7 Units, Subcutaneous, 4x Daily With Meals & Nightly, Josias Razo MD, 2 Units at 02/15/18 2108  •  insulin aspart (novoLOG) injection 7 Units, 7 Units, Subcutaneous, TID With Meals, Josias Razo MD, 7 Units at 02/16/18 1720  •  insulin detemir (LEVEMIR) injection 35 Units, 35 Units, Subcutaneous, Nightly, Josias Razo MD, 35 Units at 02/16/18 2208  •  lactated ringers infusion 1,000 mL, 1,000 mL, Intravenous, Continuous PRN, David Villavicencio MD, Last Rate: 25 mL/hr at 02/17/18 1109  •  lactobacillus acidophilus (RISAQUAD) capsule 1 capsule, 1 capsule, Oral, Daily, Josias Razo MD, 1 capsule at 02/17/18 1357  •  metoprolol tartrate (LOPRESSOR) tablet 50 mg, 50 mg, Oral, Q12H, Josias Razo MD, 50 mg at 02/17/18 0842  •  ondansetron (ZOFRAN) injection 4 mg, 4 mg, Intravenous, Q4H PRN, Josias Razo,  MD  •  pantoprazole (PROTONIX) EC tablet 40 mg, 40 mg, Oral, BID AC, Melody Johnson MD, 40 mg at 02/16/18 1715  •  polyethylene glycol (MIRALAX) powder 17 g, 17 g, Oral, BID, Melody Johnson MD, 17 g at 02/16/18 2041  •  polyethylene glycol with electrolytes (GOLYTELY) solution 2,000 mL, 2,000 mL, Oral, BID, Cate Ceja, APRN, 2,000 mL at 02/16/18 1606  •  sertraline (ZOLOFT) tablet 100 mg, 100 mg, Oral, Nightly, Melody Johnson MD, 100 mg at 02/16/18 2040  •  Insert peripheral IV, , , Once **AND** sodium chloride 0.9 % flush 10 mL, 10 mL, Intravenous, PRN, Shara Streeter MD, 10 mL at 02/14/18 2039  •  sodium chloride 0.9 % infusion, 75 mL/hr, Intravenous, Continuous, Sourav Doe MD, Last Rate: 75 mL/hr at 02/17/18 0636, 75 mL/hr at 02/17/18 0636     ASSESSMENT  Acute kidney injury with hyperkalemia  Chronic kidney disease stage III  Renal cyst  Severe esophagitis  Insulin-dependent diabetes mellitus  Hypertension  Hyperlipidemia  Chronic anemia  Gastroesophageal reflux disease  Chronic lymphedema  Status post CVA with left jaja-  Chronic anticoagulation  Immobilization syndrome    PLAN  CPM  Resume Coumadin and Plavix  GI consult appreciated  Continue nursing home meds except for diuretics and ACE inhibitor  Upper and lower endoscopy IN AM  Supportive care  Stress ulcer DVT prophylaxis  PTOT  Follow closely further recommendation according to course    MELODY JOHNSON MD

## 2018-02-17 NOTE — ADDENDUM NOTE
Addendum  created 02/17/18 1310 by Pawan Guy MD    Anesthesia Intra Meds edited, Visit Navigator Flowsheet section accepted

## 2018-02-17 NOTE — PROGRESS NOTES
"   LOS: 7 days   Patient Care Team:  Yousuf Corrigan MD as PCP - General (Internal Medicine)    Chief Complaint/ Reason for encounter: Acute renal failure/dehydration  Chief Complaint   Patient presents with   • Abnormal Lab         Subjective     Abnormal Lab   Pertinent negatives include no chest pain, fever or nausea.       Subjective:  Symptoms:  Improved.  No shortness of breath or chest pain.  (No new complaints  Resting  Plans for colonoscopy tomorrow, bowel prep tonight).    Diet:  Adequate intake.  No nausea.    Activity level: Returning to normal.    Pain:  She reports no pain.          History taken from: Patient and chart    Objective     Vital Signs  Temp:  [98.2 °F (36.8 °C)-98.5 °F (36.9 °C)] 98.3 °F (36.8 °C)  Heart Rate:  [67-76] 76  Resp:  [12-18] 12  BP: (141-181)/() 181/111       Wt Readings from Last 1 Encounters:   02/15/18 0651 117 kg (257 lb 12.8 oz)   02/13/18 0625 121 kg (267 lb 1.6 oz)   02/12/18 1540 116 kg (255 lb 11.7 oz)   02/12/18 0839 116 kg (255 lb 11.7 oz)   02/10/18 0411 116 kg (256 lb 6.4 oz)   02/09/18 1934 104 kg (230 lb)       Objective:  General Appearance:  Comfortable, well-appearing, in no acute distress and not in pain.    Vital signs: (most recent): Blood pressure (!) 181/111, pulse 76, temperature 98.3 °F (36.8 °C), resp. rate 12, height 175.3 cm (69.02\"), weight 117 kg (257 lb 12.8 oz), SpO2 96 %.  Vital signs are normal.  No fever.    Output: Producing urine.    HEENT: Normal HEENT exam.    Lungs:  Normal respiratory rate and normal effort.  She is not in respiratory distress.  Breath sounds clear to auscultation.  No wheezes.    Heart: Normal rate.  Regular rhythm.    Abdomen: Abdomen is soft and non-distended.  Bowel sounds are normal.   There is no abdominal tenderness.  There is no epigastric area or no suprapubic area tenderness.     Extremities: Normal range of motion.  There is no deformity or dependent edema.    Pulses: Distal pulses are intact.  "   Neurological: Patient is alert and oriented to person, place and time.    Skin:  Warm and dry.  No rash or cyanosis.             Results Review:    Past Medical History: reviewed and updated  Past Medical History:   Diagnosis Date   • Anemia    • Anxiety    • Arthritis    • Cellulitis of left lower extremity    • CHF (congestive heart failure)    • Depression    • Diabetes mellitus    • DVT (deep venous thrombosis)    • GERD (gastroesophageal reflux disease)    • Hyperlipidemia    • Hypertension    • Lymphedema    • Obesity    • Osteoporosis    • Renal disorder     chronic kidney disease   • Stroke     with left hemiplegia and hemiparesis   • Venous insufficiency (chronic) (peripheral)          Allergies:  Allergies   Allergen Reactions   • Contrast Dye Unknown (See Comments)     n/a   • Iodine Unknown (See Comments)     unknown       Intake/Output:     Intake/Output Summary (Last 24 hours) at 02/17/18 1109  Last data filed at 02/17/18 0636   Gross per 24 hour   Intake             1241 ml   Output                0 ml   Net             1241 ml               Labs:   Recent Results (from the past 24 hour(s))   POC Glucose Once    Collection Time: 02/16/18 11:40 AM   Result Value Ref Range    Glucose 128 70 - 130 mg/dL   POC Glucose Once    Collection Time: 02/16/18  4:34 PM   Result Value Ref Range    Glucose 165 (H) 70 - 130 mg/dL   POC Glucose Once    Collection Time: 02/16/18  9:16 PM   Result Value Ref Range    Glucose 117 70 - 130 mg/dL   POC Glucose Once    Collection Time: 02/17/18  1:28 AM   Result Value Ref Range    Glucose 121 70 - 130 mg/dL   POC Glucose Once    Collection Time: 02/17/18  4:02 AM   Result Value Ref Range    Glucose 106 70 - 130 mg/dL   Protime-INR    Collection Time: 02/17/18  5:28 AM   Result Value Ref Range    Protime 16.6 (H) 11.7 - 14.2 Seconds    INR 1.36 (H) 0.90 - 1.10   Basic Metabolic Panel    Collection Time: 02/17/18  5:28 AM   Result Value Ref Range    Glucose 80 65 - 99 mg/dL     BUN 17 8 - 23 mg/dL    Creatinine 1.07 (H) 0.57 - 1.00 mg/dL    Sodium 140 136 - 145 mmol/L    Potassium 3.9 3.5 - 5.2 mmol/L    Chloride 102 98 - 107 mmol/L    CO2 28.5 22.0 - 29.0 mmol/L    Calcium 8.5 (L) 8.6 - 10.5 mg/dL    eGFR  African Amer 60 (L) >60 mL/min/1.73    BUN/Creatinine Ratio 15.9 7.0 - 25.0    Anion Gap 9.5 mmol/L   CBC Auto Differential    Collection Time: 02/17/18  5:28 AM   Result Value Ref Range    WBC 6.33 4.50 - 10.70 10*3/mm3    RBC 3.44 (L) 3.90 - 5.20 10*6/mm3    Hemoglobin 10.3 (L) 11.9 - 15.5 g/dL    Hematocrit 33.0 (L) 35.6 - 45.5 %    MCV 95.9 80.5 - 98.2 fL    MCH 29.9 26.9 - 32.0 pg    MCHC 31.2 (L) 32.4 - 36.3 g/dL    RDW 14.0 (H) 11.7 - 13.0 %    RDW-SD 48.6 37.0 - 54.0 fl    MPV 10.2 6.0 - 12.0 fL    Platelets 167 140 - 500 10*3/mm3    Neutrophil % 41.4 (L) 42.7 - 76.0 %    Lymphocyte % 47.4 (H) 19.6 - 45.3 %    Monocyte % 6.8 5.0 - 12.0 %    Eosinophil % 3.9 0.3 - 6.2 %    Basophil % 0.5 0.0 - 1.5 %    Immature Grans % 0.0 0.0 - 0.5 %    Neutrophils, Absolute 2.62 1.90 - 8.10 10*3/mm3    Lymphocytes, Absolute 3.00 0.90 - 4.80 10*3/mm3    Monocytes, Absolute 0.43 0.20 - 1.20 10*3/mm3    Eosinophils, Absolute 0.25 0.00 - 0.70 10*3/mm3    Basophils, Absolute 0.03 0.00 - 0.20 10*3/mm3    Immature Grans, Absolute 0.00 0.00 - 0.03 10*3/mm3   POC Glucose Once    Collection Time: 02/17/18  7:19 AM   Result Value Ref Range    Glucose 75 70 - 130 mg/dL       Radiology:  Imaging Results (last 24 hours)     ** No results found for the last 24 hours. **             Medications have been reviewed:  Current Facility-Administered Medications   Medication Dose Route Frequency Provider Last Rate Last Dose   • acetaminophen (TYLENOL) tablet 650 mg  650 mg Oral Q4H PRN Josias Razo MD   650 mg at 02/16/18 1334   • allopurinol (ZYLOPRIM) tablet 100 mg  100 mg Oral Daily Josias Razo MD   100 mg at 02/16/18 0806   • amLODIPine (NORVASC) tablet 10 mg  10 mg Oral Daily Josias Razo MD   10 mg at  02/17/18 0850   • atorvastatin (LIPITOR) tablet 80 mg  80 mg Oral Nightly Josias Razo MD   80 mg at 02/16/18 2040   • baclofen (LIORESAL) tablet 10 mg  10 mg Oral Q8H Josias Razo MD   10 mg at 02/16/18 2212   • cholecalciferol (VITAMIN D3) tablet 4,000 Units  4,000 Units Oral Daily Josias Razo MD   4,000 Units at 02/16/18 0805   • CloNIDine (CATAPRES) tablet 0.3 mg  0.3 mg Oral TID Josias Razo MD   0.3 mg at 02/17/18 0850   • docusate sodium (COLACE) capsule 200 mg  200 mg Oral BID Josias Razo MD   200 mg at 02/16/18 2040   • hydrALAZINE (APRESOLINE) tablet 50 mg  50 mg Oral Q8H Josias Razo MD   50 mg at 02/16/18 2212   • insulin aspart (novoLOG) injection 0-7 Units  0-7 Units Subcutaneous 4x Daily With Meals & Nightly Josias Razo MD   2 Units at 02/15/18 2108   • insulin aspart (novoLOG) injection 7 Units  7 Units Subcutaneous TID With Meals Josias Razo MD   7 Units at 02/16/18 1720   • insulin detemir (LEVEMIR) injection 35 Units  35 Units Subcutaneous Nightly Josias Razo MD   35 Units at 02/16/18 2208   • lactated ringers infusion 1,000 mL  1,000 mL Intravenous Continuous PRN David Villavicencio MD 25 mL/hr at 02/17/18 1109 1,000 mL at 02/17/18 1109   • lactobacillus acidophilus (RISAQUAD) capsule 1 capsule  1 capsule Oral Daily Josias Razo MD   1 capsule at 02/16/18 0806   • metoprolol tartrate (LOPRESSOR) tablet 50 mg  50 mg Oral Q12H Josias Razo MD   50 mg at 02/17/18 0849   • ondansetron (ZOFRAN) injection 4 mg  4 mg Intravenous Q4H PRN Josias Razo MD       • pantoprazole (PROTONIX) EC tablet 40 mg  40 mg Oral BID AC Josias Razo MD   40 mg at 02/16/18 1715   • polyethylene glycol (MIRALAX) powder 17 g  17 g Oral BID Josias Razo MD   17 g at 02/16/18 2041   • polyethylene glycol with electrolytes (GOLYTELY) solution 2,000 mL  2,000 mL Oral BID DANISH Bingham   2,000 mL at 02/16/18 1606   • sertraline (ZOLOFT) tablet 100 mg  100 mg Oral Nightly Josias Razo MD   100 mg at 02/16/18  2040   • sodium chloride 0.9 % flush 10 mL  10 mL Intravenous PRN Shara Streeter MD   10 mL at 02/14/18 2039   • sodium chloride 0.9 % flush 3 mL  3 mL Intravenous PRN David Villavicencio MD       • sodium chloride 0.9 % infusion  75 mL/hr Intravenous Continuous Sourav Doe MD 75 mL/hr at 02/17/18 0636 75 mL/hr at 02/17/18 0636       Assessment/Plan     Principal Problem:    Anemia  Active Problems:    Acute renal failure superimposed on chronic kidney disease      Assessment:  (  Assesment    PEGGY resolved   CKD stage III , creatinine stable and near baseline  Renal cysts  Acute colitis, plans for colonoscopy per GI  HTN  HF  Anemia  DM  CVA      Plan:  Her renal function is improved, stable and near baseline  D/C IVFs  Continue current antihypertensive regimen  Bowel prep tonight with colonoscopy scheduled for tomorrow).             Continue to monitor renal function, electroytes and volume closely   Please call me with any questions or concerns      Sourav Doe MD   [facility]   [phone]    02/17/18  11:09 AM      Dictation performed using Dragon dictation software

## 2018-02-17 NOTE — BRIEF OP NOTE
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.89]     * Melanosis of stomach [L81.4]     * Colon polyp [K63.5]     * Reflux esophagitis [K21.0]    Procedure/CPT® Codes:      Procedure(s):  COLONOSCOPY into cecum with cold polypectomy  ESOPHAGOGASTRODUODENOSCOPY    Surgeon(s):  David Villavicencio MD    Anesthesia: Monitor Anesthesia Care    Staff:   Endo Technician: Gayle Power  Endo Nurse: Carrie Ponce RN    Estimated Blood Loss: none    Urine Voided: * No values recorded between 2/17/2018 11:50 AM and 2/17/2018 12:32 PM *    Specimens:                  ID Type Source Tests Collected by Time Destination   A :  Polyp Large Intestine, Sigmoid Colon TISSUE EXAM David Villavicencio MD 2/17/2018 1225          Drains:           Findings: Melanosis Intestinalis(duodenum)  Normal stomach  Reflux esophagitis    Difficult Colon to Cecum Poor Prep  Melanosis  Sigmoid colon polyp Cold Biopsy      Complications: none      David Villavicencio MD     Date: 2/17/2018  Time: 12:33 PM

## 2018-02-17 NOTE — PLAN OF CARE
Problem: Patient Care Overview (Adult)  Goal: Plan of Care Review  Outcome: Ongoing (interventions implemented as appropriate)   02/17/18 6552   Outcome Evaluation   Outcome Summary/Follow up Plan EGD and C-Scope completed; Polyp removed and esophagitis. No c/o pain. Tolerated regular/CCD diet. Restarted on Coumadin 5mg this shift.      Goal: Adult Individualization and Mutuality  Outcome: Ongoing (interventions implemented as appropriate)    Goal: Discharge Needs Assessment  Outcome: Ongoing (interventions implemented as appropriate)      Problem: Fall Risk (Adult)  Goal: Absence of Falls  Outcome: Ongoing (interventions implemented as appropriate)      Problem: Pressure Ulcer (Adult)  Goal: Signs and Symptoms of Listed Potential Problems Will be Absent or Manageable (Pressure Ulcer)  Outcome: Ongoing (interventions implemented as appropriate)      Problem: GI Endoscopy (Adult)  Goal: Signs and Symptoms of Listed Potential Problems Will be Absent or Manageable (GI Endoscopy)  Outcome: Ongoing (interventions implemented as appropriate)

## 2018-02-17 NOTE — ANESTHESIA POSTPROCEDURE EVALUATION
Patient: Kacy Mistry    Procedure Summary     Date Anesthesia Start Anesthesia Stop Room / Location    02/17/18 1150 1234  [facility] /  [facility]       Procedure Diagnosis Surgeon Provider    COLONOSCOPY into cecum with cold polypectomy (N/A ); ESOPHAGOGASTRODUODENOSCOPY (Esophagus) Anemia, unspecified type; Melanosis coli; Melanosis of stomach; Colon polyp; Reflux esophagitis  (Anemia, unspecified type [D64.9]) MD Pawan Mejia MD          Anesthesia Type: MAC  Last vitals  BP   (!) 181/111 (02/17/18 1058)   Temp   36.8 °C (98.3 °F) (02/17/18 1058)   Pulse   76 (02/17/18 1058)   Resp   12 (02/17/18 1058)     SpO2   96 % (02/17/18 1058)     Post Anesthesia Care and Evaluation    Patient location during evaluation: PACU  Patient participation: complete - patient participated  Level of consciousness: awake and alert  Pain management: adequate  Airway patency: patent  Anesthetic complications: No anesthetic complications    Cardiovascular status: acceptable  Respiratory status: acceptable  Hydration status: acceptable    Comments: ---------------------------               02/17/18 1058         ---------------------------   BP:        (!) 181/111      Pulse:         76           Resp:          12           Temp:   36.8 °C (98.3 °F)   SpO2:          96%         ---------------------------

## 2018-02-18 ENCOUNTER — INPATIENT HOSPITAL (OUTPATIENT)
Dept: URBAN - METROPOLITAN AREA HOSPITAL 113 | Facility: HOSPITAL | Age: 78
End: 2018-02-18

## 2018-02-18 DIAGNOSIS — D64.9 ANEMIA, UNSPECIFIED: ICD-10-CM

## 2018-02-18 DIAGNOSIS — R93.3 ABNORMAL FINDINGS ON DIAGNOSTIC IMAGING OF OTHER PARTS OF DI: ICD-10-CM

## 2018-02-18 LAB
ANION GAP SERPL CALCULATED.3IONS-SCNC: 11 MMOL/L
BASOPHILS # BLD AUTO: 0.03 10*3/MM3 (ref 0–0.2)
BASOPHILS NFR BLD AUTO: 0.5 % (ref 0–1.5)
BUN BLD-MCNC: 15 MG/DL (ref 8–23)
BUN/CREAT SERPL: 15 (ref 7–25)
CALCIUM SPEC-SCNC: 7.9 MG/DL (ref 8.6–10.5)
CHLORIDE SERPL-SCNC: 105 MMOL/L (ref 98–107)
CO2 SERPL-SCNC: 28 MMOL/L (ref 22–29)
CREAT BLD-MCNC: 1 MG/DL (ref 0.57–1)
DEPRECATED RDW RBC AUTO: 48 FL (ref 37–54)
EOSINOPHIL # BLD AUTO: 0.21 10*3/MM3 (ref 0–0.7)
EOSINOPHIL NFR BLD AUTO: 3.6 % (ref 0.3–6.2)
ERYTHROCYTE [DISTWIDTH] IN BLOOD BY AUTOMATED COUNT: 13.9 % (ref 11.7–13)
GFR SERPL CREATININE-BSD FRML MDRD: 65 ML/MIN/1.73
GLUCOSE BLD-MCNC: 134 MG/DL (ref 65–99)
GLUCOSE BLDC GLUCOMTR-MCNC: 131 MG/DL (ref 70–130)
GLUCOSE BLDC GLUCOMTR-MCNC: 133 MG/DL (ref 70–130)
GLUCOSE BLDC GLUCOMTR-MCNC: 142 MG/DL (ref 70–130)
GLUCOSE BLDC GLUCOMTR-MCNC: 204 MG/DL (ref 70–130)
HCT VFR BLD AUTO: 31.2 % (ref 35.6–45.5)
HGB BLD-MCNC: 9.7 G/DL (ref 11.9–15.5)
IMM GRANULOCYTES # BLD: 0 10*3/MM3 (ref 0–0.03)
IMM GRANULOCYTES NFR BLD: 0 % (ref 0–0.5)
INR PPP: 1.38 (ref 0.9–1.1)
LYMPHOCYTES # BLD AUTO: 2.38 10*3/MM3 (ref 0.9–4.8)
LYMPHOCYTES NFR BLD AUTO: 40.5 % (ref 19.6–45.3)
MCH RBC QN AUTO: 30 PG (ref 26.9–32)
MCHC RBC AUTO-ENTMCNC: 31.1 G/DL (ref 32.4–36.3)
MCV RBC AUTO: 96.6 FL (ref 80.5–98.2)
MONOCYTES # BLD AUTO: 0.43 10*3/MM3 (ref 0.2–1.2)
MONOCYTES NFR BLD AUTO: 7.3 % (ref 5–12)
NEUTROPHILS # BLD AUTO: 2.83 10*3/MM3 (ref 1.9–8.1)
NEUTROPHILS NFR BLD AUTO: 48.1 % (ref 42.7–76)
PLATELET # BLD AUTO: 152 10*3/MM3 (ref 140–500)
PMV BLD AUTO: 10.3 FL (ref 6–12)
POTASSIUM BLD-SCNC: 3.7 MMOL/L (ref 3.5–5.2)
PROTHROMBIN TIME: 16.8 SECONDS (ref 11.7–14.2)
RBC # BLD AUTO: 3.23 10*6/MM3 (ref 3.9–5.2)
SODIUM BLD-SCNC: 144 MMOL/L (ref 136–145)
WBC NRBC COR # BLD: 5.88 10*3/MM3 (ref 4.5–10.7)

## 2018-02-18 PROCEDURE — 85025 COMPLETE CBC W/AUTO DIFF WBC: CPT | Performed by: HOSPITALIST

## 2018-02-18 PROCEDURE — 99231 SBSQ HOSP IP/OBS SF/LOW 25: CPT

## 2018-02-18 PROCEDURE — 85610 PROTHROMBIN TIME: CPT | Performed by: HOSPITALIST

## 2018-02-18 PROCEDURE — 63710000001 INSULIN ASPART PER 5 UNITS: Performed by: HOSPITALIST

## 2018-02-18 PROCEDURE — 80048 BASIC METABOLIC PNL TOTAL CA: CPT | Performed by: HOSPITALIST

## 2018-02-18 PROCEDURE — 82962 GLUCOSE BLOOD TEST: CPT

## 2018-02-18 RX ORDER — WARFARIN SODIUM 7.5 MG/1
7.5 TABLET ORAL
Status: COMPLETED | OUTPATIENT
Start: 2018-02-18 | End: 2018-02-18

## 2018-02-18 RX ORDER — LUBIPROSTONE 24 UG/1
24 CAPSULE ORAL 2 TIMES DAILY WITH MEALS
Status: DISCONTINUED | OUTPATIENT
Start: 2018-02-18 | End: 2018-02-19 | Stop reason: HOSPADM

## 2018-02-18 RX ORDER — BUMETANIDE 1 MG/1
1 TABLET ORAL 2 TIMES DAILY
Status: DISCONTINUED | OUTPATIENT
Start: 2018-02-18 | End: 2018-02-19 | Stop reason: HOSPADM

## 2018-02-18 RX ADMIN — ALLOPURINOL 100 MG: 100 TABLET ORAL at 08:00

## 2018-02-18 RX ADMIN — AMLODIPINE BESYLATE 10 MG: 10 TABLET ORAL at 08:00

## 2018-02-18 RX ADMIN — METOPROLOL TARTRATE 50 MG: 50 TABLET, FILM COATED ORAL at 20:24

## 2018-02-18 RX ADMIN — INSULIN ASPART 7 UNITS: 100 INJECTION, SOLUTION INTRAVENOUS; SUBCUTANEOUS at 07:59

## 2018-02-18 RX ADMIN — BACLOFEN 10 MG: 10 TABLET ORAL at 07:04

## 2018-02-18 RX ADMIN — HYDRALAZINE HYDROCHLORIDE 75 MG: 50 TABLET, FILM COATED ORAL at 07:04

## 2018-02-18 RX ADMIN — DOCUSATE SODIUM 200 MG: 100 CAPSULE, LIQUID FILLED ORAL at 08:00

## 2018-02-18 RX ADMIN — HYDRALAZINE HYDROCHLORIDE 75 MG: 50 TABLET, FILM COATED ORAL at 14:58

## 2018-02-18 RX ADMIN — HYDRALAZINE HYDROCHLORIDE 75 MG: 50 TABLET, FILM COATED ORAL at 21:25

## 2018-02-18 RX ADMIN — WARFARIN SODIUM 7.5 MG: 7.5 TABLET ORAL at 17:06

## 2018-02-18 RX ADMIN — SERTRALINE 100 MG: 100 TABLET, FILM COATED ORAL at 20:24

## 2018-02-18 RX ADMIN — VITAMIN D, TAB 1000IU (100/BT) 4000 UNITS: 25 TAB at 08:00

## 2018-02-18 RX ADMIN — CLONIDINE HYDROCHLORIDE 0.3 MG: 0.1 TABLET ORAL at 16:23

## 2018-02-18 RX ADMIN — POLYETHYLENE GLYCOL 3350 17 G: 17 POWDER, FOR SOLUTION ORAL at 08:00

## 2018-02-18 RX ADMIN — PANTOPRAZOLE SODIUM 40 MG: 40 TABLET, DELAYED RELEASE ORAL at 07:04

## 2018-02-18 RX ADMIN — INSULIN ASPART 3 UNITS: 100 INJECTION, SOLUTION INTRAVENOUS; SUBCUTANEOUS at 11:32

## 2018-02-18 RX ADMIN — DOCUSATE SODIUM 200 MG: 100 CAPSULE, LIQUID FILLED ORAL at 20:24

## 2018-02-18 RX ADMIN — METOPROLOL TARTRATE 50 MG: 50 TABLET, FILM COATED ORAL at 08:00

## 2018-02-18 RX ADMIN — POLYETHYLENE GLYCOL 3350 17 G: 17 POWDER, FOR SOLUTION ORAL at 20:24

## 2018-02-18 RX ADMIN — CLONIDINE HYDROCHLORIDE 0.3 MG: 0.1 TABLET ORAL at 20:24

## 2018-02-18 RX ADMIN — BUMETANIDE 1 MG: 1 TABLET ORAL at 10:33

## 2018-02-18 RX ADMIN — LUBIPROSTONE 24 MCG: 24 CAPSULE, GELATIN COATED ORAL at 17:33

## 2018-02-18 RX ADMIN — BACLOFEN 10 MG: 10 TABLET ORAL at 14:34

## 2018-02-18 RX ADMIN — INSULIN ASPART 7 UNITS: 100 INJECTION, SOLUTION INTRAVENOUS; SUBCUTANEOUS at 11:31

## 2018-02-18 RX ADMIN — Medication 1 CAPSULE: at 08:00

## 2018-02-18 RX ADMIN — PANTOPRAZOLE SODIUM 40 MG: 40 TABLET, DELAYED RELEASE ORAL at 17:06

## 2018-02-18 RX ADMIN — BUMETANIDE 1 MG: 1 TABLET ORAL at 20:24

## 2018-02-18 RX ADMIN — BACLOFEN 10 MG: 10 TABLET ORAL at 21:24

## 2018-02-18 RX ADMIN — INSULIN ASPART 7 UNITS: 100 INJECTION, SOLUTION INTRAVENOUS; SUBCUTANEOUS at 17:05

## 2018-02-18 RX ADMIN — CLONIDINE HYDROCHLORIDE 0.3 MG: 0.1 TABLET ORAL at 08:00

## 2018-02-18 RX ADMIN — ATORVASTATIN CALCIUM 80 MG: 80 TABLET, FILM COATED ORAL at 20:24

## 2018-02-18 NOTE — PROGRESS NOTES
"   LOS: 8 days   Patient Care Team:  Yousuf Corrigan MD as PCP - General (Internal Medicine)    Chief Complaint/ Reason for encounter: Acute renal failure/dehydration  Chief Complaint   Patient presents with   • Abnormal Lab         Subjective     Abnormal Lab   Pertinent negatives include no chest pain, fever or nausea.       Subjective:  Symptoms:  Improved.  No shortness of breath or chest pain.  (No new complaints  Resting  No edema or shortness of breath  Discussed with family at bedside).    Diet:  Adequate intake.  No nausea.    Activity level: Returning to normal.    Pain:  She reports no pain.          History taken from: Patient and chart    Objective     Vital Signs  Temp:  [97.7 °F (36.5 °C)-99.1 °F (37.3 °C)] 99.1 °F (37.3 °C)  Heart Rate:  [61-77] 70  Resp:  [12-18] 16  BP: (136-164)/() 137/98       Wt Readings from Last 1 Encounters:   02/18/18 0646 116 kg (256 lb)   02/15/18 0651 117 kg (257 lb 12.8 oz)   02/13/18 0625 121 kg (267 lb 1.6 oz)   02/12/18 1540 116 kg (255 lb 11.7 oz)   02/12/18 0839 116 kg (255 lb 11.7 oz)   02/10/18 0411 116 kg (256 lb 6.4 oz)   02/09/18 1934 104 kg (230 lb)       Objective:  General Appearance:  Comfortable, well-appearing, in no acute distress and not in pain.    Vital signs: (most recent): Blood pressure 137/98, pulse 70, temperature 99.1 °F (37.3 °C), temperature source Oral, resp. rate 16, height 175.3 cm (69.02\"), weight 116 kg (256 lb), SpO2 95 %.  Vital signs are normal.  No fever.    Output: Producing urine.    HEENT: Normal HEENT exam.    Lungs:  Normal respiratory rate and normal effort.  She is not in respiratory distress.  Breath sounds clear to auscultation.  No wheezes.    Heart: Normal rate.  Regular rhythm.    Abdomen: Abdomen is soft and non-distended.  Bowel sounds are normal.   There is no abdominal tenderness.  There is no epigastric area or no suprapubic area tenderness.     Extremities: Normal range of motion.  There is no deformity or " dependent edema.    Pulses: Distal pulses are intact.    Neurological: Patient is alert and oriented to person, place and time.    Skin:  Warm and dry.  No rash or cyanosis.             Results Review:    Past Medical History: reviewed and updated  Past Medical History:   Diagnosis Date   • Anemia    • Anxiety    • Arthritis    • Cellulitis of left lower extremity    • CHF (congestive heart failure)    • Depression    • Diabetes mellitus    • DVT (deep venous thrombosis)    • GERD (gastroesophageal reflux disease)    • Hyperlipidemia    • Hypertension    • Lymphedema    • Obesity    • Osteoporosis    • Renal disorder     chronic kidney disease   • Stroke     with left hemiplegia and hemiparesis   • Venous insufficiency (chronic) (peripheral)          Allergies:  Allergies   Allergen Reactions   • Contrast Dye Unknown (See Comments)     n/a   • Iodine Unknown (See Comments)     unknown       Intake/Output:     Intake/Output Summary (Last 24 hours) at 02/18/18 1218  Last data filed at 02/18/18 1134   Gross per 24 hour   Intake             1400 ml   Output                0 ml   Net             1400 ml               Labs:   Recent Results (from the past 24 hour(s))   POC Glucose Once    Collection Time: 02/17/18  2:05 PM   Result Value Ref Range    Glucose 81 70 - 130 mg/dL   POC Glucose Once    Collection Time: 02/17/18  4:30 PM   Result Value Ref Range    Glucose 131 (H) 70 - 130 mg/dL   POC Glucose Once    Collection Time: 02/17/18  7:45 PM   Result Value Ref Range    Glucose 146 (H) 70 - 130 mg/dL   POC Glucose Once    Collection Time: 02/18/18  7:09 AM   Result Value Ref Range    Glucose 133 (H) 70 - 130 mg/dL   Protime-INR    Collection Time: 02/18/18  7:15 AM   Result Value Ref Range    Protime 16.8 (H) 11.7 - 14.2 Seconds    INR 1.38 (H) 0.90 - 1.10   Basic Metabolic Panel    Collection Time: 02/18/18  7:15 AM   Result Value Ref Range    Glucose 134 (H) 65 - 99 mg/dL    BUN 15 8 - 23 mg/dL    Creatinine 1.00  0.57 - 1.00 mg/dL    Sodium 144 136 - 145 mmol/L    Potassium 3.7 3.5 - 5.2 mmol/L    Chloride 105 98 - 107 mmol/L    CO2 28.0 22.0 - 29.0 mmol/L    Calcium 7.9 (L) 8.6 - 10.5 mg/dL    eGFR  African Amer 65 >60 mL/min/1.73    BUN/Creatinine Ratio 15.0 7.0 - 25.0    Anion Gap 11.0 mmol/L   CBC Auto Differential    Collection Time: 02/18/18  7:15 AM   Result Value Ref Range    WBC 5.88 4.50 - 10.70 10*3/mm3    RBC 3.23 (L) 3.90 - 5.20 10*6/mm3    Hemoglobin 9.7 (L) 11.9 - 15.5 g/dL    Hematocrit 31.2 (L) 35.6 - 45.5 %    MCV 96.6 80.5 - 98.2 fL    MCH 30.0 26.9 - 32.0 pg    MCHC 31.1 (L) 32.4 - 36.3 g/dL    RDW 13.9 (H) 11.7 - 13.0 %    RDW-SD 48.0 37.0 - 54.0 fl    MPV 10.3 6.0 - 12.0 fL    Platelets 152 140 - 500 10*3/mm3    Neutrophil % 48.1 42.7 - 76.0 %    Lymphocyte % 40.5 19.6 - 45.3 %    Monocyte % 7.3 5.0 - 12.0 %    Eosinophil % 3.6 0.3 - 6.2 %    Basophil % 0.5 0.0 - 1.5 %    Immature Grans % 0.0 0.0 - 0.5 %    Neutrophils, Absolute 2.83 1.90 - 8.10 10*3/mm3    Lymphocytes, Absolute 2.38 0.90 - 4.80 10*3/mm3    Monocytes, Absolute 0.43 0.20 - 1.20 10*3/mm3    Eosinophils, Absolute 0.21 0.00 - 0.70 10*3/mm3    Basophils, Absolute 0.03 0.00 - 0.20 10*3/mm3    Immature Grans, Absolute 0.00 0.00 - 0.03 10*3/mm3   POC Glucose Once    Collection Time: 02/18/18 11:17 AM   Result Value Ref Range    Glucose 204 (H) 70 - 130 mg/dL       Radiology:  Imaging Results (last 24 hours)     ** No results found for the last 24 hours. **             Medications have been reviewed:  Current Facility-Administered Medications   Medication Dose Route Frequency Provider Last Rate Last Dose   • acetaminophen (TYLENOL) tablet 650 mg  650 mg Oral Q4H PRN Josias Razo MD   650 mg at 02/16/18 1334   • allopurinol (ZYLOPRIM) tablet 100 mg  100 mg Oral Daily Josias Razo MD   100 mg at 02/18/18 0800   • amLODIPine (NORVASC) tablet 10 mg  10 mg Oral Daily Josias Razo MD   10 mg at 02/18/18 0800   • atorvastatin (LIPITOR) tablet 80 mg  80  mg Oral Nightly Josias Razo MD   80 mg at 02/17/18 2027   • baclofen (LIORESAL) tablet 10 mg  10 mg Oral Q8H Josias Razo MD   10 mg at 02/18/18 0704   • bumetanide (BUMEX) tablet 1 mg  1 mg Oral BID Sourav Doe MD   1 mg at 02/18/18 1033   • cholecalciferol (VITAMIN D3) tablet 4,000 Units  4,000 Units Oral Daily Josias Razo MD   4,000 Units at 02/18/18 0800   • CloNIDine (CATAPRES) tablet 0.3 mg  0.3 mg Oral TID Josias Razo MD   0.3 mg at 02/18/18 0800   • docusate sodium (COLACE) capsule 200 mg  200 mg Oral BID Josias Razo MD   200 mg at 02/18/18 0800   • hydrALAZINE (APRESOLINE) tablet 75 mg  75 mg Oral Q8H Josias Razo MD   75 mg at 02/18/18 0704   • insulin aspart (novoLOG) injection 0-7 Units  0-7 Units Subcutaneous 4x Daily With Meals & Nightly Josias Razo MD   3 Units at 02/18/18 1132   • insulin aspart (novoLOG) injection 7 Units  7 Units Subcutaneous TID With Meals Josias Razo MD   7 Units at 02/18/18 1131   • insulin detemir (LEVEMIR) injection 35 Units  35 Units Subcutaneous Nightly Josias Razo MD   35 Units at 02/17/18 2206   • lactated ringers infusion 1,000 mL  1,000 mL Intravenous Continuous PRN David Villavicencio MD 25 mL/hr at 02/17/18 1109     • lactobacillus acidophilus (RISAQUAD) capsule 1 capsule  1 capsule Oral Daily Josias Razo MD   1 capsule at 02/18/18 0800   • metoprolol tartrate (LOPRESSOR) tablet 50 mg  50 mg Oral Q12H Josias Razo MD   50 mg at 02/18/18 0800   • ondansetron (ZOFRAN) injection 4 mg  4 mg Intravenous Q4H PRN Josias Razo MD       • pantoprazole (PROTONIX) EC tablet 40 mg  40 mg Oral BID AC Josias Razo MD   40 mg at 02/18/18 0704   • polyethylene glycol (MIRALAX) powder 17 g  17 g Oral BID Josias Razo MD   17 g at 02/18/18 0800   • sertraline (ZOLOFT) tablet 100 mg  100 mg Oral Nightly Josias Razo MD   100 mg at 02/17/18 2027   • sodium chloride 0.9 % flush 10 mL  10 mL Intravenous PRN Shara Streeter MD   10 mL at 02/14/18 2039        Assessment/Plan     Principal Problem:    Anemia  Active Problems:    Acute renal failure superimposed on chronic kidney disease      Assessment:  (  Assesment    PEGGY resolved   CKD stage III , creatinine stable and near baseline  Renal cysts  Acute colitis, plans for colonoscopy per GI  HTN  HF  Anemia  DM  CVA      Plan:  Her renal function is improved, stable and near baseline  Discontinue IV fluids  Resume diuretics but continue to hold ACE inhibitor for now  Continue current antihypertensive regimen  Discharge planning).             Continue to monitor renal function, electroytes and volume closely   Please call me with any questions or concerns      Sourav Doe MD   [facility]   [phone]    02/18/18  12:18 PM      Dictation performed using Dragon dictation software

## 2018-02-18 NOTE — PLAN OF CARE
Problem: Patient Care Overview (Adult)  Goal: Plan of Care Review  Outcome: Ongoing (interventions implemented as appropriate)   02/18/18 4255   Coping/Psychosocial Response Interventions   Plan Of Care Reviewed With patient;daughter   Patient Care Overview   Progress no change   Outcome Evaluation   Outcome Summary/Follow up Plan Patient doing well. Vital signs stable. IV fluids were discontinued. Mepliex to buttock intact. Bilateral leg wraps were changed and are clean, dry, and intact. Pulses are difficult to palpate related to leg wraps. Daughter is at the bedside. Will continue to monitor.       Problem: Fall Risk (Adult)  Goal: Absence of Falls  Outcome: Ongoing (interventions implemented as appropriate)      Problem: Pressure Ulcer (Adult)  Goal: Signs and Symptoms of Listed Potential Problems Will be Absent or Manageable (Pressure Ulcer)  Outcome: Ongoing (interventions implemented as appropriate)      Problem: GI Endoscopy (Adult)  Goal: Signs and Symptoms of Listed Potential Problems Will be Absent or Manageable (GI Endoscopy)  Outcome: Ongoing (interventions implemented as appropriate)

## 2018-02-18 NOTE — PROGRESS NOTES
"Daily progress note    Chief complaint  Doing better  No new complaints    History of present illness  77-year-old -American female with history of CVA with left hemiparesis also have diabetes hypertension hyperlipidemia lymphedema morbidly obese anxiety depression chronic kidney disease stage III with a nursing home resident presented to [facility] emergency room with generalized weakness.  Patient evaluated found to be in acute kidney injury on top of chronic kidney disease admitted for management.  Patient denies any headache chest pain shortness of breath abdominal pain vomiting diarrhea.  Patient does have nausea but denies fever chills night sweats weight loss.     REVIEW OF SYSTEMS  Review of Systems   Constitutional: Negative for fever.   HENT: Negative for sore throat.         Dry mouth   Eyes: Negative.    Respiratory: Negative for cough and shortness of breath.    Cardiovascular: Positive for leg swelling (BLE, chronic). Negative for chest pain.   Gastrointestinal: Negative for abdominal pain, diarrhea and vomiting.   Genitourinary: Positive for frequency. Negative for dysuria.   Musculoskeletal: Negative for neck pain.   Skin: Negative for rash.   Allergic/Immunologic: Negative.    Neurological: Negative for weakness, numbness and headaches.   Hematological: Negative.    Psychiatric/Behavioral: Negative.    All other systems reviewed and are negative.     PHYSICAL EXAM  Blood pressure 137/98, pulse 70, temperature 99.1 °F (37.3 °C), temperature source Oral, resp. rate 16, height 175.3 cm (69.02\"), weight 116 kg (256 lb), SpO2 95 %.    Constitutional: She is oriented to person, place, and time and well-developed, well-nourished, and in no distress. No distress.   Head: Normocephalic and atraumatic.   Eyes: EOM are normal. Pupils are equal, round, and reactive to light.   Neck: Normal range of motion. Neck supple.   Cardiovascular: Normal rate, regular rhythm and normal heart sounds.  "   Pulmonary/Chest: Effort normal and breath sounds normal. No respiratory distress.   Abdominal: Soft. There is no tenderness. There is no rebound and no guarding.   Musculoskeletal: Normal range of motion. She exhibits edema (BLE).   Neurological: She is alert and oriented to person, place, and time. She has normal sensation and normal strength.   Skin: Skin is warm and dry. No rash noted.   Psychiatric: Mood and affect normal.     LAB RESULTS  Lab Results (last 24 hours)     Procedure Component Value Units Date/Time    POC Glucose Once [[id number]]  (Normal) Collected:  02/17/18 1405    Specimen:  Blood Updated:  02/17/18 1407     Glucose 81 mg/dL     Narrative:       Meter: [id number] : [id number] Dane STEWART    POC Glucose Once [[id number]]  (Abnormal) Collected:  02/17/18 1630    Specimen:  Blood Updated:  02/17/18 1631     Glucose 131 (H) mg/dL     Narrative:       Meter: [id number] : [id number] Levy STEWART    POC Glucose Once [[id number]]  (Abnormal) Collected:  02/17/18 1945    Specimen:  Blood Updated:  02/17/18 1946     Glucose 146 (H) mg/dL     Narrative:       Meter: [id number] : [id number] Fede Kaplan    POC Glucose Once [[id number]]  (Abnormal) Collected:  02/18/18 0709    Specimen:  Blood Updated:  02/18/18 0710     Glucose 133 (H) mg/dL     Narrative:       Meter: [id number] : [id number] Jameson STEWART    CBC & Differential [[id number]] Collected:  02/18/18 0715    Specimen:  Blood Updated:  02/18/18 0759    Narrative:       The following orders were created for panel order CBC & Differential.  Procedure                               Abnormality         Status                     ---------                               -----------         ------                     CBC Auto Differential[[id number]]        Abnormal            Final result                 Please view results for these tests on the individual orders.    CBC Auto Differential [[id number]]  (Abnormal) Collected:   02/18/18 0715    Specimen:  Blood Updated:  02/18/18 0759     WBC 5.88 10*3/mm3      RBC 3.23 (L) 10*6/mm3      Hemoglobin 9.7 (L) g/dL      Hematocrit 31.2 (L) %      MCV 96.6 fL      MCH 30.0 pg      MCHC 31.1 (L) g/dL      RDW 13.9 (H) %      RDW-SD 48.0 fl      MPV 10.3 fL      Platelets 152 10*3/mm3      Neutrophil % 48.1 %      Lymphocyte % 40.5 %      Monocyte % 7.3 %      Eosinophil % 3.6 %      Basophil % 0.5 %      Immature Grans % 0.0 %      Neutrophils, Absolute 2.83 10*3/mm3      Lymphocytes, Absolute 2.38 10*3/mm3      Monocytes, Absolute 0.43 10*3/mm3      Eosinophils, Absolute 0.21 10*3/mm3      Basophils, Absolute 0.03 10*3/mm3      Immature Grans, Absolute 0.00 10*3/mm3     Protime-INR [[id number]]  (Abnormal) Collected:  02/18/18 0715    Specimen:  Blood Updated:  02/18/18 0815     Protime 16.8 (H) Seconds      INR 1.38 (H)    Basic Metabolic Panel [[id number]]  (Abnormal) Collected:  02/18/18 0715    Specimen:  Blood Updated:  02/18/18 0818     Glucose 134 (H) mg/dL      BUN 15 mg/dL      Creatinine 1.00 mg/dL      Sodium 144 mmol/L      Potassium 3.7 mmol/L      Chloride 105 mmol/L      CO2 28.0 mmol/L      Calcium 7.9 (L) mg/dL      eGFR  African Amer 65 mL/min/1.73      BUN/Creatinine Ratio 15.0     Anion Gap 11.0 mmol/L     Narrative:       The MDRD GFR formula is only valid for adults with stable renal function between ages 18 and 70.    POC Glucose Once [[id number]]  (Abnormal) Collected:  02/18/18 1117    Specimen:  Blood Updated:  02/18/18 1118     Glucose 204 (H) mg/dL     Narrative:       Meter: [id number] : [id number] Jameson STEWART        Imaging Results (last 24 hours)     ** No results found for the last 24 hours. **      Upper and lower endoscopy noted.  Discussed with patient    Current Facility-Administered Medications:   •  acetaminophen (TYLENOL) tablet 650 mg, 650 mg, Oral, Q4H PRN, Josias Razo MD, 650 mg at 02/16/18 1334  •  allopurinol (ZYLOPRIM) tablet 100 mg, 100 mg,  Oral, Daily, Josias Razo MD, 100 mg at 02/18/18 0800  •  amLODIPine (NORVASC) tablet 10 mg, 10 mg, Oral, Daily, Josias Razo MD, 10 mg at 02/18/18 0800  •  atorvastatin (LIPITOR) tablet 80 mg, 80 mg, Oral, Nightly, Josias Razo MD, 80 mg at 02/17/18 2027  •  baclofen (LIORESAL) tablet 10 mg, 10 mg, Oral, Q8H, Josias Razo MD, 10 mg at 02/18/18 0704  •  bumetanide (BUMEX) tablet 1 mg, 1 mg, Oral, BID, Sourav Doe MD, 1 mg at 02/18/18 1033  •  cholecalciferol (VITAMIN D3) tablet 4,000 Units, 4,000 Units, Oral, Daily, Josias Razo MD, 4,000 Units at 02/18/18 0800  •  CloNIDine (CATAPRES) tablet 0.3 mg, 0.3 mg, Oral, TID, Josias Razo MD, 0.3 mg at 02/18/18 0800  •  docusate sodium (COLACE) capsule 200 mg, 200 mg, Oral, BID, 200 mg at 02/18/18 0800 **AND** [DISCONTINUED] docusate sodium (COLACE) capsule 50 mg, 50 mg, Oral, Daily, Josias Razo MD  •  hydrALAZINE (APRESOLINE) tablet 75 mg, 75 mg, Oral, Q8H, Josias Razo MD, 75 mg at 02/18/18 0704  •  insulin aspart (novoLOG) injection 0-7 Units, 0-7 Units, Subcutaneous, 4x Daily With Meals & Nightly, Josias Razo MD, 3 Units at 02/18/18 1132  •  insulin aspart (novoLOG) injection 7 Units, 7 Units, Subcutaneous, TID With Meals, Josias Razo MD, 7 Units at 02/18/18 1131  •  insulin detemir (LEVEMIR) injection 35 Units, 35 Units, Subcutaneous, Nightly, Josias Razo MD, 35 Units at 02/17/18 2206  •  lactated ringers infusion 1,000 mL, 1,000 mL, Intravenous, Continuous PRN, David Villavicencio MD, Last Rate: 25 mL/hr at 02/17/18 1109  •  lactobacillus acidophilus (RISAQUAD) capsule 1 capsule, 1 capsule, Oral, Daily, Josias Raoz MD, 1 capsule at 02/18/18 0800  •  metoprolol tartrate (LOPRESSOR) tablet 50 mg, 50 mg, Oral, Q12H, Josias Razo MD, 50 mg at 02/18/18 0800  •  ondansetron (ZOFRAN) injection 4 mg, 4 mg, Intravenous, Q4H PRN, Josias Razo MD  •  pantoprazole (PROTONIX) EC tablet 40 mg, 40 mg, Oral, BID AC, Josias Razo MD, 40 mg at 02/18/18 0704  •   polyethylene glycol (MIRALAX) powder 17 g, 17 g, Oral, BID, Melody Razo MD, 17 g at 02/18/18 0800  •  sertraline (ZOLOFT) tablet 100 mg, 100 mg, Oral, Nightly, Melody Razo MD, 100 mg at 02/17/18 2027  •  Insert peripheral IV, , , Once **AND** sodium chloride 0.9 % flush 10 mL, 10 mL, Intravenous, PRN, Shara Streeter MD, 10 mL at 02/14/18 2039     ASSESSMENT  Acute kidney injury with hyperkalemia  Chronic kidney disease stage III  Renal cyst  Severe esophagitis  Insulin-dependent diabetes mellitus  Hypertension  Hyperlipidemia  Chronic anemia  Gastroesophageal reflux disease  Chronic lymphedema  Status post CVA with left jaja-  Chronic anticoagulation  Immobilization syndrome    PLAN  CPM  Resume Coumadin and Plavix  GI consult appreciated  Continue nursing home meds except for diuretics and ACE inhibitor  Upper and lower endoscopy IN AM  Supportive care  Stress ulcer DVT prophylaxis  PTOT  Discharge back to nursing home in a.m. if okay with all    MELODY RAZO MD

## 2018-02-18 NOTE — PLAN OF CARE
Problem: Patient Care Overview (Adult)  Goal: Plan of Care Review  Outcome: Ongoing (interventions implemented as appropriate)   02/18/18 0510   Coping/Psychosocial Response Interventions   Plan Of Care Reviewed With patient;daughter   Outcome Evaluation   Outcome Summary/Follow up Plan Patient is resting well this shift, she denies any pain or abdominal discomfort. She is eating and taking fluids PO well.Pt. continues with IV maintenance fluids to site at her right forearm. Nursing is T&R her Q 2hrs. and the mepilex to her buttock is intact. Bilateral leg wraps are clean and intact. Pulses are difficult to palpate r/t leg wraps, feet are warm with capillary refill within normal limits. Her daughter is at bedside and very attentive to patient.      Goal: Adult Individualization and Mutuality  Outcome: Ongoing (interventions implemented as appropriate)    Goal: Discharge Needs Assessment  Outcome: Ongoing (interventions implemented as appropriate)      Problem: Fall Risk (Adult)  Goal: Absence of Falls  Outcome: Ongoing (interventions implemented as appropriate)      Problem: Pressure Ulcer (Adult)  Goal: Signs and Symptoms of Listed Potential Problems Will be Absent or Manageable (Pressure Ulcer)  Outcome: Ongoing (interventions implemented as appropriate)      Problem: GI Endoscopy (Adult)  Goal: Signs and Symptoms of Listed Potential Problems Will be Absent or Manageable (GI Endoscopy)  Outcome: Ongoing (interventions implemented as appropriate)

## 2018-02-18 NOTE — PROGRESS NOTES
GI Daily Progress Note    Assessment/Plan:    Principal Problem:    Anemia  Active Problems:    Acute renal failure superimposed on chronic kidney disease       LOS: 8 days     Kacy Mistry is a 78 y.o. female who was admitted with Anemia. She reports his symptoms are improving with treatment. Pt in a good mood for her Birthday. No BM but drinking her Miralax has used before up to 3 times a day not always with success. Reviewed her EGD/Colonoscopy with Melanosis and a polyp as well as reflux.    Subjective:    Patient expresses constipation  Patient denies abdominal pain, vomiting, diarrhea and bloody stools    Objective:    Vital signs in last 24 hours:  Temp:  [98 °F (36.7 °C)-99.1 °F (37.3 °C)] 99.1 °F (37.3 °C)  Heart Rate:  [70-77] 70  Resp:  [16-18] 18  BP: (121-160)/() 121/85    Intake/Output last 3 shifts:  I/O last 3 completed shifts:  In: 1481 [I.V.:1481]  Out: -   Intake/Output this shift:  I/O this shift:  In: 800 [I.V.:800]  Out: -     Results from last 7 days  Lab Units 02/18/18  0715 02/17/18  0528 02/16/18  0533   WBC 10*3/mm3 5.88 6.33 6.95   HEMOGLOBIN g/dL 9.7* 10.3* 10.3*   HEMATOCRIT % 31.2* 33.0* 32.8*   PLATELETS 10*3/mm3 152 167 173       Results from last 7 days  Lab Units 02/18/18  0715 02/17/18  0528 02/16/18  0533   SODIUM mmol/L 144 140 140   POTASSIUM mmol/L 3.7 3.9 3.8   CHLORIDE mmol/L 105 102 103   CO2 mmol/L 28.0 28.5 31.0*   BUN mg/dL 15 17 21   CREATININE mg/dL 1.00 1.07* 1.05*   GLUCOSE mg/dL 134* 80 77   CALCIUM mg/dL 7.9* 8.5* 8.5*       Physical Exam:Abdomen  Sounds Normal Active Bowel Sounds   Distension Soft   Tenderness Nontender     1) Anemia- in the setting of Plavix.  Not iron deficient per lab review.  No overt GI bleeding  2) Abnormal imaging- Abd/pelvis CT w/o contrast with moderate amount of stool in colon, fecal impaction in rectal vault, mild circumferential wall thickening of the rectum, possible rectal ulcer  3) Acute on CKD- renal following  4) CVA- on  Plavix as out pt, last dose 2/12/18  5) constipation    Agree with resuming Plavix and will Rx Amitiza along with her BID MIralax, colon polyp path pending, HGB stable, BGA to follow

## 2018-02-19 ENCOUNTER — APPOINTMENT (OUTPATIENT)
Dept: GENERAL RADIOLOGY | Facility: HOSPITAL | Age: 78
End: 2018-02-19

## 2018-02-19 VITALS
BODY MASS INDEX: 39.4 KG/M2 | TEMPERATURE: 99.4 F | RESPIRATION RATE: 18 BRPM | SYSTOLIC BLOOD PRESSURE: 176 MMHG | HEIGHT: 69 IN | OXYGEN SATURATION: 97 % | DIASTOLIC BLOOD PRESSURE: 106 MMHG | WEIGHT: 266 LBS | HEART RATE: 80 BPM

## 2018-02-19 LAB
ANION GAP SERPL CALCULATED.3IONS-SCNC: 11.8 MMOL/L
BASOPHILS # BLD AUTO: 0.01 10*3/MM3 (ref 0–0.2)
BASOPHILS NFR BLD AUTO: 0.2 % (ref 0–1.5)
BUN BLD-MCNC: 15 MG/DL (ref 8–23)
BUN/CREAT SERPL: 14.2 (ref 7–25)
CALCIUM SPEC-SCNC: 8.1 MG/DL (ref 8.6–10.5)
CHLORIDE SERPL-SCNC: 103 MMOL/L (ref 98–107)
CO2 SERPL-SCNC: 27.2 MMOL/L (ref 22–29)
CREAT BLD-MCNC: 1.06 MG/DL (ref 0.57–1)
DEPRECATED RDW RBC AUTO: 48.3 FL (ref 37–54)
EOSINOPHIL # BLD AUTO: 0.23 10*3/MM3 (ref 0–0.7)
EOSINOPHIL NFR BLD AUTO: 3.9 % (ref 0.3–6.2)
ERYTHROCYTE [DISTWIDTH] IN BLOOD BY AUTOMATED COUNT: 13.9 % (ref 11.7–13)
GFR SERPL CREATININE-BSD FRML MDRD: 61 ML/MIN/1.73
GLUCOSE BLD-MCNC: 112 MG/DL (ref 65–99)
GLUCOSE BLDC GLUCOMTR-MCNC: 104 MG/DL (ref 70–130)
GLUCOSE BLDC GLUCOMTR-MCNC: 115 MG/DL (ref 70–130)
GLUCOSE BLDC GLUCOMTR-MCNC: 192 MG/DL (ref 70–130)
HCT VFR BLD AUTO: 31.8 % (ref 35.6–45.5)
HGB BLD-MCNC: 9.8 G/DL (ref 11.9–15.5)
IMM GRANULOCYTES # BLD: 0 10*3/MM3 (ref 0–0.03)
IMM GRANULOCYTES NFR BLD: 0 % (ref 0–0.5)
INR PPP: 1.26 (ref 0.9–1.1)
INR PPP: 1.39 (ref 0.9–1.1)
LAB AP CASE REPORT: NORMAL
LAB AP CLINICAL INFORMATION: NORMAL
LYMPHOCYTES # BLD AUTO: 3.1 10*3/MM3 (ref 0.9–4.8)
LYMPHOCYTES NFR BLD AUTO: 52.2 % (ref 19.6–45.3)
Lab: NORMAL
MCH RBC QN AUTO: 29.8 PG (ref 26.9–32)
MCHC RBC AUTO-ENTMCNC: 30.8 G/DL (ref 32.4–36.3)
MCV RBC AUTO: 96.7 FL (ref 80.5–98.2)
MONOCYTES # BLD AUTO: 0.23 10*3/MM3 (ref 0.2–1.2)
MONOCYTES NFR BLD AUTO: 3.9 % (ref 5–12)
NEUTROPHILS # BLD AUTO: 2.37 10*3/MM3 (ref 1.9–8.1)
NEUTROPHILS NFR BLD AUTO: 39.8 % (ref 42.7–76)
PATH REPORT.FINAL DX SPEC: NORMAL
PATH REPORT.GROSS SPEC: NORMAL
PLATELET # BLD AUTO: 151 10*3/MM3 (ref 140–500)
PMV BLD AUTO: 10.2 FL (ref 6–12)
POTASSIUM BLD-SCNC: 3.7 MMOL/L (ref 3.5–5.2)
PROTHROMBIN TIME: 15.6 SECONDS (ref 11.7–14.2)
PROTHROMBIN TIME: 16.8 SECONDS (ref 11.7–14.2)
RBC # BLD AUTO: 3.29 10*6/MM3 (ref 3.9–5.2)
SODIUM BLD-SCNC: 142 MMOL/L (ref 136–145)
WBC NRBC COR # BLD: 5.94 10*3/MM3 (ref 4.5–10.7)

## 2018-02-19 PROCEDURE — 82962 GLUCOSE BLOOD TEST: CPT

## 2018-02-19 PROCEDURE — 73501 X-RAY EXAM HIP UNI 1 VIEW: CPT

## 2018-02-19 PROCEDURE — 63710000001 INSULIN ASPART PER 5 UNITS: Performed by: HOSPITALIST

## 2018-02-19 PROCEDURE — 85025 COMPLETE CBC W/AUTO DIFF WBC: CPT | Performed by: HOSPITALIST

## 2018-02-19 PROCEDURE — 85610 PROTHROMBIN TIME: CPT | Performed by: HOSPITALIST

## 2018-02-19 PROCEDURE — 99232 SBSQ HOSP IP/OBS MODERATE 35: CPT | Performed by: INTERNAL MEDICINE

## 2018-02-19 PROCEDURE — 80048 BASIC METABOLIC PNL TOTAL CA: CPT | Performed by: HOSPITALIST

## 2018-02-19 RX ORDER — HYDRALAZINE HYDROCHLORIDE 50 MG/1
100 TABLET, FILM COATED ORAL EVERY 8 HOURS SCHEDULED
Status: DISCONTINUED | OUTPATIENT
Start: 2018-02-19 | End: 2018-02-19 | Stop reason: HOSPADM

## 2018-02-19 RX ORDER — AMLODIPINE BESYLATE 10 MG/1
10 TABLET ORAL DAILY
Qty: 30 TABLET | Refills: 0 | Status: SHIPPED | OUTPATIENT
Start: 2018-02-20 | End: 2018-03-23

## 2018-02-19 RX ORDER — FAMOTIDINE 20 MG/1
20 TABLET, FILM COATED ORAL 2 TIMES DAILY
Qty: 60 TABLET | Refills: 0 | Status: SHIPPED | OUTPATIENT
Start: 2018-02-19 | End: 2018-03-23

## 2018-02-19 RX ORDER — ATORVASTATIN CALCIUM 80 MG/1
80 TABLET, FILM COATED ORAL NIGHTLY
Qty: 30 TABLET | Refills: 0 | Status: SHIPPED | OUTPATIENT
Start: 2018-02-19 | End: 2018-03-23

## 2018-02-19 RX ORDER — FAMOTIDINE 20 MG/1
20 TABLET, FILM COATED ORAL 2 TIMES DAILY
Status: DISCONTINUED | OUTPATIENT
Start: 2018-02-19 | End: 2018-02-19 | Stop reason: HOSPADM

## 2018-02-19 RX ORDER — ALLOPURINOL 100 MG/1
100 TABLET ORAL DAILY
Qty: 30 TABLET | Refills: 0 | Status: SHIPPED | OUTPATIENT
Start: 2018-02-20 | End: 2018-03-23

## 2018-02-19 RX ORDER — HYDRALAZINE HYDROCHLORIDE 100 MG/1
100 TABLET, FILM COATED ORAL EVERY 8 HOURS SCHEDULED
Qty: 90 TABLET | Refills: 0 | Status: SHIPPED | OUTPATIENT
Start: 2018-02-19 | End: 2018-03-23

## 2018-02-19 RX ORDER — WARFARIN SODIUM 10 MG/1
10 TABLET ORAL
Status: COMPLETED | OUTPATIENT
Start: 2018-02-19 | End: 2018-02-19

## 2018-02-19 RX ORDER — METOPROLOL TARTRATE 50 MG/1
50 TABLET, FILM COATED ORAL EVERY 12 HOURS SCHEDULED
Qty: 60 TABLET | Refills: 0 | Status: SHIPPED | OUTPATIENT
Start: 2018-02-19 | End: 2018-03-23

## 2018-02-19 RX ORDER — LUBIPROSTONE 24 UG/1
24 CAPSULE ORAL 2 TIMES DAILY WITH MEALS
Qty: 60 CAPSULE | Refills: 0 | Status: SHIPPED | OUTPATIENT
Start: 2018-02-19 | End: 2018-03-21

## 2018-02-19 RX ADMIN — INSULIN ASPART 7 UNITS: 100 INJECTION, SOLUTION INTRAVENOUS; SUBCUTANEOUS at 08:18

## 2018-02-19 RX ADMIN — METOPROLOL TARTRATE 50 MG: 50 TABLET, FILM COATED ORAL at 08:20

## 2018-02-19 RX ADMIN — HYDRALAZINE HYDROCHLORIDE 100 MG: 50 TABLET, FILM COATED ORAL at 15:00

## 2018-02-19 RX ADMIN — VITAMIN D, TAB 1000IU (100/BT) 4000 UNITS: 25 TAB at 08:19

## 2018-02-19 RX ADMIN — POLYETHYLENE GLYCOL 3350 17 G: 17 POWDER, FOR SOLUTION ORAL at 08:28

## 2018-02-19 RX ADMIN — AMLODIPINE BESYLATE 10 MG: 10 TABLET ORAL at 08:20

## 2018-02-19 RX ADMIN — BUMETANIDE 1 MG: 1 TABLET ORAL at 08:21

## 2018-02-19 RX ADMIN — INSULIN ASPART 7 UNITS: 100 INJECTION, SOLUTION INTRAVENOUS; SUBCUTANEOUS at 12:45

## 2018-02-19 RX ADMIN — DOCUSATE SODIUM 200 MG: 100 CAPSULE, LIQUID FILLED ORAL at 08:21

## 2018-02-19 RX ADMIN — BACLOFEN 10 MG: 10 TABLET ORAL at 15:00

## 2018-02-19 RX ADMIN — BACLOFEN 10 MG: 10 TABLET ORAL at 06:00

## 2018-02-19 RX ADMIN — WARFARIN SODIUM 10 MG: 10 TABLET ORAL at 17:07

## 2018-02-19 RX ADMIN — LUBIPROSTONE 24 MCG: 24 CAPSULE, GELATIN COATED ORAL at 17:05

## 2018-02-19 RX ADMIN — LUBIPROSTONE 24 MCG: 24 CAPSULE, GELATIN COATED ORAL at 08:21

## 2018-02-19 RX ADMIN — INSULIN ASPART 7 UNITS: 100 INJECTION, SOLUTION INTRAVENOUS; SUBCUTANEOUS at 17:05

## 2018-02-19 RX ADMIN — PANTOPRAZOLE SODIUM 40 MG: 40 TABLET, DELAYED RELEASE ORAL at 08:21

## 2018-02-19 RX ADMIN — CLONIDINE HYDROCHLORIDE 0.3 MG: 0.1 TABLET ORAL at 17:05

## 2018-02-19 RX ADMIN — CLONIDINE HYDROCHLORIDE 0.3 MG: 0.1 TABLET ORAL at 08:20

## 2018-02-19 RX ADMIN — ALLOPURINOL 100 MG: 100 TABLET ORAL at 08:20

## 2018-02-19 RX ADMIN — Medication 1 CAPSULE: at 08:19

## 2018-02-19 RX ADMIN — HYDRALAZINE HYDROCHLORIDE 75 MG: 50 TABLET, FILM COATED ORAL at 06:00

## 2018-02-19 NOTE — PLAN OF CARE
Problem: Patient Care Overview (Adult)  Goal: Plan of Care Review  Outcome: Ongoing (interventions implemented as appropriate)   02/19/18 4298   Coping/Psychosocial Response Interventions   Plan Of Care Reviewed With patient;daughter   Outcome Evaluation   Outcome Summary/Follow up Plan A&O, pleasant, VSS, afebrile, monitor RSR, lungs diminished, no SOB, monitor closely, daughters at bedside and updates given.     Goal: Adult Individualization and Mutuality  Outcome: Ongoing (interventions implemented as appropriate)    Goal: Discharge Needs Assessment  Outcome: Ongoing (interventions implemented as appropriate)      Problem: Fall Risk (Adult)  Goal: Absence of Falls  Outcome: Ongoing (interventions implemented as appropriate)      Problem: Pressure Ulcer (Adult)  Goal: Signs and Symptoms of Listed Potential Problems Will be Absent or Manageable (Pressure Ulcer)  Outcome: Ongoing (interventions implemented as appropriate)      Problem: GI Endoscopy (Adult)  Goal: Signs and Symptoms of Listed Potential Problems Will be Absent or Manageable (GI Endoscopy)  Outcome: Ongoing (interventions implemented as appropriate)

## 2018-02-19 NOTE — PLAN OF CARE
Problem: Patient Care Overview (Adult)  Goal: Plan of Care Review  Outcome: Ongoing (interventions implemented as appropriate)   02/19/18 0431   Coping/Psychosocial Response Interventions   Plan Of Care Reviewed With patient;family   Patient Care Overview   Progress improving   Outcome Evaluation   Outcome Summary/Follow up Plan No complaints of pain, refused to be turned every 2 hours, mepilex off, wound cdi, drsgs to ble cdi, patient to go to [facility] today.     Goal: Adult Individualization and Mutuality  Outcome: Ongoing (interventions implemented as appropriate)    Goal: Discharge Needs Assessment  Outcome: Ongoing (interventions implemented as appropriate)      Problem: Fall Risk (Adult)  Goal: Absence of Falls  Outcome: Ongoing (interventions implemented as appropriate)      Problem: Pressure Ulcer (Adult)  Goal: Signs and Symptoms of Listed Potential Problems Will be Absent or Manageable (Pressure Ulcer)  Outcome: Ongoing (interventions implemented as appropriate)

## 2018-02-19 NOTE — DISCHARGE SUMMARY
Discharge summary    Date of admission 2/9/2018  Date of discharge 2/19/2018    Final diagnosis  Acute kidney injury with hyperkalemia resolved  Chronic kidney disease stage III  Renal cyst  Severe esophagitis  Insulin-dependent diabetes mellitus  Hypertension  Hyperlipidemia  Chronic anemia  Gastroesophageal reflux disease  Chronic lymphedema  Status post CVA with left jaja-  Chronic anticoagulation  Immobilization syndrome    Discharge medications    Current Facility-Administered Medications:   •  allopurinol (ZYLOPRIM) tablet 100 mg, 100 mg, Oral, Daily, Josias Razo MD, 100 mg at 02/19/18 0820  •  amLODIPine (NORVASC) tablet 10 mg, 10 mg, Oral, Daily, Josias Razo MD, 10 mg at 02/19/18 0820  •  atorvastatin (LIPITOR) tablet 80 mg, 80 mg, Oral, Nightly, Josias Razo MD, 80 mg at 02/18/18 2024  •  baclofen (LIORESAL) tablet 10 mg, 10 mg, Oral, Q8H, Josias Razo MD, 10 mg at 02/19/18 0600  •  bumetanide (BUMEX) tablet 1 mg, 1 mg, Oral, BID, Sourav Doe MD, 1 mg at 02/19/18 0821  •  cholecalciferol (VITAMIN D3) tablet 4,000 Units, 4,000 Units, Oral, Daily, Josias Razo MD, 4,000 Units at 02/19/18 0819  •  CloNIDine (CATAPRES) tablet 0.3 mg, 0.3 mg, Oral, TID, Josias Razo MD, 0.3 mg at 02/19/18 0820  •  docusate sodium (COLACE) capsule 200 mg, 200 mg, Oral, BID, 200 mg at 02/19/18 0821 **AND** [DISCONTINUED] docusate sodium (COLACE) capsule 50 mg, 50 mg, Oral, Daily, Josias Razo MD  •  famotidine (PEPCID) tablet 20 mg, 20 mg, Oral, BID, Frieda Rodriguez MD  •  hydrALAZINE (APRESOLINE) tablet 100 mg, 100 mg, Oral, Q8H, Josias Razo MD  •  insulin aspart (novoLOG) injection 7 Units, 7 Units, Subcutaneous, TID With Meals, Josias Razo MD, 7 Units at 02/19/18 1245  •  insulin detemir (LEVEMIR) injection 40 Units, 40 Units, Subcutaneous, Nightly, Josias Razo MD, 40 Units at 02/18/18 2124  •  lactobacillus acidophilus (RISAQUAD) capsule 1 capsule, 1 capsule, Oral, Daily, Josias Razo MD, 1 capsule at 02/19/18  0819  •  lubiprostone (AMITIZA) capsule 24 mcg, 24 mcg, Oral, BID With Meals, David Villavicencio MD, 24 mcg at 02/19/18 0821  •  metoprolol tartrate (LOPRESSOR) tablet 50 mg, 50 mg, Oral, Q12H, Josias Razo MD, 50 mg at 02/19/18 0820  •  polyethylene glycol (MIRALAX) powder 17 g, 17 g, Oral, BID, Josias Razo MD, 17 g at 02/19/18 0828  •  sertraline (ZOLOFT) tablet 100 mg, 100 mg, Oral, Nightly, Josias Razo MD, 100 mg at 02/18/18 2024  •  Insert peripheral IV, , , Once **AND** sodium chloride 0.9 % flush 10 mL, 10 mL, Intravenous, PRN, Shara Streeter MD, 10 mL at 02/14/18 2039  •  warfarin (COUMADIN) tablet 10 mg, 10 mg, Oral, Once, Josias Razo MD     Consult obtained  Gastroenterology  Nephrology  Urology    Procedures  Upper and lower endoscopy    Hospital course  79-year-old -American female with multiple medical problem including CVA with left jaja-diabetes hypertension hyperlipidemia admitted through emergency room with generalized weakness.  Patient workup revealed acute kidney injury on top of chronic kidney disease with hyperkalemia and for management.  Patient admitted she was taking ACE inhibitor and diuretics potassium all stopped she received IV fluid nephrology consult obtained.  Patient workup revealed renal cyst which is followed by urology and the recommend no further workup.  Patient other medical condition and also stabilize.  Patient remain on Plavix and Coumadin and was dropping him up) and found to have Hemoccult positives stools.  GI will consult obtained and they recommended endoscopy once off Plavix for 5 days.  Patient underwent upper and lower endoscopy which showed severe esophagitis and polyps but no active bleeding.  Patient Plavix and Coumadin restarted.  Patient will be discharged with prior to discharge she complained of left hip pain for several days and will get an x-ray if no fracture dislocation with discharge back to nursing home with daily PT/INR.  Patient will  get extra dose of Coumadin prior to discharge.  Her hemoglobin 9.8 BUN 15 creatinine 1.0 and blood sugar is very well controlled with current dose of insulin.    Discharge diet regular    Activity per PT OT    Medication as above    Further care per accepting physician at nursing home    MELODY JOHNSON MD

## 2018-02-19 NOTE — PROGRESS NOTES
BGA/GI Progress Note   Chief Complaint:  Anemia and abnormal imaging    Subjective     Interval History:   2/18 EGD with Grade a esophagitis, cscope with melanosis and one polyps. Biopsies pending   Patient denies GI complaints at this time.     History taken from: patient chart family    Review of Systems:    The following systems were reviewed and negative;  gastrointestinal    Objective     Vital Signs  Temp:  [97.2 °F (36.2 °C)-98.7 °F (37.1 °C)] 97.2 °F (36.2 °C)  Heart Rate:  [52-75] 65  Resp:  [18] 18  BP: (121-188)/(73-98) 156/98  Body mass index is 39.26 kg/(m^2).    Intake/Output Summary (Last 24 hours) at 02/19/18 0908  Last data filed at 02/18/18 1834   Gross per 24 hour   Intake              950 ml   Output                0 ml   Net              950 ml          Physical Exam:   General: patient awake, alert and cooperative   Eyes: Normal lids and lashes, no scleral icterus, no conjunctival pallor   Neck: supple, normal ROM, no tracheal deviation, no thyromegaly   Skin: warm and dry, not jaundiced   Cardiovascular: regular rhythm and rate, no murmurs auscultated   Pulm: clear to auscultation bilaterally, regular and unlabored   Abdomen: soft, nontender, nondistended; normal bowel sounds   Rectal: deferred   Extremities: no rash or edema   Neurologic: Normal mood and behavior    All Medications Have Been Reviewed     Results Review:       Results from last 7 days  Lab Units 02/19/18  0544 02/18/18  0715 02/17/18  0528   WBC 10*3/mm3 5.94 5.88 6.33   HEMOGLOBIN g/dL 9.8* 9.7* 10.3*   HEMATOCRIT % 31.8* 31.2* 33.0*   PLATELETS 10*3/mm3 151 152 167         Results from last 7 days  Lab Units 02/19/18  0544 02/18/18  0715 02/17/18  0528  02/13/18  0549   SODIUM mmol/L 142 144 140  < > 140   POTASSIUM mmol/L 3.7 3.7 3.9  < > 3.9   CHLORIDE mmol/L 103 105 102  < > 105   CO2 mmol/L 27.2 28.0 28.5  < > 28.7   BUN mg/dL 15 15 17  < > 32*   CREATININE mg/dL 1.06* 1.00 1.07*  < > 1.11*   CALCIUM mg/dL 8.1*  7.9* 8.5*  < > 8.1*   BILIRUBIN mg/dL  --   --   --   --  0.2   ALK PHOS U/L  --   --   --   --  47   ALT (SGPT) U/L  --   --   --   --  9   AST (SGOT) U/L  --   --   --   --  13   GLUCOSE mg/dL 112* 134* 80  < > 160*   < > = values in this interval not displayed.      Results from last 7 days  Lab Units 02/19/18  0544 02/18/18  0715 02/17/18  0528   INR  1.39* 1.38* 1.36*       RADIOLOGY:    Imaging Results (last 72 hours)     ** No results found for the last 72 hours. **          Assessment/Plan     Patient Active Problem List   Diagnosis Code   • Acute renal failure superimposed on chronic kidney disease N17.9, N18.9   • Anemia D64.9         Cyrstal Yanes, APRN  02/19/18  9:08 AM      She denies any complaints.  Had a small bowel movement this morning.  She is on MiraLAX and Amitiza.  Getting ready for lunch.  Denies any abdominal pain.    Exam:  Genl: alert, no distress, appears stated age  HEENT: normal external eyes, ears, and nose, no icterus  Resp: normal effort, no wheezing, on room air  Cards: regular rate and rhythm, no murmurs, no LE edema  Abd: soft and obese non distended, non tender to palpation    Impression  1. Normocytic anemia: no overt bleeding, ? AOCKD vs AOCD  2. Abnormal CT: no findings on colonoscopy associated with this  3. Constipaton: improving on bowel regimen  4. Sigmoid colon polyp: path pending    Plan  -follow Hb  -OK to resume plavix, anticoagulants as per hospitalist  -change ppi to H2 blocker given CKD  -will follow polyp path    Frieda Rodriguez MD  [facility]

## 2018-02-19 NOTE — PROGRESS NOTES
Continued Stay Note  [facility]     Patient Name: Kacy Mistry  MRN: [id number]  Today's Date: 2/19/2018    Admit Date: 2/9/2018          Discharge Plan       02/19/18 1355    Case Management/Social Work Plan    Plan Plan return to [facility] of care.  AMY Taylor    Additional Comments Spoke to pt and pt's daughter  (Nadeem Georges) at bedside.  Pt with discharge order.  [facility]  ( [phone]) called and states bed is available today and pt can be accepted today.   Packet to RN.  Discharge summary faxed to [facility].  Ambulance arranged to pick pt up at 1930 and transport to [facility].   Brandon RN              Discharge Codes     None        Expected Discharge Date and Time     Expected Discharge Date Expected Discharge Time    Feb 19, 2018             Radha Chu, RN

## 2018-02-19 NOTE — PROGRESS NOTES
"   LOS: 9 days   Patient Care Team:  Yousuf Corrigan MD as PCP - General (Internal Medicine)    Chief Complaint/ Reason for encounter: Acute renal failure/dehydration  Chief Complaint   Patient presents with   • Abnormal Lab         Subjective     Abnormal Lab   Pertinent negatives include no chest pain, fever or nausea.       Subjective:  Symptoms:  Improved.  No shortness of breath or chest pain.  (No new complaints  Resting  No edema or shortness of breath  Discussed with family at bedside).    Diet:  Adequate intake.  No nausea.    Activity level: Returning to normal.    Pain:  She reports no pain.          History taken from: Patient and chart    Objective     Vital Signs  Temp:  [97.2 °F (36.2 °C)-99.4 °F (37.4 °C)] 99.4 °F (37.4 °C)  Heart Rate:  [52-80] 80  Resp:  [18] 18  BP: (135-188)/() 176/106       Wt Readings from Last 1 Encounters:   02/19/18 0559 121 kg (266 lb)   02/18/18 0646 116 kg (256 lb)   02/15/18 0651 117 kg (257 lb 12.8 oz)   02/13/18 0625 121 kg (267 lb 1.6 oz)   02/12/18 1540 116 kg (255 lb 11.7 oz)   02/12/18 0839 116 kg (255 lb 11.7 oz)   02/10/18 0411 116 kg (256 lb 6.4 oz)   02/09/18 1934 104 kg (230 lb)       Objective:  General Appearance:  Comfortable, well-appearing, in no acute distress and not in pain.    Vital signs: (most recent): Blood pressure (!) 176/106, pulse 80, temperature 99.4 °F (37.4 °C), temperature source Oral, resp. rate 18, height 175.3 cm (69.02\"), weight 121 kg (266 lb), SpO2 97 %.  Vital signs are normal.  No fever.    Output: Producing urine.    HEENT: Normal HEENT exam.    Lungs:  Normal respiratory rate and normal effort.  She is not in respiratory distress.  Breath sounds clear to auscultation.  No wheezes.    Heart: Normal rate.  Regular rhythm.    Abdomen: Abdomen is soft and non-distended.  Bowel sounds are normal.   There is no abdominal tenderness.  There is no epigastric area or no suprapubic area tenderness.     Extremities: Normal range " of motion.  There is no deformity or dependent edema.    Pulses: Distal pulses are intact.    Neurological: Patient is alert and oriented to person, place and time.    Skin:  Warm and dry.  No rash or cyanosis.             Results Review:    Past Medical History: reviewed and updated  Past Medical History:   Diagnosis Date   • Anemia    • Anxiety    • Arthritis    • Cellulitis of left lower extremity    • CHF (congestive heart failure)    • Depression    • Diabetes mellitus    • DVT (deep venous thrombosis)    • GERD (gastroesophageal reflux disease)    • Hyperlipidemia    • Hypertension    • Lymphedema    • Obesity    • Osteoporosis    • Renal disorder     chronic kidney disease   • Stroke     with left hemiplegia and hemiparesis   • Venous insufficiency (chronic) (peripheral)          Allergies:  Allergies   Allergen Reactions   • Contrast Dye Unknown (See Comments)     n/a   • Iodine Unknown (See Comments)     unknown       Intake/Output:     Intake/Output Summary (Last 24 hours) at 02/19/18 1543  Last data filed at 02/18/18 1834   Gross per 24 hour   Intake              150 ml   Output                0 ml   Net              150 ml               Labs:   Recent Results (from the past 24 hour(s))   POC Glucose Once    Collection Time: 02/18/18  4:59 PM   Result Value Ref Range    Glucose 142 (H) 70 - 130 mg/dL   POC Glucose Once    Collection Time: 02/18/18  8:02 PM   Result Value Ref Range    Glucose 131 (H) 70 - 130 mg/dL   Basic Metabolic Panel    Collection Time: 02/19/18  5:44 AM   Result Value Ref Range    Glucose 112 (H) 65 - 99 mg/dL    BUN 15 8 - 23 mg/dL    Creatinine 1.06 (H) 0.57 - 1.00 mg/dL    Sodium 142 136 - 145 mmol/L    Potassium 3.7 3.5 - 5.2 mmol/L    Chloride 103 98 - 107 mmol/L    CO2 27.2 22.0 - 29.0 mmol/L    Calcium 8.1 (L) 8.6 - 10.5 mg/dL    eGFR  African Amer 61 >60 mL/min/1.73    BUN/Creatinine Ratio 14.2 7.0 - 25.0    Anion Gap 11.8 mmol/L   Protime-INR    Collection Time: 02/19/18   5:44 AM   Result Value Ref Range    Protime 16.8 (H) 11.7 - 14.2 Seconds    INR 1.39 (H) 0.90 - 1.10   CBC Auto Differential    Collection Time: 02/19/18  5:44 AM   Result Value Ref Range    WBC 5.94 4.50 - 10.70 10*3/mm3    RBC 3.29 (L) 3.90 - 5.20 10*6/mm3    Hemoglobin 9.8 (L) 11.9 - 15.5 g/dL    Hematocrit 31.8 (L) 35.6 - 45.5 %    MCV 96.7 80.5 - 98.2 fL    MCH 29.8 26.9 - 32.0 pg    MCHC 30.8 (L) 32.4 - 36.3 g/dL    RDW 13.9 (H) 11.7 - 13.0 %    RDW-SD 48.3 37.0 - 54.0 fl    MPV 10.2 6.0 - 12.0 fL    Platelets 151 140 - 500 10*3/mm3    Neutrophil % 39.8 (L) 42.7 - 76.0 %    Lymphocyte % 52.2 (H) 19.6 - 45.3 %    Monocyte % 3.9 (L) 5.0 - 12.0 %    Eosinophil % 3.9 0.3 - 6.2 %    Basophil % 0.2 0.0 - 1.5 %    Immature Grans % 0.0 0.0 - 0.5 %    Neutrophils, Absolute 2.37 1.90 - 8.10 10*3/mm3    Lymphocytes, Absolute 3.10 0.90 - 4.80 10*3/mm3    Monocytes, Absolute 0.23 0.20 - 1.20 10*3/mm3    Eosinophils, Absolute 0.23 0.00 - 0.70 10*3/mm3    Basophils, Absolute 0.01 0.00 - 0.20 10*3/mm3    Immature Grans, Absolute 0.00 0.00 - 0.03 10*3/mm3   POC Glucose Once    Collection Time: 02/19/18  7:09 AM   Result Value Ref Range    Glucose 104 70 - 130 mg/dL   POC Glucose Once    Collection Time: 02/19/18 11:13 AM   Result Value Ref Range    Glucose 115 70 - 130 mg/dL   Protime-INR    Collection Time: 02/19/18  1:22 PM   Result Value Ref Range    Protime 15.6 (H) 11.7 - 14.2 Seconds    INR 1.26 (H) 0.90 - 1.10       Radiology:  Imaging Results (last 24 hours)     Procedure Component Value Units Date/Time    XR Hip With or Without Pelvis 1 View Right [[id number]] Collected:  02/19/18 1316     Updated:  02/19/18 1348    Narrative:       ONE VIEW PORTABLE RIGHT HIP AND PORTABLE AP PELVIS     HISTORY: Hip pain.     There are extremely severe degenerative changes involving both hips with  very marked joint space narrowing and extensive marginal spurring and  this involves the right hip more than left. There is some  associated  deformity of the right femoral head. This appearance is quite similar to  the study dating back to 05/29/2011. No acute abnormality is seen.     This report was finalized on 2/19/2018 1:45 PM by Dr. Gregory Davis MD.                Medications have been reviewed:  Current Facility-Administered Medications   Medication Dose Route Frequency Provider Last Rate Last Dose   • allopurinol (ZYLOPRIM) tablet 100 mg  100 mg Oral Daily Josias Razo MD   100 mg at 02/19/18 0820   • amLODIPine (NORVASC) tablet 10 mg  10 mg Oral Daily Josias Razo MD   10 mg at 02/19/18 0820   • atorvastatin (LIPITOR) tablet 80 mg  80 mg Oral Nightly Josias Razo MD   80 mg at 02/18/18 2024   • baclofen (LIORESAL) tablet 10 mg  10 mg Oral Q8H Josias Razo MD   10 mg at 02/19/18 1500   • bumetanide (BUMEX) tablet 1 mg  1 mg Oral BID Sourav Doe MD   1 mg at 02/19/18 0821   • cholecalciferol (VITAMIN D3) tablet 4,000 Units  4,000 Units Oral Daily Josias Razo MD   4,000 Units at 02/19/18 0819   • CloNIDine (CATAPRES) tablet 0.3 mg  0.3 mg Oral TID Josias Razo MD   0.3 mg at 02/19/18 0820   • docusate sodium (COLACE) capsule 200 mg  200 mg Oral BID Josias Razo MD   200 mg at 02/19/18 0821   • famotidine (PEPCID) tablet 20 mg  20 mg Oral BID Frieda Rodriguez MD       • hydrALAZINE (APRESOLINE) tablet 100 mg  100 mg Oral Q8H Josias Razo MD   100 mg at 02/19/18 1500   • insulin aspart (novoLOG) injection 7 Units  7 Units Subcutaneous TID With Meals Josias Razo MD   7 Units at 02/19/18 1245   • insulin detemir (LEVEMIR) injection 40 Units  40 Units Subcutaneous Nightly Josias Razo MD   40 Units at 02/18/18 2124   • lactobacillus acidophilus (RISAQUAD) capsule 1 capsule  1 capsule Oral Daily Josias Razo MD   1 capsule at 02/19/18 0819   • lubiprostone (AMITIZA) capsule 24 mcg  24 mcg Oral BID With Meals David Villavicencio MD   24 mcg at 02/19/18 0821   • metoprolol tartrate (LOPRESSOR) tablet 50 mg  50 mg Oral Q12H  Josias Razo MD   50 mg at 02/19/18 0820   • polyethylene glycol (MIRALAX) powder 17 g  17 g Oral BID Josias Razo MD   17 g at 02/19/18 0828   • sertraline (ZOLOFT) tablet 100 mg  100 mg Oral Nightly Josias Razo MD   100 mg at 02/18/18 2024   • sodium chloride 0.9 % flush 10 mL  10 mL Intravenous PRN Shara Streeter MD   10 mL at 02/14/18 2039   • warfarin (COUMADIN) tablet 10 mg  10 mg Oral Once Josias Razo MD           Assessment/Plan     Principal Problem:    Anemia  Active Problems:    Acute renal failure superimposed on chronic kidney disease      Assessment:  (  Assesment    PEGGY resolved   CKD stage III , creatinine stable and near baseline  Renal cysts  Acute colitis, plans for colonoscopy per GI  HTN  HF  Anemia  DM  CVA      Plan:  Her renal function is improved, stable and near baseline, off IV fluids  Resume diuretics   OK to resume ACEI anytime  Continue current antihypertensive regimen  Discharge OK with me).             Continue to monitor renal function, electroytes and volume closely   Please call me with any questions or concerns      Sourav Doe MD   [facility]   [phone]    02/19/18  3:43 PM      Dictation performed using Dragon dictation software

## 2018-02-19 NOTE — PROGRESS NOTES
"Daily progress note    Chief complaint  Complain of left hip pain  Otherwise doing better    History of present illness  77-year-old -American female with history of CVA with left hemiparesis also have diabetes hypertension hyperlipidemia lymphedema morbidly obese anxiety depression chronic kidney disease stage III with a nursing home resident presented to [facility], [place], operated by [facility] emergency room with generalized weakness.  Patient evaluated found to be in acute kidney injury on top of chronic kidney disease admitted for management.  Patient denies any headache chest pain shortness of breath abdominal pain vomiting diarrhea.  Patient does have nausea but denies fever chills night sweats weight loss.     REVIEW OF SYSTEMS  Review of Systems   Constitutional: Negative for fever.   HENT: Negative for sore throat.         Dry mouth   Eyes: Negative.    Respiratory: Negative for cough and shortness of breath.    Cardiovascular: Positive for leg swelling (BLE, chronic). Negative for chest pain.   Gastrointestinal: Negative for abdominal pain, diarrhea and vomiting.   Genitourinary: Positive for frequency. Negative for dysuria.   Musculoskeletal: Negative for neck pain.   Skin: Negative for rash.   Allergic/Immunologic: Negative.    Neurological: Negative for weakness, numbness and headaches.   Hematological: Negative.    Psychiatric/Behavioral: Negative.    All other systems reviewed and are negative.     PHYSICAL EXAM  Blood pressure 156/98, pulse 65, temperature 97.2 °F (36.2 °C), temperature source Oral, resp. rate 18, height 175.3 cm (69.02\"), weight 121 kg (266 lb), SpO2 97 %.    Constitutional: She is oriented to person, place, and time and well-developed, well-nourished, and in no distress. No distress.   Head: Normocephalic and atraumatic.   Eyes: EOM are normal. Pupils are equal, round, and reactive to light.   Neck: Normal range of motion. Neck supple.   Cardiovascular: Normal rate, regular rhythm and normal heart " sounds.    Pulmonary/Chest: Effort normal and breath sounds normal. No respiratory distress.   Abdominal: Soft. There is no tenderness. There is no rebound and no guarding.   Musculoskeletal: Normal range of motion. She exhibits edema (BLE).   Neurological: She is alert and oriented to person, place, and time. She has normal sensation and normal strength.   Skin: Skin is warm and dry. No rash noted.   Psychiatric: Mood and affect normal.     LAB RESULTS  Lab Results (last 24 hours)     Procedure Component Value Units Date/Time    POC Glucose Once [[id number]]  (Abnormal) Collected:  02/18/18 1659    Specimen:  Blood Updated:  02/18/18 1700     Glucose 142 (H) mg/dL     Narrative:       Meter: [id number] : [id number] Michelle Olmedo    POC Glucose Once [[id number]]  (Abnormal) Collected:  02/18/18 2002    Specimen:  Blood Updated:  02/18/18 2003     Glucose 131 (H) mg/dL     Narrative:       Meter: [id number] : [id number] Soren STEWART    CBC & Differential [[id number]] Collected:  02/19/18 0544    Specimen:  Blood Updated:  02/19/18 0612    Narrative:       The following orders were created for panel order CBC & Differential.  Procedure                               Abnormality         Status                     ---------                               -----------         ------                     CBC Auto Differential[[id number]]        Abnormal            Final result                 Please view results for these tests on the individual orders.    CBC Auto Differential [[id number]]  (Abnormal) Collected:  02/19/18 0544    Specimen:  Blood Updated:  02/19/18 0612     WBC 5.94 10*3/mm3      RBC 3.29 (L) 10*6/mm3      Hemoglobin 9.8 (L) g/dL      Hematocrit 31.8 (L) %      MCV 96.7 fL      MCH 29.8 pg      MCHC 30.8 (L) g/dL      RDW 13.9 (H) %      RDW-SD 48.3 fl      MPV 10.2 fL      Platelets 151 10*3/mm3      Neutrophil % 39.8 (L) %      Lymphocyte % 52.2 (H) %      Monocyte % 3.9 (L) %      Eosinophil % 3.9 %       Basophil % 0.2 %      Immature Grans % 0.0 %      Neutrophils, Absolute 2.37 10*3/mm3      Lymphocytes, Absolute 3.10 10*3/mm3      Monocytes, Absolute 0.23 10*3/mm3      Eosinophils, Absolute 0.23 10*3/mm3      Basophils, Absolute 0.01 10*3/mm3      Immature Grans, Absolute 0.00 10*3/mm3     Protime-INR [[id number]]  (Abnormal) Collected:  02/19/18 0544    Specimen:  Blood Updated:  02/19/18 0620     Protime 16.8 (H) Seconds      INR 1.39 (H)    Basic Metabolic Panel [[id number]]  (Abnormal) Collected:  02/19/18 0544    Specimen:  Blood Updated:  02/19/18 0640     Glucose 112 (H) mg/dL      BUN 15 mg/dL      Creatinine 1.06 (H) mg/dL      Sodium 142 mmol/L      Potassium 3.7 mmol/L      Chloride 103 mmol/L      CO2 27.2 mmol/L      Calcium 8.1 (L) mg/dL      eGFR   Amer 61 mL/min/1.73      BUN/Creatinine Ratio 14.2     Anion Gap 11.8 mmol/L     Narrative:       The MDRD GFR formula is only valid for adults with stable renal function between ages 18 and 70.    POC Glucose Once [[id number]]  (Normal) Collected:  02/19/18 0709    Specimen:  Blood Updated:  02/19/18 0710     Glucose 104 mg/dL     Narrative:       Meter: [id number] : [id number] Ba Carina NA    POC Glucose Once [[id number]]  (Normal) Collected:  02/19/18 1113    Specimen:  Blood Updated:  02/19/18 1122     Glucose 115 mg/dL     Narrative:       Meter: [id number] : [id number] Ba Carina NA    Tissue Pathology Exam - Polyp, Large Intestine, Sigmoid Colon [[id number]] Collected:  02/17/18 1225    Specimen:  Polyp from Large Intestine, Sigmoid Colon Updated:  02/19/18 1253     Case Report --     Surgical Pathology Report                         Case: [id number]                                  Authorizing Provider:  David Villavicencio, Collected:           02/17/2018 12:25 PM                                 MD                                                                           Ordering Location:     [facility]  Received:     "        02/19/2018 06:57 AM                                 ENDO SUITES                                                                  Pathologist:           Pawan Zepeda MD                                                                           Specimen:    Large Intestine, Sigmoid Colon                                                              Clinical Information --     Cold biopsy       Final Diagnosis --     SIGMOID COLON:   HYPERPLASTIC POLYP.    CMK/brb     CPT CODES:  1.  07450       Gross Description --     Received is a single specimen container labeled with the patient's name and \"sigmoid colon\".  The specimen consists of a single 3 mm fragment of tan tissue which is entirely submitted in 1A.    TDJ/jse        Embedded Images --        Imaging Results (last 24 hours)     Procedure Component Value Units Date/Time    XR Hip With or Without Pelvis 1 View Right [[id number]] Updated:  02/19/18 1201      Upper and lower endoscopy noted.  Discussed with patient    Current Facility-Administered Medications:   •  allopurinol (ZYLOPRIM) tablet 100 mg, 100 mg, Oral, Daily, Josias Razo MD, 100 mg at 02/19/18 0820  •  amLODIPine (NORVASC) tablet 10 mg, 10 mg, Oral, Daily, Josias Razo MD, 10 mg at 02/19/18 0820  •  atorvastatin (LIPITOR) tablet 80 mg, 80 mg, Oral, Nightly, Josias Razo MD, 80 mg at 02/18/18 2024  •  baclofen (LIORESAL) tablet 10 mg, 10 mg, Oral, Q8H, Josias Razo MD, 10 mg at 02/19/18 0600  •  bumetanide (BUMEX) tablet 1 mg, 1 mg, Oral, BID, Sourav Doe MD, 1 mg at 02/19/18 0821  •  cholecalciferol (VITAMIN D3) tablet 4,000 Units, 4,000 Units, Oral, Daily, Josias Razo MD, 4,000 Units at 02/19/18 0819  •  CloNIDine (CATAPRES) tablet 0.3 mg, 0.3 mg, Oral, TID, Josias Razo MD, 0.3 mg at 02/19/18 0820  •  docusate sodium (COLACE) capsule 200 mg, 200 mg, Oral, BID, 200 mg at 02/19/18 0821 **AND** " [DISCONTINUED] docusate sodium (COLACE) capsule 50 mg, 50 mg, Oral, Daily, Melody Johnson MD  •  famotidine (PEPCID) tablet 20 mg, 20 mg, Oral, BID, Frieda Rodriguez MD  •  hydrALAZINE (APRESOLINE) tablet 75 mg, 75 mg, Oral, Q8H, Melody Johnson MD, 75 mg at 02/19/18 0600  •  insulin aspart (novoLOG) injection 7 Units, 7 Units, Subcutaneous, TID With Meals, Melody Johnson MD, 7 Units at 02/19/18 1245  •  insulin detemir (LEVEMIR) injection 40 Units, 40 Units, Subcutaneous, Nightly, Melody Johnson MD, 40 Units at 02/18/18 2124  •  lactobacillus acidophilus (RISAQUAD) capsule 1 capsule, 1 capsule, Oral, Daily, Melody Johnson MD, 1 capsule at 02/19/18 0819  •  lubiprostone (AMITIZA) capsule 24 mcg, 24 mcg, Oral, BID With Meals, David Villavicencio MD, 24 mcg at 02/19/18 0821  •  metoprolol tartrate (LOPRESSOR) tablet 50 mg, 50 mg, Oral, Q12H, Melody Johnson MD, 50 mg at 02/19/18 0820  •  polyethylene glycol (MIRALAX) powder 17 g, 17 g, Oral, BID, Melody Johnson MD, 17 g at 02/19/18 0828  •  sertraline (ZOLOFT) tablet 100 mg, 100 mg, Oral, Nightly, Melody Johnson MD, 100 mg at 02/18/18 2024  •  Insert peripheral IV, , , Once **AND** sodium chloride 0.9 % flush 10 mL, 10 mL, Intravenous, PRN, Shara Streeter MD, 10 mL at 02/14/18 2039  •  warfarin (COUMADIN) tablet 10 mg, 10 mg, Oral, Once, Melody Johnson MD     ASSESSMENT  Acute kidney injury with hyperkalemia  Chronic kidney disease stage III  Renal cyst  Severe esophagitis  Insulin-dependent diabetes mellitus  Hypertension  Hyperlipidemia  Chronic anemia  Gastroesophageal reflux disease  Chronic lymphedema  Status post CVA with left jaja-  Chronic anticoagulation  Immobilization syndrome    PLAN  Discharge back to nursing home if left hip x-ray within normal limits  Discharge summary dictated  MELODY JOHNSON MD

## 2018-02-20 NOTE — PROGRESS NOTES
Case Management Discharge Note    Final Note: Plan [facility] level of care.  AMY Taylor    Discharge Placement     Facility/Agency Request Status Selected? Address Phone Number Fax Number    [facility] Accepted    [address] [facility] [place] [address] [phone]        Ambulance: Yellow    Discharge Codes: 04  Discharged/transferred to intermediate care facility (ICF)

## 2018-03-22 ENCOUNTER — APPOINTMENT (OUTPATIENT)
Dept: CT IMAGING | Facility: HOSPITAL | Age: 78
End: 2018-03-22

## 2018-03-22 ENCOUNTER — HOSPITAL ENCOUNTER (EMERGENCY)
Facility: HOSPITAL | Age: 78
Discharge: HOME | End: 2018-03-23
Attending: EMERGENCY MEDICINE | Admitting: EMERGENCY MEDICINE

## 2018-03-22 DIAGNOSIS — I15.9 SECONDARY HYPERTENSION: ICD-10-CM

## 2018-03-22 DIAGNOSIS — R40.0 SOMNOLENCE: Primary | ICD-10-CM

## 2018-03-22 PROCEDURE — 83880 ASSAY OF NATRIURETIC PEPTIDE: CPT | Performed by: EMERGENCY MEDICINE

## 2018-03-22 PROCEDURE — 83735 ASSAY OF MAGNESIUM: CPT | Performed by: EMERGENCY MEDICINE

## 2018-03-22 PROCEDURE — 85610 PROTHROMBIN TIME: CPT | Performed by: EMERGENCY MEDICINE

## 2018-03-22 PROCEDURE — 84484 ASSAY OF TROPONIN QUANT: CPT | Performed by: EMERGENCY MEDICINE

## 2018-03-22 PROCEDURE — 70450 CT HEAD/BRAIN W/O DYE: CPT

## 2018-03-22 PROCEDURE — 80053 COMPREHEN METABOLIC PANEL: CPT | Performed by: EMERGENCY MEDICINE

## 2018-03-22 PROCEDURE — 96360 HYDRATION IV INFUSION INIT: CPT

## 2018-03-22 PROCEDURE — 93005 ELECTROCARDIOGRAM TRACING: CPT | Performed by: EMERGENCY MEDICINE

## 2018-03-22 PROCEDURE — 99285 EMERGENCY DEPT VISIT HI MDM: CPT

## 2018-03-22 PROCEDURE — 84145 PROCALCITONIN (PCT): CPT | Performed by: EMERGENCY MEDICINE

## 2018-03-22 PROCEDURE — 36415 COLL VENOUS BLD VENIPUNCTURE: CPT

## 2018-03-22 PROCEDURE — 85025 COMPLETE CBC W/AUTO DIFF WBC: CPT | Performed by: EMERGENCY MEDICINE

## 2018-03-22 RX ORDER — BUMETANIDE 2 MG/1
2 TABLET ORAL DAILY
Status: ON HOLD | COMMUNITY
End: 2020-01-01 | Stop reason: DRUGHIGH

## 2018-03-22 RX ORDER — SODIUM CHLORIDE 0.9 % (FLUSH) 0.9 %
10 SYRINGE (ML) INJECTION AS NEEDED
Status: DISCONTINUED | OUTPATIENT
Start: 2018-03-22 | End: 2018-03-23 | Stop reason: HOSPADM

## 2018-03-22 RX ORDER — HYDROCODONE BITARTRATE AND ACETAMINOPHEN 5; 325 MG/1; MG/1
1 TABLET ORAL EVERY 6 HOURS PRN
Status: ON HOLD | COMMUNITY
End: 2020-01-01 | Stop reason: SDUPTHER

## 2018-03-22 RX ORDER — INSULIN GLARGINE 100 [IU]/ML
18 INJECTION, SOLUTION SUBCUTANEOUS NIGHTLY
Status: ON HOLD | COMMUNITY
End: 2020-01-01 | Stop reason: SDUPTHER

## 2018-03-22 RX ADMIN — SODIUM CHLORIDE 1000 ML: 9 INJECTION, SOLUTION INTRAVENOUS at 23:56

## 2018-03-23 ENCOUNTER — APPOINTMENT (OUTPATIENT)
Dept: GENERAL RADIOLOGY | Facility: HOSPITAL | Age: 78
End: 2018-03-23

## 2018-03-23 VITALS
RESPIRATION RATE: 17 BRPM | WEIGHT: 259 LBS | TEMPERATURE: 98.2 F | HEIGHT: 69 IN | HEART RATE: 67 BPM | DIASTOLIC BLOOD PRESSURE: 95 MMHG | SYSTOLIC BLOOD PRESSURE: 158 MMHG | BODY MASS INDEX: 38.36 KG/M2 | OXYGEN SATURATION: 99 %

## 2018-03-23 LAB
ALBUMIN SERPL-MCNC: 3.4 G/DL (ref 3.5–5.2)
ALBUMIN/GLOB SERPL: 0.8 G/DL
ALP SERPL-CCNC: 84 U/L (ref 39–117)
ALT SERPL W P-5'-P-CCNC: 14 U/L (ref 1–33)
ANION GAP SERPL CALCULATED.3IONS-SCNC: 10 MMOL/L
AST SERPL-CCNC: 18 U/L (ref 1–32)
BACTERIA UR QL AUTO: NORMAL /HPF
BASOPHILS # BLD AUTO: 0.03 10*3/MM3 (ref 0–0.2)
BASOPHILS NFR BLD AUTO: 0.5 % (ref 0–1.5)
BILIRUB SERPL-MCNC: 0.2 MG/DL (ref 0.1–1.2)
BILIRUB UR QL STRIP: NEGATIVE
BUN BLD-MCNC: 22 MG/DL (ref 8–23)
BUN/CREAT SERPL: 18.6 (ref 7–25)
CALCIUM SPEC-SCNC: 9.4 MG/DL (ref 8.6–10.5)
CHLORIDE SERPL-SCNC: 102 MMOL/L (ref 98–107)
CLARITY UR: CLEAR
CO2 SERPL-SCNC: 29 MMOL/L (ref 22–29)
COLOR UR: YELLOW
CREAT BLD-MCNC: 1.18 MG/DL (ref 0.57–1)
D-LACTATE SERPL-SCNC: 1.3 MMOL/L (ref 0.5–2)
DEPRECATED RDW RBC AUTO: 48.5 FL (ref 37–54)
EOSINOPHIL # BLD AUTO: 0.21 10*3/MM3 (ref 0–0.7)
EOSINOPHIL NFR BLD AUTO: 3.2 % (ref 0.3–6.2)
ERYTHROCYTE [DISTWIDTH] IN BLOOD BY AUTOMATED COUNT: 14.2 % (ref 11.7–13)
GFR SERPL CREATININE-BSD FRML MDRD: 54 ML/MIN/1.73
GLOBULIN UR ELPH-MCNC: 4.5 GM/DL
GLUCOSE BLD-MCNC: 267 MG/DL (ref 65–99)
GLUCOSE UR STRIP-MCNC: NEGATIVE MG/DL
HCT VFR BLD AUTO: 39.4 % (ref 35.6–45.5)
HGB BLD-MCNC: 12.3 G/DL (ref 11.9–15.5)
HGB UR QL STRIP.AUTO: NEGATIVE
HYALINE CASTS UR QL AUTO: NORMAL /LPF
IMM GRANULOCYTES # BLD: 0.02 10*3/MM3 (ref 0–0.03)
IMM GRANULOCYTES NFR BLD: 0.3 % (ref 0–0.5)
INR PPP: 2.97 (ref 0.9–1.1)
KETONES UR QL STRIP: NEGATIVE
LEUKOCYTE ESTERASE UR QL STRIP.AUTO: NEGATIVE
LYMPHOCYTES # BLD AUTO: 3.11 10*3/MM3 (ref 0.9–4.8)
LYMPHOCYTES NFR BLD AUTO: 47.5 % (ref 19.6–45.3)
MAGNESIUM SERPL-MCNC: 1.6 MG/DL (ref 1.6–2.4)
MCH RBC QN AUTO: 29.4 PG (ref 26.9–32)
MCHC RBC AUTO-ENTMCNC: 31.2 G/DL (ref 32.4–36.3)
MCV RBC AUTO: 94 FL (ref 80.5–98.2)
MONOCYTES # BLD AUTO: 0.38 10*3/MM3 (ref 0.2–1.2)
MONOCYTES NFR BLD AUTO: 5.8 % (ref 5–12)
NEUTROPHILS # BLD AUTO: 2.8 10*3/MM3 (ref 1.9–8.1)
NEUTROPHILS NFR BLD AUTO: 42.7 % (ref 42.7–76)
NITRITE UR QL STRIP: NEGATIVE
NT-PROBNP SERPL-MCNC: 378.8 PG/ML (ref 0–1800)
PH UR STRIP.AUTO: 6 [PH] (ref 5–8)
PLATELET # BLD AUTO: 220 10*3/MM3 (ref 140–500)
PMV BLD AUTO: 11.2 FL (ref 6–12)
POTASSIUM BLD-SCNC: 4.3 MMOL/L (ref 3.5–5.2)
PROCALCITONIN SERPL-MCNC: 0.12 NG/ML (ref 0.1–0.25)
PROT SERPL-MCNC: 7.9 G/DL (ref 6–8.5)
PROT UR QL STRIP: ABNORMAL
PROTHROMBIN TIME: 30.5 SECONDS (ref 11.7–14.2)
RBC # BLD AUTO: 4.19 10*6/MM3 (ref 3.9–5.2)
RBC # UR: NORMAL /HPF
REF LAB TEST METHOD: NORMAL
SODIUM BLD-SCNC: 141 MMOL/L (ref 136–145)
SP GR UR STRIP: 1.01 (ref 1–1.03)
SQUAMOUS #/AREA URNS HPF: NORMAL /HPF
TROPONIN T SERPL-MCNC: <0.01 NG/ML (ref 0–0.03)
UROBILINOGEN UR QL STRIP: ABNORMAL
WBC NRBC COR # BLD: 6.55 10*3/MM3 (ref 4.5–10.7)
WBC UR QL AUTO: NORMAL /HPF

## 2018-03-23 PROCEDURE — 81001 URINALYSIS AUTO W/SCOPE: CPT | Performed by: EMERGENCY MEDICINE

## 2018-03-23 PROCEDURE — 93010 ELECTROCARDIOGRAM REPORT: CPT | Performed by: INTERNAL MEDICINE

## 2018-03-23 PROCEDURE — 87040 BLOOD CULTURE FOR BACTERIA: CPT | Performed by: EMERGENCY MEDICINE

## 2018-03-23 PROCEDURE — 83605 ASSAY OF LACTIC ACID: CPT | Performed by: EMERGENCY MEDICINE

## 2018-03-23 PROCEDURE — 71045 X-RAY EXAM CHEST 1 VIEW: CPT

## 2018-03-23 RX ORDER — HYDRALAZINE HYDROCHLORIDE 100 MG/1
100 TABLET, FILM COATED ORAL EVERY 8 HOURS SCHEDULED
Qty: 90 TABLET | Refills: 0 | Status: SHIPPED | OUTPATIENT
Start: 2018-03-23 | End: 2018-04-22

## 2018-03-23 RX ORDER — ALLOPURINOL 100 MG/1
100 TABLET ORAL DAILY
Qty: 30 TABLET | Refills: 0 | Status: SHIPPED | OUTPATIENT
Start: 2018-03-23 | End: 2018-04-22

## 2018-03-23 RX ORDER — METOPROLOL TARTRATE 50 MG/1
50 TABLET, FILM COATED ORAL EVERY 12 HOURS SCHEDULED
Qty: 60 TABLET | Refills: 0 | Status: SHIPPED | OUTPATIENT
Start: 2018-03-23 | End: 2018-04-22

## 2018-03-23 RX ORDER — FAMOTIDINE 20 MG/1
20 TABLET, FILM COATED ORAL 2 TIMES DAILY
Qty: 60 TABLET | Refills: 0 | Status: SHIPPED | OUTPATIENT
Start: 2018-03-23 | End: 2018-04-22

## 2018-03-23 RX ORDER — CLONIDINE HYDROCHLORIDE 0.1 MG/1
0.3 TABLET ORAL ONCE
Status: COMPLETED | OUTPATIENT
Start: 2018-03-23 | End: 2018-03-23

## 2018-03-23 RX ORDER — ATORVASTATIN CALCIUM 80 MG/1
80 TABLET, FILM COATED ORAL NIGHTLY
Qty: 30 TABLET | Refills: 0 | Status: SHIPPED | OUTPATIENT
Start: 2018-03-23 | End: 2018-04-22

## 2018-03-23 RX ORDER — AMLODIPINE BESYLATE 10 MG/1
10 TABLET ORAL DAILY
Qty: 30 TABLET | Refills: 0 | Status: SHIPPED | OUTPATIENT
Start: 2018-03-23 | End: 2018-04-22

## 2018-03-23 RX ORDER — HYDRALAZINE HYDROCHLORIDE 50 MG/1
100 TABLET, FILM COATED ORAL ONCE
Status: COMPLETED | OUTPATIENT
Start: 2018-03-23 | End: 2018-03-23

## 2018-03-23 RX ORDER — METOPROLOL TARTRATE 50 MG/1
50 TABLET, FILM COATED ORAL ONCE
Status: COMPLETED | OUTPATIENT
Start: 2018-03-23 | End: 2018-03-23

## 2018-03-23 RX ADMIN — CLONIDINE HYDROCHLORIDE 0.3 MG: 0.1 TABLET ORAL at 03:23

## 2018-03-23 RX ADMIN — METOPROLOL TARTRATE 50 MG: 50 TABLET, FILM COATED ORAL at 03:24

## 2018-03-23 RX ADMIN — HYDRALAZINE HYDROCHLORIDE 100 MG: 50 TABLET, FILM COATED ORAL at 03:24

## 2018-03-23 NOTE — ED TRIAGE NOTES
EMS reports Nursing home called for confusion and hypertension.  Ems reports pt is no longer confused.  Pt is alert and oriented at this time.  Nursing home states pt refused all meals today.

## 2018-03-23 NOTE — ED PROVIDER NOTES
" EMERGENCY DEPARTMENT ENCOUNTER    CHIEF COMPLAINT  Chief Complaint: AMS  History given by: Pt and her family  History limited by: none  Room Number: 11/11  PMD: Yousuf Corrigan MD      HPI:  Pt is a 78 y.o. female who presents complaining of AMS that was reported by the nursing home as starting today. Per pt's family, Pt woke up with confusion and \"glassy eyes\". They also states pt has SOA, cough,N/V, posterior headache, and speech difficulty.Pt denies fever, chest pain, or abd pain. Family states pt's status when found with confusion is not baseline. She states she now feels better. She also states she has not eaten anything in 2 days as she did not feel well.  Pt lives at [facility]. Pt also has left hemiplegia due to previous stroke.    Duration:  Since today  Onset: gradual  Radiation: none stated  Quality: Confusion  Intensity/Severity: moderate  Progression: Pt states she is feeling much better  Associated Symptoms: NEHEMIAH, posterior headache, cough, N/V, and speech difficulty  Aggravating Factors: none stated  Alleviating Factors: none stated  Previous Episodes: none stated  Treatment before arrival: none stated    PAST MEDICAL HISTORY  Active Ambulatory Problems     Diagnosis Date Noted   • Acute renal failure superimposed on chronic kidney disease 02/10/2018   • Anemia 02/09/2018     Resolved Ambulatory Problems     Diagnosis Date Noted   • No Resolved Ambulatory Problems     Past Medical History:   Diagnosis Date   • Anemia    • Anxiety    • Arthritis    • Cellulitis of left lower extremity    • CHF (congestive heart failure)    • Depression    • Diabetes mellitus    • DVT (deep venous thrombosis)    • GERD (gastroesophageal reflux disease)    • Hyperlipidemia    • Hypertension    • Lymphedema    • Obesity    • Osteoporosis    • Renal disorder    • Stroke    • Venous insufficiency (chronic) (peripheral)        PAST SURGICAL HISTORY  Past Surgical History:   Procedure Laterality Date   • " "COLONOSCOPY N/A 2/17/2018    Procedure: COLONOSCOPY into cecum with cold polypectomy;  Surgeon: David Villavicencio MD;  Location: [facility] ENDOSCOPY;  Service:    • ENDOSCOPY  2/17/2018    Procedure: ESOPHAGOGASTRODUODENOSCOPY;  Surgeon: David Villavicencio MD;  Location: [facility] ENDOSCOPY;  Service:        FAMILY HISTORY  No family history on file.    SOCIAL HISTORY  Social History     Social History   • Marital status:      Spouse name: N/A   • Number of children: N/A   • Years of education: N/A     Occupational History   • Not on file.     Social History Main Topics   • Smoking status: Unknown If Ever Smoked   • Smokeless tobacco: Not on file   • Alcohol use No   • Drug use: Unknown   • Sexual activity: Defer     Other Topics Concern   • Not on file     Social History Narrative   • No narrative on file       ALLERGIES  Contrast dye and Iodine    REVIEW OF SYSTEMS  Review of Systems   Constitutional: Negative for fever.   HENT: Negative for sore throat.    Eyes: Negative.         \"glassy eyes\"   Respiratory: Positive for cough and shortness of breath.    Cardiovascular: Negative for chest pain.   Gastrointestinal: Positive for nausea and vomiting. Negative for abdominal pain and diarrhea.   Genitourinary: Negative for dysuria.   Musculoskeletal: Negative for neck pain.   Skin: Negative for rash.   Allergic/Immunologic: Negative.    Neurological: Positive for speech difficulty and headaches (Posterior). Negative for weakness and numbness.   Hematological: Negative.    Psychiatric/Behavioral: Positive for confusion.        AMS   All other systems reviewed and are negative.      PHYSICAL EXAM  ED Triage Vitals   Temp Heart Rate Resp BP SpO2   03/22/18 2041 03/22/18 2038 03/22/18 2038 03/22/18 2038 03/22/18 2041   96.6 °F (35.9 °C) 80 18 (!) 190/100 96 %      Temp src Heart Rate Source Patient Position BP Location FiO2 (%)   03/22/18 2041 03/22/18 2038 03/22/18 2038 03/22/18 2038 --   Tympanic " Monitor Sitting Right arm        Physical Exam   Constitutional: She is oriented to person, place, and time and well-developed, well-nourished, and in no distress. No distress.   HENT:   Head: Normocephalic and atraumatic.   Mouth/Throat: Mucous membranes are dry.   Eyes: EOM are normal. Pupils are equal, round, and reactive to light.   Neck: Normal range of motion. Neck supple.   Cardiovascular: Normal rate, regular rhythm and normal heart sounds.    Pulmonary/Chest: Effort normal. No respiratory distress. She has rhonchi in the left lower field.   Abdominal: Soft. There is no tenderness. There is no rebound and no guarding.   Musculoskeletal: She exhibits no edema.   Pain with movement of right hip.  Pt's legs bandaged by nursing home. After removal, 2 superficial skin ulcers on anterior mid lower left leg. No evidence of infection.   Neurological: She is alert and oriented to person, place, and time.   Left hemiparesis   Skin: Skin is warm and dry. No rash noted.   Psychiatric: Mood and affect normal.   Nursing note and vitals reviewed.      LAB RESULTS  Lab Results (last 24 hours)     Procedure Component Value Units Date/Time    CBC & Differential [[id number]] Collected:  03/22/18 2355    Specimen:  Blood Updated:  03/23/18 0013    Narrative:       The following orders were created for panel order CBC & Differential.  Procedure                               Abnormality         Status                     ---------                               -----------         ------                     CBC Auto Differential[[id number]]        Abnormal            Final result                 Please view results for these tests on the individual orders.    Comprehensive Metabolic Panel [[id number]]  (Abnormal) Collected:  03/22/18 2355    Specimen:  Blood Updated:  03/23/18 0032     Glucose 267 (H) mg/dL      BUN 22 mg/dL      Creatinine 1.18 (H) mg/dL      Sodium 141 mmol/L      Potassium 4.3 mmol/L      Chloride 102 mmol/L       "CO2 29.0 mmol/L      Calcium 9.4 mg/dL      Total Protein 7.9 g/dL      Albumin 3.40 (L) g/dL      ALT (SGPT) 14 U/L      AST (SGOT) 18 U/L      Alkaline Phosphatase 84 U/L      Total Bilirubin 0.2 mg/dL      eGFR  Damon Amer 54 (L) mL/min/1.73      Globulin 4.5 gm/dL      A/G Ratio 0.8 g/dL      BUN/Creatinine Ratio 18.6     Anion Gap 10.0 mmol/L     Narrative:       The MDRD GFR formula is only valid for adults with stable renal function between ages 18 and 70.    Protime-INR [[id number]]  (Abnormal) Collected:  03/22/18 2355    Specimen:  Blood Updated:  03/23/18 0021     Protime 30.5 (H) Seconds      INR 2.97 (H)    Procalcitonin [[id number]]  (Normal) Collected:  03/22/18 2355    Specimen:  Blood Updated:  03/23/18 0039     Procalcitonin 0.12 ng/mL     Narrative:       As a Marker for Sepsis (Non-Neonates):   1. <0.5 ng/mL represents a low risk of severe sepsis and/or septic shock.  1. >2 ng/mL represents a high risk of severe sepsis and/or septic shock.    As a Marker for Lower Respiratory Tract Infections that require antibiotic therapy:  PCT on Admission     Antibiotic Therapy             6-12 Hrs later  > 0.5                Strongly Recommended            >0.25 - <0.5         Recommended  0.1 - 0.25           Discouraged                   Remeasure/reassess PCT  <0.1                 Strongly Discouraged          Remeasure/reassess PCT      As 28 day mortality risk marker: \"Change in Procalcitonin Result\" (> 80 % or <=80 %) if Day 0 (or Day 1) and Day 4 values are available. Refer to http://www.[employer]-pct-calculator.com/   Change in PCT <=80 %   A decrease of PCT levels below or equal to 80 % defines a positive change in PCT test result representing a higher risk for 28-day all-cause mortality of patients diagnosed with severe sepsis or septic shock.  Change in PCT > 80 %   A decrease of PCT levels of more than 80 % defines a negative change in PCT result representing a lower risk for 28-day all-cause " mortality of patients diagnosed with severe sepsis or septic shock.                Troponin [[id number]]  (Normal) Collected:  03/22/18 2355    Specimen:  Blood Updated:  03/23/18 0032     Troponin T <0.010 ng/mL     Narrative:       Troponin T Reference Ranges:  Less than 0.03 ng/mL:    Negative for AMI  0.03 to 0.09 ng/mL:      Indeterminant for AMI  Greater than 0.09 ng/mL: Positive for AMI    BNP [[id number]]  (Normal) Collected:  03/22/18 2355    Specimen:  Blood Updated:  03/23/18 0030     proBNP 378.8 pg/mL     Narrative:       Among patients with dyspnea, NT-proBNP is highly sensitive for the detection of acute congestive heart failure. In addition NT-proBNP of <300 pg/ml effectively rules out acute congestive heart failure with 99% negative predictive value.    Magnesium [[id number]]  (Normal) Collected:  03/22/18 2355    Specimen:  Blood Updated:  03/23/18 0032     Magnesium 1.6 mg/dL     CBC Auto Differential [[id number]]  (Abnormal) Collected:  03/22/18 2355    Specimen:  Blood Updated:  03/23/18 0013     WBC 6.55 10*3/mm3      RBC 4.19 10*6/mm3      Hemoglobin 12.3 g/dL      Hematocrit 39.4 %      MCV 94.0 fL      MCH 29.4 pg      MCHC 31.2 (L) g/dL      RDW 14.2 (H) %      RDW-SD 48.5 fl      MPV 11.2 fL      Platelets 220 10*3/mm3      Neutrophil % 42.7 %      Lymphocyte % 47.5 (H) %      Monocyte % 5.8 %      Eosinophil % 3.2 %      Basophil % 0.5 %      Immature Grans % 0.3 %      Neutrophils, Absolute 2.80 10*3/mm3      Lymphocytes, Absolute 3.11 10*3/mm3      Monocytes, Absolute 0.38 10*3/mm3      Eosinophils, Absolute 0.21 10*3/mm3      Basophils, Absolute 0.03 10*3/mm3      Immature Grans, Absolute 0.02 10*3/mm3     Lactic Acid, Plasma [[id number]]  (Normal) Collected:  03/23/18 0043    Specimen:  Blood Updated:  03/23/18 0107     Lactate 1.3 mmol/L     Blood Culture - Blood, [[id number]] Collected:  03/23/18 0043    Specimen:  Blood from Arm, Left Updated:  03/23/18 0047    Blood Culture - Blood,  [[id number]] Collected:  03/23/18 0043    Specimen:  Blood from Arm, Left Updated:  03/23/18 0048    Urinalysis With / Microscopic If Indicated - Urine, Catheter [[id number]]  (Abnormal) Collected:  03/23/18 0059    Specimen:  Urine from Urine, Catheter Updated:  03/23/18 0116     Color, UA Yellow     Appearance, UA Clear     pH, UA 6.0     Specific Gravity, UA 1.014     Glucose, UA Negative     Ketones, UA Negative     Bilirubin, UA Negative     Blood, UA Negative     Protein,  mg/dL (2+) (A)     Leuk Esterase, UA Negative     Nitrite, UA Negative     Urobilinogen, UA 0.2 E.U./dL    Urinalysis, Microscopic Only - Urine, Clean Catch [[id number]] Collected:  03/23/18 0059    Specimen:  Urine from Urine, Catheter Updated:  03/23/18 0116     RBC, UA 0-2 /HPF      WBC, UA 0-2 /HPF      Bacteria, UA None Seen /HPF      Squamous Epithelial Cells, UA 0-2 /HPF      Hyaline Casts, UA 0-2 /LPF      Methodology Automated Microscopy          I ordered the above labs and reviewed the results    RADIOLOGY  CT Head Without Contrast   Final Result   1. No acute intracranial abnormality.                           This study was performed with techniques to keep radiation doses as low   as reasonably achievable (ALARA). Individualized dose reduction   techniques using automated exposure control or adjustment of mA and/or   kV according to the patient size were employed.        This report was finalized on 3/23/2018 12:22 AM by Nima Curry MD.          XR Chest 1 View   Final Result       Stable appearance of the chest by portable radiography.       This report was finalized on 3/23/2018 12:19 AM by Nima Curry MD.               I ordered the above noted radiological studies. Interpreted by radiologist. Reviewed by me in PACS.       PROCEDURES  Procedures  EKG           EKG time: 1218  Rhythm/Rate: Sinus 63  P waves and UT: nml  QRS, axis: LVH   ST and T waves: Repolarization changes from LVH     Interpreted Contemporaneously  by me, independently viewed  unchanged compared to prior 2/10/18      PROGRESS AND CONSULTS  ED Course   1:41 AM  BP- 161/95 HR- 80 Temp- 98 °F (36.7 °C) (Tympanic) O2 sat- 94%  Rechecked the patient who is in NAD and is resting comfortably. I informed pt of blood work being normal and CXR being NAD. I informed them that I am now waiting on the results of the CT scan.    2:39 AM  BP- (!) 174/104 HR- 63 Temp- 98 °F (36.7 °C) (Tympanic) O2 sat- 95%  Rechecked the patient who is in NAD and is resting comfortably.   Informed pt of CT being also normal. I then informed her of the plan to treat her high blood pressure and discharge her.Pt understands and agrees with the plan, all questions answered.      3:08 AM  RN gave pt her regular night time blood pressure medicine and we will reassess at a later time.    4:28 AM  BP- 150/97 HR- 60 Temp- 98 °F (36.7 °C) (Tympanic) O2 sat- 95%  Rechecked the patient who is in NAD and is resting comfortably. Informed pt and her family of the recent change in pt's medication that has increased dosages of blodd pressure medicine. I then informed them of how her norco medication might be causing her symptoms. I then informed her of the plan to discharge her with new medication instructions for her nursing home.Pt understands and agrees with the plan, all questions answered.      MEDICAL DECISION MAKING  Results were reviewed/discussed with the patient and they were also made aware of online access. Pt also made aware that some labs, such as cultures, will not be resulted during ER visit and follow up with PMD is necessary.     MDM  Number of Diagnoses or Management Options     Amount and/or Complexity of Data Reviewed  Clinical lab tests: reviewed and ordered (Glucose-267  Creatinine-stable compared to 2/19  Troponin is <0.010  )  Tests in the radiology section of CPT®: ordered and reviewed (CXR-Stable appearance of the chest by portable radiography.    CT head-NAD)  Tests in the medicine  section of CPT®: reviewed and ordered (See Procedure Note)  Decide to obtain previous medical records or to obtain history from someone other than the patient: yes  Review and summarize past medical records: yes (Admitted feb 9-19 with acute renal failure with hyperkalemia.Left hemiplegia.)  Independent visualization of images, tracings, or specimens: yes           DIAGNOSIS  Final diagnoses:   Somnolence   Secondary hypertension       DISPOSITION  DISCHARGE    Patient discharged in stable condition.    Reviewed implications of results, diagnosis, meds, responsibility to follow up, warning signs and symptoms of possible worsening, potential complications and reasons to return to ER.    Patient/Family voiced understanding of above instructions.    Discussed plan for discharge, as there is no emergent indication for admission. Patient referred to primary care provider for BP management due to today's BP. Pt/family is agreeable and understands need for follow up and repeat testing.  Pt is aware that discharge does not mean that nothing is wrong but it indicates no emergency is present that requires admission and they must continue care with follow-up as given below or physician of their choice.     FOLLOW-UP  No follow-up provider specified.       Medication List      ASK your doctor about these medications    allopurinol 100 MG tablet  Commonly known as:  ZYLOPRIM  Take 1 tablet by mouth Daily for 30 days.  Ask about: Should I take this medication?     amLODIPine 10 MG tablet  Commonly known as:  NORVASC  Take 1 tablet by mouth Daily for 30 days.  Ask about: Should I take this medication?     atorvastatin 80 MG tablet  Commonly known as:  LIPITOR  Take 1 tablet by mouth Every Night for 30 days.  Ask about: Should I take this medication?     famotidine 20 MG tablet  Commonly known as:  PEPCID  Take 1 tablet by mouth 2 (Two) Times a Day for 30 days.  Ask about: Should I take this medication?     hydrALAZINE 100 MG  tablet  Commonly known as:  APRESOLINE  Take 1 tablet by mouth Every 8 (Eight) Hours for 30 days.  Ask about: Should I take this medication?     insulin aspart 100 UNIT/ML injection  Commonly known as:  novoLOG  Inject 7 Units under the skin 3 (Three) Times a Day With Meals for 30   days.  Ask about: Should I take this medication?     insulin detemir 100 UNIT/ML injection  Commonly known as:  LEVEMIR  Inject 40 Units under the skin Every Night for 30 days.  Ask about: Should I take this medication?     lubiprostone 24 MCG capsule  Commonly known as:  AMITIZA  Take 1 capsule by mouth 2 (Two) Times a Day With Meals for 30 days.  Ask about: Should I take this medication?     metoprolol tartrate 50 MG tablet  Commonly known as:  LOPRESSOR  Take 1 tablet by mouth Every 12 (Twelve) Hours for 30 days.  Ask about: Should I take this medication?              Latest Documented Vital Signs:  As of 4:35 AM  BP- 154/97 HR- 68 Temp- 98 °F (36.7 °C) (Tympanic) O2 sat- 98%    --  Documentation assistance provided by chiquita Man for .  Information recorded by the scribe was done at my direction and has been verified and validated by me.              Samson Man  03/23/18 6252       Aldo Bhtaia MD  03/29/18 9829

## 2018-03-28 LAB
BACTERIA SPEC AEROBE CULT: NORMAL
BACTERIA SPEC AEROBE CULT: NORMAL

## 2018-12-19 ENCOUNTER — LAB REQUISITION (OUTPATIENT)
Dept: LAB | Facility: HOSPITAL | Age: 78
End: 2018-12-19

## 2018-12-19 DIAGNOSIS — Z00.00 ROUTINE GENERAL MEDICAL EXAMINATION AT A HEALTH CARE FACILITY: ICD-10-CM

## 2018-12-19 LAB
INR PPP: 2.89 (ref 0.9–1.1)
PROTHROMBIN TIME: 29.8 SECONDS (ref 11.7–14.2)

## 2018-12-19 PROCEDURE — 85610 PROTHROMBIN TIME: CPT

## 2019-02-01 ENCOUNTER — LAB REQUISITION (OUTPATIENT)
Dept: LAB | Facility: HOSPITAL | Age: 79
End: 2019-02-01

## 2019-02-01 DIAGNOSIS — Z00.00 ROUTINE GENERAL MEDICAL EXAMINATION AT A HEALTH CARE FACILITY: ICD-10-CM

## 2019-02-01 LAB
INR PPP: 4.39 (ref 0.9–1.1)
PROTHROMBIN TIME: 41.3 SECONDS (ref 11.7–14.2)

## 2019-02-01 PROCEDURE — 85610 PROTHROMBIN TIME: CPT

## 2019-02-06 ENCOUNTER — LAB REQUISITION (OUTPATIENT)
Dept: LAB | Facility: HOSPITAL | Age: 79
End: 2019-02-06

## 2019-02-06 DIAGNOSIS — Z00.00 ROUTINE GENERAL MEDICAL EXAMINATION AT A HEALTH CARE FACILITY: ICD-10-CM

## 2019-02-06 LAB
INR PPP: 2.55 (ref 0.9–1.1)
PROTHROMBIN TIME: 27 SECONDS (ref 11.7–14.2)

## 2019-02-06 PROCEDURE — 85610 PROTHROMBIN TIME: CPT

## 2019-02-07 ENCOUNTER — LAB REQUISITION (OUTPATIENT)
Dept: LAB | Facility: HOSPITAL | Age: 79
End: 2019-02-07

## 2019-02-07 DIAGNOSIS — Z00.00 ROUTINE GENERAL MEDICAL EXAMINATION AT A HEALTH CARE FACILITY: ICD-10-CM

## 2019-02-07 LAB
APTT PPP: 38.3 SECONDS (ref 22.7–35.4)
INR PPP: 2.35 (ref 0.9–1.1)
PROTHROMBIN TIME: 25.3 SECONDS (ref 11.7–14.2)

## 2019-02-07 PROCEDURE — 85610 PROTHROMBIN TIME: CPT

## 2020-01-01 ENCOUNTER — APPOINTMENT (OUTPATIENT)
Dept: CT IMAGING | Facility: HOSPITAL | Age: 80
End: 2020-01-01

## 2020-01-01 ENCOUNTER — TELEPHONE (OUTPATIENT)
Dept: GASTROENTEROLOGY | Facility: CLINIC | Age: 80
End: 2020-01-01

## 2020-01-01 ENCOUNTER — APPOINTMENT (OUTPATIENT)
Dept: NUCLEAR MEDICINE | Facility: HOSPITAL | Age: 80
End: 2020-01-01

## 2020-01-01 ENCOUNTER — LAB REQUISITION (OUTPATIENT)
Dept: LAB | Facility: HOSPITAL | Age: 80
End: 2020-01-01

## 2020-01-01 ENCOUNTER — HOSPITAL ENCOUNTER (INPATIENT)
Facility: HOSPITAL | Age: 80
LOS: 15 days | Discharge: NURSING, REHAB OR LONG-TERM CARE | End: 2020-07-09
Attending: EMERGENCY MEDICINE | Admitting: HOSPITALIST

## 2020-01-01 ENCOUNTER — APPOINTMENT (OUTPATIENT)
Dept: CARDIOLOGY | Facility: HOSPITAL | Age: 80
End: 2020-01-01

## 2020-01-01 ENCOUNTER — APPOINTMENT (OUTPATIENT)
Dept: MRI IMAGING | Facility: HOSPITAL | Age: 80
End: 2020-01-01

## 2020-01-01 ENCOUNTER — APPOINTMENT (OUTPATIENT)
Dept: GENERAL RADIOLOGY | Facility: HOSPITAL | Age: 80
End: 2020-01-01

## 2020-01-01 VITALS
DIASTOLIC BLOOD PRESSURE: 73 MMHG | HEART RATE: 87 BPM | SYSTOLIC BLOOD PRESSURE: 133 MMHG | OXYGEN SATURATION: 97 % | TEMPERATURE: 99.8 F | RESPIRATION RATE: 18 BRPM | WEIGHT: 284.39 LBS | BODY MASS INDEX: 44.64 KG/M2 | HEIGHT: 67 IN

## 2020-01-01 DIAGNOSIS — Z00.00 ROUTINE GENERAL MEDICAL EXAMINATION AT A HEALTH CARE FACILITY: ICD-10-CM

## 2020-01-01 DIAGNOSIS — K92.2 GASTROINTESTINAL HEMORRHAGE, UNSPECIFIED GASTROINTESTINAL HEMORRHAGE TYPE: Primary | ICD-10-CM

## 2020-01-01 DIAGNOSIS — Z79.01 ANTICOAGULATED: ICD-10-CM

## 2020-01-01 DIAGNOSIS — R77.8 ELEVATED TROPONIN: ICD-10-CM

## 2020-01-01 DIAGNOSIS — N17.9 AKI (ACUTE KIDNEY INJURY) (HCC): ICD-10-CM

## 2020-01-01 LAB
ABO GROUP BLD: NORMAL
ABO GROUP BLD: NORMAL
ALBUMIN SERPL-MCNC: 2.7 G/DL (ref 3.5–5.2)
ALBUMIN SERPL-MCNC: 2.8 G/DL (ref 3.5–5.2)
ALBUMIN SERPL-MCNC: 2.9 G/DL (ref 3.5–5.2)
ALBUMIN SERPL-MCNC: 2.9 G/DL (ref 3.5–5.2)
ALBUMIN SERPL-MCNC: 3.1 G/DL (ref 3.5–5.2)
ALBUMIN SERPL-MCNC: 3.2 G/DL (ref 3.5–5.2)
ALBUMIN/GLOB SERPL: 0.9 G/DL
ALBUMIN/GLOB SERPL: 0.9 G/DL
ALP SERPL-CCNC: 54 U/L (ref 39–117)
ALP SERPL-CCNC: 59 U/L (ref 39–117)
ALT SERPL W P-5'-P-CCNC: 11 U/L (ref 1–33)
ALT SERPL W P-5'-P-CCNC: 9 U/L (ref 1–33)
ANION GAP SERPL CALCULATED.3IONS-SCNC: 10 MMOL/L (ref 5–15)
ANION GAP SERPL CALCULATED.3IONS-SCNC: 10.2 MMOL/L (ref 5–15)
ANION GAP SERPL CALCULATED.3IONS-SCNC: 10.6 MMOL/L (ref 5–15)
ANION GAP SERPL CALCULATED.3IONS-SCNC: 11.5 MMOL/L (ref 5–15)
ANION GAP SERPL CALCULATED.3IONS-SCNC: 11.8 MMOL/L (ref 5–15)
ANION GAP SERPL CALCULATED.3IONS-SCNC: 5.4 MMOL/L (ref 5–15)
ANION GAP SERPL CALCULATED.3IONS-SCNC: 6.4 MMOL/L (ref 5–15)
ANION GAP SERPL CALCULATED.3IONS-SCNC: 6.5 MMOL/L (ref 5–15)
ANION GAP SERPL CALCULATED.3IONS-SCNC: 6.9 MMOL/L (ref 5–15)
ANION GAP SERPL CALCULATED.3IONS-SCNC: 6.9 MMOL/L (ref 5–15)
ANION GAP SERPL CALCULATED.3IONS-SCNC: 7.2 MMOL/L (ref 5–15)
ANION GAP SERPL CALCULATED.3IONS-SCNC: 7.2 MMOL/L (ref 5–15)
ANION GAP SERPL CALCULATED.3IONS-SCNC: 7.6 MMOL/L (ref 5–15)
ANION GAP SERPL CALCULATED.3IONS-SCNC: 7.9 MMOL/L (ref 5–15)
ANION GAP SERPL CALCULATED.3IONS-SCNC: 8 MMOL/L (ref 5–15)
ANION GAP SERPL CALCULATED.3IONS-SCNC: 8.2 MMOL/L (ref 5–15)
ANION GAP SERPL CALCULATED.3IONS-SCNC: 9.6 MMOL/L (ref 5–15)
ANION GAP SERPL CALCULATED.3IONS-SCNC: 9.7 MMOL/L (ref 5–15)
AORTIC DIMENSIONLESS INDEX: 0.9 (DI)
APTT PPP: 32.2 SECONDS (ref 22.7–35.4)
AST SERPL-CCNC: 12 U/L (ref 1–32)
AST SERPL-CCNC: 14 U/L (ref 1–32)
B PARAPERT DNA SPEC QL NAA+PROBE: NOT DETECTED
B PERT DNA SPEC QL NAA+PROBE: NOT DETECTED
BACTERIA SPEC AEROBE CULT: ABNORMAL
BACTERIA SPEC AEROBE CULT: NORMAL
BACTERIA SPEC AEROBE CULT: NORMAL
BACTERIA UR QL AUTO: ABNORMAL /HPF
BASOPHILS # BLD AUTO: 0.03 10*3/MM3 (ref 0–0.2)
BASOPHILS # BLD AUTO: 0.04 10*3/MM3 (ref 0–0.2)
BASOPHILS # BLD AUTO: 0.05 10*3/MM3 (ref 0–0.2)
BASOPHILS # BLD AUTO: 0.06 10*3/MM3 (ref 0–0.2)
BASOPHILS # BLD AUTO: 0.07 10*3/MM3 (ref 0–0.2)
BASOPHILS NFR BLD AUTO: 0.4 % (ref 0–1.5)
BASOPHILS NFR BLD AUTO: 0.5 % (ref 0–1.5)
BASOPHILS NFR BLD AUTO: 0.6 % (ref 0–1.5)
BASOPHILS NFR BLD AUTO: 0.7 % (ref 0–1.5)
BASOPHILS NFR BLD AUTO: 0.8 % (ref 0–1.5)
BASOPHILS NFR BLD AUTO: 0.9 % (ref 0–1.5)
BH BB BLOOD EXPIRATION DATE: NORMAL
BH BB BLOOD TYPE BARCODE: 600
BH BB DISPENSE STATUS: NORMAL
BH BB PRODUCT CODE: NORMAL
BH BB UNIT NUMBER: NORMAL
BH CV ECHO MEAS - ACS: 2.2 CM
BH CV ECHO MEAS - AO MAX PG: 7.6 MMHG
BH CV ECHO MEAS - AO MEAN PG (FULL): 1 MMHG
BH CV ECHO MEAS - AO MEAN PG: 4 MMHG
BH CV ECHO MEAS - AO ROOT AREA (BSA CORRECTED): 1.8
BH CV ECHO MEAS - AO ROOT AREA: 13.2 CM^2
BH CV ECHO MEAS - AO ROOT DIAM: 4.1 CM
BH CV ECHO MEAS - AO V2 MAX: 138 CM/SEC
BH CV ECHO MEAS - AO V2 MEAN: 96.6 CM/SEC
BH CV ECHO MEAS - AO V2 VTI: 26.9 CM
BH CV ECHO MEAS - AVA(I,A): 3.1 CM^2
BH CV ECHO MEAS - AVA(I,D): 3.1 CM^2
BH CV ECHO MEAS - BSA(HAYCOCK): 2.4 M^2
BH CV ECHO MEAS - BSA: 2.3 M^2
BH CV ECHO MEAS - BZI_BMI: 41.2 KILOGRAMS/M^2
BH CV ECHO MEAS - BZI_METRIC_HEIGHT: 170.2 CM
BH CV ECHO MEAS - BZI_METRIC_WEIGHT: 119.3 KG
BH CV ECHO MEAS - EDV(CUBED): 54.9 ML
BH CV ECHO MEAS - EDV(MOD-SP2): 107 ML
BH CV ECHO MEAS - EDV(MOD-SP4): 98 ML
BH CV ECHO MEAS - EDV(TEICH): 62 ML
BH CV ECHO MEAS - EF(CUBED): 64.1 %
BH CV ECHO MEAS - EF(MOD-BP): 62 %
BH CV ECHO MEAS - EF(MOD-SP2): 72 %
BH CV ECHO MEAS - EF(MOD-SP4): 51 %
BH CV ECHO MEAS - EF(TEICH): 56.4 %
BH CV ECHO MEAS - ESV(CUBED): 19.7 ML
BH CV ECHO MEAS - ESV(MOD-SP2): 30 ML
BH CV ECHO MEAS - ESV(MOD-SP4): 48 ML
BH CV ECHO MEAS - ESV(TEICH): 27 ML
BH CV ECHO MEAS - FS: 28.9 %
BH CV ECHO MEAS - IVS/LVPW: 1
BH CV ECHO MEAS - IVSD: 1.5 CM
BH CV ECHO MEAS - LAT PEAK E' VEL: 8.2 CM/SEC
BH CV ECHO MEAS - LV DIASTOLIC VOL/BSA (35-75): 43.1 ML/M^2
BH CV ECHO MEAS - LV MASS(C)D: 216.6 GRAMS
BH CV ECHO MEAS - LV MASS(C)DI: 95.3 GRAMS/M^2
BH CV ECHO MEAS - LV MAX PG: 5.5 MMHG
BH CV ECHO MEAS - LV MEAN PG: 3 MMHG
BH CV ECHO MEAS - LV SYSTOLIC VOL/BSA (12-30): 21.1 ML/M^2
BH CV ECHO MEAS - LV V1 MAX: 117 CM/SEC
BH CV ECHO MEAS - LV V1 MEAN: 84.5 CM/SEC
BH CV ECHO MEAS - LV V1 VTI: 24.2 CM
BH CV ECHO MEAS - LVIDD: 3.8 CM
BH CV ECHO MEAS - LVIDS: 2.7 CM
BH CV ECHO MEAS - LVLD AP2: 8.1 CM
BH CV ECHO MEAS - LVLD AP4: 7.8 CM
BH CV ECHO MEAS - LVLS AP2: 6.9 CM
BH CV ECHO MEAS - LVLS AP4: 7.6 CM
BH CV ECHO MEAS - LVOT AREA (M): 3.5 CM^2
BH CV ECHO MEAS - LVOT AREA: 3.5 CM^2
BH CV ECHO MEAS - LVOT DIAM: 2.1 CM
BH CV ECHO MEAS - LVPWD: 1.5 CM
BH CV ECHO MEAS - MED PEAK E' VEL: 5.4 CM/SEC
BH CV ECHO MEAS - MV A DUR: 0.1 SEC
BH CV ECHO MEAS - MV A MAX VEL: 121 CM/SEC
BH CV ECHO MEAS - MV DEC SLOPE: 444 CM/SEC^2
BH CV ECHO MEAS - MV DEC TIME: 185 SEC
BH CV ECHO MEAS - MV E MAX VEL: 103 CM/SEC
BH CV ECHO MEAS - MV E/A: 0.85
BH CV ECHO MEAS - MV MEAN PG: 3 MMHG
BH CV ECHO MEAS - MV P1/2T MAX VEL: 102 CM/SEC
BH CV ECHO MEAS - MV P1/2T: 67.3 MSEC
BH CV ECHO MEAS - MV V2 MEAN: 77.8 CM/SEC
BH CV ECHO MEAS - MV V2 VTI: 28.1 CM
BH CV ECHO MEAS - MVA P1/2T LCG: 2.2 CM^2
BH CV ECHO MEAS - MVA(P1/2T): 3.3 CM^2
BH CV ECHO MEAS - MVA(VTI): 3 CM^2
BH CV ECHO MEAS - PA ACC SLOPE: 2791 CM/SEC^2
BH CV ECHO MEAS - PA ACC TIME: 0.03 SEC
BH CV ECHO MEAS - PA MAX PG (FULL): 0.39 MMHG
BH CV ECHO MEAS - PA MAX PG: 2 MMHG
BH CV ECHO MEAS - PA PR(ACCEL): 67.8 MMHG
BH CV ECHO MEAS - PA V2 MAX: 71.2 CM/SEC
BH CV ECHO MEAS - PULM A REVS DUR: 0.12 SEC
BH CV ECHO MEAS - PULM A REVS VEL: 31.2 CM/SEC
BH CV ECHO MEAS - PULM DIAS VEL: 37.8 CM/SEC
BH CV ECHO MEAS - PULM S/D: 1.1
BH CV ECHO MEAS - PULM SYS VEL: 41.3 CM/SEC
BH CV ECHO MEAS - PVA(V,A): 7.7 CM^2
BH CV ECHO MEAS - PVA(V,D): 7.7 CM^2
BH CV ECHO MEAS - QP/QS: 1.1
BH CV ECHO MEAS - RV MAX PG: 1.6 MMHG
BH CV ECHO MEAS - RV MEAN PG: 1 MMHG
BH CV ECHO MEAS - RV V1 MAX: 63.9 CM/SEC
BH CV ECHO MEAS - RV V1 MEAN: 38.7 CM/SEC
BH CV ECHO MEAS - RV V1 VTI: 11.2 CM
BH CV ECHO MEAS - RVOT AREA: 8.6 CM^2
BH CV ECHO MEAS - RVOT DIAM: 3.3 CM
BH CV ECHO MEAS - SI(AO): 156.3 ML/M^2
BH CV ECHO MEAS - SI(CUBED): 15.5 ML/M^2
BH CV ECHO MEAS - SI(LVOT): 36.9 ML/M^2
BH CV ECHO MEAS - SI(MOD-SP2): 33.9 ML/M^2
BH CV ECHO MEAS - SI(MOD-SP4): 22 ML/M^2
BH CV ECHO MEAS - SI(TEICH): 15.4 ML/M^2
BH CV ECHO MEAS - SV(AO): 355.1 ML
BH CV ECHO MEAS - SV(CUBED): 35.2 ML
BH CV ECHO MEAS - SV(LVOT): 83.8 ML
BH CV ECHO MEAS - SV(MOD-SP2): 77 ML
BH CV ECHO MEAS - SV(MOD-SP4): 50 ML
BH CV ECHO MEAS - SV(RVOT): 95.8 ML
BH CV ECHO MEAS - SV(TEICH): 34.9 ML
BH CV ECHO MEAS - TAPSE (>1.6): 2.1 CM2
BH CV ECHO MEASUREMENTS AVERAGE E/E' RATIO: 15.15
BH CV LOWER VASCULAR LEFT COMMON FEMORAL AUGMENT: NORMAL
BH CV LOWER VASCULAR LEFT COMMON FEMORAL COMPETENT: NORMAL
BH CV LOWER VASCULAR LEFT COMMON FEMORAL COMPRESS: NORMAL
BH CV LOWER VASCULAR LEFT COMMON FEMORAL PHASIC: NORMAL
BH CV LOWER VASCULAR LEFT COMMON FEMORAL SPONT: NORMAL
BH CV LOWER VASCULAR LEFT DISTAL FEMORAL COMPRESS: NORMAL
BH CV LOWER VASCULAR LEFT GASTRONEMIUS COMPRESS: NORMAL
BH CV LOWER VASCULAR LEFT GREATER SAPH AK COMPRESS: NORMAL
BH CV LOWER VASCULAR LEFT GREATER SAPH BK COMPRESS: NORMAL
BH CV LOWER VASCULAR LEFT MID FEMORAL AUGMENT: NORMAL
BH CV LOWER VASCULAR LEFT MID FEMORAL COMPETENT: NORMAL
BH CV LOWER VASCULAR LEFT MID FEMORAL COMPRESS: NORMAL
BH CV LOWER VASCULAR LEFT MID FEMORAL PHASIC: NORMAL
BH CV LOWER VASCULAR LEFT MID FEMORAL SPONT: NORMAL
BH CV LOWER VASCULAR LEFT PERONEAL COMPRESS: NORMAL
BH CV LOWER VASCULAR LEFT POPLITEAL AUGMENT: NORMAL
BH CV LOWER VASCULAR LEFT POPLITEAL COMPETENT: NORMAL
BH CV LOWER VASCULAR LEFT POPLITEAL COMPRESS: NORMAL
BH CV LOWER VASCULAR LEFT POPLITEAL PHASIC: NORMAL
BH CV LOWER VASCULAR LEFT POPLITEAL SPONT: NORMAL
BH CV LOWER VASCULAR LEFT POSTERIOR TIBIAL COMPRESS: NORMAL
BH CV LOWER VASCULAR LEFT PROFUNDA FEMORAL COMPRESS: NORMAL
BH CV LOWER VASCULAR LEFT PROXIMAL FEMORAL COMPRESS: NORMAL
BH CV LOWER VASCULAR LEFT SAPHENOFEMORAL JUNCTION COMPRESS: NORMAL
BH CV LOWER VASCULAR RIGHT COMMON FEMORAL AUGMENT: NORMAL
BH CV LOWER VASCULAR RIGHT COMMON FEMORAL COMPETENT: NORMAL
BH CV LOWER VASCULAR RIGHT COMMON FEMORAL COMPRESS: NORMAL
BH CV LOWER VASCULAR RIGHT COMMON FEMORAL PHASIC: NORMAL
BH CV LOWER VASCULAR RIGHT COMMON FEMORAL SPONT: NORMAL
BH CV LOWER VASCULAR RIGHT DISTAL FEMORAL COMPRESS: NORMAL
BH CV LOWER VASCULAR RIGHT GASTRONEMIUS COMPRESS: NORMAL
BH CV LOWER VASCULAR RIGHT GREATER SAPH AK COMPRESS: NORMAL
BH CV LOWER VASCULAR RIGHT GREATER SAPH BK COMPRESS: NORMAL
BH CV LOWER VASCULAR RIGHT MID FEMORAL AUGMENT: NORMAL
BH CV LOWER VASCULAR RIGHT MID FEMORAL COMPETENT: NORMAL
BH CV LOWER VASCULAR RIGHT MID FEMORAL COMPRESS: NORMAL
BH CV LOWER VASCULAR RIGHT MID FEMORAL PHASIC: NORMAL
BH CV LOWER VASCULAR RIGHT MID FEMORAL SPONT: NORMAL
BH CV LOWER VASCULAR RIGHT POPLITEAL AUGMENT: NORMAL
BH CV LOWER VASCULAR RIGHT POPLITEAL COMPETENT: NORMAL
BH CV LOWER VASCULAR RIGHT POPLITEAL COMPRESS: NORMAL
BH CV LOWER VASCULAR RIGHT POPLITEAL PHASIC: NORMAL
BH CV LOWER VASCULAR RIGHT POPLITEAL SPONT: NORMAL
BH CV LOWER VASCULAR RIGHT POSTERIOR TIBIAL COMPRESS: NORMAL
BH CV LOWER VASCULAR RIGHT PROFUNDA FEMORAL COMPRESS: NORMAL
BH CV LOWER VASCULAR RIGHT PROXIMAL FEMORAL COMPRESS: NORMAL
BH CV LOWER VASCULAR RIGHT SAPHENOFEMORAL JUNCTION COMPRESS: NORMAL
BH CV STRESS COMMENTS STAGE 1: NORMAL
BH CV STRESS DOSE REGADENOSON STAGE 1: 0.4
BH CV STRESS DURATION MIN STAGE 1: 0
BH CV STRESS DURATION SEC STAGE 1: 10
BH CV STRESS PROTOCOL 1: NORMAL
BH CV STRESS RECOVERY BP: NORMAL MMHG
BH CV STRESS RECOVERY HR: 93 BPM
BH CV STRESS STAGE 1: 1
BH CV XLRA - RV BASE: 3.9 CM
BH CV XLRA - TDI S': 9.5 CM/SEC
BH CV XLRA MEAS LEFT DIST CCA EDV: 15.7 CM/SEC
BH CV XLRA MEAS LEFT DIST CCA PSV: 55 CM/SEC
BH CV XLRA MEAS LEFT DIST ICA EDV: -29.9 CM/SEC
BH CV XLRA MEAS LEFT DIST ICA PSV: -68 CM/SEC
BH CV XLRA MEAS LEFT ICA/CCA RATIO: 1.3
BH CV XLRA MEAS LEFT MID ICA EDV: -27.6 CM/SEC
BH CV XLRA MEAS LEFT MID ICA PSV: -72.1 CM/SEC
BH CV XLRA MEAS LEFT PROX CCA EDV: -19.3 CM/SEC
BH CV XLRA MEAS LEFT PROX CCA PSV: -70.9 CM/SEC
BH CV XLRA MEAS LEFT PROX ECA EDV: 17 CM/SEC
BH CV XLRA MEAS LEFT PROX ECA PSV: 131 CM/SEC
BH CV XLRA MEAS LEFT PROX ICA EDV: -21.1 CM/SEC
BH CV XLRA MEAS LEFT PROX ICA PSV: -42.2 CM/SEC
BH CV XLRA MEAS LEFT PROX SCLA EDV: 0 CM/SEC
BH CV XLRA MEAS LEFT PROX SCLA PSV: 65.1 CM/SEC
BH CV XLRA MEAS LEFT VERTEBRAL A EDV: 15.8 CM/SEC
BH CV XLRA MEAS LEFT VERTEBRAL A PSV: 49.2 CM/SEC
BH CV XLRA MEAS RIGHT DIST CCA EDV: -17 CM/SEC
BH CV XLRA MEAS RIGHT DIST CCA PSV: -75.6 CM/SEC
BH CV XLRA MEAS RIGHT DIST ICA EDV: -29.1 CM/SEC
BH CV XLRA MEAS RIGHT DIST ICA PSV: -67.9 CM/SEC
BH CV XLRA MEAS RIGHT ICA/CCA RATIO: 0.76
BH CV XLRA MEAS RIGHT MID ICA EDV: -31.1 CM/SEC
BH CV XLRA MEAS RIGHT MID ICA PSV: -57.5 CM/SEC
BH CV XLRA MEAS RIGHT PROX CCA EDV: -28.7 CM/SEC
BH CV XLRA MEAS RIGHT PROX CCA PSV: -73.9 CM/SEC
BH CV XLRA MEAS RIGHT PROX ECA EDV: 24.6 CM/SEC
BH CV XLRA MEAS RIGHT PROX ECA PSV: 80.9 CM/SEC
BH CV XLRA MEAS RIGHT PROX ICA EDV: -15.2 CM/SEC
BH CV XLRA MEAS RIGHT PROX ICA PSV: -47.5 CM/SEC
BH CV XLRA MEAS RIGHT PROX SCLA EDV: 14.9 CM/SEC
BH CV XLRA MEAS RIGHT PROX SCLA PSV: 119 CM/SEC
BH CV XLRA MEAS RIGHT VERTEBRAL A EDV: 0 CM/SEC
BH CV XLRA MEAS RIGHT VERTEBRAL A PSV: -46 CM/SEC
BILIRUB SERPL-MCNC: 0.2 MG/DL (ref 0.2–1.2)
BILIRUB SERPL-MCNC: 0.2 MG/DL (ref 0.2–1.2)
BILIRUB UR QL STRIP: NEGATIVE
BLD GP AB SCN SERPL QL: NEGATIVE
BLD GP AB SCN SERPL QL: NEGATIVE
BUN BLD-MCNC: 27 MG/DL (ref 8–23)
BUN BLD-MCNC: 28 MG/DL (ref 8–23)
BUN BLD-MCNC: 29 MG/DL (ref 8–23)
BUN BLD-MCNC: 32 MG/DL (ref 8–23)
BUN BLD-MCNC: 33 MG/DL (ref 8–23)
BUN BLD-MCNC: 36 MG/DL (ref 8–23)
BUN BLD-MCNC: 40 MG/DL (ref 8–23)
BUN SERPL-MCNC: 31 MG/DL (ref 8–23)
BUN SERPL-MCNC: 31 MG/DL (ref 8–23)
BUN SERPL-MCNC: 32 MG/DL (ref 8–23)
BUN SERPL-MCNC: 37 MG/DL (ref 8–23)
BUN SERPL-MCNC: 37 MG/DL (ref 8–23)
BUN SERPL-MCNC: 40 MG/DL (ref 8–23)
BUN SERPL-MCNC: 41 MG/DL (ref 8–23)
BUN SERPL-MCNC: 41 MG/DL (ref 8–23)
BUN SERPL-MCNC: 42 MG/DL (ref 8–23)
BUN SERPL-MCNC: 43 MG/DL (ref 8–23)
BUN SERPL-MCNC: 44 MG/DL (ref 8–23)
BUN/CREAT SERPL: 13.2 (ref 7–25)
BUN/CREAT SERPL: 13.4 (ref 7–25)
BUN/CREAT SERPL: 13.9 (ref 7–25)
BUN/CREAT SERPL: 14.1 (ref 7–25)
BUN/CREAT SERPL: 14.2 (ref 7–25)
BUN/CREAT SERPL: 14.5 (ref 7–25)
BUN/CREAT SERPL: 14.7 (ref 7–25)
BUN/CREAT SERPL: 15.3 (ref 7–25)
BUN/CREAT SERPL: 16.2 (ref 7–25)
BUN/CREAT SERPL: 17 (ref 7–25)
BUN/CREAT SERPL: 17.1 (ref 7–25)
BUN/CREAT SERPL: 17.3 (ref 7–25)
BUN/CREAT SERPL: 17.9 (ref 7–25)
BUN/CREAT SERPL: 18 (ref 7–25)
BUN/CREAT SERPL: 18.1 (ref 7–25)
BUN/CREAT SERPL: 18.9 (ref 7–25)
BUN/CREAT SERPL: 20 (ref 7–25)
BUN/CREAT SERPL: 20.8 (ref 7–25)
C PNEUM DNA NPH QL NAA+NON-PROBE: NOT DETECTED
CALCIUM SPEC-SCNC: 8.1 MG/DL (ref 8.6–10.5)
CALCIUM SPEC-SCNC: 8.2 MG/DL (ref 8.6–10.5)
CALCIUM SPEC-SCNC: 8.4 MG/DL (ref 8.6–10.5)
CALCIUM SPEC-SCNC: 8.5 MG/DL (ref 8.6–10.5)
CALCIUM SPEC-SCNC: 8.6 MG/DL (ref 8.6–10.5)
CALCIUM SPEC-SCNC: 8.7 MG/DL (ref 8.6–10.5)
CALCIUM SPEC-SCNC: 8.8 MG/DL (ref 8.6–10.5)
CALCIUM SPEC-SCNC: 8.9 MG/DL (ref 8.6–10.5)
CHLORIDE SERPL-SCNC: 101 MMOL/L (ref 98–107)
CHLORIDE SERPL-SCNC: 102 MMOL/L (ref 98–107)
CHLORIDE SERPL-SCNC: 103 MMOL/L (ref 98–107)
CHLORIDE SERPL-SCNC: 104 MMOL/L (ref 98–107)
CHLORIDE SERPL-SCNC: 105 MMOL/L (ref 98–107)
CHLORIDE SERPL-SCNC: 106 MMOL/L (ref 98–107)
CHOLEST SERPL-MCNC: 149 MG/DL (ref 0–200)
CHOLEST SERPL-MCNC: 175 MG/DL (ref 0–200)
CLARITY UR: ABNORMAL
CO2 SERPL-SCNC: 24.5 MMOL/L (ref 22–29)
CO2 SERPL-SCNC: 24.8 MMOL/L (ref 22–29)
CO2 SERPL-SCNC: 25.1 MMOL/L (ref 22–29)
CO2 SERPL-SCNC: 25.2 MMOL/L (ref 22–29)
CO2 SERPL-SCNC: 25.3 MMOL/L (ref 22–29)
CO2 SERPL-SCNC: 25.8 MMOL/L (ref 22–29)
CO2 SERPL-SCNC: 26 MMOL/L (ref 22–29)
CO2 SERPL-SCNC: 26.4 MMOL/L (ref 22–29)
CO2 SERPL-SCNC: 26.6 MMOL/L (ref 22–29)
CO2 SERPL-SCNC: 27.1 MMOL/L (ref 22–29)
CO2 SERPL-SCNC: 27.1 MMOL/L (ref 22–29)
CO2 SERPL-SCNC: 27.4 MMOL/L (ref 22–29)
CO2 SERPL-SCNC: 27.4 MMOL/L (ref 22–29)
CO2 SERPL-SCNC: 27.5 MMOL/L (ref 22–29)
CO2 SERPL-SCNC: 27.6 MMOL/L (ref 22–29)
CO2 SERPL-SCNC: 27.8 MMOL/L (ref 22–29)
CO2 SERPL-SCNC: 28.8 MMOL/L (ref 22–29)
CO2 SERPL-SCNC: 29 MMOL/L (ref 22–29)
COLOR UR: YELLOW
CREAT BLD-MCNC: 1.79 MG/DL (ref 0.57–1)
CREAT BLD-MCNC: 1.92 MG/DL (ref 0.57–1)
CREAT BLD-MCNC: 1.97 MG/DL (ref 0.57–1)
CREAT BLD-MCNC: 2 MG/DL (ref 0.57–1)
CREAT BLD-MCNC: 2.02 MG/DL (ref 0.57–1)
CREAT BLD-MCNC: 2.04 MG/DL (ref 0.57–1)
CREAT BLD-MCNC: 2.11 MG/DL (ref 0.57–1)
CREAT SERPL-MCNC: 2.11 MG/DL (ref 0.57–1)
CREAT SERPL-MCNC: 2.2 MG/DL (ref 0.57–1)
CREAT SERPL-MCNC: 2.26 MG/DL (ref 0.57–1)
CREAT SERPL-MCNC: 2.27 MG/DL (ref 0.57–1)
CREAT SERPL-MCNC: 2.28 MG/DL (ref 0.57–1)
CREAT SERPL-MCNC: 2.31 MG/DL (ref 0.57–1)
CREAT SERPL-MCNC: 2.31 MG/DL (ref 0.57–1)
CREAT SERPL-MCNC: 2.35 MG/DL (ref 0.57–1)
CREAT SERPL-MCNC: 2.42 MG/DL (ref 0.57–1)
CREAT SERPL-MCNC: 2.47 MG/DL (ref 0.57–1)
CREAT SERPL-MCNC: 2.62 MG/DL (ref 0.57–1)
CROSSMATCH INTERPRETATION: NORMAL
CRP SERPL-MCNC: 0.6 MG/DL (ref 0–0.5)
D-LACTATE SERPL-SCNC: 0.9 MMOL/L (ref 0.5–2)
DEPRECATED RDW RBC AUTO: 45.7 FL (ref 37–54)
DEPRECATED RDW RBC AUTO: 46.3 FL (ref 37–54)
DEPRECATED RDW RBC AUTO: 47.3 FL (ref 37–54)
DEPRECATED RDW RBC AUTO: 47.6 FL (ref 37–54)
DEPRECATED RDW RBC AUTO: 48 FL (ref 37–54)
DEPRECATED RDW RBC AUTO: 48.2 FL (ref 37–54)
DEPRECATED RDW RBC AUTO: 48.3 FL (ref 37–54)
DEPRECATED RDW RBC AUTO: 48.5 FL (ref 37–54)
DEPRECATED RDW RBC AUTO: 48.7 FL (ref 37–54)
DEPRECATED RDW RBC AUTO: 49.5 FL (ref 37–54)
DEPRECATED RDW RBC AUTO: 50.2 FL (ref 37–54)
DEPRECATED RDW RBC AUTO: 50.8 FL (ref 37–54)
DEPRECATED RDW RBC AUTO: 50.9 FL (ref 37–54)
DEPRECATED RDW RBC AUTO: 51.6 FL (ref 37–54)
EOSINOPHIL # BLD AUTO: 0.2 10*3/MM3 (ref 0–0.4)
EOSINOPHIL # BLD AUTO: 0.24 10*3/MM3 (ref 0–0.4)
EOSINOPHIL # BLD AUTO: 0.25 10*3/MM3 (ref 0–0.4)
EOSINOPHIL # BLD AUTO: 0.27 10*3/MM3 (ref 0–0.4)
EOSINOPHIL # BLD AUTO: 0.28 10*3/MM3 (ref 0–0.4)
EOSINOPHIL # BLD AUTO: 0.28 10*3/MM3 (ref 0–0.4)
EOSINOPHIL # BLD AUTO: 0.29 10*3/MM3 (ref 0–0.4)
EOSINOPHIL # BLD AUTO: 0.29 10*3/MM3 (ref 0–0.4)
EOSINOPHIL # BLD AUTO: 0.31 10*3/MM3 (ref 0–0.4)
EOSINOPHIL # BLD AUTO: 0.32 10*3/MM3 (ref 0–0.4)
EOSINOPHIL # BLD AUTO: 0.33 10*3/MM3 (ref 0–0.4)
EOSINOPHIL # BLD AUTO: 0.33 10*3/MM3 (ref 0–0.4)
EOSINOPHIL # BLD AUTO: 0.35 10*3/MM3 (ref 0–0.4)
EOSINOPHIL # BLD AUTO: 0.37 10*3/MM3 (ref 0–0.4)
EOSINOPHIL # BLD AUTO: 0.37 10*3/MM3 (ref 0–0.4)
EOSINOPHIL # BLD AUTO: 0.43 10*3/MM3 (ref 0–0.4)
EOSINOPHIL NFR BLD AUTO: 3.7 % (ref 0.3–6.2)
EOSINOPHIL NFR BLD AUTO: 3.9 % (ref 0.3–6.2)
EOSINOPHIL NFR BLD AUTO: 3.9 % (ref 0.3–6.2)
EOSINOPHIL NFR BLD AUTO: 4 % (ref 0.3–6.2)
EOSINOPHIL NFR BLD AUTO: 4.1 % (ref 0.3–6.2)
EOSINOPHIL NFR BLD AUTO: 4.4 % (ref 0.3–6.2)
EOSINOPHIL NFR BLD AUTO: 4.5 % (ref 0.3–6.2)
EOSINOPHIL NFR BLD AUTO: 4.7 % (ref 0.3–6.2)
EOSINOPHIL NFR BLD AUTO: 5 % (ref 0.3–6.2)
EOSINOPHIL NFR BLD AUTO: 5.1 % (ref 0.3–6.2)
EOSINOPHIL NFR BLD AUTO: 5.3 % (ref 0.3–6.2)
EOSINOPHIL NFR BLD AUTO: 5.4 % (ref 0.3–6.2)
EOSINOPHIL NFR BLD AUTO: 5.7 % (ref 0.3–6.2)
EOSINOPHIL NFR BLD AUTO: 6.3 % (ref 0.3–6.2)
ERYTHROCYTE [DISTWIDTH] IN BLOOD BY AUTOMATED COUNT: 13.1 % (ref 12.3–15.4)
ERYTHROCYTE [DISTWIDTH] IN BLOOD BY AUTOMATED COUNT: 13.2 % (ref 12.3–15.4)
ERYTHROCYTE [DISTWIDTH] IN BLOOD BY AUTOMATED COUNT: 13.2 % (ref 12.3–15.4)
ERYTHROCYTE [DISTWIDTH] IN BLOOD BY AUTOMATED COUNT: 13.3 % (ref 12.3–15.4)
ERYTHROCYTE [DISTWIDTH] IN BLOOD BY AUTOMATED COUNT: 13.4 % (ref 12.3–15.4)
ERYTHROCYTE [DISTWIDTH] IN BLOOD BY AUTOMATED COUNT: 13.6 % (ref 12.3–15.4)
ERYTHROCYTE [DISTWIDTH] IN BLOOD BY AUTOMATED COUNT: 13.7 % (ref 12.3–15.4)
ERYTHROCYTE [DISTWIDTH] IN BLOOD BY AUTOMATED COUNT: 13.9 % (ref 12.3–15.4)
ERYTHROCYTE [DISTWIDTH] IN BLOOD BY AUTOMATED COUNT: 13.9 % (ref 12.3–15.4)
ERYTHROCYTE [DISTWIDTH] IN BLOOD BY AUTOMATED COUNT: 14 % (ref 12.3–15.4)
ERYTHROCYTE [DISTWIDTH] IN BLOOD BY AUTOMATED COUNT: 14.4 % (ref 12.3–15.4)
ERYTHROCYTE [DISTWIDTH] IN BLOOD BY AUTOMATED COUNT: 14.6 % (ref 12.3–15.4)
FERRITIN SERPL-MCNC: 86.2 NG/ML (ref 13–150)
FLUAV H1 2009 PAND RNA NPH QL NAA+PROBE: NOT DETECTED
FLUAV H1 HA GENE NPH QL NAA+PROBE: NOT DETECTED
FLUAV H3 RNA NPH QL NAA+PROBE: NOT DETECTED
FLUAV SUBTYP SPEC NAA+PROBE: NOT DETECTED
FLUBV RNA ISLT QL NAA+PROBE: NOT DETECTED
FOLATE BLD-MCNC: 417 NG/ML
FOLATE RBC-MCNC: 1463 NG/ML
GFR SERPL CREATININE-BSD FRML MDRD: 21 ML/MIN/1.73
GFR SERPL CREATININE-BSD FRML MDRD: 23 ML/MIN/1.73
GFR SERPL CREATININE-BSD FRML MDRD: 23 ML/MIN/1.73
GFR SERPL CREATININE-BSD FRML MDRD: 24 ML/MIN/1.73
GFR SERPL CREATININE-BSD FRML MDRD: 25 ML/MIN/1.73
GFR SERPL CREATININE-BSD FRML MDRD: 26 ML/MIN/1.73
GFR SERPL CREATININE-BSD FRML MDRD: 27 ML/MIN/1.73
GFR SERPL CREATININE-BSD FRML MDRD: 27 ML/MIN/1.73
GFR SERPL CREATININE-BSD FRML MDRD: 28 ML/MIN/1.73
GFR SERPL CREATININE-BSD FRML MDRD: 29 ML/MIN/1.73
GFR SERPL CREATININE-BSD FRML MDRD: 29 ML/MIN/1.73
GFR SERPL CREATININE-BSD FRML MDRD: 30 ML/MIN/1.73
GFR SERPL CREATININE-BSD FRML MDRD: 31 ML/MIN/1.73
GFR SERPL CREATININE-BSD FRML MDRD: 33 ML/MIN/1.73
GLOBULIN UR ELPH-MCNC: 3.5 GM/DL
GLOBULIN UR ELPH-MCNC: 3.7 GM/DL
GLUCOSE BLD-MCNC: 104 MG/DL (ref 65–99)
GLUCOSE BLD-MCNC: 135 MG/DL (ref 65–99)
GLUCOSE BLD-MCNC: 144 MG/DL (ref 65–99)
GLUCOSE BLD-MCNC: 149 MG/DL (ref 65–99)
GLUCOSE BLD-MCNC: 156 MG/DL (ref 65–99)
GLUCOSE BLD-MCNC: 156 MG/DL (ref 65–99)
GLUCOSE BLD-MCNC: 169 MG/DL (ref 65–99)
GLUCOSE BLDC GLUCOMTR-MCNC: 102 MG/DL (ref 70–130)
GLUCOSE BLDC GLUCOMTR-MCNC: 113 MG/DL (ref 70–130)
GLUCOSE BLDC GLUCOMTR-MCNC: 114 MG/DL (ref 70–130)
GLUCOSE BLDC GLUCOMTR-MCNC: 119 MG/DL (ref 70–130)
GLUCOSE BLDC GLUCOMTR-MCNC: 119 MG/DL (ref 70–130)
GLUCOSE BLDC GLUCOMTR-MCNC: 120 MG/DL (ref 70–130)
GLUCOSE BLDC GLUCOMTR-MCNC: 120 MG/DL (ref 70–130)
GLUCOSE BLDC GLUCOMTR-MCNC: 124 MG/DL (ref 70–130)
GLUCOSE BLDC GLUCOMTR-MCNC: 126 MG/DL (ref 70–130)
GLUCOSE BLDC GLUCOMTR-MCNC: 128 MG/DL (ref 70–130)
GLUCOSE BLDC GLUCOMTR-MCNC: 132 MG/DL (ref 70–130)
GLUCOSE BLDC GLUCOMTR-MCNC: 136 MG/DL (ref 70–130)
GLUCOSE BLDC GLUCOMTR-MCNC: 136 MG/DL (ref 70–130)
GLUCOSE BLDC GLUCOMTR-MCNC: 137 MG/DL (ref 70–130)
GLUCOSE BLDC GLUCOMTR-MCNC: 137 MG/DL (ref 70–130)
GLUCOSE BLDC GLUCOMTR-MCNC: 138 MG/DL (ref 70–130)
GLUCOSE BLDC GLUCOMTR-MCNC: 140 MG/DL (ref 70–130)
GLUCOSE BLDC GLUCOMTR-MCNC: 141 MG/DL (ref 70–130)
GLUCOSE BLDC GLUCOMTR-MCNC: 142 MG/DL (ref 70–130)
GLUCOSE BLDC GLUCOMTR-MCNC: 142 MG/DL (ref 70–130)
GLUCOSE BLDC GLUCOMTR-MCNC: 145 MG/DL (ref 70–130)
GLUCOSE BLDC GLUCOMTR-MCNC: 146 MG/DL (ref 70–130)
GLUCOSE BLDC GLUCOMTR-MCNC: 148 MG/DL (ref 70–130)
GLUCOSE BLDC GLUCOMTR-MCNC: 149 MG/DL (ref 70–130)
GLUCOSE BLDC GLUCOMTR-MCNC: 150 MG/DL (ref 70–130)
GLUCOSE BLDC GLUCOMTR-MCNC: 150 MG/DL (ref 70–130)
GLUCOSE BLDC GLUCOMTR-MCNC: 151 MG/DL (ref 70–130)
GLUCOSE BLDC GLUCOMTR-MCNC: 152 MG/DL (ref 70–130)
GLUCOSE BLDC GLUCOMTR-MCNC: 152 MG/DL (ref 70–130)
GLUCOSE BLDC GLUCOMTR-MCNC: 156 MG/DL (ref 70–130)
GLUCOSE BLDC GLUCOMTR-MCNC: 157 MG/DL (ref 70–130)
GLUCOSE BLDC GLUCOMTR-MCNC: 160 MG/DL (ref 70–130)
GLUCOSE BLDC GLUCOMTR-MCNC: 163 MG/DL (ref 70–130)
GLUCOSE BLDC GLUCOMTR-MCNC: 163 MG/DL (ref 70–130)
GLUCOSE BLDC GLUCOMTR-MCNC: 164 MG/DL (ref 70–130)
GLUCOSE BLDC GLUCOMTR-MCNC: 167 MG/DL (ref 70–130)
GLUCOSE BLDC GLUCOMTR-MCNC: 169 MG/DL (ref 70–130)
GLUCOSE BLDC GLUCOMTR-MCNC: 170 MG/DL (ref 70–130)
GLUCOSE BLDC GLUCOMTR-MCNC: 170 MG/DL (ref 70–130)
GLUCOSE BLDC GLUCOMTR-MCNC: 173 MG/DL (ref 70–130)
GLUCOSE BLDC GLUCOMTR-MCNC: 173 MG/DL (ref 70–130)
GLUCOSE BLDC GLUCOMTR-MCNC: 175 MG/DL (ref 70–130)
GLUCOSE BLDC GLUCOMTR-MCNC: 177 MG/DL (ref 70–130)
GLUCOSE BLDC GLUCOMTR-MCNC: 184 MG/DL (ref 70–130)
GLUCOSE BLDC GLUCOMTR-MCNC: 185 MG/DL (ref 70–130)
GLUCOSE BLDC GLUCOMTR-MCNC: 186 MG/DL (ref 70–130)
GLUCOSE BLDC GLUCOMTR-MCNC: 191 MG/DL (ref 70–130)
GLUCOSE BLDC GLUCOMTR-MCNC: 196 MG/DL (ref 70–130)
GLUCOSE BLDC GLUCOMTR-MCNC: 197 MG/DL (ref 70–130)
GLUCOSE BLDC GLUCOMTR-MCNC: 198 MG/DL (ref 70–130)
GLUCOSE BLDC GLUCOMTR-MCNC: 198 MG/DL (ref 70–130)
GLUCOSE BLDC GLUCOMTR-MCNC: 200 MG/DL (ref 70–130)
GLUCOSE BLDC GLUCOMTR-MCNC: 206 MG/DL (ref 70–130)
GLUCOSE SERPL-MCNC: 111 MG/DL (ref 65–99)
GLUCOSE SERPL-MCNC: 117 MG/DL (ref 65–99)
GLUCOSE SERPL-MCNC: 124 MG/DL (ref 65–99)
GLUCOSE SERPL-MCNC: 128 MG/DL (ref 65–99)
GLUCOSE SERPL-MCNC: 135 MG/DL (ref 65–99)
GLUCOSE SERPL-MCNC: 136 MG/DL (ref 65–99)
GLUCOSE SERPL-MCNC: 145 MG/DL (ref 65–99)
GLUCOSE SERPL-MCNC: 150 MG/DL (ref 65–99)
GLUCOSE SERPL-MCNC: 167 MG/DL (ref 65–99)
GLUCOSE SERPL-MCNC: 168 MG/DL (ref 65–99)
GLUCOSE SERPL-MCNC: 187 MG/DL (ref 65–99)
GLUCOSE UR STRIP-MCNC: ABNORMAL MG/DL
HADV DNA SPEC NAA+PROBE: NOT DETECTED
HBA1C MFR BLD: 7.2 % (ref 4.8–5.6)
HBA1C MFR BLD: 7.9 % (ref 4.8–5.6)
HCOV 229E RNA SPEC QL NAA+PROBE: NOT DETECTED
HCOV HKU1 RNA SPEC QL NAA+PROBE: NOT DETECTED
HCOV NL63 RNA SPEC QL NAA+PROBE: NOT DETECTED
HCOV OC43 RNA SPEC QL NAA+PROBE: NOT DETECTED
HCT VFR BLD AUTO: 25.1 % (ref 34–46.6)
HCT VFR BLD AUTO: 26 % (ref 34–46.6)
HCT VFR BLD AUTO: 26.2 % (ref 34–46.6)
HCT VFR BLD AUTO: 26.6 % (ref 34–46.6)
HCT VFR BLD AUTO: 26.6 % (ref 34–46.6)
HCT VFR BLD AUTO: 27.8 % (ref 34–46.6)
HCT VFR BLD AUTO: 27.9 % (ref 34–46.6)
HCT VFR BLD AUTO: 28 % (ref 34–46.6)
HCT VFR BLD AUTO: 28.3 % (ref 34–46.6)
HCT VFR BLD AUTO: 28.4 % (ref 34–46.6)
HCT VFR BLD AUTO: 28.5 % (ref 34–46.6)
HCT VFR BLD AUTO: 28.5 % (ref 34–46.6)
HCT VFR BLD AUTO: 29.1 % (ref 34–46.6)
HCT VFR BLD AUTO: 29.3 % (ref 34–46.6)
HCT VFR BLD AUTO: 29.5 % (ref 34–46.6)
HCT VFR BLD AUTO: 29.8 % (ref 34–46.6)
HCT VFR BLD AUTO: 30.1 % (ref 34–46.6)
HCT VFR BLD AUTO: 35.5 % (ref 34–46.6)
HDLC SERPL-MCNC: 30 MG/DL (ref 40–60)
HDLC SERPL-MCNC: 31 MG/DL (ref 40–60)
HGB BLD-MCNC: 11.3 G/DL (ref 12–15.9)
HGB BLD-MCNC: 7.9 G/DL (ref 12–15.9)
HGB BLD-MCNC: 8.2 G/DL (ref 12–15.9)
HGB BLD-MCNC: 8.3 G/DL (ref 12–15.9)
HGB BLD-MCNC: 8.6 G/DL (ref 12–15.9)
HGB BLD-MCNC: 8.6 G/DL (ref 12–15.9)
HGB BLD-MCNC: 8.9 G/DL (ref 12–15.9)
HGB BLD-MCNC: 9 G/DL (ref 12–15.9)
HGB BLD-MCNC: 9.1 G/DL (ref 12–15.9)
HGB BLD-MCNC: 9.2 G/DL (ref 12–15.9)
HGB BLD-MCNC: 9.5 G/DL (ref 12–15.9)
HGB BLD-MCNC: 9.5 G/DL (ref 12–15.9)
HGB BLD-MCNC: 9.6 G/DL (ref 12–15.9)
HGB BLD-MCNC: 9.8 G/DL (ref 12–15.9)
HGB UR QL STRIP.AUTO: ABNORMAL
HMPV RNA NPH QL NAA+NON-PROBE: NOT DETECTED
HPIV1 RNA SPEC QL NAA+PROBE: NOT DETECTED
HPIV2 RNA SPEC QL NAA+PROBE: NOT DETECTED
HPIV3 RNA NPH QL NAA+PROBE: NOT DETECTED
HPIV4 P GENE NPH QL NAA+PROBE: NOT DETECTED
HYALINE CASTS UR QL AUTO: ABNORMAL /LPF
IMM GRANULOCYTES # BLD AUTO: 0.01 10*3/MM3 (ref 0–0.05)
IMM GRANULOCYTES # BLD AUTO: 0.01 10*3/MM3 (ref 0–0.05)
IMM GRANULOCYTES # BLD AUTO: 0.02 10*3/MM3 (ref 0–0.05)
IMM GRANULOCYTES # BLD AUTO: 0.03 10*3/MM3 (ref 0–0.05)
IMM GRANULOCYTES # BLD AUTO: 0.04 10*3/MM3 (ref 0–0.05)
IMM GRANULOCYTES # BLD AUTO: 0.04 10*3/MM3 (ref 0–0.05)
IMM GRANULOCYTES # BLD AUTO: 0.05 10*3/MM3 (ref 0–0.05)
IMM GRANULOCYTES NFR BLD AUTO: 0.1 % (ref 0–0.5)
IMM GRANULOCYTES NFR BLD AUTO: 0.2 % (ref 0–0.5)
IMM GRANULOCYTES NFR BLD AUTO: 0.3 % (ref 0–0.5)
IMM GRANULOCYTES NFR BLD AUTO: 0.4 % (ref 0–0.5)
IMM GRANULOCYTES NFR BLD AUTO: 0.5 % (ref 0–0.5)
IMM GRANULOCYTES NFR BLD AUTO: 0.5 % (ref 0–0.5)
IMM GRANULOCYTES NFR BLD AUTO: 0.6 % (ref 0–0.5)
IMM GRANULOCYTES NFR BLD AUTO: 0.9 % (ref 0–0.5)
INR PPP: 1.42 (ref 0.9–1.1)
INR PPP: 1.47 (ref 0.9–1.1)
INR PPP: 1.75 (ref 0.9–1.1)
IRON 24H UR-MRATE: 40 MCG/DL (ref 37–145)
IRON SATN MFR SERPL: 17 % (ref 20–50)
KETONES UR QL STRIP: NEGATIVE
LDH SERPL-CCNC: 243 U/L (ref 135–214)
LDLC SERPL CALC-MCNC: 106 MG/DL (ref 0–100)
LDLC SERPL CALC-MCNC: 86 MG/DL (ref 0–100)
LDLC/HDLC SERPL: 2.79 {RATIO}
LDLC/HDLC SERPL: 3.54 {RATIO}
LEFT ARM BP: NORMAL MMHG
LEFT ATRIUM VOLUME INDEX: 24.2 ML/M2
LEUKOCYTE ESTERASE UR QL STRIP.AUTO: ABNORMAL
LV EF NUC BP: 60 %
LYMPHOCYTES # BLD AUTO: 1.54 10*3/MM3 (ref 0.7–3.1)
LYMPHOCYTES # BLD AUTO: 2.05 10*3/MM3 (ref 0.7–3.1)
LYMPHOCYTES # BLD AUTO: 2.08 10*3/MM3 (ref 0.7–3.1)
LYMPHOCYTES # BLD AUTO: 2.21 10*3/MM3 (ref 0.7–3.1)
LYMPHOCYTES # BLD AUTO: 2.32 10*3/MM3 (ref 0.7–3.1)
LYMPHOCYTES # BLD AUTO: 2.38 10*3/MM3 (ref 0.7–3.1)
LYMPHOCYTES # BLD AUTO: 2.39 10*3/MM3 (ref 0.7–3.1)
LYMPHOCYTES # BLD AUTO: 2.45 10*3/MM3 (ref 0.7–3.1)
LYMPHOCYTES # BLD AUTO: 2.5 10*3/MM3 (ref 0.7–3.1)
LYMPHOCYTES # BLD AUTO: 2.5 10*3/MM3 (ref 0.7–3.1)
LYMPHOCYTES # BLD AUTO: 2.59 10*3/MM3 (ref 0.7–3.1)
LYMPHOCYTES # BLD AUTO: 2.65 10*3/MM3 (ref 0.7–3.1)
LYMPHOCYTES # BLD AUTO: 2.78 10*3/MM3 (ref 0.7–3.1)
LYMPHOCYTES # BLD AUTO: 2.83 10*3/MM3 (ref 0.7–3.1)
LYMPHOCYTES # BLD AUTO: 3.1 10*3/MM3 (ref 0.7–3.1)
LYMPHOCYTES # BLD AUTO: 3.76 10*3/MM3 (ref 0.7–3.1)
LYMPHOCYTES NFR BLD AUTO: 28.2 % (ref 19.6–45.3)
LYMPHOCYTES NFR BLD AUTO: 30.1 % (ref 19.6–45.3)
LYMPHOCYTES NFR BLD AUTO: 32.2 % (ref 19.6–45.3)
LYMPHOCYTES NFR BLD AUTO: 34.5 % (ref 19.6–45.3)
LYMPHOCYTES NFR BLD AUTO: 35.3 % (ref 19.6–45.3)
LYMPHOCYTES NFR BLD AUTO: 36.1 % (ref 19.6–45.3)
LYMPHOCYTES NFR BLD AUTO: 36.4 % (ref 19.6–45.3)
LYMPHOCYTES NFR BLD AUTO: 36.7 % (ref 19.6–45.3)
LYMPHOCYTES NFR BLD AUTO: 36.8 % (ref 19.6–45.3)
LYMPHOCYTES NFR BLD AUTO: 36.9 % (ref 19.6–45.3)
LYMPHOCYTES NFR BLD AUTO: 37.8 % (ref 19.6–45.3)
LYMPHOCYTES NFR BLD AUTO: 39.7 % (ref 19.6–45.3)
LYMPHOCYTES NFR BLD AUTO: 40.5 % (ref 19.6–45.3)
LYMPHOCYTES NFR BLD AUTO: 43.2 % (ref 19.6–45.3)
LYMPHOCYTES NFR BLD AUTO: 43.4 % (ref 19.6–45.3)
LYMPHOCYTES NFR BLD AUTO: 48.9 % (ref 19.6–45.3)
M PNEUMO IGG SER IA-ACNC: NOT DETECTED
MAGNESIUM SERPL-MCNC: 2.2 MG/DL (ref 1.6–2.4)
MAGNESIUM SERPL-MCNC: 2.3 MG/DL (ref 1.6–2.4)
MAXIMAL PREDICTED HEART RATE: 141 BPM
MAXIMAL PREDICTED HEART RATE: 141 BPM
MCH RBC QN AUTO: 30.5 PG (ref 26.6–33)
MCH RBC QN AUTO: 30.9 PG (ref 26.6–33)
MCH RBC QN AUTO: 31 PG (ref 26.6–33)
MCH RBC QN AUTO: 31.1 PG (ref 26.6–33)
MCH RBC QN AUTO: 31.2 PG (ref 26.6–33)
MCH RBC QN AUTO: 31.2 PG (ref 26.6–33)
MCH RBC QN AUTO: 31.3 PG (ref 26.6–33)
MCH RBC QN AUTO: 31.4 PG (ref 26.6–33)
MCH RBC QN AUTO: 31.4 PG (ref 26.6–33)
MCH RBC QN AUTO: 31.5 PG (ref 26.6–33)
MCH RBC QN AUTO: 31.5 PG (ref 26.6–33)
MCH RBC QN AUTO: 31.6 PG (ref 26.6–33)
MCH RBC QN AUTO: 31.9 PG (ref 26.6–33)
MCHC RBC AUTO-ENTMCNC: 31.2 G/DL (ref 31.5–35.7)
MCHC RBC AUTO-ENTMCNC: 31.3 G/DL (ref 31.5–35.7)
MCHC RBC AUTO-ENTMCNC: 31.5 G/DL (ref 31.5–35.7)
MCHC RBC AUTO-ENTMCNC: 31.5 G/DL (ref 31.5–35.7)
MCHC RBC AUTO-ENTMCNC: 31.6 G/DL (ref 31.5–35.7)
MCHC RBC AUTO-ENTMCNC: 31.7 G/DL (ref 31.5–35.7)
MCHC RBC AUTO-ENTMCNC: 31.8 G/DL (ref 31.5–35.7)
MCHC RBC AUTO-ENTMCNC: 31.8 G/DL (ref 31.5–35.7)
MCHC RBC AUTO-ENTMCNC: 32.1 G/DL (ref 31.5–35.7)
MCHC RBC AUTO-ENTMCNC: 32.2 G/DL (ref 31.5–35.7)
MCHC RBC AUTO-ENTMCNC: 32.3 G/DL (ref 31.5–35.7)
MCHC RBC AUTO-ENTMCNC: 32.4 G/DL (ref 31.5–35.7)
MCHC RBC AUTO-ENTMCNC: 32.4 G/DL (ref 31.5–35.7)
MCHC RBC AUTO-ENTMCNC: 32.8 G/DL (ref 31.5–35.7)
MCHC RBC AUTO-ENTMCNC: 32.9 G/DL (ref 31.5–35.7)
MCV RBC AUTO: 100 FL (ref 79–97)
MCV RBC AUTO: 95.8 FL (ref 79–97)
MCV RBC AUTO: 96.4 FL (ref 79–97)
MCV RBC AUTO: 96.5 FL (ref 79–97)
MCV RBC AUTO: 96.6 FL (ref 79–97)
MCV RBC AUTO: 96.8 FL (ref 79–97)
MCV RBC AUTO: 96.9 FL (ref 79–97)
MCV RBC AUTO: 97 FL (ref 79–97)
MCV RBC AUTO: 97.2 FL (ref 79–97)
MCV RBC AUTO: 97.4 FL (ref 79–97)
MCV RBC AUTO: 98.3 FL (ref 79–97)
MCV RBC AUTO: 98.4 FL (ref 79–97)
MCV RBC AUTO: 98.5 FL (ref 79–97)
MCV RBC AUTO: 98.5 FL (ref 79–97)
MCV RBC AUTO: 98.6 FL (ref 79–97)
MCV RBC AUTO: 98.9 FL (ref 79–97)
MCV RBC AUTO: 99 FL (ref 79–97)
MONOCYTES # BLD AUTO: 0.57 10*3/MM3 (ref 0.1–0.9)
MONOCYTES # BLD AUTO: 0.6 10*3/MM3 (ref 0.1–0.9)
MONOCYTES # BLD AUTO: 0.61 10*3/MM3 (ref 0.1–0.9)
MONOCYTES # BLD AUTO: 0.62 10*3/MM3 (ref 0.1–0.9)
MONOCYTES # BLD AUTO: 0.63 10*3/MM3 (ref 0.1–0.9)
MONOCYTES # BLD AUTO: 0.66 10*3/MM3 (ref 0.1–0.9)
MONOCYTES # BLD AUTO: 0.68 10*3/MM3 (ref 0.1–0.9)
MONOCYTES # BLD AUTO: 0.68 10*3/MM3 (ref 0.1–0.9)
MONOCYTES # BLD AUTO: 0.7 10*3/MM3 (ref 0.1–0.9)
MONOCYTES # BLD AUTO: 0.71 10*3/MM3 (ref 0.1–0.9)
MONOCYTES # BLD AUTO: 0.72 10*3/MM3 (ref 0.1–0.9)
MONOCYTES # BLD AUTO: 0.73 10*3/MM3 (ref 0.1–0.9)
MONOCYTES # BLD AUTO: 0.74 10*3/MM3 (ref 0.1–0.9)
MONOCYTES # BLD AUTO: 0.74 10*3/MM3 (ref 0.1–0.9)
MONOCYTES # BLD AUTO: 0.77 10*3/MM3 (ref 0.1–0.9)
MONOCYTES # BLD AUTO: 0.79 10*3/MM3 (ref 0.1–0.9)
MONOCYTES NFR BLD AUTO: 10 % (ref 5–12)
MONOCYTES NFR BLD AUTO: 10.3 % (ref 5–12)
MONOCYTES NFR BLD AUTO: 10.5 % (ref 5–12)
MONOCYTES NFR BLD AUTO: 10.5 % (ref 5–12)
MONOCYTES NFR BLD AUTO: 10.6 % (ref 5–12)
MONOCYTES NFR BLD AUTO: 10.7 % (ref 5–12)
MONOCYTES NFR BLD AUTO: 10.8 % (ref 5–12)
MONOCYTES NFR BLD AUTO: 10.9 % (ref 5–12)
MONOCYTES NFR BLD AUTO: 11 % (ref 5–12)
MONOCYTES NFR BLD AUTO: 11.7 % (ref 5–12)
MONOCYTES NFR BLD AUTO: 7.3 % (ref 5–12)
MONOCYTES NFR BLD AUTO: 9.5 % (ref 5–12)
MONOCYTES NFR BLD AUTO: 9.7 % (ref 5–12)
NEUTROPHILS # BLD AUTO: 2.5 10*3/MM3 (ref 1.7–7)
NEUTROPHILS # BLD AUTO: 2.76 10*3/MM3 (ref 1.7–7)
NEUTROPHILS # BLD AUTO: 3.04 10*3/MM3 (ref 1.7–7)
NEUTROPHILS # BLD AUTO: 3.31 10*3/MM3 (ref 1.7–7)
NEUTROPHILS # BLD AUTO: 3.68 10*3/MM3 (ref 1.7–7)
NEUTROPHILS # BLD AUTO: 3.79 10*3/MM3 (ref 1.7–7)
NEUTROPHILS NFR BLD AUTO: 2.69 10*3/MM3 (ref 1.7–7)
NEUTROPHILS NFR BLD AUTO: 2.75 10*3/MM3 (ref 1.7–7)
NEUTROPHILS NFR BLD AUTO: 2.77 10*3/MM3 (ref 1.7–7)
NEUTROPHILS NFR BLD AUTO: 3.01 10*3/MM3 (ref 1.7–7)
NEUTROPHILS NFR BLD AUTO: 3.03 10*3/MM3 (ref 1.7–7)
NEUTROPHILS NFR BLD AUTO: 3.21 10*3/MM3 (ref 1.7–7)
NEUTROPHILS NFR BLD AUTO: 3.23 10*3/MM3 (ref 1.7–7)
NEUTROPHILS NFR BLD AUTO: 3.34 10*3/MM3 (ref 1.7–7)
NEUTROPHILS NFR BLD AUTO: 3.42 10*3/MM3 (ref 1.7–7)
NEUTROPHILS NFR BLD AUTO: 3.43 10*3/MM3 (ref 1.7–7)
NEUTROPHILS NFR BLD AUTO: 36 % (ref 42.7–76)
NEUTROPHILS NFR BLD AUTO: 40.7 % (ref 42.7–76)
NEUTROPHILS NFR BLD AUTO: 42.3 % (ref 42.7–76)
NEUTROPHILS NFR BLD AUTO: 43.1 % (ref 42.7–76)
NEUTROPHILS NFR BLD AUTO: 46.3 % (ref 42.7–76)
NEUTROPHILS NFR BLD AUTO: 46.5 % (ref 42.7–76)
NEUTROPHILS NFR BLD AUTO: 46.7 % (ref 42.7–76)
NEUTROPHILS NFR BLD AUTO: 47.1 % (ref 42.7–76)
NEUTROPHILS NFR BLD AUTO: 47.4 % (ref 42.7–76)
NEUTROPHILS NFR BLD AUTO: 47.8 % (ref 42.7–76)
NEUTROPHILS NFR BLD AUTO: 48.2 % (ref 42.7–76)
NEUTROPHILS NFR BLD AUTO: 48.3 % (ref 42.7–76)
NEUTROPHILS NFR BLD AUTO: 48.4 % (ref 42.7–76)
NEUTROPHILS NFR BLD AUTO: 49.7 % (ref 42.7–76)
NEUTROPHILS NFR BLD AUTO: 54.7 % (ref 42.7–76)
NEUTROPHILS NFR BLD AUTO: 55.3 % (ref 42.7–76)
NITRITE UR QL STRIP: NEGATIVE
NRBC BLD AUTO-RTO: 0 /100 WBC (ref 0–0.2)
NRBC BLD AUTO-RTO: 0.1 /100 WBC (ref 0–0.2)
NRBC BLD AUTO-RTO: 0.1 /100 WBC (ref 0–0.2)
NRBC BLD AUTO-RTO: 0.2 /100 WBC (ref 0–0.2)
NRBC BLD AUTO-RTO: 0.3 /100 WBC (ref 0–0.2)
NRBC BLD AUTO-RTO: 0.4 /100 WBC (ref 0–0.2)
NT-PROBNP SERPL-MCNC: 1132 PG/ML (ref 5–1800)
NT-PROBNP SERPL-MCNC: 912.7 PG/ML (ref 5–1800)
PA ADP PRP-ACNC: 354 PRU (ref 194–418)
PERCENT MAX PREDICTED HR: 71.63 %
PH UR STRIP.AUTO: 5.5 [PH] (ref 5–8)
PHOSPHATE SERPL-MCNC: 2.7 MG/DL (ref 2.5–4.5)
PHOSPHATE SERPL-MCNC: 3.3 MG/DL (ref 2.5–4.5)
PHOSPHATE SERPL-MCNC: 4.2 MG/DL (ref 2.5–4.5)
PHOSPHATE SERPL-MCNC: 4.3 MG/DL (ref 2.5–4.5)
PLATELET # BLD AUTO: 181 10*3/MM3 (ref 140–450)
PLATELET # BLD AUTO: 190 10*3/MM3 (ref 140–450)
PLATELET # BLD AUTO: 198 10*3/MM3 (ref 140–450)
PLATELET # BLD AUTO: 199 10*3/MM3 (ref 140–450)
PLATELET # BLD AUTO: 204 10*3/MM3 (ref 140–450)
PLATELET # BLD AUTO: 206 10*3/MM3 (ref 140–450)
PLATELET # BLD AUTO: 209 10*3/MM3 (ref 140–450)
PLATELET # BLD AUTO: 214 10*3/MM3 (ref 140–450)
PLATELET # BLD AUTO: 224 10*3/MM3 (ref 140–450)
PLATELET # BLD AUTO: 227 10*3/MM3 (ref 140–450)
PLATELET # BLD AUTO: 230 10*3/MM3 (ref 140–450)
PLATELET # BLD AUTO: 230 10*3/MM3 (ref 140–450)
PLATELET # BLD AUTO: 232 10*3/MM3 (ref 140–450)
PLATELET # BLD AUTO: 232 10*3/MM3 (ref 140–450)
PLATELET # BLD AUTO: 233 10*3/MM3 (ref 140–450)
PLATELET # BLD AUTO: 242 10*3/MM3 (ref 140–450)
PLATELET # BLD AUTO: 250 10*3/MM3 (ref 140–450)
PMV BLD AUTO: 10 FL (ref 6–12)
PMV BLD AUTO: 10.1 FL (ref 6–12)
PMV BLD AUTO: 10.2 FL (ref 6–12)
PMV BLD AUTO: 10.3 FL (ref 6–12)
PMV BLD AUTO: 10.4 FL (ref 6–12)
PMV BLD AUTO: 10.7 FL (ref 6–12)
PMV BLD AUTO: 9.9 FL (ref 6–12)
POTASSIUM BLD-SCNC: 3.5 MMOL/L (ref 3.5–5.2)
POTASSIUM BLD-SCNC: 3.7 MMOL/L (ref 3.5–5.2)
POTASSIUM BLD-SCNC: 4 MMOL/L (ref 3.5–5.2)
POTASSIUM BLD-SCNC: 4 MMOL/L (ref 3.5–5.2)
POTASSIUM BLD-SCNC: 5.7 MMOL/L (ref 3.5–5.2)
POTASSIUM SERPL-SCNC: 4.2 MMOL/L (ref 3.5–5.2)
POTASSIUM SERPL-SCNC: 4.2 MMOL/L (ref 3.5–5.2)
POTASSIUM SERPL-SCNC: 4.5 MMOL/L (ref 3.5–5.2)
POTASSIUM SERPL-SCNC: 4.8 MMOL/L (ref 3.5–5.2)
POTASSIUM SERPL-SCNC: 4.9 MMOL/L (ref 3.5–5.2)
POTASSIUM SERPL-SCNC: 5 MMOL/L (ref 3.5–5.2)
POTASSIUM SERPL-SCNC: 5 MMOL/L (ref 3.5–5.2)
POTASSIUM SERPL-SCNC: 5.1 MMOL/L (ref 3.5–5.2)
POTASSIUM SERPL-SCNC: 5.4 MMOL/L (ref 3.5–5.2)
POTASSIUM SERPL-SCNC: 5.6 MMOL/L (ref 3.5–5.2)
POTASSIUM SERPL-SCNC: 5.7 MMOL/L (ref 3.5–5.2)
PROCALCITONIN SERPL-MCNC: 0.27 NG/ML (ref 0.1–0.25)
PROT SERPL-MCNC: 6.6 G/DL (ref 6–8.5)
PROT SERPL-MCNC: 6.9 G/DL (ref 6–8.5)
PROT UR QL STRIP: ABNORMAL
PROTHROMBIN TIME: 17.1 SECONDS (ref 11.7–14.2)
PROTHROMBIN TIME: 17.5 SECONDS (ref 11.7–14.2)
PROTHROMBIN TIME: 20.1 SECONDS (ref 11.7–14.2)
RBC # BLD AUTO: 2.55 10*6/MM3 (ref 3.77–5.28)
RBC # BLD AUTO: 2.63 10*6/MM3 (ref 3.77–5.28)
RBC # BLD AUTO: 2.66 10*6/MM3 (ref 3.77–5.28)
RBC # BLD AUTO: 2.7 10*6/MM3 (ref 3.77–5.28)
RBC # BLD AUTO: 2.73 10*6/MM3 (ref 3.77–5.28)
RBC # BLD AUTO: 2.87 10*6/MM3 (ref 3.77–5.28)
RBC # BLD AUTO: 2.87 10*6/MM3 (ref 3.77–5.28)
RBC # BLD AUTO: 2.88 10*6/MM3 (ref 3.77–5.28)
RBC # BLD AUTO: 2.88 10*6/MM3 (ref 3.77–5.28)
RBC # BLD AUTO: 2.89 10*6/MM3 (ref 3.77–5.28)
RBC # BLD AUTO: 2.91 10*6/MM3 (ref 3.77–5.28)
RBC # BLD AUTO: 2.94 10*6/MM3 (ref 3.77–5.28)
RBC # BLD AUTO: 3.04 10*6/MM3 (ref 3.77–5.28)
RBC # BLD AUTO: 3.04 10*6/MM3 (ref 3.77–5.28)
RBC # BLD AUTO: 3.11 10*6/MM3 (ref 3.77–5.28)
RBC # BLD AUTO: 3.11 10*6/MM3 (ref 3.77–5.28)
RBC # BLD AUTO: 3.61 10*6/MM3 (ref 3.77–5.28)
RBC # UR: ABNORMAL /HPF
REF LAB TEST METHOD: ABNORMAL
RETICS # AUTO: 0.11 10*6/MM3 (ref 0.02–0.13)
RETICS/RBC NFR AUTO: 3.88 % (ref 0.7–1.9)
RH BLD: NEGATIVE
RH BLD: NEGATIVE
RHINOVIRUS RNA SPEC NAA+PROBE: NOT DETECTED
RIGHT ARM BP: NORMAL MMHG
RSV RNA NPH QL NAA+NON-PROBE: NOT DETECTED
SARS-COV-2 N GENE NPH QL NAA+PROBE: NOT DETECTED
SODIUM BLD-SCNC: 136 MMOL/L (ref 136–145)
SODIUM BLD-SCNC: 138 MMOL/L (ref 136–145)
SODIUM BLD-SCNC: 139 MMOL/L (ref 136–145)
SODIUM BLD-SCNC: 141 MMOL/L (ref 136–145)
SODIUM BLD-SCNC: 142 MMOL/L (ref 136–145)
SODIUM BLD-SCNC: 142 MMOL/L (ref 136–145)
SODIUM BLD-SCNC: 143 MMOL/L (ref 136–145)
SODIUM SERPL-SCNC: 135 MMOL/L (ref 136–145)
SODIUM SERPL-SCNC: 138 MMOL/L (ref 136–145)
SODIUM SERPL-SCNC: 140 MMOL/L (ref 136–145)
SODIUM SERPL-SCNC: 140 MMOL/L (ref 136–145)
SODIUM SERPL-SCNC: 141 MMOL/L (ref 136–145)
SP GR UR STRIP: >=1.03 (ref 1–1.03)
SQUAMOUS #/AREA URNS HPF: ABNORMAL /HPF
STRESS BASELINE BP: NORMAL MMHG
STRESS BASELINE HR: 85 BPM
STRESS PERCENT HR: 84 %
STRESS POST PEAK BP: NORMAL MMHG
STRESS POST PEAK HR: 101 BPM
STRESS TARGET HR: 120 BPM
STRESS TARGET HR: 120 BPM
T&S EXPIRATION DATE: NORMAL
T&S EXPIRATION DATE: NORMAL
TIBC SERPL-MCNC: 232 MCG/DL (ref 298–536)
TRANSFERRIN SERPL-MCNC: 156 MG/DL (ref 200–360)
TRIGL SERPL-MCNC: 158 MG/DL (ref 0–150)
TRIGL SERPL-MCNC: 194 MG/DL (ref 0–150)
TROPONIN T SERPL-MCNC: 0.06 NG/ML (ref 0–0.03)
TROPONIN T SERPL-MCNC: 0.08 NG/ML (ref 0–0.03)
TSH SERPL DL<=0.05 MIU/L-ACNC: 1.49 UIU/ML (ref 0.27–4.2)
UNIT  ABO: NORMAL
UNIT  RH: NORMAL
UROBILINOGEN UR QL STRIP: ABNORMAL
VIT B12 BLD-MCNC: 983 PG/ML (ref 211–946)
VLDLC SERPL-MCNC: 31.6 MG/DL (ref 5–40)
VLDLC SERPL-MCNC: 38.8 MG/DL
WBC # BLD AUTO: 5.47 10*3/MM3 (ref 3.4–10.8)
WBC # BLD AUTO: 5.81 10*3/MM3 (ref 3.4–10.8)
WBC # BLD AUTO: 6.39 10*3/MM3 (ref 3.4–10.8)
WBC # BLD AUTO: 6.48 10*3/MM3 (ref 3.4–10.8)
WBC # BLD AUTO: 6.77 10*3/MM3 (ref 3.4–10.8)
WBC # BLD AUTO: 6.79 10*3/MM3 (ref 3.4–10.8)
WBC # BLD AUTO: 6.87 10*3/MM3 (ref 3.4–10.8)
WBC # BLD AUTO: 6.92 10*3/MM3 (ref 3.4–10.8)
WBC # BLD AUTO: 7.35 10*3/MM3 (ref 3.4–10.8)
WBC # BLD AUTO: 7.69 10*3/MM3 (ref 3.4–10.8)
WBC NRBC COR # BLD: 6.14 10*3/MM3 (ref 3.4–10.8)
WBC NRBC COR # BLD: 6.31 10*3/MM3 (ref 3.4–10.8)
WBC NRBC COR # BLD: 6.33 10*3/MM3 (ref 3.4–10.8)
WBC NRBC COR # BLD: 6.52 10*3/MM3 (ref 3.4–10.8)
WBC NRBC COR # BLD: 6.86 10*3/MM3 (ref 3.4–10.8)
WBC NRBC COR # BLD: 6.92 10*3/MM3 (ref 3.4–10.8)
WBC NRBC COR # BLD: 7.81 10*3/MM3 (ref 3.4–10.8)
WBC UR QL AUTO: ABNORMAL /HPF

## 2020-01-01 PROCEDURE — 63710000001 INSULIN GLARGINE PER 5 UNITS: Performed by: HOSPITALIST

## 2020-01-01 PROCEDURE — 85610 PROTHROMBIN TIME: CPT | Performed by: HOSPITALIST

## 2020-01-01 PROCEDURE — 82962 GLUCOSE BLOOD TEST: CPT

## 2020-01-01 PROCEDURE — 25010000002 ENOXAPARIN PER 10 MG: Performed by: PSYCHIATRY & NEUROLOGY

## 2020-01-01 PROCEDURE — 63710000001 INSULIN LISPRO (HUMAN) PER 5 UNITS: Performed by: HOSPITALIST

## 2020-01-01 PROCEDURE — 83615 LACTATE (LD) (LDH) ENZYME: CPT | Performed by: EMERGENCY MEDICINE

## 2020-01-01 PROCEDURE — A9500 TC99M SESTAMIBI: HCPCS | Performed by: HOSPITALIST

## 2020-01-01 PROCEDURE — 99222 1ST HOSP IP/OBS MODERATE 55: CPT | Performed by: PSYCHIATRY & NEUROLOGY

## 2020-01-01 PROCEDURE — 99221 1ST HOSP IP/OBS SF/LOW 40: CPT | Performed by: INTERNAL MEDICINE

## 2020-01-01 PROCEDURE — 84145 PROCALCITONIN (PCT): CPT | Performed by: EMERGENCY MEDICINE

## 2020-01-01 PROCEDURE — 99231 SBSQ HOSP IP/OBS SF/LOW 25: CPT | Performed by: INTERNAL MEDICINE

## 2020-01-01 PROCEDURE — 97162 PT EVAL MOD COMPLEX 30 MIN: CPT

## 2020-01-01 PROCEDURE — 93970 EXTREMITY STUDY: CPT

## 2020-01-01 PROCEDURE — 63710000001 INSULIN REGULAR HUMAN PER 5 UNITS: Performed by: INTERNAL MEDICINE

## 2020-01-01 PROCEDURE — 85025 COMPLETE CBC W/AUTO DIFF WBC: CPT | Performed by: HOSPITALIST

## 2020-01-01 PROCEDURE — 82747 ASSAY OF FOLIC ACID RBC: CPT | Performed by: INTERNAL MEDICINE

## 2020-01-01 PROCEDURE — 93880 EXTRACRANIAL BILAT STUDY: CPT

## 2020-01-01 PROCEDURE — 93010 ELECTROCARDIOGRAM REPORT: CPT | Performed by: INTERNAL MEDICINE

## 2020-01-01 PROCEDURE — 99232 SBSQ HOSP IP/OBS MODERATE 35: CPT | Performed by: INTERNAL MEDICINE

## 2020-01-01 PROCEDURE — 71250 CT THORAX DX C-: CPT

## 2020-01-01 PROCEDURE — 85576 BLOOD PLATELET AGGREGATION: CPT | Performed by: NURSE PRACTITIONER

## 2020-01-01 PROCEDURE — 25010000002 ONDANSETRON PER 1 MG: Performed by: HOSPITALIST

## 2020-01-01 PROCEDURE — 83605 ASSAY OF LACTIC ACID: CPT | Performed by: EMERGENCY MEDICINE

## 2020-01-01 PROCEDURE — 99233 SBSQ HOSP IP/OBS HIGH 50: CPT | Performed by: NURSE PRACTITIONER

## 2020-01-01 PROCEDURE — 93017 CV STRESS TEST TRACING ONLY: CPT

## 2020-01-01 PROCEDURE — 85730 THROMBOPLASTIN TIME PARTIAL: CPT | Performed by: EMERGENCY MEDICINE

## 2020-01-01 PROCEDURE — 80048 BASIC METABOLIC PNL TOTAL CA: CPT | Performed by: HOSPITALIST

## 2020-01-01 PROCEDURE — 84484 ASSAY OF TROPONIN QUANT: CPT | Performed by: HOSPITALIST

## 2020-01-01 PROCEDURE — 25010000002 FUROSEMIDE PER 20 MG: Performed by: INTERNAL MEDICINE

## 2020-01-01 PROCEDURE — P9016 RBC LEUKOCYTES REDUCED: HCPCS

## 2020-01-01 PROCEDURE — 71045 X-RAY EXAM CHEST 1 VIEW: CPT

## 2020-01-01 PROCEDURE — 93016 CV STRESS TEST SUPVJ ONLY: CPT | Performed by: INTERNAL MEDICINE

## 2020-01-01 PROCEDURE — 0100U HC BIOFIRE FILMARRAY RESP PANEL 2: CPT | Performed by: EMERGENCY MEDICINE

## 2020-01-01 PROCEDURE — 93005 ELECTROCARDIOGRAM TRACING: CPT | Performed by: HOSPITALIST

## 2020-01-01 PROCEDURE — 86900 BLOOD TYPING SEROLOGIC ABO: CPT

## 2020-01-01 PROCEDURE — 25010000002 PERFLUTREN (DEFINITY) 8.476 MG IN SODIUM CHLORIDE 0.9 % 10 ML INJECTION: Performed by: HOSPITALIST

## 2020-01-01 PROCEDURE — 83540 ASSAY OF IRON: CPT | Performed by: INTERNAL MEDICINE

## 2020-01-01 PROCEDURE — 0 TECHNETIUM SESTAMIBI: Performed by: HOSPITALIST

## 2020-01-01 PROCEDURE — 70551 MRI BRAIN STEM W/O DYE: CPT

## 2020-01-01 PROCEDURE — 86901 BLOOD TYPING SEROLOGIC RH(D): CPT | Performed by: EMERGENCY MEDICINE

## 2020-01-01 PROCEDURE — 80053 COMPREHEN METABOLIC PANEL: CPT | Performed by: HOSPITALIST

## 2020-01-01 PROCEDURE — C9803 HOPD COVID-19 SPEC COLLECT: HCPCS | Performed by: HOSPITALIST

## 2020-01-01 PROCEDURE — 94799 UNLISTED PULMONARY SVC/PX: CPT

## 2020-01-01 PROCEDURE — 86923 COMPATIBILITY TEST ELECTRIC: CPT

## 2020-01-01 PROCEDURE — 85025 COMPLETE CBC W/AUTO DIFF WBC: CPT | Performed by: EMERGENCY MEDICINE

## 2020-01-01 PROCEDURE — 83735 ASSAY OF MAGNESIUM: CPT | Performed by: INTERNAL MEDICINE

## 2020-01-01 PROCEDURE — 86850 RBC ANTIBODY SCREEN: CPT | Performed by: EMERGENCY MEDICINE

## 2020-01-01 PROCEDURE — 83880 ASSAY OF NATRIURETIC PEPTIDE: CPT | Performed by: EMERGENCY MEDICINE

## 2020-01-01 PROCEDURE — 85014 HEMATOCRIT: CPT | Performed by: INTERNAL MEDICINE

## 2020-01-01 PROCEDURE — 86900 BLOOD TYPING SEROLOGIC ABO: CPT | Performed by: HOSPITALIST

## 2020-01-01 PROCEDURE — 25010000002 CEFTRIAXONE PER 250 MG: Performed by: HOSPITALIST

## 2020-01-01 PROCEDURE — 80053 COMPREHEN METABOLIC PANEL: CPT | Performed by: EMERGENCY MEDICINE

## 2020-01-01 PROCEDURE — 84466 ASSAY OF TRANSFERRIN: CPT | Performed by: INTERNAL MEDICINE

## 2020-01-01 PROCEDURE — 83880 ASSAY OF NATRIURETIC PEPTIDE: CPT | Performed by: HOSPITALIST

## 2020-01-01 PROCEDURE — 85025 COMPLETE CBC W/AUTO DIFF WBC: CPT | Performed by: INTERNAL MEDICINE

## 2020-01-01 PROCEDURE — 99285 EMERGENCY DEPT VISIT HI MDM: CPT

## 2020-01-01 PROCEDURE — 85610 PROTHROMBIN TIME: CPT | Performed by: EMERGENCY MEDICINE

## 2020-01-01 PROCEDURE — 80048 BASIC METABOLIC PNL TOTAL CA: CPT

## 2020-01-01 PROCEDURE — 81001 URINALYSIS AUTO W/SCOPE: CPT | Performed by: HOSPITALIST

## 2020-01-01 PROCEDURE — 93306 TTE W/DOPPLER COMPLETE: CPT

## 2020-01-01 PROCEDURE — 78452 HT MUSCLE IMAGE SPECT MULT: CPT | Performed by: INTERNAL MEDICINE

## 2020-01-01 PROCEDURE — 82607 VITAMIN B-12: CPT | Performed by: INTERNAL MEDICINE

## 2020-01-01 PROCEDURE — 87635 SARS-COV-2 COVID-19 AMP PRB: CPT | Performed by: HOSPITALIST

## 2020-01-01 PROCEDURE — 87040 BLOOD CULTURE FOR BACTERIA: CPT | Performed by: EMERGENCY MEDICINE

## 2020-01-01 PROCEDURE — 85027 COMPLETE CBC AUTOMATED: CPT | Performed by: HOSPITALIST

## 2020-01-01 PROCEDURE — 86850 RBC ANTIBODY SCREEN: CPT | Performed by: HOSPITALIST

## 2020-01-01 PROCEDURE — 82728 ASSAY OF FERRITIN: CPT | Performed by: INTERNAL MEDICINE

## 2020-01-01 PROCEDURE — 85025 COMPLETE CBC W/AUTO DIFF WBC: CPT

## 2020-01-01 PROCEDURE — 84484 ASSAY OF TROPONIN QUANT: CPT | Performed by: EMERGENCY MEDICINE

## 2020-01-01 PROCEDURE — 97165 OT EVAL LOW COMPLEX 30 MIN: CPT | Performed by: OCCUPATIONAL THERAPIST

## 2020-01-01 PROCEDURE — 78452 HT MUSCLE IMAGE SPECT MULT: CPT

## 2020-01-01 PROCEDURE — 80061 LIPID PANEL: CPT | Performed by: HOSPITALIST

## 2020-01-01 PROCEDURE — 36430 TRANSFUSION BLD/BLD COMPNT: CPT

## 2020-01-01 PROCEDURE — 25010000002 REGADENOSON 0.4 MG/5ML SOLUTION: Performed by: HOSPITALIST

## 2020-01-01 PROCEDURE — 87086 URINE CULTURE/COLONY COUNT: CPT | Performed by: HOSPITALIST

## 2020-01-01 PROCEDURE — 93005 ELECTROCARDIOGRAM TRACING: CPT | Performed by: EMERGENCY MEDICINE

## 2020-01-01 PROCEDURE — 86901 BLOOD TYPING SEROLOGIC RH(D): CPT | Performed by: HOSPITALIST

## 2020-01-01 PROCEDURE — 80048 BASIC METABOLIC PNL TOTAL CA: CPT | Performed by: INTERNAL MEDICINE

## 2020-01-01 PROCEDURE — 80069 RENAL FUNCTION PANEL: CPT | Performed by: INTERNAL MEDICINE

## 2020-01-01 PROCEDURE — 83036 HEMOGLOBIN GLYCOSYLATED A1C: CPT | Performed by: HOSPITALIST

## 2020-01-01 PROCEDURE — 83036 HEMOGLOBIN GLYCOSYLATED A1C: CPT | Performed by: PSYCHIATRY & NEUROLOGY

## 2020-01-01 PROCEDURE — 93018 CV STRESS TEST I&R ONLY: CPT | Performed by: INTERNAL MEDICINE

## 2020-01-01 PROCEDURE — 92610 EVALUATE SWALLOWING FUNCTION: CPT

## 2020-01-01 PROCEDURE — 80061 LIPID PANEL: CPT | Performed by: PSYCHIATRY & NEUROLOGY

## 2020-01-01 PROCEDURE — 86140 C-REACTIVE PROTEIN: CPT | Performed by: EMERGENCY MEDICINE

## 2020-01-01 PROCEDURE — 99223 1ST HOSP IP/OBS HIGH 75: CPT | Performed by: INTERNAL MEDICINE

## 2020-01-01 PROCEDURE — 70450 CT HEAD/BRAIN W/O DYE: CPT

## 2020-01-01 PROCEDURE — 84443 ASSAY THYROID STIM HORMONE: CPT | Performed by: HOSPITALIST

## 2020-01-01 PROCEDURE — 86900 BLOOD TYPING SEROLOGIC ABO: CPT | Performed by: EMERGENCY MEDICINE

## 2020-01-01 PROCEDURE — 87635 SARS-COV-2 COVID-19 AMP PRB: CPT | Performed by: EMERGENCY MEDICINE

## 2020-01-01 PROCEDURE — 93306 TTE W/DOPPLER COMPLETE: CPT | Performed by: INTERNAL MEDICINE

## 2020-01-01 PROCEDURE — 87088 URINE BACTERIA CULTURE: CPT | Performed by: HOSPITALIST

## 2020-01-01 PROCEDURE — 85045 AUTOMATED RETICULOCYTE COUNT: CPT | Performed by: INTERNAL MEDICINE

## 2020-01-01 PROCEDURE — 92526 ORAL FUNCTION THERAPY: CPT

## 2020-01-01 PROCEDURE — 87186 SC STD MICRODIL/AGAR DIL: CPT | Performed by: HOSPITALIST

## 2020-01-01 RX ORDER — ACETAMINOPHEN 325 MG/1
650 TABLET ORAL EVERY 4 HOURS PRN
Status: DISCONTINUED | OUTPATIENT
Start: 2020-01-01 | End: 2020-01-01 | Stop reason: HOSPADM

## 2020-01-01 RX ORDER — PANTOPRAZOLE SODIUM 40 MG/10ML
80 INJECTION, POWDER, LYOPHILIZED, FOR SOLUTION INTRAVENOUS ONCE
Status: COMPLETED | OUTPATIENT
Start: 2020-01-01 | End: 2020-01-01

## 2020-01-01 RX ORDER — HYDRALAZINE HYDROCHLORIDE 50 MG/1
100 TABLET, FILM COATED ORAL EVERY 8 HOURS SCHEDULED
Status: DISCONTINUED | OUTPATIENT
Start: 2020-01-01 | End: 2020-01-01 | Stop reason: HOSPADM

## 2020-01-01 RX ORDER — POLYETHYLENE GLYCOL 3350 17 G/17G
17 POWDER, FOR SOLUTION ORAL DAILY
Status: DISCONTINUED | OUTPATIENT
Start: 2020-01-01 | End: 2020-01-01 | Stop reason: HOSPADM

## 2020-01-01 RX ORDER — SODIUM CHLORIDE 9 MG/ML
75 INJECTION, SOLUTION INTRAVENOUS CONTINUOUS
Status: DISCONTINUED | OUTPATIENT
Start: 2020-01-01 | End: 2020-01-01

## 2020-01-01 RX ORDER — ROSUVASTATIN CALCIUM 5 MG/1
5 TABLET, COATED ORAL NIGHTLY
Status: DISCONTINUED | OUTPATIENT
Start: 2020-01-01 | End: 2020-01-01 | Stop reason: ALTCHOICE

## 2020-01-01 RX ORDER — ROSUVASTATIN CALCIUM 10 MG/1
10 TABLET, COATED ORAL NIGHTLY
Status: DISCONTINUED | OUTPATIENT
Start: 2020-01-01 | End: 2020-01-01

## 2020-01-01 RX ORDER — FUROSEMIDE 40 MG/1
40 TABLET ORAL DAILY
Status: DISCONTINUED | OUTPATIENT
Start: 2020-01-01 | End: 2020-01-01

## 2020-01-01 RX ORDER — LORATADINE 10 MG/1
10 CAPSULE, LIQUID FILLED ORAL DAILY
COMMUNITY
End: 2020-01-01 | Stop reason: HOSPADM

## 2020-01-01 RX ORDER — SODIUM POLYSTYRENE SULFONATE 15 G/60ML
30 SUSPENSION ORAL; RECTAL ONCE
Status: DISCONTINUED | OUTPATIENT
Start: 2020-01-01 | End: 2020-01-01

## 2020-01-01 RX ORDER — HYDRALAZINE HYDROCHLORIDE 10 MG/1
100 TABLET, FILM COATED ORAL 3 TIMES DAILY
COMMUNITY

## 2020-01-01 RX ORDER — PANTOPRAZOLE SODIUM 40 MG/1
40 TABLET, DELAYED RELEASE ORAL
Status: DISCONTINUED | OUTPATIENT
Start: 2020-01-01 | End: 2020-01-01

## 2020-01-01 RX ORDER — SODIUM POLYSTYRENE SULFONATE 15 G/60ML
15 SUSPENSION ORAL; RECTAL ONCE
Status: DISCONTINUED | OUTPATIENT
Start: 2020-01-01 | End: 2020-01-01

## 2020-01-01 RX ORDER — CEFTRIAXONE SODIUM 1 G/50ML
1 INJECTION, SOLUTION INTRAVENOUS EVERY 24 HOURS
Status: DISCONTINUED | OUTPATIENT
Start: 2020-01-01 | End: 2020-01-01

## 2020-01-01 RX ORDER — BISACODYL 10 MG
10 SUPPOSITORY, RECTAL RECTAL DAILY PRN
COMMUNITY

## 2020-01-01 RX ORDER — PETROLATUM 42 G/100G
OINTMENT TOPICAL 2 TIMES DAILY
Status: DISCONTINUED | OUTPATIENT
Start: 2020-01-01 | End: 2020-01-01

## 2020-01-01 RX ORDER — INSULIN GLARGINE 100 [IU]/ML
20 INJECTION, SOLUTION SUBCUTANEOUS NIGHTLY
Status: DISCONTINUED | OUTPATIENT
Start: 2020-01-01 | End: 2020-01-01

## 2020-01-01 RX ORDER — PANTOPRAZOLE SODIUM 40 MG/10ML
80 INJECTION, POWDER, LYOPHILIZED, FOR SOLUTION INTRAVENOUS ONCE
Status: DISCONTINUED | OUTPATIENT
Start: 2020-01-01 | End: 2020-01-01

## 2020-01-01 RX ORDER — AMOXICILLIN 250 MG
1 CAPSULE ORAL 2 TIMES DAILY
Status: DISCONTINUED | OUTPATIENT
Start: 2020-01-01 | End: 2020-01-01 | Stop reason: HOSPADM

## 2020-01-01 RX ORDER — FUROSEMIDE 10 MG/ML
40 INJECTION INTRAMUSCULAR; INTRAVENOUS ONCE
Status: COMPLETED | OUTPATIENT
Start: 2020-01-01 | End: 2020-01-01

## 2020-01-01 RX ORDER — ACETAMINOPHEN 650 MG/1
650 SUPPOSITORY RECTAL EVERY 4 HOURS PRN
Status: DISCONTINUED | OUTPATIENT
Start: 2020-01-01 | End: 2020-01-01

## 2020-01-01 RX ORDER — CLOPIDOGREL BISULFATE 75 MG/1
75 TABLET ORAL NIGHTLY
COMMUNITY

## 2020-01-01 RX ORDER — INSULIN GLARGINE 100 [IU]/ML
25 INJECTION, SOLUTION SUBCUTANEOUS NIGHTLY
Status: DISCONTINUED | OUTPATIENT
Start: 2020-01-01 | End: 2020-01-01

## 2020-01-01 RX ORDER — PANTOPRAZOLE SODIUM 40 MG/10ML
40 INJECTION, POWDER, LYOPHILIZED, FOR SOLUTION INTRAVENOUS EVERY 12 HOURS SCHEDULED
Status: DISCONTINUED | OUTPATIENT
Start: 2020-01-01 | End: 2020-01-01

## 2020-01-01 RX ORDER — ROSUVASTATIN CALCIUM 10 MG/1
10 TABLET, COATED ORAL NIGHTLY
COMMUNITY
End: 2020-01-01 | Stop reason: HOSPADM

## 2020-01-01 RX ORDER — POLYETHYLENE GLYCOL 3350 17 G/17G
17 POWDER, FOR SOLUTION ORAL DAILY
COMMUNITY

## 2020-01-01 RX ORDER — INSULIN GLARGINE 100 [IU]/ML
15 INJECTION, SOLUTION SUBCUTANEOUS NIGHTLY
Status: DISCONTINUED | OUTPATIENT
Start: 2020-01-01 | End: 2020-01-01

## 2020-01-01 RX ORDER — ONDANSETRON 2 MG/ML
4 INJECTION INTRAMUSCULAR; INTRAVENOUS EVERY 4 HOURS PRN
Status: DISCONTINUED | OUTPATIENT
Start: 2020-01-01 | End: 2020-01-01 | Stop reason: HOSPADM

## 2020-01-01 RX ORDER — INSULIN GLARGINE 100 [IU]/ML
10 INJECTION, SOLUTION SUBCUTANEOUS NIGHTLY
Status: DISCONTINUED | OUTPATIENT
Start: 2020-01-01 | End: 2020-01-01

## 2020-01-01 RX ORDER — INSULIN GLARGINE 100 [IU]/ML
30 INJECTION, SOLUTION SUBCUTANEOUS NIGHTLY
Qty: 10 ML | Refills: 0 | Status: SHIPPED | OUTPATIENT
Start: 2020-01-01 | End: 2020-08-08

## 2020-01-01 RX ORDER — AMLODIPINE BESYLATE 10 MG/1
10 TABLET ORAL DAILY
COMMUNITY

## 2020-01-01 RX ORDER — HYDROCODONE BITARTRATE AND ACETAMINOPHEN 5; 325 MG/1; MG/1
1 TABLET ORAL EVERY 6 HOURS PRN
Status: DISCONTINUED | OUTPATIENT
Start: 2020-01-01 | End: 2020-01-01 | Stop reason: HOSPADM

## 2020-01-01 RX ORDER — BISACODYL 5 MG/1
10 TABLET, DELAYED RELEASE ORAL DAILY PRN
Status: ON HOLD | COMMUNITY
End: 2020-01-01

## 2020-01-01 RX ORDER — CLONIDINE HYDROCHLORIDE 0.1 MG/1
0.3 TABLET ORAL 3 TIMES DAILY
Status: DISCONTINUED | OUTPATIENT
Start: 2020-01-01 | End: 2020-01-01

## 2020-01-01 RX ORDER — ATORVASTATIN CALCIUM 20 MG/1
40 TABLET, FILM COATED ORAL NIGHTLY
Status: DISCONTINUED | OUTPATIENT
Start: 2020-01-01 | End: 2020-01-01

## 2020-01-01 RX ORDER — ATORVASTATIN CALCIUM 80 MG/1
80 TABLET, FILM COATED ORAL NIGHTLY
Status: DISCONTINUED | OUTPATIENT
Start: 2020-01-01 | End: 2020-01-01 | Stop reason: HOSPADM

## 2020-01-01 RX ORDER — ALLOPURINOL 100 MG/1
100 TABLET ORAL DAILY
Status: DISCONTINUED | OUTPATIENT
Start: 2020-01-01 | End: 2020-01-01 | Stop reason: HOSPADM

## 2020-01-01 RX ORDER — AMLODIPINE BESYLATE 10 MG/1
10 TABLET ORAL
Status: DISCONTINUED | OUTPATIENT
Start: 2020-01-01 | End: 2020-01-01 | Stop reason: HOSPADM

## 2020-01-01 RX ORDER — SODIUM CHLORIDE 0.9 % (FLUSH) 0.9 %
10 SYRINGE (ML) INJECTION AS NEEDED
Status: DISCONTINUED | OUTPATIENT
Start: 2020-01-01 | End: 2020-01-01 | Stop reason: HOSPADM

## 2020-01-01 RX ORDER — CLOPIDOGREL BISULFATE 75 MG/1
75 TABLET ORAL DAILY
Status: DISCONTINUED | OUTPATIENT
Start: 2020-01-01 | End: 2020-01-01 | Stop reason: HOSPADM

## 2020-01-01 RX ORDER — CLOPIDOGREL BISULFATE 75 MG/1
75 TABLET ORAL NIGHTLY
Status: DISCONTINUED | OUTPATIENT
Start: 2020-01-01 | End: 2020-01-01

## 2020-01-01 RX ORDER — MELATONIN
1000 DAILY
Status: DISCONTINUED | OUTPATIENT
Start: 2020-01-01 | End: 2020-01-01 | Stop reason: HOSPADM

## 2020-01-01 RX ORDER — ASPIRIN 81 MG/1
81 TABLET, CHEWABLE ORAL DAILY
Status: DISCONTINUED | OUTPATIENT
Start: 2020-01-01 | End: 2020-01-01

## 2020-01-01 RX ORDER — ALLOPURINOL 100 MG/1
100 TABLET ORAL DAILY
COMMUNITY

## 2020-01-01 RX ORDER — POTASSIUM CHLORIDE 750 MG/1
20 CAPSULE, EXTENDED RELEASE ORAL DAILY
Status: DISCONTINUED | OUTPATIENT
Start: 2020-01-01 | End: 2020-01-01

## 2020-01-01 RX ORDER — DEXTROSE MONOHYDRATE 25 G/50ML
50 INJECTION, SOLUTION INTRAVENOUS
Status: DISCONTINUED | OUTPATIENT
Start: 2020-01-01 | End: 2020-01-01

## 2020-01-01 RX ORDER — UREA 10 %
3 LOTION (ML) TOPICAL NIGHTLY PRN
Status: DISCONTINUED | OUTPATIENT
Start: 2020-01-01 | End: 2020-01-01 | Stop reason: HOSPADM

## 2020-01-01 RX ORDER — CEPHALEXIN 500 MG/1
500 CAPSULE ORAL EVERY 12 HOURS
Qty: 2 CAPSULE | Refills: 0 | Status: SHIPPED | OUTPATIENT
Start: 2020-01-01 | End: 2020-01-01

## 2020-01-01 RX ORDER — WARFARIN SODIUM 5 MG/1
5 TABLET ORAL
COMMUNITY
End: 2020-01-01 | Stop reason: HOSPADM

## 2020-01-01 RX ORDER — INSULIN GLARGINE 100 [IU]/ML
18 INJECTION, SOLUTION SUBCUTANEOUS NIGHTLY
Status: DISCONTINUED | OUTPATIENT
Start: 2020-01-01 | End: 2020-01-01

## 2020-01-01 RX ORDER — ATORVASTATIN CALCIUM 80 MG/1
80 TABLET, FILM COATED ORAL NIGHTLY
Qty: 30 TABLET | Refills: 0 | Status: SHIPPED | OUTPATIENT
Start: 2020-01-01 | End: 2020-08-08

## 2020-01-01 RX ORDER — PETROLATUM 420 MG/G
OINTMENT TOPICAL EVERY 12 HOURS SCHEDULED
Status: DISCONTINUED | OUTPATIENT
Start: 2020-01-01 | End: 2020-01-01 | Stop reason: HOSPADM

## 2020-01-01 RX ORDER — FUROSEMIDE 40 MG/1
40 TABLET ORAL
Status: DISCONTINUED | OUTPATIENT
Start: 2020-01-01 | End: 2020-01-01 | Stop reason: HOSPADM

## 2020-01-01 RX ORDER — HYDROCODONE BITARTRATE AND ACETAMINOPHEN 5; 325 MG/1; MG/1
1 TABLET ORAL EVERY 6 HOURS PRN
Status: DISCONTINUED | OUTPATIENT
Start: 2020-01-01 | End: 2020-01-01

## 2020-01-01 RX ORDER — HYDROCODONE BITARTRATE AND ACETAMINOPHEN 5; 325 MG/1; MG/1
1 TABLET ORAL EVERY 6 HOURS PRN
Qty: 10 TABLET | Refills: 0 | Status: SHIPPED | OUTPATIENT
Start: 2020-01-01 | End: 2020-01-01

## 2020-01-01 RX ORDER — INSULIN GLARGINE 100 [IU]/ML
30 INJECTION, SOLUTION SUBCUTANEOUS NIGHTLY
Status: DISCONTINUED | OUTPATIENT
Start: 2020-01-01 | End: 2020-01-01 | Stop reason: HOSPADM

## 2020-01-01 RX ORDER — FUROSEMIDE 40 MG/1
40 TABLET ORAL 2 TIMES DAILY
Qty: 60 TABLET | Refills: 0 | Status: SHIPPED | OUTPATIENT
Start: 2020-01-01 | End: 2020-08-08

## 2020-01-01 RX ORDER — CEPHALEXIN 500 MG/1
500 CAPSULE ORAL EVERY 12 HOURS
Status: DISPENSED | OUTPATIENT
Start: 2020-01-01 | End: 2020-01-01

## 2020-01-01 RX ORDER — CLOPIDOGREL BISULFATE 75 MG/1
75 TABLET ORAL DAILY
Status: DISCONTINUED | OUTPATIENT
Start: 2020-01-01 | End: 2020-01-01

## 2020-01-01 RX ORDER — SODIUM CHLORIDE 0.9 % (FLUSH) 0.9 %
10 SYRINGE (ML) INJECTION EVERY 12 HOURS SCHEDULED
Status: DISCONTINUED | OUTPATIENT
Start: 2020-01-01 | End: 2020-01-01 | Stop reason: HOSPADM

## 2020-01-01 RX ADMIN — DOCUSATE SODIUM 50MG AND SENNOSIDES 8.6MG 1 TABLET: 8.6; 5 TABLET, FILM COATED ORAL at 21:06

## 2020-01-01 RX ADMIN — ALLOPURINOL 100 MG: 100 TABLET ORAL at 08:43

## 2020-01-01 RX ADMIN — ALLOPURINOL 100 MG: 100 TABLET ORAL at 14:52

## 2020-01-01 RX ADMIN — ENOXAPARIN SODIUM 30 MG: 30 INJECTION SUBCUTANEOUS at 14:28

## 2020-01-01 RX ADMIN — CEPHALEXIN 500 MG: 500 CAPSULE ORAL at 06:30

## 2020-01-01 RX ADMIN — METOPROLOL TARTRATE 25 MG: 25 TABLET, FILM COATED ORAL at 03:26

## 2020-01-01 RX ADMIN — HYDROCODONE BITARTRATE AND ACETAMINOPHEN 1 TABLET: 5; 325 TABLET ORAL at 17:24

## 2020-01-01 RX ADMIN — VITAMIN D, TAB 1000IU (100/BT) 1000 UNITS: 25 TAB at 08:25

## 2020-01-01 RX ADMIN — ALLOPURINOL 100 MG: 100 TABLET ORAL at 08:21

## 2020-01-01 RX ADMIN — AMLODIPINE BESYLATE 10 MG: 10 TABLET ORAL at 08:29

## 2020-01-01 RX ADMIN — SODIUM CHLORIDE 75 ML/HR: 9 INJECTION, SOLUTION INTRAVENOUS at 03:26

## 2020-01-01 RX ADMIN — METOPROLOL TARTRATE 37.5 MG: 25 TABLET, FILM COATED ORAL at 21:06

## 2020-01-01 RX ADMIN — ROSUVASTATIN CALCIUM 5 MG: 5 TABLET, FILM COATED ORAL at 21:06

## 2020-01-01 RX ADMIN — PANTOPRAZOLE SODIUM 40 MG: 40 TABLET, DELAYED RELEASE ORAL at 16:31

## 2020-01-01 RX ADMIN — ENOXAPARIN SODIUM 30 MG: 30 INJECTION SUBCUTANEOUS at 14:13

## 2020-01-01 RX ADMIN — AMLODIPINE BESYLATE 10 MG: 10 TABLET ORAL at 08:28

## 2020-01-01 RX ADMIN — AMLODIPINE BESYLATE 10 MG: 10 TABLET ORAL at 09:30

## 2020-01-01 RX ADMIN — Medication 3 MG: at 21:31

## 2020-01-01 RX ADMIN — ATORVASTATIN CALCIUM 80 MG: 80 TABLET, FILM COATED ORAL at 21:54

## 2020-01-01 RX ADMIN — INSULIN GLARGINE 10 UNITS: 100 INJECTION, SOLUTION SUBCUTANEOUS at 21:54

## 2020-01-01 RX ADMIN — METOPROLOL TARTRATE 25 MG: 25 TABLET, FILM COATED ORAL at 09:30

## 2020-01-01 RX ADMIN — PETROLATUM 100 APPLICATION: 420 OINTMENT TOPICAL at 08:43

## 2020-01-01 RX ADMIN — SODIUM CHLORIDE, PRESERVATIVE FREE 10 ML: 5 INJECTION INTRAVENOUS at 08:26

## 2020-01-01 RX ADMIN — INSULIN LISPRO 2 UNITS: 100 INJECTION, SOLUTION INTRAVENOUS; SUBCUTANEOUS at 11:31

## 2020-01-01 RX ADMIN — INSULIN GLARGINE 30 UNITS: 100 INJECTION, SOLUTION SUBCUTANEOUS at 22:06

## 2020-01-01 RX ADMIN — Medication 3 MG: at 23:50

## 2020-01-01 RX ADMIN — INSULIN LISPRO 3 UNITS: 100 INJECTION, SOLUTION INTRAVENOUS; SUBCUTANEOUS at 18:16

## 2020-01-01 RX ADMIN — INSULIN LISPRO 5 UNITS: 100 INJECTION, SOLUTION INTRAVENOUS; SUBCUTANEOUS at 12:52

## 2020-01-01 RX ADMIN — HYDRALAZINE HYDROCHLORIDE 100 MG: 50 TABLET ORAL at 01:28

## 2020-01-01 RX ADMIN — HYDRALAZINE HYDROCHLORIDE 100 MG: 50 TABLET ORAL at 06:11

## 2020-01-01 RX ADMIN — HYDRALAZINE HYDROCHLORIDE 100 MG: 50 TABLET ORAL at 14:54

## 2020-01-01 RX ADMIN — INSULIN LISPRO 10 UNITS: 100 INJECTION, SOLUTION INTRAVENOUS; SUBCUTANEOUS at 12:27

## 2020-01-01 RX ADMIN — HYDRALAZINE HYDROCHLORIDE 100 MG: 50 TABLET ORAL at 05:57

## 2020-01-01 RX ADMIN — ASPIRIN 81 MG: 81 TABLET, CHEWABLE ORAL at 13:14

## 2020-01-01 RX ADMIN — METOPROLOL TARTRATE 37.5 MG: 25 TABLET, FILM COATED ORAL at 08:29

## 2020-01-01 RX ADMIN — INSULIN LISPRO 8 UNITS: 100 INJECTION, SOLUTION INTRAVENOUS; SUBCUTANEOUS at 12:15

## 2020-01-01 RX ADMIN — FUROSEMIDE 40 MG: 40 TABLET ORAL at 08:26

## 2020-01-01 RX ADMIN — HYDROCODONE BITARTRATE AND ACETAMINOPHEN 1 TABLET: 5; 325 TABLET ORAL at 21:31

## 2020-01-01 RX ADMIN — TECHNETIUM TC 99M SESTAMIBI 1 DOSE: 1 INJECTION INTRAVENOUS at 10:05

## 2020-01-01 RX ADMIN — HYDRALAZINE HYDROCHLORIDE 100 MG: 50 TABLET ORAL at 14:28

## 2020-01-01 RX ADMIN — POLYETHYLENE GLYCOL 3350 17 G: 17 POWDER, FOR SOLUTION ORAL at 09:30

## 2020-01-01 RX ADMIN — HYDRALAZINE HYDROCHLORIDE 100 MG: 50 TABLET ORAL at 21:31

## 2020-01-01 RX ADMIN — ACETAMINOPHEN 650 MG: 325 TABLET, FILM COATED ORAL at 23:50

## 2020-01-01 RX ADMIN — DOCUSATE SODIUM 50MG AND SENNOSIDES 8.6MG 1 TABLET: 8.6; 5 TABLET, FILM COATED ORAL at 20:51

## 2020-01-01 RX ADMIN — HYDROCODONE BITARTRATE AND ACETAMINOPHEN 1 TABLET: 5; 325 TABLET ORAL at 00:40

## 2020-01-01 RX ADMIN — HYDRALAZINE HYDROCHLORIDE 100 MG: 50 TABLET ORAL at 14:30

## 2020-01-01 RX ADMIN — DOCUSATE SODIUM 50MG AND SENNOSIDES 8.6MG 1 TABLET: 8.6; 5 TABLET, FILM COATED ORAL at 21:32

## 2020-01-01 RX ADMIN — HYDROCODONE BITARTRATE AND ACETAMINOPHEN 1 TABLET: 5; 325 TABLET ORAL at 23:30

## 2020-01-01 RX ADMIN — PANTOPRAZOLE SODIUM 40 MG: 40 TABLET, DELAYED RELEASE ORAL at 08:21

## 2020-01-01 RX ADMIN — FUROSEMIDE 40 MG: 40 TABLET ORAL at 12:03

## 2020-01-01 RX ADMIN — INSULIN LISPRO 5 UNITS: 100 INJECTION, SOLUTION INTRAVENOUS; SUBCUTANEOUS at 14:50

## 2020-01-01 RX ADMIN — AMLODIPINE BESYLATE 10 MG: 10 TABLET ORAL at 09:25

## 2020-01-01 RX ADMIN — ALLOPURINOL 100 MG: 100 TABLET ORAL at 08:30

## 2020-01-01 RX ADMIN — CEPHALEXIN 500 MG: 500 CAPSULE ORAL at 05:58

## 2020-01-01 RX ADMIN — PETROLATUM 100 APPLICATION: 420 OINTMENT TOPICAL at 21:30

## 2020-01-01 RX ADMIN — FUROSEMIDE 40 MG: 40 TABLET ORAL at 08:21

## 2020-01-01 RX ADMIN — ALLOPURINOL 100 MG: 100 TABLET ORAL at 08:29

## 2020-01-01 RX ADMIN — INSULIN LISPRO 2 UNITS: 100 INJECTION, SOLUTION INTRAVENOUS; SUBCUTANEOUS at 09:17

## 2020-01-01 RX ADMIN — POLYETHYLENE GLYCOL 3350 17 G: 17 POWDER, FOR SOLUTION ORAL at 08:30

## 2020-01-01 RX ADMIN — HYDRALAZINE HYDROCHLORIDE 100 MG: 50 TABLET ORAL at 15:10

## 2020-01-01 RX ADMIN — PANTOPRAZOLE SODIUM 40 MG: 40 TABLET, DELAYED RELEASE ORAL at 05:43

## 2020-01-01 RX ADMIN — HYDROCODONE BITARTRATE AND ACETAMINOPHEN 1 TABLET: 5; 325 TABLET ORAL at 14:39

## 2020-01-01 RX ADMIN — ALLOPURINOL 100 MG: 100 TABLET ORAL at 08:06

## 2020-01-01 RX ADMIN — AMLODIPINE BESYLATE 10 MG: 10 TABLET ORAL at 14:39

## 2020-01-01 RX ADMIN — ROSUVASTATIN CALCIUM 5 MG: 5 TABLET, FILM COATED ORAL at 21:59

## 2020-01-01 RX ADMIN — PANTOPRAZOLE SODIUM 40 MG: 40 TABLET, DELAYED RELEASE ORAL at 17:44

## 2020-01-01 RX ADMIN — DOCUSATE SODIUM 50MG AND SENNOSIDES 8.6MG 1 TABLET: 8.6; 5 TABLET, FILM COATED ORAL at 08:45

## 2020-01-01 RX ADMIN — INSULIN HUMAN 8 UNITS: 100 INJECTION, SOLUTION PARENTERAL at 13:45

## 2020-01-01 RX ADMIN — DOCUSATE SODIUM 50MG AND SENNOSIDES 8.6MG 1 TABLET: 8.6; 5 TABLET, FILM COATED ORAL at 21:28

## 2020-01-01 RX ADMIN — AMLODIPINE BESYLATE 10 MG: 10 TABLET ORAL at 08:26

## 2020-01-01 RX ADMIN — FUROSEMIDE 40 MG: 40 TABLET ORAL at 18:00

## 2020-01-01 RX ADMIN — ALLOPURINOL 100 MG: 100 TABLET ORAL at 09:17

## 2020-01-01 RX ADMIN — HYDRALAZINE HYDROCHLORIDE 100 MG: 50 TABLET ORAL at 06:33

## 2020-01-01 RX ADMIN — SODIUM CHLORIDE 75 ML/HR: 9 INJECTION, SOLUTION INTRAVENOUS at 00:07

## 2020-01-01 RX ADMIN — PANTOPRAZOLE SODIUM 40 MG: 40 INJECTION, POWDER, FOR SOLUTION INTRAVENOUS at 20:50

## 2020-01-01 RX ADMIN — VITAMIN D, TAB 1000IU (100/BT) 1000 UNITS: 25 TAB at 09:21

## 2020-01-01 RX ADMIN — ALLOPURINOL 100 MG: 100 TABLET ORAL at 09:21

## 2020-01-01 RX ADMIN — INSULIN LISPRO 5 UNITS: 100 INJECTION, SOLUTION INTRAVENOUS; SUBCUTANEOUS at 17:26

## 2020-01-01 RX ADMIN — HYDRALAZINE HYDROCHLORIDE 100 MG: 50 TABLET ORAL at 14:39

## 2020-01-01 RX ADMIN — METOPROLOL TARTRATE 37.5 MG: 25 TABLET, FILM COATED ORAL at 21:51

## 2020-01-01 RX ADMIN — CLOPIDOGREL 75 MG: 75 TABLET, FILM COATED ORAL at 08:43

## 2020-01-01 RX ADMIN — POLYETHYLENE GLYCOL 3350 17 G: 17 POWDER, FOR SOLUTION ORAL at 08:21

## 2020-01-01 RX ADMIN — VITAMIN D, TAB 1000IU (100/BT) 1000 UNITS: 25 TAB at 14:39

## 2020-01-01 RX ADMIN — CEPHALEXIN 500 MG: 500 CAPSULE ORAL at 05:56

## 2020-01-01 RX ADMIN — DOCUSATE SODIUM 50MG AND SENNOSIDES 8.6MG 1 TABLET: 8.6; 5 TABLET, FILM COATED ORAL at 08:26

## 2020-01-01 RX ADMIN — SODIUM CHLORIDE, PRESERVATIVE FREE 10 ML: 5 INJECTION INTRAVENOUS at 21:31

## 2020-01-01 RX ADMIN — PETROLATUM 100 APPLICATION: 420 OINTMENT TOPICAL at 08:37

## 2020-01-01 RX ADMIN — CLOPIDOGREL 75 MG: 75 TABLET, FILM COATED ORAL at 12:03

## 2020-01-01 RX ADMIN — DOCUSATE SODIUM 50MG AND SENNOSIDES 8.6MG 1 TABLET: 8.6; 5 TABLET, FILM COATED ORAL at 21:59

## 2020-01-01 RX ADMIN — ASPIRIN 81 MG: 81 TABLET, CHEWABLE ORAL at 08:30

## 2020-01-01 RX ADMIN — FUROSEMIDE 40 MG: 40 TABLET ORAL at 17:31

## 2020-01-01 RX ADMIN — METOPROLOL TARTRATE 37.5 MG: 25 TABLET, FILM COATED ORAL at 12:00

## 2020-01-01 RX ADMIN — INSULIN GLARGINE 20 UNITS: 100 INJECTION, SOLUTION SUBCUTANEOUS at 20:34

## 2020-01-01 RX ADMIN — SODIUM CHLORIDE, PRESERVATIVE FREE 10 ML: 5 INJECTION INTRAVENOUS at 08:43

## 2020-01-01 RX ADMIN — INSULIN LISPRO 2 UNITS: 100 INJECTION, SOLUTION INTRAVENOUS; SUBCUTANEOUS at 17:57

## 2020-01-01 RX ADMIN — VITAMIN D, TAB 1000IU (100/BT) 1000 UNITS: 25 TAB at 08:43

## 2020-01-01 RX ADMIN — INSULIN LISPRO 2 UNITS: 100 INJECTION, SOLUTION INTRAVENOUS; SUBCUTANEOUS at 08:18

## 2020-01-01 RX ADMIN — AMLODIPINE BESYLATE 10 MG: 10 TABLET ORAL at 09:10

## 2020-01-01 RX ADMIN — INSULIN LISPRO 10 UNITS: 100 INJECTION, SOLUTION INTRAVENOUS; SUBCUTANEOUS at 17:30

## 2020-01-01 RX ADMIN — INSULIN LISPRO 2 UNITS: 100 INJECTION, SOLUTION INTRAVENOUS; SUBCUTANEOUS at 12:38

## 2020-01-01 RX ADMIN — DOCUSATE SODIUM 50MG AND SENNOSIDES 8.6MG 1 TABLET: 8.6; 5 TABLET, FILM COATED ORAL at 09:30

## 2020-01-01 RX ADMIN — INSULIN LISPRO 5 UNITS: 100 INJECTION, SOLUTION INTRAVENOUS; SUBCUTANEOUS at 09:18

## 2020-01-01 RX ADMIN — SODIUM CHLORIDE, PRESERVATIVE FREE 10 ML: 5 INJECTION INTRAVENOUS at 21:34

## 2020-01-01 RX ADMIN — INSULIN LISPRO 2 UNITS: 100 INJECTION, SOLUTION INTRAVENOUS; SUBCUTANEOUS at 12:34

## 2020-01-01 RX ADMIN — DOCUSATE SODIUM 50MG AND SENNOSIDES 8.6MG 1 TABLET: 8.6; 5 TABLET, FILM COATED ORAL at 08:21

## 2020-01-01 RX ADMIN — ALLOPURINOL 100 MG: 100 TABLET ORAL at 12:04

## 2020-01-01 RX ADMIN — PANTOPRAZOLE SODIUM 40 MG: 40 TABLET, DELAYED RELEASE ORAL at 16:49

## 2020-01-01 RX ADMIN — INSULIN GLARGINE 15 UNITS: 100 INJECTION, SOLUTION SUBCUTANEOUS at 21:52

## 2020-01-01 RX ADMIN — AMLODIPINE BESYLATE 10 MG: 10 TABLET ORAL at 12:03

## 2020-01-01 RX ADMIN — DOCUSATE SODIUM 50MG AND SENNOSIDES 8.6MG 1 TABLET: 8.6; 5 TABLET, FILM COATED ORAL at 08:29

## 2020-01-01 RX ADMIN — PANTOPRAZOLE SODIUM 40 MG: 40 TABLET, DELAYED RELEASE ORAL at 18:17

## 2020-01-01 RX ADMIN — PANTOPRAZOLE SODIUM 40 MG: 40 TABLET, DELAYED RELEASE ORAL at 07:00

## 2020-01-01 RX ADMIN — ENOXAPARIN SODIUM 30 MG: 30 INJECTION SUBCUTANEOUS at 17:11

## 2020-01-01 RX ADMIN — INSULIN LISPRO 2 UNITS: 100 INJECTION, SOLUTION INTRAVENOUS; SUBCUTANEOUS at 14:51

## 2020-01-01 RX ADMIN — CEPHALEXIN 500 MG: 500 CAPSULE ORAL at 17:52

## 2020-01-01 RX ADMIN — POLYETHYLENE GLYCOL 3350 17 G: 17 POWDER, FOR SOLUTION ORAL at 12:28

## 2020-01-01 RX ADMIN — METOPROLOL TARTRATE 37.5 MG: 25 TABLET, FILM COATED ORAL at 14:40

## 2020-01-01 RX ADMIN — AMLODIPINE BESYLATE 10 MG: 10 TABLET ORAL at 08:43

## 2020-01-01 RX ADMIN — REGADENOSON 0.4 MG: 0.08 INJECTION, SOLUTION INTRAVENOUS at 10:05

## 2020-01-01 RX ADMIN — HYDRALAZINE HYDROCHLORIDE 100 MG: 50 TABLET ORAL at 21:06

## 2020-01-01 RX ADMIN — INSULIN LISPRO 2 UNITS: 100 INJECTION, SOLUTION INTRAVENOUS; SUBCUTANEOUS at 12:41

## 2020-01-01 RX ADMIN — INSULIN LISPRO 7 UNITS: 100 INJECTION, SOLUTION INTRAVENOUS; SUBCUTANEOUS at 12:34

## 2020-01-01 RX ADMIN — ACETAMINOPHEN 650 MG: 325 TABLET, FILM COATED ORAL at 21:31

## 2020-01-01 RX ADMIN — HYDROCODONE BITARTRATE AND ACETAMINOPHEN 1 TABLET: 5; 325 TABLET ORAL at 06:30

## 2020-01-01 RX ADMIN — PERFLUTREN 2 ML: 6.52 INJECTION, SUSPENSION INTRAVENOUS at 11:20

## 2020-01-01 RX ADMIN — ALLOPURINOL 100 MG: 100 TABLET ORAL at 14:40

## 2020-01-01 RX ADMIN — POLYETHYLENE GLYCOL 3350 17 G: 17 POWDER, FOR SOLUTION ORAL at 08:06

## 2020-01-01 RX ADMIN — VITAMIN D, TAB 1000IU (100/BT) 1000 UNITS: 25 TAB at 09:10

## 2020-01-01 RX ADMIN — HYDROCODONE BITARTRATE AND ACETAMINOPHEN 1 TABLET: 5; 325 TABLET ORAL at 13:14

## 2020-01-01 RX ADMIN — ALLOPURINOL 100 MG: 100 TABLET ORAL at 08:26

## 2020-01-01 RX ADMIN — POLYETHYLENE GLYCOL 3350 17 G: 17 POWDER, FOR SOLUTION ORAL at 09:17

## 2020-01-01 RX ADMIN — PANTOPRAZOLE SODIUM 40 MG: 40 INJECTION, POWDER, FOR SOLUTION INTRAVENOUS at 21:55

## 2020-01-01 RX ADMIN — VITAMIN D, TAB 1000IU (100/BT) 1000 UNITS: 25 TAB at 12:01

## 2020-01-01 RX ADMIN — CLOPIDOGREL 75 MG: 75 TABLET, FILM COATED ORAL at 08:30

## 2020-01-01 RX ADMIN — INSULIN LISPRO 2 UNITS: 100 INJECTION, SOLUTION INTRAVENOUS; SUBCUTANEOUS at 16:49

## 2020-01-01 RX ADMIN — ALLOPURINOL 100 MG: 100 TABLET ORAL at 08:28

## 2020-01-01 RX ADMIN — AMLODIPINE BESYLATE 10 MG: 10 TABLET ORAL at 09:21

## 2020-01-01 RX ADMIN — HYDROCODONE BITARTRATE AND ACETAMINOPHEN 1 TABLET: 5; 325 TABLET ORAL at 12:20

## 2020-01-01 RX ADMIN — HYDRALAZINE HYDROCHLORIDE 100 MG: 50 TABLET ORAL at 06:28

## 2020-01-01 RX ADMIN — INSULIN LISPRO 10 UNITS: 100 INJECTION, SOLUTION INTRAVENOUS; SUBCUTANEOUS at 12:17

## 2020-01-01 RX ADMIN — METOPROLOL TARTRATE 37.5 MG: 25 TABLET, FILM COATED ORAL at 09:16

## 2020-01-01 RX ADMIN — HYDROCODONE BITARTRATE AND ACETAMINOPHEN 1 TABLET: 5; 325 TABLET ORAL at 14:14

## 2020-01-01 RX ADMIN — INSULIN LISPRO 10 UNITS: 100 INJECTION, SOLUTION INTRAVENOUS; SUBCUTANEOUS at 08:43

## 2020-01-01 RX ADMIN — VITAMIN D, TAB 1000IU (100/BT) 1000 UNITS: 25 TAB at 08:28

## 2020-01-01 RX ADMIN — ROSUVASTATIN CALCIUM 5 MG: 5 TABLET, FILM COATED ORAL at 20:37

## 2020-01-01 RX ADMIN — HYDRALAZINE HYDROCHLORIDE 100 MG: 50 TABLET ORAL at 14:14

## 2020-01-01 RX ADMIN — METOPROLOL TARTRATE 37.5 MG: 25 TABLET, FILM COATED ORAL at 21:31

## 2020-01-01 RX ADMIN — HYDROCODONE BITARTRATE AND ACETAMINOPHEN 1 TABLET: 5; 325 TABLET ORAL at 05:58

## 2020-01-01 RX ADMIN — HYDRALAZINE HYDROCHLORIDE 100 MG: 50 TABLET ORAL at 21:40

## 2020-01-01 RX ADMIN — DOCUSATE SODIUM 50MG AND SENNOSIDES 8.6MG 1 TABLET: 8.6; 5 TABLET, FILM COATED ORAL at 21:31

## 2020-01-01 RX ADMIN — DOCUSATE SODIUM 50MG AND SENNOSIDES 8.6MG 1 TABLET: 8.6; 5 TABLET, FILM COATED ORAL at 20:33

## 2020-01-01 RX ADMIN — HYDRALAZINE HYDROCHLORIDE 100 MG: 50 TABLET ORAL at 21:28

## 2020-01-01 RX ADMIN — POTASSIUM CHLORIDE 20 MEQ: 10 CAPSULE, COATED, EXTENDED RELEASE ORAL at 11:49

## 2020-01-01 RX ADMIN — VITAMIN D, TAB 1000IU (100/BT) 1000 UNITS: 25 TAB at 08:30

## 2020-01-01 RX ADMIN — HYDRALAZINE HYDROCHLORIDE 100 MG: 50 TABLET ORAL at 22:01

## 2020-01-01 RX ADMIN — DOCUSATE SODIUM 50MG AND SENNOSIDES 8.6MG 1 TABLET: 8.6; 5 TABLET, FILM COATED ORAL at 08:05

## 2020-01-01 RX ADMIN — METOPROLOL TARTRATE 25 MG: 25 TABLET, FILM COATED ORAL at 09:20

## 2020-01-01 RX ADMIN — SODIUM CHLORIDE 75 ML/HR: 9 INJECTION, SOLUTION INTRAVENOUS at 13:31

## 2020-01-01 RX ADMIN — POLYETHYLENE GLYCOL 3350 17 G: 17 POWDER, FOR SOLUTION ORAL at 08:28

## 2020-01-01 RX ADMIN — SODIUM CHLORIDE, PRESERVATIVE FREE 10 ML: 5 INJECTION INTRAVENOUS at 08:30

## 2020-01-01 RX ADMIN — DOCUSATE SODIUM 50MG AND SENNOSIDES 8.6MG 1 TABLET: 8.6; 5 TABLET, FILM COATED ORAL at 21:50

## 2020-01-01 RX ADMIN — DOCUSATE SODIUM 50MG AND SENNOSIDES 8.6MG 1 TABLET: 8.6; 5 TABLET, FILM COATED ORAL at 20:00

## 2020-01-01 RX ADMIN — CEPHALEXIN 500 MG: 500 CAPSULE ORAL at 17:04

## 2020-01-01 RX ADMIN — HYDRALAZINE HYDROCHLORIDE 100 MG: 50 TABLET ORAL at 13:56

## 2020-01-01 RX ADMIN — CLOPIDOGREL 75 MG: 75 TABLET, FILM COATED ORAL at 08:26

## 2020-01-01 RX ADMIN — DOCUSATE SODIUM 50MG AND SENNOSIDES 8.6MG 1 TABLET: 8.6; 5 TABLET, FILM COATED ORAL at 09:17

## 2020-01-01 RX ADMIN — INSULIN GLARGINE 30 UNITS: 100 INJECTION, SOLUTION SUBCUTANEOUS at 23:34

## 2020-01-01 RX ADMIN — HYDRALAZINE HYDROCHLORIDE 100 MG: 50 TABLET ORAL at 23:59

## 2020-01-01 RX ADMIN — METOPROLOL TARTRATE 37.5 MG: 25 TABLET, FILM COATED ORAL at 08:21

## 2020-01-01 RX ADMIN — INSULIN GLARGINE 15 UNITS: 100 INJECTION, SOLUTION SUBCUTANEOUS at 21:22

## 2020-01-01 RX ADMIN — INSULIN LISPRO 2 UNITS: 100 INJECTION, SOLUTION INTRAVENOUS; SUBCUTANEOUS at 17:25

## 2020-01-01 RX ADMIN — INSULIN GLARGINE 20 UNITS: 100 INJECTION, SOLUTION SUBCUTANEOUS at 21:31

## 2020-01-01 RX ADMIN — ACETAMINOPHEN 650 MG: 325 TABLET, FILM COATED ORAL at 14:28

## 2020-01-01 RX ADMIN — HYDROCODONE BITARTRATE AND ACETAMINOPHEN 1 TABLET: 5; 325 TABLET ORAL at 20:43

## 2020-01-01 RX ADMIN — DOCUSATE SODIUM 50MG AND SENNOSIDES 8.6MG 1 TABLET: 8.6; 5 TABLET, FILM COATED ORAL at 20:34

## 2020-01-01 RX ADMIN — HYDRALAZINE HYDROCHLORIDE 100 MG: 50 TABLET ORAL at 15:01

## 2020-01-01 RX ADMIN — ALLOPURINOL 100 MG: 100 TABLET ORAL at 09:30

## 2020-01-01 RX ADMIN — INSULIN LISPRO 2 UNITS: 100 INJECTION, SOLUTION INTRAVENOUS; SUBCUTANEOUS at 08:27

## 2020-01-01 RX ADMIN — HYDRALAZINE HYDROCHLORIDE 100 MG: 50 TABLET ORAL at 21:22

## 2020-01-01 RX ADMIN — METOPROLOL TARTRATE 37.5 MG: 25 TABLET, FILM COATED ORAL at 21:34

## 2020-01-01 RX ADMIN — CEPHALEXIN 500 MG: 500 CAPSULE ORAL at 18:00

## 2020-01-01 RX ADMIN — VITAMIN D, TAB 1000IU (100/BT) 1000 UNITS: 25 TAB at 09:30

## 2020-01-01 RX ADMIN — HYDRALAZINE HYDROCHLORIDE 100 MG: 50 TABLET ORAL at 22:17

## 2020-01-01 RX ADMIN — CEPHALEXIN 500 MG: 500 CAPSULE ORAL at 07:41

## 2020-01-01 RX ADMIN — METOPROLOL TARTRATE 37.5 MG: 25 TABLET, FILM COATED ORAL at 20:33

## 2020-01-01 RX ADMIN — PANTOPRAZOLE SODIUM 40 MG: 40 TABLET, DELAYED RELEASE ORAL at 17:25

## 2020-01-01 RX ADMIN — ACETAMINOPHEN 650 MG: 650 SUPPOSITORY RECTAL at 01:00

## 2020-01-01 RX ADMIN — POLYETHYLENE GLYCOL 3350 17 G: 17 POWDER, FOR SOLUTION ORAL at 08:25

## 2020-01-01 RX ADMIN — INSULIN LISPRO 2 UNITS: 100 INJECTION, SOLUTION INTRAVENOUS; SUBCUTANEOUS at 09:06

## 2020-01-01 RX ADMIN — ALLOPURINOL 100 MG: 100 TABLET ORAL at 09:10

## 2020-01-01 RX ADMIN — CLOPIDOGREL 75 MG: 75 TABLET, FILM COATED ORAL at 08:29

## 2020-01-01 RX ADMIN — TECHNETIUM TC 99M SESTAMIBI 1 DOSE: 1 INJECTION INTRAVENOUS at 06:25

## 2020-01-01 RX ADMIN — DOCUSATE SODIUM 50MG AND SENNOSIDES 8.6MG 1 TABLET: 8.6; 5 TABLET, FILM COATED ORAL at 12:00

## 2020-01-01 RX ADMIN — HYDROCODONE BITARTRATE AND ACETAMINOPHEN 1 TABLET: 5; 325 TABLET ORAL at 06:28

## 2020-01-01 RX ADMIN — HYDROCODONE BITARTRATE AND ACETAMINOPHEN 1 TABLET: 5; 325 TABLET ORAL at 18:08

## 2020-01-01 RX ADMIN — INSULIN LISPRO 7 UNITS: 100 INJECTION, SOLUTION INTRAVENOUS; SUBCUTANEOUS at 08:30

## 2020-01-01 RX ADMIN — ROSUVASTATIN CALCIUM 5 MG: 5 TABLET, FILM COATED ORAL at 20:33

## 2020-01-01 RX ADMIN — HYDRALAZINE HYDROCHLORIDE 100 MG: 50 TABLET ORAL at 07:00

## 2020-01-01 RX ADMIN — HYDRALAZINE HYDROCHLORIDE 100 MG: 50 TABLET ORAL at 21:57

## 2020-01-01 RX ADMIN — PANTOPRAZOLE SODIUM 40 MG: 40 TABLET, DELAYED RELEASE ORAL at 09:10

## 2020-01-01 RX ADMIN — SODIUM CHLORIDE, PRESERVATIVE FREE 10 ML: 5 INJECTION INTRAVENOUS at 21:57

## 2020-01-01 RX ADMIN — METOPROLOL TARTRATE 37.5 MG: 25 TABLET, FILM COATED ORAL at 20:34

## 2020-01-01 RX ADMIN — HYDRALAZINE HYDROCHLORIDE 100 MG: 50 TABLET ORAL at 21:54

## 2020-01-01 RX ADMIN — METOPROLOL TARTRATE 37.5 MG: 25 TABLET, FILM COATED ORAL at 21:30

## 2020-01-01 RX ADMIN — VITAMIN D, TAB 1000IU (100/BT) 1000 UNITS: 25 TAB at 08:21

## 2020-01-01 RX ADMIN — DOCUSATE SODIUM 50MG AND SENNOSIDES 8.6MG 1 TABLET: 8.6; 5 TABLET, FILM COATED ORAL at 20:37

## 2020-01-01 RX ADMIN — ACETAMINOPHEN 650 MG: 325 TABLET, FILM COATED ORAL at 12:37

## 2020-01-01 RX ADMIN — ROSUVASTATIN CALCIUM 5 MG: 5 TABLET, FILM COATED ORAL at 20:34

## 2020-01-01 RX ADMIN — INSULIN LISPRO 5 UNITS: 100 INJECTION, SOLUTION INTRAVENOUS; SUBCUTANEOUS at 12:37

## 2020-01-01 RX ADMIN — HYDRALAZINE HYDROCHLORIDE 100 MG: 50 TABLET ORAL at 05:43

## 2020-01-01 RX ADMIN — INSULIN LISPRO 2 UNITS: 100 INJECTION, SOLUTION INTRAVENOUS; SUBCUTANEOUS at 17:53

## 2020-01-01 RX ADMIN — INSULIN LISPRO 10 UNITS: 100 INJECTION, SOLUTION INTRAVENOUS; SUBCUTANEOUS at 09:06

## 2020-01-01 RX ADMIN — PANTOPRAZOLE SODIUM 40 MG: 40 TABLET, DELAYED RELEASE ORAL at 17:53

## 2020-01-01 RX ADMIN — INSULIN LISPRO 10 UNITS: 100 INJECTION, SOLUTION INTRAVENOUS; SUBCUTANEOUS at 17:14

## 2020-01-01 RX ADMIN — INSULIN LISPRO 10 UNITS: 100 INJECTION, SOLUTION INTRAVENOUS; SUBCUTANEOUS at 17:53

## 2020-01-01 RX ADMIN — INSULIN LISPRO 8 UNITS: 100 INJECTION, SOLUTION INTRAVENOUS; SUBCUTANEOUS at 17:54

## 2020-01-01 RX ADMIN — HYDRALAZINE HYDROCHLORIDE 100 MG: 50 TABLET ORAL at 05:40

## 2020-01-01 RX ADMIN — INSULIN GLARGINE 30 UNITS: 100 INJECTION, SOLUTION SUBCUTANEOUS at 22:00

## 2020-01-01 RX ADMIN — INSULIN LISPRO 10 UNITS: 100 INJECTION, SOLUTION INTRAVENOUS; SUBCUTANEOUS at 17:25

## 2020-01-01 RX ADMIN — AMLODIPINE BESYLATE 10 MG: 10 TABLET ORAL at 08:21

## 2020-01-01 RX ADMIN — DOCUSATE SODIUM 50MG AND SENNOSIDES 8.6MG 1 TABLET: 8.6; 5 TABLET, FILM COATED ORAL at 14:40

## 2020-01-01 RX ADMIN — DOCUSATE SODIUM 50MG AND SENNOSIDES 8.6MG 1 TABLET: 8.6; 5 TABLET, FILM COATED ORAL at 13:56

## 2020-01-01 RX ADMIN — HYDRALAZINE HYDROCHLORIDE 100 MG: 50 TABLET ORAL at 15:20

## 2020-01-01 RX ADMIN — FUROSEMIDE 40 MG: 40 TABLET ORAL at 17:03

## 2020-01-01 RX ADMIN — INSULIN LISPRO 5 UNITS: 100 INJECTION, SOLUTION INTRAVENOUS; SUBCUTANEOUS at 17:57

## 2020-01-01 RX ADMIN — HYDRALAZINE HYDROCHLORIDE 100 MG: 50 TABLET ORAL at 22:06

## 2020-01-01 RX ADMIN — HYDRALAZINE HYDROCHLORIDE 100 MG: 50 TABLET ORAL at 13:40

## 2020-01-01 RX ADMIN — INSULIN LISPRO 2 UNITS: 100 INJECTION, SOLUTION INTRAVENOUS; SUBCUTANEOUS at 13:56

## 2020-01-01 RX ADMIN — METOPROLOL TARTRATE 37.5 MG: 25 TABLET, FILM COATED ORAL at 09:10

## 2020-01-01 RX ADMIN — POLYETHYLENE GLYCOL 3350 17 G: 17 POWDER, FOR SOLUTION ORAL at 14:40

## 2020-01-01 RX ADMIN — CEPHALEXIN 500 MG: 500 CAPSULE ORAL at 17:24

## 2020-01-01 RX ADMIN — ACETAMINOPHEN 650 MG: 325 TABLET, FILM COATED ORAL at 16:31

## 2020-01-01 RX ADMIN — ROSUVASTATIN CALCIUM 5 MG: 5 TABLET, FILM COATED ORAL at 20:01

## 2020-01-01 RX ADMIN — HYDRALAZINE HYDROCHLORIDE 100 MG: 50 TABLET ORAL at 16:25

## 2020-01-01 RX ADMIN — METOPROLOL TARTRATE 37.5 MG: 25 TABLET, FILM COATED ORAL at 08:05

## 2020-01-01 RX ADMIN — ATORVASTATIN CALCIUM 80 MG: 80 TABLET, FILM COATED ORAL at 21:32

## 2020-01-01 RX ADMIN — ACETAMINOPHEN 650 MG: 325 TABLET, FILM COATED ORAL at 00:14

## 2020-01-01 RX ADMIN — PANTOPRAZOLE SODIUM 40 MG: 40 TABLET, DELAYED RELEASE ORAL at 20:37

## 2020-01-01 RX ADMIN — FUROSEMIDE 40 MG: 10 INJECTION, SOLUTION INTRAMUSCULAR; INTRAVENOUS at 14:54

## 2020-01-01 RX ADMIN — HYDRALAZINE HYDROCHLORIDE 100 MG: 50 TABLET ORAL at 14:34

## 2020-01-01 RX ADMIN — INSULIN GLARGINE 25 UNITS: 100 INJECTION, SOLUTION SUBCUTANEOUS at 21:59

## 2020-01-01 RX ADMIN — CEFTRIAXONE SODIUM 1 G: 1 INJECTION, SOLUTION INTRAVENOUS at 14:39

## 2020-01-01 RX ADMIN — PANTOPRAZOLE SODIUM 40 MG: 40 INJECTION, POWDER, FOR SOLUTION INTRAVENOUS at 12:16

## 2020-01-01 RX ADMIN — METOPROLOL TARTRATE 37.5 MG: 25 TABLET, FILM COATED ORAL at 21:28

## 2020-01-01 RX ADMIN — PANTOPRAZOLE SODIUM 80 MG: 40 INJECTION, POWDER, FOR SOLUTION INTRAVENOUS at 23:49

## 2020-01-01 RX ADMIN — ROSUVASTATIN CALCIUM 5 MG: 5 TABLET, FILM COATED ORAL at 20:52

## 2020-01-01 RX ADMIN — SODIUM CHLORIDE 75 ML/HR: 9 INJECTION, SOLUTION INTRAVENOUS at 06:01

## 2020-01-01 RX ADMIN — PANTOPRAZOLE SODIUM 40 MG: 40 INJECTION, POWDER, FOR SOLUTION INTRAVENOUS at 09:30

## 2020-01-01 RX ADMIN — FUROSEMIDE 40 MG: 40 TABLET ORAL at 08:42

## 2020-01-01 RX ADMIN — FUROSEMIDE 40 MG: 10 INJECTION, SOLUTION INTRAMUSCULAR; INTRAVENOUS at 14:40

## 2020-01-01 RX ADMIN — HYDRALAZINE HYDROCHLORIDE 100 MG: 50 TABLET ORAL at 06:48

## 2020-01-01 RX ADMIN — DOCUSATE SODIUM 50MG AND SENNOSIDES 8.6MG 1 TABLET: 8.6; 5 TABLET, FILM COATED ORAL at 08:30

## 2020-01-01 RX ADMIN — METOPROLOL TARTRATE 37.5 MG: 25 TABLET, FILM COATED ORAL at 20:37

## 2020-01-01 RX ADMIN — HYDRALAZINE HYDROCHLORIDE 100 MG: 50 TABLET ORAL at 13:29

## 2020-01-01 RX ADMIN — INSULIN GLARGINE 15 UNITS: 100 INJECTION, SOLUTION SUBCUTANEOUS at 21:40

## 2020-01-01 RX ADMIN — INSULIN LISPRO 5 UNITS: 100 INJECTION, SOLUTION INTRAVENOUS; SUBCUTANEOUS at 08:28

## 2020-01-01 RX ADMIN — VITAMIN D, TAB 1000IU (100/BT) 1000 UNITS: 25 TAB at 09:17

## 2020-01-01 RX ADMIN — INSULIN LISPRO 2 UNITS: 100 INJECTION, SOLUTION INTRAVENOUS; SUBCUTANEOUS at 17:44

## 2020-01-01 RX ADMIN — INSULIN GLARGINE 30 UNITS: 100 INJECTION, SOLUTION SUBCUTANEOUS at 21:29

## 2020-01-01 RX ADMIN — HYDRALAZINE HYDROCHLORIDE 100 MG: 50 TABLET ORAL at 05:07

## 2020-01-01 RX ADMIN — AMLODIPINE BESYLATE 10 MG: 10 TABLET ORAL at 09:17

## 2020-01-01 RX ADMIN — INSULIN LISPRO 2 UNITS: 100 INJECTION, SOLUTION INTRAVENOUS; SUBCUTANEOUS at 12:33

## 2020-01-01 RX ADMIN — PANTOPRAZOLE SODIUM 40 MG: 40 INJECTION, POWDER, FOR SOLUTION INTRAVENOUS at 09:20

## 2020-01-01 RX ADMIN — HYDRALAZINE HYDROCHLORIDE 100 MG: 50 TABLET ORAL at 14:40

## 2020-01-01 RX ADMIN — CEFTRIAXONE SODIUM 1 G: 1 INJECTION, SOLUTION INTRAVENOUS at 14:55

## 2020-01-01 RX ADMIN — ROSUVASTATIN CALCIUM 5 MG: 5 TABLET, FILM COATED ORAL at 21:31

## 2020-01-01 RX ADMIN — INSULIN LISPRO 2 UNITS: 100 INJECTION, SOLUTION INTRAVENOUS; SUBCUTANEOUS at 18:18

## 2020-01-01 RX ADMIN — VITAMIN D, TAB 1000IU (100/BT) 1000 UNITS: 25 TAB at 14:52

## 2020-01-01 RX ADMIN — AMLODIPINE BESYLATE 10 MG: 10 TABLET ORAL at 08:30

## 2020-01-01 RX ADMIN — INSULIN GLARGINE 30 UNITS: 100 INJECTION, SOLUTION SUBCUTANEOUS at 21:54

## 2020-01-01 RX ADMIN — PANTOPRAZOLE SODIUM 40 MG: 40 TABLET, DELAYED RELEASE ORAL at 08:05

## 2020-01-01 RX ADMIN — CLOPIDOGREL 75 MG: 75 TABLET, FILM COATED ORAL at 14:55

## 2020-01-01 RX ADMIN — POLYETHYLENE GLYCOL 3350 17 G: 17 POWDER, FOR SOLUTION ORAL at 08:29

## 2020-01-01 RX ADMIN — PETROLATUM 1 APPLICATION: 42 OINTMENT TOPICAL at 21:56

## 2020-01-01 RX ADMIN — METOPROLOL TARTRATE 37.5 MG: 25 TABLET, FILM COATED ORAL at 21:58

## 2020-01-01 RX ADMIN — INSULIN LISPRO 5 UNITS: 100 INJECTION, SOLUTION INTRAVENOUS; SUBCUTANEOUS at 16:50

## 2020-01-01 RX ADMIN — INSULIN GLARGINE 30 UNITS: 100 INJECTION, SOLUTION SUBCUTANEOUS at 21:32

## 2020-01-01 RX ADMIN — DOCUSATE SODIUM 50MG AND SENNOSIDES 8.6MG 1 TABLET: 8.6; 5 TABLET, FILM COATED ORAL at 08:28

## 2020-01-01 RX ADMIN — METOPROLOL TARTRATE 37.5 MG: 25 TABLET, FILM COATED ORAL at 08:25

## 2020-01-01 RX ADMIN — SODIUM CHLORIDE, PRESERVATIVE FREE 10 ML: 5 INJECTION INTRAVENOUS at 21:32

## 2020-01-01 RX ADMIN — FUROSEMIDE 40 MG: 40 TABLET ORAL at 11:49

## 2020-01-01 RX ADMIN — PANTOPRAZOLE SODIUM 40 MG: 40 TABLET, DELAYED RELEASE ORAL at 17:52

## 2020-01-01 RX ADMIN — CEFTRIAXONE SODIUM 1 G: 1 INJECTION, SOLUTION INTRAVENOUS at 13:41

## 2020-01-01 RX ADMIN — INSULIN LISPRO 2 UNITS: 100 INJECTION, SOLUTION INTRAVENOUS; SUBCUTANEOUS at 08:05

## 2020-01-01 RX ADMIN — PANTOPRAZOLE SODIUM 40 MG: 40 TABLET, DELAYED RELEASE ORAL at 05:55

## 2020-01-01 RX ADMIN — FUROSEMIDE 40 MG: 40 TABLET ORAL at 17:24

## 2020-01-01 RX ADMIN — HYDRALAZINE HYDROCHLORIDE 100 MG: 50 TABLET ORAL at 06:40

## 2020-01-01 RX ADMIN — METOPROLOL TARTRATE 25 MG: 25 TABLET, FILM COATED ORAL at 20:00

## 2020-01-01 RX ADMIN — CLOPIDOGREL 75 MG: 75 TABLET, FILM COATED ORAL at 08:21

## 2020-01-01 RX ADMIN — METOPROLOL TARTRATE 37.5 MG: 25 TABLET, FILM COATED ORAL at 08:43

## 2020-01-01 RX ADMIN — FUROSEMIDE 40 MG: 10 INJECTION, SOLUTION INTRAMUSCULAR; INTRAVENOUS at 07:13

## 2020-01-01 RX ADMIN — METOPROLOL TARTRATE 25 MG: 25 TABLET, FILM COATED ORAL at 20:50

## 2020-01-01 RX ADMIN — AMLODIPINE BESYLATE 10 MG: 10 TABLET ORAL at 08:06

## 2020-01-01 RX ADMIN — METOPROLOL TARTRATE 37.5 MG: 25 TABLET, FILM COATED ORAL at 20:52

## 2020-01-01 RX ADMIN — FUROSEMIDE 40 MG: 40 TABLET ORAL at 08:30

## 2020-01-01 RX ADMIN — VITAMIN D, TAB 1000IU (100/BT) 1000 UNITS: 25 TAB at 08:06

## 2020-01-01 RX ADMIN — INSULIN LISPRO 7 UNITS: 100 INJECTION, SOLUTION INTRAVENOUS; SUBCUTANEOUS at 17:45

## 2020-01-01 RX ADMIN — ONDANSETRON 4 MG: 2 INJECTION INTRAMUSCULAR; INTRAVENOUS at 15:10

## 2020-01-01 RX ADMIN — HYDRALAZINE HYDROCHLORIDE 100 MG: 50 TABLET ORAL at 06:54

## 2020-01-01 RX ADMIN — ROSUVASTATIN CALCIUM 5 MG: 5 TABLET, FILM COATED ORAL at 20:51

## 2020-01-01 RX ADMIN — HYDROCODONE BITARTRATE AND ACETAMINOPHEN 1 TABLET: 5; 325 TABLET ORAL at 12:23

## 2020-01-01 RX ADMIN — ASPIRIN 81 MG: 81 TABLET, CHEWABLE ORAL at 08:21

## 2020-01-01 RX ADMIN — ATORVASTATIN CALCIUM 80 MG: 80 TABLET, FILM COATED ORAL at 21:28

## 2020-01-01 RX ADMIN — INSULIN LISPRO 8 UNITS: 100 INJECTION, SOLUTION INTRAVENOUS; SUBCUTANEOUS at 08:21

## 2020-06-24 PROBLEM — K92.2 GASTROINTESTINAL HEMORRHAGE: Status: ACTIVE | Noted: 2020-01-01

## 2020-07-01 NOTE — PROGRESS NOTES
"   LOS: 7 days     Chief Complaint/ Reason for encounter: Acute on chronic renal failure  Chief Complaint   Patient presents with   • Abnormal Lab         Subjective    No complaints, resting  Improved edema no shortness of breath or chest pain  Marginal appetite but no nausea or vomiting  No fevers or chills, weight stable today      Medical history reviewed:  Abnormal Lab         Subjective    History taken from: Patient and chart    Vital Signs  Temp:  [97.2 °F (36.2 °C)-98.2 °F (36.8 °C)] 97.2 °F (36.2 °C)  Heart Rate:  [86-93] 86  Resp:  [18-20] 18  BP: (118-152)/(65-96) 118/65       Wt Readings from Last 1 Encounters:   07/01/20 0212 117 kg (258 lb 2.5 oz)   06/30/20 0501 121 kg (267 lb 6.4 oz)   06/29/20 0110 116 kg (256 lb 12.8 oz)   06/28/20 0410 116 kg (256 lb 3.2 oz)   06/27/20 0611 119 kg (261 lb 14.5 oz)   06/26/20 1045 119 kg (263 lb)   06/26/20 0559 120 kg (263 lb 12.8 oz)   06/25/20 0112 114 kg (251 lb 11.2 oz)   06/24/20 1803 117 kg (257 lb 15 oz)       Objective:  Vital signs: (most recent): Blood pressure 118/65, pulse 86, temperature 97.2 °F (36.2 °C), temperature source Oral, resp. rate 18, height 170.2 cm (67\"), weight 117 kg (258 lb 2.5 oz), SpO2 99 %.              Objective:  General Appearance:  Comfortable, obese, chronically ill-appearing, in no acute distress and not in pain.  Awake, alert, oriented  HEENT: Mucous membranes moist, no injury, oropharynx clear  Lungs:  Normal effort and normal respiratory rate.  Breath sounds clear to auscultation.  No  respiratory distress.  No rales, decreased breath sounds or rhonchi.    Heart: Normal rate.  Regular rhythm.  S1 normal.  No murmur.   Abdomen: Abdomen is soft.  Bowel sounds are normal, no abdominal tenderness.  There is no rebound or guarding  Extremities: Normal range of motion.  Decreased, 1+ doughy edema of bilateral lower extremities, distal pulses intact  Neurological: No focal motor or sensory deficits, pupils reactive  Skin:  Warm " and dry.  No rash or cyanosis.       Results Review:    Intake/Output:     Intake/Output Summary (Last 24 hours) at 7/1/2020 1717  Last data filed at 7/1/2020 1321  Gross per 24 hour   Intake 420 ml   Output 350 ml   Net 70 ml         DATA:  Radiology and Labs:  The following labs independently reviewed by me. Additional labs ordered for tomorrow a.m.  Interval notes, chart personally reviewed by me.   Discussed with pt herself at bedside        Labs:   Recent Results (from the past 24 hour(s))   POC Glucose Once    Collection Time: 06/30/20  8:25 PM   Result Value Ref Range    Glucose 150 (H) 70 - 130 mg/dL   Basic Metabolic Panel    Collection Time: 07/01/20  5:50 AM   Result Value Ref Range    Glucose 167 (H) 65 - 99 mg/dL    BUN 32 (H) 8 - 23 mg/dL    Creatinine 2.31 (H) 0.57 - 1.00 mg/dL    Sodium 135 (L) 136 - 145 mmol/L    Potassium 4.2 3.5 - 5.2 mmol/L    Chloride 103 98 - 107 mmol/L    CO2 26.6 22.0 - 29.0 mmol/L    Calcium 8.1 (L) 8.6 - 10.5 mg/dL    eGFR  African Amer 25 (L) >60 mL/min/1.73    BUN/Creatinine Ratio 13.9 7.0 - 25.0    Anion Gap 5.4 5.0 - 15.0 mmol/L   CBC Auto Differential    Collection Time: 07/01/20  5:50 AM   Result Value Ref Range    WBC 6.77 3.40 - 10.80 10*3/mm3    RBC 2.66 (L) 3.77 - 5.28 10*6/mm3    Hemoglobin 8.3 (L) 12.0 - 15.9 g/dL    Hematocrit 26.2 (L) 34.0 - 46.6 %    MCV 98.5 (H) 79.0 - 97.0 fL    MCH 31.2 26.6 - 33.0 pg    MCHC 31.7 31.5 - 35.7 g/dL    RDW 13.4 12.3 - 15.4 %    RDW-SD 48.3 37.0 - 54.0 fl    MPV 10.2 6.0 - 12.0 fL    Platelets 214 140 - 450 10*3/mm3    Neutrophil % 47.8 42.7 - 76.0 %    Lymphocyte % 36.9 19.6 - 45.3 %    Monocyte % 10.9 5.0 - 12.0 %    Eosinophil % 3.7 0.3 - 6.2 %    Basophil % 0.4 0.0 - 1.5 %    Immature Grans % 0.3 0.0 - 0.5 %    Neutrophils, Absolute 3.23 1.70 - 7.00 10*3/mm3    Lymphocytes, Absolute 2.50 0.70 - 3.10 10*3/mm3    Monocytes, Absolute 0.74 0.10 - 0.90 10*3/mm3    Eosinophils, Absolute 0.25 0.00 - 0.40 10*3/mm3    Basophils,  Absolute 0.03 0.00 - 0.20 10*3/mm3    Immature Grans, Absolute 0.02 0.00 - 0.05 10*3/mm3    nRBC 0.1 0.0 - 0.2 /100 WBC   POC Glucose Once    Collection Time: 07/01/20  6:15 AM   Result Value Ref Range    Glucose 167 (H) 70 - 130 mg/dL   POC Glucose Once    Collection Time: 07/01/20 10:59 AM   Result Value Ref Range    Glucose 137 (H) 70 - 130 mg/dL   POC Glucose Once    Collection Time: 07/01/20  4:08 PM   Result Value Ref Range    Glucose 197 (H) 70 - 130 mg/dL       Radiology:  Imaging Results (Last 24 Hours)     ** No results found for the last 24 hours. **             Medications have been reviewed:  Current Facility-Administered Medications   Medication Dose Route Frequency Provider Last Rate Last Dose   • acetaminophen (TYLENOL) tablet 650 mg  650 mg Oral Q4H PRN Josias Razo MD   650 mg at 07/01/20 1631   • allopurinol (ZYLOPRIM) tablet 100 mg  100 mg Oral Daily Josias Razo MD   100 mg at 07/01/20 0828   • amLODIPine (NORVASC) tablet 10 mg  10 mg Oral Q24H Josias Razo MD   10 mg at 07/01/20 0828   • cholecalciferol (VITAMIN D3) tablet 1,000 Units  1,000 Units Oral Daily Josias Razo MD   1,000 Units at 07/01/20 0828   • hydrALAZINE (APRESOLINE) tablet 100 mg  100 mg Oral Q8H Joisas Razo MD   100 mg at 07/01/20 1501   • insulin glargine (LANTUS) injection 25 Units  25 Units Subcutaneous Nightly Josias Razo MD       • insulin lispro (humaLOG) injection 0-7 Units  0-7 Units Subcutaneous TID AC Josias Razo MD   2 Units at 07/01/20 0827   • insulin lispro (humaLOG) injection 5 Units  5 Units Subcutaneous TID With Meals Josias Razo MD   5 Units at 07/01/20 1252   • melatonin tablet 3 mg  3 mg Oral Nightly PRN Josias Razo MD   3 mg at 06/28/20 2131   • metoprolol tartrate (LOPRESSOR) tablet 37.5 mg  37.5 mg Oral Q12H Josias Razo MD   37.5 mg at 07/01/20 0829   • ondansetron (ZOFRAN) injection 4 mg  4 mg Intravenous Q4H PRN Josias Razo MD       • pantoprazole (PROTONIX) EC tablet 40 mg  40 mg  Oral BID AC Josias Razo MD   40 mg at 07/01/20 1631   • polyethylene glycol (MIRALAX) packet 17 g  17 g Oral Daily Josias Razo MD   17 g at 07/01/20 0828   • rosuvastatin (CRESTOR) tablet 5 mg  5 mg Oral Nightly Josias Razo MD   5 mg at 06/30/20 2034   • sennosides-docusate (PERICOLACE) 8.6-50 MG per tablet 1 tablet  1 tablet Oral BID Josias Razo MD   1 tablet at 07/01/20 0828       ASSESSMENT:  Chronic kidney disease stage III  Acute kidney injury, stable  Worsening volume overload  Hypertension  Type 2 diabetes mellitus  Chronic anemia secondary to CKD  Gastrointestinal hemorrhage  History of DVT on Coumadin        PLAN:   Renal function stable today, volume improved, continue to hold Lasix and potassium  Anticoagulation on hold, GI following, hemoglobin 8.3  Blood pressure stable, continue current regimen plus oral vitamin D  Repeat labs in a..      Sourav Doe MD   [facility]   Office phone number: [phone]  Answering service phone number: [phone]    07/01/20  17:17    Dictation performed using Dragon dictation software

## 2020-07-01 NOTE — PLAN OF CARE
Medicated x 1 for leg pain. CBC noted. No other physical signs of bleeding. Edema of lower extremities. Telemetry NSR. Family member at BS.  Problem: Skin Injury Risk (Adult)  Goal: Identify Related Risk Factors and Signs and Symptoms  Outcome: Ongoing (interventions implemented as appropriate)  Goal: Skin Health and Integrity  Outcome: Ongoing (interventions implemented as appropriate)     Problem: Skin Injury Risk (Adult)  Goal: Identify Related Risk Factors and Signs and Symptoms  Outcome: Ongoing (interventions implemented as appropriate)  Goal: Skin Health and Integrity  Outcome: Ongoing (interventions implemented as appropriate)     Problem: Fall Risk (Adult)  Goal: Identify Related Risk Factors and Signs and Symptoms  Outcome: Ongoing (interventions implemented as appropriate)  Goal: Absence of Fall  Outcome: Ongoing (interventions implemented as appropriate)     Problem: Patient Care Overview  Goal: Plan of Care Review  Outcome: Ongoing (interventions implemented as appropriate)  Goal: Individualization and Mutuality  Outcome: Ongoing (interventions implemented as appropriate)  Goal: Discharge Needs Assessment  Outcome: Ongoing (interventions implemented as appropriate)  Goal: Interprofessional Rounds/Family Conf  Outcome: Ongoing (interventions implemented as appropriate)     Problem: Fluid Volume Excess (Adult)  Goal: Identify Related Risk Factors and Signs and Symptoms  Outcome: Ongoing (interventions implemented as appropriate)  Goal: Optimal Fluid Balance  Outcome: Ongoing (interventions implemented as appropriate)     Problem: Gastrointestinal Bleeding (Adult)  Goal: Signs and Symptoms of Listed Potential Problems Will be Absent, Minimized or Managed (Gastrointestinal Bleeding)  Outcome: Ongoing (interventions implemented as appropriate)

## 2020-07-01 NOTE — NURSING NOTE
Ms. Teague, pt's POA phoned in saying that she still needs to hear from 2 family members regarding patient having scopes or not.     1030  Dr. Razo informed.

## 2020-07-01 NOTE — PROGRESS NOTES
Continued Stay Note  [facility]     Patient Name: Kacy Mistry  MRN: [id number]  Today's Date: 7/1/2020    Admit Date: 6/24/2020    Discharge Plan     Row Name 07/01/20 1032       Plan    Plan  Plan return to [facility].  PAZ Chu RN    Patient/Family in Agreement with Plan  yes    Plan Comments  Per Adelia  ( [phone]) [facility] will have a bed and can accept pt.   Pt will need a COVID test.  Plan return to [facility] care.   PAZ Chu RN        Discharge Codes    No documentation.             Radha Chu, RN

## 2020-07-01 NOTE — PROGRESS NOTES
"Daily progress note    Chief complaint  Doing same  No new complaints  Still refusing endoscopy    History of present illness  80-year-old female with history of diabetes mellitus hypertension hyperlipidemia lymphedema DVT on anticoagulation who is a nursing home resident brought to the emergency room with decreased hemoglobin and generalized weakness.  Patient has no chest pain shortness of breath fever cough abdominal pain nausea vomiting diarrhea.  Patient is taking anticoagulation due to history of DVT and previous stroke.  Patient work-up in ER revealed symptomatic anemia with heme positive stool and acute kidney injury admit for management.     REVIEW OF SYSTEMS  Unremarkable     PHYSICAL EXAM  Blood pressure 118/65, pulse 86, temperature 97.2 °F (36.2 °C), temperature source Oral, resp. rate 18, height 170.2 cm (67\"), weight 117 kg (258 lb 2.5 oz), SpO2 99 %.    GENERAL: No acute distress  HENT: NCAT, PERRL, Nares patent  EYES: no scleral icterus  NECK: trachea midline, no ROM limitations  CV: regular rhythm, regular rate  RESPIRATORY: normal effort, crackles at bilateral lung bases  ABDOMEN: soft  : deferred  MUSCULOSKELETAL: no deformity  NEURO: alert, left-sided deficits, follows commands  SKIN: warm, dry, pitting and nonpitting edema bilateral lower extremities, scarring on bilateral anterior shins, chronic atrophic skin changes, no signs of cellulitis     LAB RESULTS  Lab Results (last 24 hours)     Procedure Component Value Units Date/Time    POC Glucose Once [[id number]]  (Abnormal) Collected:  07/01/20 1059    Specimen:  Blood Updated:  07/01/20 1100     Glucose 137 mg/dL     Basic Metabolic Panel [[id number]]  (Abnormal) Collected:  07/01/20 0550    Specimen:  Blood Updated:  07/01/20 0701     Glucose 167 mg/dL      BUN 32 mg/dL      Creatinine 2.31 mg/dL      Sodium 135 mmol/L      Potassium 4.2 mmol/L      Chloride 103 mmol/L      CO2 26.6 mmol/L      Calcium 8.1 mg/dL      eGFR  African Amer 25 " mL/min/1.73      BUN/Creatinine Ratio 13.9     Anion Gap 5.4 mmol/L     Narrative:       GFR Normal >60  Chronic Kidney Disease <60  Kidney Failure <15      CBC & Differential [[id number]] Collected:  07/01/20 0550    Specimen:  Blood Updated:  07/01/20 0646    Narrative:       The following orders were created for panel order CBC & Differential.  Procedure                               Abnormality         Status                     ---------                               -----------         ------                     CBC Auto Differential[[id number]]        Abnormal            Final result                 Please view results for these tests on the individual orders.    CBC Auto Differential [[id number]]  (Abnormal) Collected:  07/01/20 0550    Specimen:  Blood Updated:  07/01/20 0646     WBC 6.77 10*3/mm3      RBC 2.66 10*6/mm3      Hemoglobin 8.3 g/dL      Hematocrit 26.2 %      MCV 98.5 fL      MCH 31.2 pg      MCHC 31.7 g/dL      RDW 13.4 %      RDW-SD 48.3 fl      MPV 10.2 fL      Platelets 214 10*3/mm3      Neutrophil % 47.8 %      Lymphocyte % 36.9 %      Monocyte % 10.9 %      Eosinophil % 3.7 %      Basophil % 0.4 %      Immature Grans % 0.3 %      Neutrophils, Absolute 3.23 10*3/mm3      Lymphocytes, Absolute 2.50 10*3/mm3      Monocytes, Absolute 0.74 10*3/mm3      Eosinophils, Absolute 0.25 10*3/mm3      Basophils, Absolute 0.03 10*3/mm3      Immature Grans, Absolute 0.02 10*3/mm3      nRBC 0.1 /100 WBC     POC Glucose Once [[id number]]  (Abnormal) Collected:  07/01/20 0615    Specimen:  Blood Updated:  07/01/20 0616     Glucose 167 mg/dL     POC Glucose Once [[id number]]  (Abnormal) Collected:  06/30/20 2025    Specimen:  Blood Updated:  06/30/20 2026     Glucose 150 mg/dL     POC Glucose Once [[id number]]  (Abnormal) Collected:  06/30/20 1623    Specimen:  Blood Updated:  06/30/20 1625     Glucose 142 mg/dL         Imaging Results (Last 24 Hours)     ** No results found for the last 24 hours. **        ECG  12 Lead          HEART RATE= 125  bpm  RR Interval= 480  ms  WI Interval= 112  ms  P Horizontal Axis= 209  deg  P Front Axis= -60  deg  QRSD Interval= 82  ms  QT Interval= 358  ms  QRS Axis= -2  deg  T Wave Axis= 107  deg  - ABNORMAL ECG -  Sinus or ectopic atrial tachycardia  Prolonged QT interval  Rate faster, ectopic rhythm appears to be new versus previous ECG  Nonspecific ST-T wave changes new versus previous ECG           Lower extremity Doppler study negative for DVT  Stress Cardiolite showed ischemia    Current Facility-Administered Medications:   •  acetaminophen (TYLENOL) tablet 650 mg, 650 mg, Oral, Q4H PRN, Josias Razo MD, 650 mg at 06/28/20 2131  •  allopurinol (ZYLOPRIM) tablet 100 mg, 100 mg, Oral, Daily, Josias Razo MD, 100 mg at 07/01/20 0828  •  amLODIPine (NORVASC) tablet 10 mg, 10 mg, Oral, Q24H, Josias Razo MD, 10 mg at 07/01/20 0828  •  cholecalciferol (VITAMIN D3) tablet 1,000 Units, 1,000 Units, Oral, Daily, Josias Razo MD, 1,000 Units at 07/01/20 0828  •  hydrALAZINE (APRESOLINE) tablet 100 mg, 100 mg, Oral, Q8H, Josias Razo MD, 100 mg at 07/01/20 0507  •  insulin glargine (LANTUS) injection 20 Units, 20 Units, Subcutaneous, Nightly, Josias Razo MD, 20 Units at 06/30/20 2034  •  insulin lispro (humaLOG) injection 0-7 Units, 0-7 Units, Subcutaneous, TID AC, Josias Razo MD, 2 Units at 07/01/20 0827  •  insulin lispro (humaLOG) injection 5 Units, 5 Units, Subcutaneous, TID With Meals, Josias Razo MD, 5 Units at 07/01/20 1252  •  melatonin tablet 3 mg, 3 mg, Oral, Nightly PRN, Josias Razo MD, 3 mg at 06/28/20 2131  •  metoprolol tartrate (LOPRESSOR) tablet 37.5 mg, 37.5 mg, Oral, Q12H, Josias Razo MD, 37.5 mg at 07/01/20 0829  •  ondansetron (ZOFRAN) injection 4 mg, 4 mg, Intravenous, Q4H PRN, Josias Razo MD  •  pantoprazole (PROTONIX) EC tablet 40 mg, 40 mg, Oral, BID AC, Josias Razo MD, 40 mg at 07/01/20 0555  •  polyethylene glycol (MIRALAX) packet 17 g, 17 g, Oral,  Daily, Melody Razo MD, 17 g at 07/01/20 0828  •  rosuvastatin (CRESTOR) tablet 5 mg, 5 mg, Oral, Nightly, Melody Razo MD, 5 mg at 06/30/20 2034  •  sennosides-docusate (PERICOLACE) 8.6-50 MG per tablet 1 tablet, 1 tablet, Oral, BID, Melody Razo MD, 1 tablet at 07/01/20 0828     ASSESSMENT  Symptomatic anemia with heme positive stool  GI bleed no endoscopy per family request  Hypertension  Diabetes mellitus  Hyperlipidemia  History of DVT and   Elevated troponin with positive stress Cardiolite per cardiology  History of CVA  Acute kidney injury  Chronic kidney disease stage III  Gastroesophageal reflux disease    PLAN  CPM  Change Protonix to p.o.  Continue to hold Lasix and potassium  Continue to hold on Plavix and Coumadin  GI to follow patient as family considering endoscopy  Cardiology and nephrology to follow patient  Accu-Chek with sliding scale insulin  Adjust nursing home medications  Stress ulcer DVT prophylaxis  Supportive care  PT/OT  Discussed with nursing staff and family  Follow closely    MELODY RAZO MD

## 2020-07-01 NOTE — PROGRESS NOTES
"[facility]  Cardiology Progress Note    Patient Identification:  Name: Kacy Mistry  Age: 80 y.o.  Sex: female  :  1940  MRN: [id number]                 Follow Up / Chief Complaint: Elevated troponin    Interval History: She had a stress test today which was positive.       Subjective:  No cp or sob    Objective:    Past Medical History:  Past Medical History:   Diagnosis Date   • Anemia    • Anxiety    • Arthritis    • Cellulitis of left lower extremity    • CHF (congestive heart failure) (CMS/HCC)    • Chronic kidney disease    • Depression    • Diabetes mellitus (CMS/HCC)    • DVT (deep venous thrombosis) (CMS/HCC)    • GERD (gastroesophageal reflux disease)    • Hyperlipidemia    • Hypertension    • Lymphedema    • Obesity    • Osteoporosis    • Renal disorder     chronic kidney disease   • Stroke (CMS/HCC)     with left hemiplegia and hemiparesis   • Venous insufficiency (chronic) (peripheral)      Past Surgical History:  Past Surgical History:   Procedure Laterality Date   • COLONOSCOPY N/A 2018    Procedure: COLONOSCOPY into cecum with cold polypectomy;  Surgeon: David Villavicencio MD;  Location: [facility] ENDOSCOPY;  Service:    • ENDOSCOPY  2018    Procedure: ESOPHAGOGASTRODUODENOSCOPY;  Surgeon: David Villavicencio MD;  Location: [facility] ENDOSCOPY;  Service:         Social History:   Social History     Tobacco Use   • Smoking status: Former Smoker     Types: Cigarettes   • Smokeless tobacco: Never Used   • Tobacco comment: \"Over 20 years\"   Substance Use Topics   • Alcohol use: No      Family History:  Family History   Problem Relation Age of Onset   • Heart disease Mother    • Heart disease Brother           Allergies:  Allergies   Allergen Reactions   • Contrast Dye Unknown (See Comments)     n/a   • Iodine Unknown (See Comments)     unknown     Scheduled Meds:    allopurinol 100 mg Daily   amLODIPine 10 mg Q24H   cholecalciferol 1,000 Units Daily   " "  hydrALAZINE 100 mg Q8H   insulin glargine 25 Units Nightly   insulin lispro 0-7 Units TID AC   insulin lispro 5 Units TID With Meals   metoprolol tartrate 37.5 mg Q12H   pantoprazole 40 mg BID AC   polyethylene glycol 17 g Daily   rosuvastatin 5 mg Nightly   sennosides-docusate 1 tablet BID           INTAKE AND OUTPUT:    Intake/Output Summary (Last 24 hours) at 7/1/2020 1640  Last data filed at 7/1/2020 1321  Gross per 24 hour   Intake 420 ml   Output 350 ml   Net 70 ml                ROS  Constitutional: Awake and alert, no fever. No nosebleeds  Abdomen           no abdominal pain   Cardiac              no chest pain  Pulmonary          no shortness of breath      /65 (BP Location: Right arm, Patient Position: Lying)   Pulse 86   Temp 97.2 °F (36.2 °C) (Oral)   Resp 18   Ht 170.2 cm (67\")   Wt 117 kg (258 lb 2.5 oz)   SpO2 99%   BMI 40.43 kg/m²   General appearance: No acute changes   Neck: Trachea midline; NECK, supple, no thyromegaly or lymphadenopathy   Lungs: Normal size and shape, normal breath sounds, equal distribution of air, no rales and rhonchi   CV: S1-S2 regular, no murmurs, no rub, no gallop   Abdomen: Soft, non-tender; no masses , no abnormal abdominal sounds   Extremities: No deformity , normal color , no peripheral edema   Skin: Normal temperature, turgor and texture; no rash, ulcers              Cardiographics  Telemetry:   Sinus rhythm        ECG:     Echocardiogram:   Interpretation Summary     · Left ventricular wall thickness is consistent with mild concentric hypertrophy.  · Calculated EF = 62.0%  · There is no evidence of pericardial effusion          Lab Review   Results from last 7 days   Lab Units 06/26/20  0625 06/24/20  1812   TROPONIN T ng/mL 0.078* 0.055*         Results from last 7 days   Lab Units 07/01/20  0550   SODIUM mmol/L 135*   POTASSIUM mmol/L 4.2   BUN mg/dL 32*   CREATININE mg/dL 2.31*   CALCIUM mg/dL 8.1*        Results from last 7 days   Lab Units " "07/01/20  0550 06/30/20  0611 06/29/20  0532   WBC 10*3/mm3 6.77 6.48 6.92   HEMOGLOBIN g/dL 8.3* 8.6* 9.1*   HEMATOCRIT % 26.2* 26.6* 29.1*   PLATELETS 10*3/mm3 214 232 227     Results from last 7 days   Lab Units 06/27/20  0617 06/26/20  0625 06/24/20  1812   INR  1.42* 1.47* 1.75*   APTT seconds  --   --  32.2       The following medical decision was discussed in detail with Dr. Miramontes    Assessment:  Elevated troponin  Gastrointestinal bleed  Acute renal failure  Anemia  Hypertension    Plan:  Vital signs remained stable.  She had a stress test today which was positive.  We will be treating her medically.  Continue metoprolol tartrate, hydralazine, amlodipine and lovastatin.          )7/1/2020  DANISH Steward    Stress test inferolateral wall ischemia, with no cp will be treated medically    Toma Miramontes MD      [facility] Dragon/Transcription:   \"Dictated utilizing Dragon dictation\".     "

## 2020-07-01 NOTE — PLAN OF CARE
Pt denies pain, SOA, and nausea. A&O to self, place, and time (sometimes situation). Medications administered per orders. Q2 turn. BM this shift. No s/s of bleeding present. VSS. No s/s of distress at this time. Will continue to monitor.      Problem: Skin Injury Risk (Adult)  Goal: Identify Related Risk Factors and Signs and Symptoms  Outcome: Ongoing (interventions implemented as appropriate)  Goal: Skin Health and Integrity  Outcome: Ongoing (interventions implemented as appropriate)     Problem: Fall Risk (Adult)  Goal: Identify Related Risk Factors and Signs and Symptoms  Outcome: Ongoing (interventions implemented as appropriate)  Goal: Absence of Fall  Outcome: Ongoing (interventions implemented as appropriate)     Problem: Patient Care Overview  Goal: Plan of Care Review  Outcome: Ongoing (interventions implemented as appropriate)  Flowsheets (Taken 7/1/2020 3146)  Progress: no change  Plan of Care Reviewed With: patient  Goal: Individualization and Mutuality  Outcome: Ongoing (interventions implemented as appropriate)  Goal: Discharge Needs Assessment  Outcome: Ongoing (interventions implemented as appropriate)  Goal: Interprofessional Rounds/Family Conf  Outcome: Ongoing (interventions implemented as appropriate)     Problem: Fluid Volume Excess (Adult)  Goal: Identify Related Risk Factors and Signs and Symptoms  Outcome: Ongoing (interventions implemented as appropriate)  Goal: Optimal Fluid Balance  Outcome: Ongoing (interventions implemented as appropriate)     Problem: Gastrointestinal Bleeding (Adult)  Goal: Signs and Symptoms of Listed Potential Problems Will be Absent, Minimized or Managed (Gastrointestinal Bleeding)  Outcome: Ongoing (interventions implemented as appropriate)

## 2020-07-02 NOTE — PROGRESS NOTES
"   LOS: 8 days     Chief Complaint/ Reason for encounter: Acute on chronic renal failure  Chief Complaint   Patient presents with   • Abnormal Lab         Subjective    No complaints, resting  Edema little worse today, weight up a few pounds  Denies any shortness of breath or chest pain  Marginal appetite but no nausea or vomiting  No fevers or chills, weight stable today      Medical history reviewed:  Abnormal Lab         Subjective    History taken from: Patient and chart    Vital Signs  Temp:  [94.8 °F (34.9 °C)-99.7 °F (37.6 °C)] 98.3 °F (36.8 °C)  Heart Rate:  [79-90] 79  Resp:  [20] 20  BP: (113-138)/(65-85) 121/68       Wt Readings from Last 1 Encounters:   07/02/20 0500 118 kg (260 lb 11.2 oz)   07/01/20 0212 117 kg (258 lb 2.5 oz)   06/30/20 0501 121 kg (267 lb 6.4 oz)   06/29/20 0110 116 kg (256 lb 12.8 oz)   06/28/20 0410 116 kg (256 lb 3.2 oz)   06/27/20 0611 119 kg (261 lb 14.5 oz)   06/26/20 1045 119 kg (263 lb)   06/26/20 0559 120 kg (263 lb 12.8 oz)   06/25/20 0112 114 kg (251 lb 11.2 oz)   06/24/20 1803 117 kg (257 lb 15 oz)       Objective:  Vital signs: (most recent): Blood pressure 121/68, pulse 79, temperature 98.3 °F (36.8 °C), temperature source Oral, resp. rate 20, height 170.2 cm (67\"), weight 118 kg (260 lb 11.2 oz), SpO2 98 %.              Objective:  General Appearance:  Comfortable, obese, chronically ill-appearing, in no acute distress and not in pain.  Awake, alert, oriented  HEENT: Mucous membranes moist, no injury, oropharynx clear  Lungs:  Normal effort and normal respiratory rate.  Breath sounds clear to auscultation.  No  respiratory distress.  No rales, decreased breath sounds or rhonchi.    Heart: Normal rate.  Regular rhythm.  S1 normal.  No murmur.   Abdomen: Abdomen is soft.  Bowel sounds are normal, no abdominal tenderness.  There is no rebound or guarding  Extremities: Normal range of motion.  Decreased, 1+ doughy edema of bilateral lower extremities, distal pulses " intact  Neurological: No focal motor or sensory deficits, pupils reactive  Skin:  Warm and dry.  No rash or cyanosis.       Results Review:    Intake/Output:     Intake/Output Summary (Last 24 hours) at 7/2/2020 1427  Last data filed at 7/1/2020 1847  Gross per 24 hour   Intake --   Output 10 ml   Net -10 ml         DATA:  Radiology and Labs:  The following labs independently reviewed by me. Additional labs ordered for tomorrow a.m.  Interval notes, chart personally reviewed by me.   Discussed with pt herself at bedside and with a family member at bedside        Labs:   Recent Results (from the past 24 hour(s))   POC Glucose Once    Collection Time: 07/01/20  4:08 PM   Result Value Ref Range    Glucose 197 (H) 70 - 130 mg/dL   Urinalysis With Culture If Indicated - Urine, Catheter In/Out    Collection Time: 07/01/20  6:38 PM   Result Value Ref Range    Color, UA Yellow Yellow, Straw    Appearance, UA Cloudy (A) Clear    pH, UA 5.5 5.0 - 8.0    Specific Gravity, UA >=1.030 1.005 - 1.030    Glucose,  mg/dL (Trace) (A) Negative    Ketones, UA Negative Negative    Bilirubin, UA Negative Negative    Blood, UA Large (3+) (A) Negative    Protein, UA >=300 mg/dL (3+) (A) Negative    Leuk Esterase, UA Small (1+) (A) Negative    Nitrite, UA Negative Negative    Urobilinogen, UA 0.2 E.U./dL 0.2 - 1.0 E.U./dL   Urinalysis, Microscopic Only - Urine, Catheter In/Out    Collection Time: 07/01/20  6:38 PM   Result Value Ref Range    RBC, UA Too Numerous to Count (A) None Seen, 0-2 /HPF    WBC, UA Too Numerous to Count (A) None Seen, 0-2 /HPF    Bacteria, UA 4+ (A) None Seen /HPF    Squamous Epithelial Cells, UA 3-6 (A) None Seen, 0-2 /HPF    Hyaline Casts, UA 7-12 None Seen /LPF    Methodology Automated Microscopy    Urine Culture - Urine, Urine, Catheter In/Out    Collection Time: 07/01/20  6:38 PM   Result Value Ref Range    Urine Culture >100,000 CFU/mL Escherichia coli (A)    POC Glucose Once    Collection Time: 07/01/20   9:19 PM   Result Value Ref Range    Glucose 198 (H) 70 - 130 mg/dL   POC Glucose Once    Collection Time: 07/02/20  6:34 AM   Result Value Ref Range    Glucose 173 (H) 70 - 130 mg/dL   Basic Metabolic Panel    Collection Time: 07/02/20  6:43 AM   Result Value Ref Range    Glucose 187 (H) 65 - 99 mg/dL    BUN 31 (H) 8 - 23 mg/dL    Creatinine 2.11 (H) 0.57 - 1.00 mg/dL    Sodium 138 136 - 145 mmol/L    Potassium 5.1 3.5 - 5.2 mmol/L    Chloride 104 98 - 107 mmol/L    CO2 26.0 22.0 - 29.0 mmol/L    Calcium 8.7 8.6 - 10.5 mg/dL    eGFR  African Amer 27 (L) >60 mL/min/1.73    BUN/Creatinine Ratio 14.7 7.0 - 25.0    Anion Gap 8.0 5.0 - 15.0 mmol/L   CBC Auto Differential    Collection Time: 07/02/20  6:43 AM   Result Value Ref Range    WBC 7.35 3.40 - 10.80 10*3/mm3    RBC 3.11 (L) 3.77 - 5.28 10*6/mm3    Hemoglobin 9.5 (L) 12.0 - 15.9 g/dL    Hematocrit 30.1 (L) 34.0 - 46.6 %    MCV 96.8 79.0 - 97.0 fL    MCH 30.5 26.6 - 33.0 pg    MCHC 31.6 31.5 - 35.7 g/dL    RDW 13.1 12.3 - 15.4 %    RDW-SD 46.3 37.0 - 54.0 fl    MPV 10.2 6.0 - 12.0 fL    Platelets 230 140 - 450 10*3/mm3    Neutrophil % 46.7 42.7 - 76.0 %    Lymphocyte % 37.8 19.6 - 45.3 %    Monocyte % 10.5 5.0 - 12.0 %    Eosinophil % 3.9 0.3 - 6.2 %    Basophil % 0.7 0.0 - 1.5 %    Immature Grans % 0.4 0.0 - 0.5 %    Neutrophils, Absolute 3.43 1.70 - 7.00 10*3/mm3    Lymphocytes, Absolute 2.78 0.70 - 3.10 10*3/mm3    Monocytes, Absolute 0.77 0.10 - 0.90 10*3/mm3    Eosinophils, Absolute 0.29 0.00 - 0.40 10*3/mm3    Basophils, Absolute 0.05 0.00 - 0.20 10*3/mm3    Immature Grans, Absolute 0.03 0.00 - 0.05 10*3/mm3    nRBC 0.0 0.0 - 0.2 /100 WBC   POC Glucose Once    Collection Time: 07/02/20 11:25 AM   Result Value Ref Range    Glucose 177 (H) 70 - 130 mg/dL       Radiology:  Imaging Results (Last 24 Hours)     ** No results found for the last 24 hours. **             Medications have been reviewed:  Current Facility-Administered Medications   Medication Dose Route  Frequency Provider Last Rate Last Dose   • acetaminophen (TYLENOL) tablet 650 mg  650 mg Oral Q4H PRN Josias Razo MD   650 mg at 07/02/20 1237   • allopurinol (ZYLOPRIM) tablet 100 mg  100 mg Oral Daily Josias Razo MD   100 mg at 07/02/20 0917   • amLODIPine (NORVASC) tablet 10 mg  10 mg Oral Q24H Josias Razo MD   10 mg at 07/02/20 0917   • aspirin chewable tablet 81 mg  81 mg Oral Daily Josias Razo MD   81 mg at 07/02/20 1314   • cefTRIAXone (ROCEPHIN) IVPB 1 g  1 g Intravenous Q24H Josias Razo MD       • cholecalciferol (VITAMIN D3) tablet 1,000 Units  1,000 Units Oral Daily Josias Razo MD   1,000 Units at 07/02/20 0917   • clopidogrel (PLAVIX) tablet 75 mg  75 mg Oral Daily Josias Razo MD       • hydrALAZINE (APRESOLINE) tablet 100 mg  100 mg Oral Q8H Josias Razo MD   100 mg at 07/02/20 0700   • HYDROcodone-acetaminophen (NORCO) 5-325 MG per tablet 1 tablet  1 tablet Oral Q6H PRN Josias Razo MD   1 tablet at 07/02/20 1314   • insulin glargine (LANTUS) injection 30 Units  30 Units Subcutaneous Nightly Josias Razo MD       • insulin lispro (humaLOG) injection 0-7 Units  0-7 Units Subcutaneous TID AC Josias Razo MD   2 Units at 07/02/20 1238   • insulin lispro (humaLOG) injection 7 Units  7 Units Subcutaneous TID With Meals Josias Razo MD       • melatonin tablet 3 mg  3 mg Oral Nightly PRN Josias Razo MD   3 mg at 06/28/20 2131   • metoprolol tartrate (LOPRESSOR) tablet 37.5 mg  37.5 mg Oral Q12H Josias Razo MD   37.5 mg at 07/02/20 0916   • ondansetron (ZOFRAN) injection 4 mg  4 mg Intravenous Q4H PRN Josias Razo MD       • pantoprazole (PROTONIX) EC tablet 40 mg  40 mg Oral BID AC Josias Razo MD   40 mg at 07/02/20 0700   • polyethylene glycol (MIRALAX) packet 17 g  17 g Oral Daily Josias Razo MD   17 g at 07/02/20 0917   • rosuvastatin (CRESTOR) tablet 5 mg  5 mg Oral Nightly Josias Razo MD   5 mg at 07/01/20 9593   • sennosides-docusate (PERICOLACE) 8.6-50 MG per tablet 1  tablet  1 tablet Oral BID Josias Razo MD   1 tablet at 07/02/20 0917       ASSESSMENT:  Chronic kidney disease stage III  Acute kidney injury, stable  Worsening volume overload  Hypertension  Type 2 diabetes mellitus  Chronic anemia secondary to CKD  Gastrointestinal hemorrhage  History of DVT on Coumadin  New, UTI, growing E. Coli       PLAN:   Renal function is marginally improved today  Edema looks a bit worse, will give Lasix IV x1 dose  Anticoagulation on hold, GI following, hemoglobin improved, 9.5  Blood pressure stable, continue current regimen plus oral vitamin D  Repeat labs in a.m. discussed with family at bedside      Sourav Doe MD   [facility]   Office phone number: [phone]  Answering service phone number: [phone]    07/02/20  14:27    Dictation performed using Dragon dictation software

## 2020-07-02 NOTE — PROGRESS NOTES
"[facility]  Cardiology Progress Note    Patient Identification:  Name: Kacy Mistry  Age: 80 y.o.  Sex: female  :  1940  MRN: [id number]                 Follow Up / Chief Complaint: Elevated troponin    Interval History: She had a positive stress test yesterday.  Will medical management.        Subjective: She denies chest pain, shortness of breath, and palpitations.       Objective:    Past Medical History:  Past Medical History:   Diagnosis Date   • Anemia    • Anxiety    • Arthritis    • Cellulitis of left lower extremity    • CHF (congestive heart failure) (CMS/HCC)    • Chronic kidney disease    • Depression    • Diabetes mellitus (CMS/HCC)    • DVT (deep venous thrombosis) (CMS/HCC)    • GERD (gastroesophageal reflux disease)    • Hyperlipidemia    • Hypertension    • Lymphedema    • Obesity    • Osteoporosis    • Renal disorder     chronic kidney disease   • Stroke (CMS/HCC)     with left hemiplegia and hemiparesis   • Venous insufficiency (chronic) (peripheral)      Past Surgical History:  Past Surgical History:   Procedure Laterality Date   • COLONOSCOPY N/A 2018    Procedure: COLONOSCOPY into cecum with cold polypectomy;  Surgeon: David Villavicencio MD;  Location: [facility] ENDOSCOPY;  Service:    • ENDOSCOPY  2018    Procedure: ESOPHAGOGASTRODUODENOSCOPY;  Surgeon: David Villavicencio MD;  Location: [facility] ENDOSCOPY;  Service:         Social History:   Social History     Tobacco Use   • Smoking status: Former Smoker     Types: Cigarettes   • Smokeless tobacco: Never Used   • Tobacco comment: \"Over 20 years\"   Substance Use Topics   • Alcohol use: No      Family History:  Family History   Problem Relation Age of Onset   • Heart disease Mother    • Heart disease Brother           Allergies:  Allergies   Allergen Reactions   • Contrast Dye Unknown (See Comments)     n/a   • Iodine Unknown (See Comments)     unknown     Scheduled Meds:    allopurinol 100 mg Daily " "  amLODIPine 10 mg Q24H   aspirin 81 mg Daily   cefTRIAXone 1 g Q24H   cholecalciferol 1,000 Units Daily   clopidogrel 75 mg Daily   hydrALAZINE 100 mg Q8H   insulin glargine 30 Units Nightly   insulin lispro 0-7 Units TID AC   insulin lispro 7 Units TID With Meals   metoprolol tartrate 37.5 mg Q12H   pantoprazole 40 mg BID AC   polyethylene glycol 17 g Daily   rosuvastatin 5 mg Nightly   sennosides-docusate 1 tablet BID           INTAKE AND OUTPUT:    Intake/Output Summary (Last 24 hours) at 7/2/2020 1710  Last data filed at 7/1/2020 1847  Gross per 24 hour   Intake --   Output 10 ml   Net -10 ml                  ROS  Constitutional: Awake and alert, no fever. No nosebleeds  Abdomen           no abdominal pain   Cardiac              no chest pain  Pulmonary          no shortness of breath      /68 (BP Location: Right arm, Patient Position: Lying)   Pulse 79   Temp 98.3 °F (36.8 °C) (Oral)   Resp 20   Ht 170.2 cm (67\")   Wt 118 kg (260 lb 11.2 oz)   SpO2 98%   BMI 40.83 kg/m²   General appearance: No acute changes   Neck: Trachea midline; NECK, supple, no thyromegaly or lymphadenopathy   Lungs: Normal size and shape, normal breath sounds, equal distribution of air, no rales and rhonchi   CV: S1-S2 regular, no murmurs, no rub, no gallop   Abdomen: Soft, non-tender; no masses , no abnormal abdominal sounds   Extremities: No deformity , normal color , no peripheral edema   Skin: Normal temperature, turgor and texture; no rash, ulcers        Cardiographics  Telemetry:       Sinus rhythm        ECG:     Echocardiogram:   Interpretation Summary     · Left ventricular wall thickness is consistent with mild concentric hypertrophy.  · Calculated EF = 62.0%  · There is no evidence of pericardial effusion          Lab Review   Results from last 7 days   Lab Units 06/26/20  0625   TROPONIN T ng/mL 0.078*         Results from last 7 days   Lab Units 07/02/20  0643   SODIUM mmol/L 138   POTASSIUM mmol/L 5.1   BUN " "mg/dL 31*   CREATININE mg/dL 2.11*   CALCIUM mg/dL 8.7        Results from last 7 days   Lab Units 07/02/20  0643 07/01/20  0550 06/30/20  0611   WBC 10*3/mm3 7.35 6.77 6.48   HEMOGLOBIN g/dL 9.5* 8.3* 8.6*   HEMATOCRIT % 30.1* 26.2* 26.6*   PLATELETS 10*3/mm3 230 214 232     Results from last 7 days   Lab Units 06/27/20  0617 06/26/20  0625   INR  1.42* 1.47*       The following medical decision was discussed in detail with Dr. Miramontes    Assessment:  Elevated troponin  Gastrointestinal bleed  Acute renal failure  Anemia  Hypertension    Plan:  VS remain stable. Her stress test was positive, at this time we will medical manage. Continue BB, hydralazine, amlodipine and statin.              )7/2/2020  DANISH Steward       Patient personally interviewed and above subjective findings personally confirmed during a face to face contact with patient today  All findings of physical examination confirmed  All pertinent and performed labs, cardiac procedures ,  radiographs of the last 24 hours personally reviewed  Impression and plans discussed/elaborated and implemented jointly as described above     Toma Miramontes MD            EMR Dragon/Transcription:   \"Dictated utilizing Dragon dictation\".     "

## 2020-07-02 NOTE — PROGRESS NOTES
"Daily progress note    Chief complaint  Doing same  No new complaints  Still refusing endoscopy    History of present illness  80-year-old female with history of diabetes mellitus hypertension hyperlipidemia lymphedema DVT on anticoagulation who is a nursing home resident brought to the emergency room with decreased hemoglobin and generalized weakness.  Patient has no chest pain shortness of breath fever cough abdominal pain nausea vomiting diarrhea.  Patient is taking anticoagulation due to history of DVT and previous stroke.  Patient work-up in ER revealed symptomatic anemia with heme positive stool and acute kidney injury admit for management.     REVIEW OF SYSTEMS  Unremarkable     PHYSICAL EXAM  Blood pressure 133/76, pulse 89, temperature 99.7 °F (37.6 °C), temperature source Oral, resp. rate 20, height 170.2 cm (67\"), weight 118 kg (260 lb 11.2 oz), SpO2 98 %.    GENERAL: No acute distress  HENT: NCAT, PERRL, Nares patent  EYES: no scleral icterus  NECK: trachea midline, no ROM limitations  CV: regular rhythm, regular rate  RESPIRATORY: normal effort, crackles at bilateral lung bases  ABDOMEN: soft  : deferred  MUSCULOSKELETAL: no deformity  NEURO: alert, left-sided deficits, follows commands  SKIN: warm, dry, pitting and nonpitting edema bilateral lower extremities, scarring on bilateral anterior shins, chronic atrophic skin changes, no signs of cellulitis     LAB RESULTS  Lab Results (last 24 hours)     Procedure Component Value Units Date/Time    POC Glucose Once [[id number]]  (Abnormal) Collected:  07/02/20 1125    Specimen:  Blood Updated:  07/02/20 1130     Glucose 177 mg/dL     Urine Culture - Urine, Urine, Catheter In/Out [[id number]]  (Abnormal) Collected:  07/01/20 1838    Specimen:  Urine, Catheter In/Out Updated:  07/02/20 1059     Urine Culture >100,000 CFU/mL Escherichia coli    Basic Metabolic Panel [[id number]]  (Abnormal) Collected:  07/02/20 0643    Specimen:  Blood from Arm, Left Updated:  " 07/02/20 0754     Glucose 187 mg/dL      BUN 31 mg/dL      Creatinine 2.11 mg/dL      Sodium 138 mmol/L      Potassium 5.1 mmol/L      Chloride 104 mmol/L      CO2 26.0 mmol/L      Calcium 8.7 mg/dL      eGFR  African Amer 27 mL/min/1.73      BUN/Creatinine Ratio 14.7     Anion Gap 8.0 mmol/L     Narrative:       GFR Normal >60  Chronic Kidney Disease <60  Kidney Failure <15      CBC & Differential [[id number]] Collected:  07/02/20 0643    Specimen:  Blood from Arm, Left Updated:  07/02/20 0730    Narrative:       The following orders were created for panel order CBC & Differential.  Procedure                               Abnormality         Status                     ---------                               -----------         ------                     CBC Auto Differential[[id number]]        Abnormal            Final result                 Please view results for these tests on the individual orders.    CBC Auto Differential [[id number]]  (Abnormal) Collected:  07/02/20 0643    Specimen:  Blood from Arm, Left Updated:  07/02/20 0730     WBC 7.35 10*3/mm3      RBC 3.11 10*6/mm3      Hemoglobin 9.5 g/dL      Hematocrit 30.1 %      MCV 96.8 fL      MCH 30.5 pg      MCHC 31.6 g/dL      RDW 13.1 %      RDW-SD 46.3 fl      MPV 10.2 fL      Platelets 230 10*3/mm3      Neutrophil % 46.7 %      Lymphocyte % 37.8 %      Monocyte % 10.5 %      Eosinophil % 3.9 %      Basophil % 0.7 %      Immature Grans % 0.4 %      Neutrophils, Absolute 3.43 10*3/mm3      Lymphocytes, Absolute 2.78 10*3/mm3      Monocytes, Absolute 0.77 10*3/mm3      Eosinophils, Absolute 0.29 10*3/mm3      Basophils, Absolute 0.05 10*3/mm3      Immature Grans, Absolute 0.03 10*3/mm3      nRBC 0.0 /100 WBC     POC Glucose Once [[id number]]  (Abnormal) Collected:  07/02/20 0634    Specimen:  Blood Updated:  07/02/20 0636     Glucose 173 mg/dL     POC Glucose Once [[id number]]  (Abnormal) Collected:  07/01/20 2119    Specimen:  Blood Updated:  07/01/20 2121      Glucose 198 mg/dL     Urinalysis, Microscopic Only - Urine, Catheter In/Out [[id number]]  (Abnormal) Collected:  07/01/20 1838    Specimen:  Urine, Catheter In/Out Updated:  07/01/20 1942     RBC, UA Too Numerous to Count /HPF      WBC, UA Too Numerous to Count /HPF      Bacteria, UA 4+ /HPF      Squamous Epithelial Cells, UA 3-6 /HPF      Hyaline Casts, UA 7-12 /LPF      Methodology Automated Microscopy    Urinalysis With Culture If Indicated - Urine, Catheter In/Out [[id number]]  (Abnormal) Collected:  07/01/20 1838    Specimen:  Urine, Catheter In/Out Updated:  07/01/20 1921     Color, UA Yellow     Appearance, UA Cloudy     pH, UA 5.5     Specific Gravity, UA >=1.030     Glucose,  mg/dL (Trace)     Ketones, UA Negative     Bilirubin, UA Negative     Blood, UA Large (3+)     Protein, UA >=300 mg/dL (3+)     Leuk Esterase, UA Small (1+)     Nitrite, UA Negative     Urobilinogen, UA 0.2 E.U./dL    POC Glucose Once [[id number]]  (Abnormal) Collected:  07/01/20 1608    Specimen:  Blood Updated:  07/01/20 1610     Glucose 197 mg/dL         Imaging Results (Last 24 Hours)     ** No results found for the last 24 hours. **        ECG 12 Lead          HEART RATE= 125  bpm  RR Interval= 480  ms  NE Interval= 112  ms  P Horizontal Axis= 209  deg  P Front Axis= -60  deg  QRSD Interval= 82  ms  QT Interval= 358  ms  QRS Axis= -2  deg  T Wave Axis= 107  deg  - ABNORMAL ECG -  Sinus or ectopic atrial tachycardia  Prolonged QT interval  Rate faster, ectopic rhythm appears to be new versus previous ECG  Nonspecific ST-T wave changes new versus previous ECG           Lower extremity Doppler study negative for DVT  Stress Cardiolite showed ischemia    Current Facility-Administered Medications:   •  acetaminophen (TYLENOL) tablet 650 mg, 650 mg, Oral, Q4H PRN, Josias Razo MD, 650 mg at 07/02/20 1237  •  allopurinol (ZYLOPRIM) tablet 100 mg, 100 mg, Oral, Daily, Josias Razo MD, 100 mg at 07/02/20 0917  •  amLODIPine (NORVASC)  tablet 10 mg, 10 mg, Oral, Q24H, Joisas Razo MD, 10 mg at 07/02/20 0917  •  cholecalciferol (VITAMIN D3) tablet 1,000 Units, 1,000 Units, Oral, Daily, Josias Razo MD, 1,000 Units at 07/02/20 0917  •  hydrALAZINE (APRESOLINE) tablet 100 mg, 100 mg, Oral, Q8H, Josias Razo MD, 100 mg at 07/02/20 0700  •  insulin glargine (LANTUS) injection 25 Units, 25 Units, Subcutaneous, Nightly, Josias Razo MD, 25 Units at 07/01/20 2159  •  insulin lispro (humaLOG) injection 0-7 Units, 0-7 Units, Subcutaneous, TID AC, Josias Razo MD, 2 Units at 07/02/20 1238  •  insulin lispro (humaLOG) injection 5 Units, 5 Units, Subcutaneous, TID With Meals, Josias Razo MD, 5 Units at 07/02/20 1237  •  melatonin tablet 3 mg, 3 mg, Oral, Nightly PRN, Josias Razo MD, 3 mg at 06/28/20 2131  •  metoprolol tartrate (LOPRESSOR) tablet 37.5 mg, 37.5 mg, Oral, Q12H, Josias Razo MD, 37.5 mg at 07/02/20 0916  •  ondansetron (ZOFRAN) injection 4 mg, 4 mg, Intravenous, Q4H PRN, Josias Razo MD  •  pantoprazole (PROTONIX) EC tablet 40 mg, 40 mg, Oral, BID AC, Josias Razo MD, 40 mg at 07/02/20 0700  •  polyethylene glycol (MIRALAX) packet 17 g, 17 g, Oral, Daily, Josias Razo MD, 17 g at 07/02/20 0917  •  rosuvastatin (CRESTOR) tablet 5 mg, 5 mg, Oral, Nightly, Josias Razo MD, 5 mg at 07/01/20 2159  •  sennosides-docusate (PERICOLACE) 8.6-50 MG per tablet 1 tablet, 1 tablet, Oral, BID, Josias Razo MD, 1 tablet at 07/02/20 0917     ASSESSMENT  Acute E. coli UTI  Symptomatic anemia with heme positive stool  GI bleed no endoscopy per family request  Hypertension  Diabetes mellitus  Hyperlipidemia  History of DVT and   Elevated troponin with positive stress Cardiolite   History of CVA  Acute kidney injury  Chronic kidney disease stage III  Gastroesophageal reflux disease    PLAN  CPM   IV antibiotics  Resume aspirin Plavix as cleared by GI  No need for Coumadin as she has no evidence of DVT  Protonix p.o. twice daily  Continue to hold Lasix  and potassium per nephrology  GI input appreciated  Cardiology to follow patient  Accu-Chek with sliding scale insulin  Adjust nursing home medications  Stress ulcer DVT prophylaxis  Supportive care  PT/OT  Discussed with nursing staff and family  Discharge planning    MELODY JOHNSON MD

## 2020-07-02 NOTE — PLAN OF CARE
Pt medicated prn for leg pain, given ssi with meals, given iv lasix, turned q 2 hours, vss, will ctm.      Problem: Patient Care Overview  Goal: Plan of Care Review  Outcome: Ongoing (interventions implemented as appropriate)

## 2020-07-02 NOTE — PLAN OF CARE
Patient resting with HOB low per pt. Preference due to chronic back pain. Lungs are diminished throughout with normal respirations and no coughing or c/o SOA at this time. Abdomen is large, soft, with +bowel sounds in all quadrants, and she is voiding and having bowel movements per incontinence and without difficulty. Patient has severe edema in all extremeties and generalized moderate edema all over. Pulses are palpable and no skin issues noted at this time, with all bony prominences and buttocks examined this shift. Nursing is turning and repositioning patient every two hours to allow off-loading. She is wearing bilateral protective heel boots with dion. Legs elevated on pillows as well. No s/s of bleeding at this time, nursing will continue to monitor her and stools for s/s of GI bleeding.

## 2020-07-02 NOTE — PROGRESS NOTES
[facility]  Inpatient Progress Note    Reason for Follow Up:  Heme positive stool, anemia    Subjective     Interval History:   Pt denies complaints other than feeling cold.  No overt bleeding.  She has not needed any transfusions.  Hb stable.    Current Facility-Administered Medications:   •  acetaminophen (TYLENOL) tablet 650 mg, 650 mg, Oral, Q4H PRN, Josias Razo MD, 650 mg at 07/01/20 1631  •  allopurinol (ZYLOPRIM) tablet 100 mg, 100 mg, Oral, Daily, Josias Razo MD, 100 mg at 07/02/20 0917  •  amLODIPine (NORVASC) tablet 10 mg, 10 mg, Oral, Q24H, Josias Razo MD, 10 mg at 07/02/20 0917  •  cholecalciferol (VITAMIN D3) tablet 1,000 Units, 1,000 Units, Oral, Daily, Josias Razo MD, 1,000 Units at 07/02/20 0917  •  hydrALAZINE (APRESOLINE) tablet 100 mg, 100 mg, Oral, Q8H, Josias Razo MD, 100 mg at 07/02/20 0700  •  insulin glargine (LANTUS) injection 25 Units, 25 Units, Subcutaneous, Nightly, Josias Razo MD, 25 Units at 07/01/20 2159  •  insulin lispro (humaLOG) injection 0-7 Units, 0-7 Units, Subcutaneous, TID AC, Josias Razo MD, 2 Units at 07/02/20 0917  •  insulin lispro (humaLOG) injection 5 Units, 5 Units, Subcutaneous, TID With Meals, Josias Razo MD, 5 Units at 07/02/20 0918  •  melatonin tablet 3 mg, 3 mg, Oral, Nightly PRN, Josias Razo MD, 3 mg at 06/28/20 2131  •  metoprolol tartrate (LOPRESSOR) tablet 37.5 mg, 37.5 mg, Oral, Q12H, Josias Razo MD, 37.5 mg at 07/02/20 0916  •  ondansetron (ZOFRAN) injection 4 mg, 4 mg, Intravenous, Q4H PRN, Josias Razo MD  •  pantoprazole (PROTONIX) EC tablet 40 mg, 40 mg, Oral, BID AC, Josias Razo MD, 40 mg at 07/02/20 0700  •  polyethylene glycol (MIRALAX) packet 17 g, 17 g, Oral, Daily, Josias Razo MD, 17 g at 07/02/20 0917  •  rosuvastatin (CRESTOR) tablet 5 mg, 5 mg, Oral, Nightly, Josias Razo MD, 5 mg at 07/01/20 2159  •  sennosides-docusate (PERICOLACE) 8.6-50 MG per tablet 1 tablet, 1 tablet, Oral, BID, Dung  MD Josias, 1 tablet at 07/02/20 0917  Review of Systems:   All systems reviewed and negative except for: Constitution: feels cold    Objective     Vital Signs  Temp:  [94.8 °F (34.9 °C)-99.7 °F (37.6 °C)] 99.7 °F (37.6 °C)  Heart Rate:  [85-90] 89  Resp:  [18-20] 20  BP: (113-138)/(65-85) 133/76  Body mass index is 40.83 kg/m².    Intake/Output Summary (Last 24 hours) at 7/2/2020 1050  Last data filed at 7/1/2020 1847  Gross per 24 hour   Intake 210 ml   Output 10 ml   Net 200 ml     No intake/output data recorded.     Physical Exam:   General: patient awake, alert and cooperative   Eyes: Normal lids and lashes, no scleral icterus   Neck: supple, normal ROM   Skin: warm and dry, not jaundiced   Cardiovascular: regular rhythm and rate, no murmurs auscultated   Pulm: clear to auscultation bilaterally, regular and unlabored   Abdomen: soft, obese, nontender, nondistended; normal bowel sounds   Rectal: deferred   Extremities: no rash or edema   Psychiatric: Normal mood and behavior; memory intact     Results Review:     I reviewed the patient's new clinical results.    Results from last 7 days   Lab Units 07/02/20  0643 07/01/20  0550 06/30/20  0611   WBC 10*3/mm3 7.35 6.77 6.48   HEMOGLOBIN g/dL 9.5* 8.3* 8.6*   HEMATOCRIT % 30.1* 26.2* 26.6*   PLATELETS 10*3/mm3 230 214 232     Results from last 7 days   Lab Units 07/02/20  0643 07/01/20  0550 06/30/20  0611  06/26/20  0625   SODIUM mmol/L 138 135* 140   < > 142   POTASSIUM mmol/L 5.1 4.2 4.2   < > 4.0   CHLORIDE mmol/L 104 103 106   < > 105   CO2 mmol/L 26.0 26.6 26.4   < > 25.2   BUN mg/dL 31* 32* 31*   < > 33*   CREATININE mg/dL 2.11* 2.31* 2.35*   < > 2.04*   CALCIUM mg/dL 8.7 8.1* 8.5*   < > 8.4*   BILIRUBIN mg/dL  --   --   --   --  0.2   ALK PHOS U/L  --   --   --   --  54   ALT (SGPT) U/L  --   --   --   --  9   AST (SGOT) U/L  --   --   --   --  14   GLUCOSE mg/dL 187* 167* 150*   < > 149*    < > = values in this interval not displayed.     Results from last  7 days   Lab Units 06/27/20  0617 06/26/20  0625   INR  1.42* 1.47*     No results found for: LIPASE    Radiology:  CT Chest Without Contrast   Final Result   Small area of consolidation in the posterior aspect of the   right lung base that is most likely due to atelectasis. More extensive   consolidation within the left lower lobe may be due to atelectasis   and/or pneumonia. No groundglass infiltrates are identified.   Cholelithiasis.       This report was finalized on 6/24/2020 10:16 PM by Dr. Roscoe Izquierdo M.D.          XR Chest AP   Final Result          Assessment/Plan     Patient Active Problem List   Diagnosis   • Acute renal failure superimposed on chronic kidney disease (CMS/HCC)   • Anemia   • Gastrointestinal hemorrhage       Impression  1. Normocytic anemia: Chronic, iron studies more consistent with chronic disease rather than iron deficiency. She had bidirectional endoscopy with esophagitis, hyperplastic polyps with Dr Villavicencio in 2/2018 for similar indications    2. Heme positive stool: no overt bleeding    3. Anticoagulated: warfarin currently held, hx of DVT    4. Clopidogrel therapy: currently held    5. Frailty: nursing home resident, recent prolonged hospitalization with intubation    6. CKD    7. CAD: with positive stress test yesterday    Plan  Discussed with her daughter and pt's sister.  Given no overt bleeding and stability of hemoglobin, no indication for urgent endoscopy at this time.  She has just had a positive stress test and recent prolonged hospitalization and is not the best candidate unless she has evidence of a life threatening GI bleed. Also, pt not interested in repeating endoscopy at this time.    I think it is reasonable to resume warfarin and plavix or aspirin if needed with careful monitoring    Follow hemoglobin and for bleeding    I think it would be reasonable to consider outpt evaluation if she otherwise remains stable to give her a chance to recover from her  current issues    Her daughter voices agreement with this plan      I discussed the patients findings and my recommendations with patient, family and nursing staff.    Over 25 minutes was spent reviewing the patient's chart and records, in discussion with the patient, in examination of the patient, and in discussion with members of the patient's medical team.    Frieda Rodriguez MD

## 2020-07-03 NOTE — PLAN OF CARE
Pt medicated prn for leg pain, turned q 2 hours, given iv abx, ssi with meals, vss, will ctm.      Problem: Patient Care Overview  Goal: Plan of Care Review  Outcome: Ongoing (interventions implemented as appropriate)

## 2020-07-03 NOTE — PROGRESS NOTES
[facility], [place], operated by [facility]  Inpatient Progress Note    Reason for Follow Up: Heme positive stool and anemia    Subjective     Interval History:   Discussed with patient and daughter at bedside.  Patient amenable to outpatient follow-up and evaluation once her immediate problems have improved.  Advised to monitor H&H.    Current Facility-Administered Medications:   •  acetaminophen (TYLENOL) tablet 650 mg, 650 mg, Oral, Q4H PRN, Josias Razo MD, 650 mg at 07/03/20 0014  •  allopurinol (ZYLOPRIM) tablet 100 mg, 100 mg, Oral, Daily, Josias Razo MD, 100 mg at 07/03/20 0830  •  amLODIPine (NORVASC) tablet 10 mg, 10 mg, Oral, Q24H, Josias Razo MD, 10 mg at 07/03/20 0830  •  aspirin chewable tablet 81 mg, 81 mg, Oral, Daily, Josias Razo MD, 81 mg at 07/03/20 0830  •  cefTRIAXone (ROCEPHIN) IVPB 1 g, 1 g, Intravenous, Q24H, Josias Razo MD, Last Rate: 100 mL/hr at 07/02/20 1455, 1 g at 07/02/20 1455  •  cholecalciferol (VITAMIN D3) tablet 1,000 Units, 1,000 Units, Oral, Daily, Josias Razo MD, 1,000 Units at 07/03/20 0830  •  clopidogrel (PLAVIX) tablet 75 mg, 75 mg, Oral, Daily, Josias Razo MD, 75 mg at 07/03/20 0830  •  hydrALAZINE (APRESOLINE) tablet 100 mg, 100 mg, Oral, Q8H, Josias Razo MD, 100 mg at 07/03/20 0543  •  HYDROcodone-acetaminophen (NORCO) 5-325 MG per tablet 1 tablet, 1 tablet, Oral, Q6H PRN, Josias Razo MD, 1 tablet at 07/02/20 2043  •  insulin glargine (LANTUS) injection 30 Units, 30 Units, Subcutaneous, Nightly, Josias Razo MD, 30 Units at 07/02/20 2206  •  insulin lispro (humaLOG) injection 0-7 Units, 0-7 Units, Subcutaneous, TID AC, Josias Razo MD, 2 Units at 07/02/20 1744  •  insulin lispro (humaLOG) injection 7 Units, 7 Units, Subcutaneous, TID With Meals, Josias Razo MD, 7 Units at 07/03/20 0830  •  melatonin tablet 3 mg, 3 mg, Oral, Nightly PRN, Josias Razo MD, 3 mg at 06/28/20 2131  •  metoprolol tartrate (LOPRESSOR) tablet 37.5 mg, 37.5 mg, Oral, Q12H, Dung  MD Josias, 37.5 mg at 07/03/20 0829  •  ondansetron (ZOFRAN) injection 4 mg, 4 mg, Intravenous, Q4H PRN, Josias Razo MD  •  pantoprazole (PROTONIX) EC tablet 40 mg, 40 mg, Oral, BID AC, Josias Razo MD, 40 mg at 07/03/20 0543  •  phenol (CHLORASEPTIC) 1.4 % liquid 1 spray, 1 spray, Mouth/Throat, Q2H PRN, Josias Razo MD  •  polyethylene glycol (MIRALAX) packet 17 g, 17 g, Oral, Daily, Josias Razo MD, 17 g at 07/03/20 0830  •  rosuvastatin (CRESTOR) tablet 5 mg, 5 mg, Oral, Nightly, Josias Razo MD, 5 mg at 07/02/20 2033  •  sennosides-docusate (PERICOLACE) 8.6-50 MG per tablet 1 tablet, 1 tablet, Oral, BID, Josias Razo MD, 1 tablet at 07/03/20 0830  Review of Systems:    All systems were reviewed and negative except for:  Gastrointestinal: positive for  See HPI    Objective     Vital Signs  Temp:  [98.3 °F (36.8 °C)-100.7 °F (38.2 °C)] 98.3 °F (36.8 °C)  Heart Rate:  [76-91] 86  Resp:  [18-20] 20  BP: (113-149)/(66-89) 137/74  Body mass index is 40 kg/m².  No intake or output data in the 24 hours ending 07/03/20 1213  No intake/output data recorded.     Physical Exam:   General: patient awake, alert and cooperative   Eyes: Normal lids and lashes, no scleral icterus   Neck: supple, normal ROM   Skin: warm and dry, not jaundiced   Cardiovascular: regular rhythm and rate, no murmurs auscultated   Pulm: clear to auscultation bilaterally, regular and unlabored   Abdomen: soft, nontender, nondistended; normal bowel sounds   Extremities: no rash or edema   Psychiatric: Normal mood and behavior; memory intact     Results Review:     I reviewed the patient's new clinical results.    Results from last 7 days   Lab Units 07/03/20  0540 07/02/20  0643 07/01/20  0550   WBC 10*3/mm3 7.69 7.35 6.77   HEMOGLOBIN g/dL 8.6* 9.5* 8.3*   HEMATOCRIT % 26.6* 30.1* 26.2*   PLATELETS 10*3/mm3 209 230 214     Results from last 7 days   Lab Units 07/03/20  0540 07/02/20  0643 07/01/20  0550   SODIUM mmol/L 138 138 135*   POTASSIUM  mmol/L 4.9 5.1 4.2   CHLORIDE mmol/L 104 104 103   CO2 mmol/L 27.1 26.0 26.6   BUN mg/dL 37* 31* 32*   CREATININE mg/dL 2.62* 2.11* 2.31*   CALCIUM mg/dL 8.2* 8.7 8.1*   GLUCOSE mg/dL 135* 187* 167*     Results from last 7 days   Lab Units 06/27/20  0617   INR  1.42*     No results found for: LIPASE    Radiology:  CT Chest Without Contrast   Final Result   Small area of consolidation in the posterior aspect of the   right lung base that is most likely due to atelectasis. More extensive   consolidation within the left lower lobe may be due to atelectasis   and/or pneumonia. No groundglass infiltrates are identified.   Cholelithiasis.       This report was finalized on 6/24/2020 10:16 PM by Dr. Roscoe Izquierdo M.D.          XR Chest AP   Final Result          Assessment/Plan     Patient Active Problem List   Diagnosis   • Acute renal failure superimposed on chronic kidney disease (CMS/HCC)   • Anemia   • Gastrointestinal hemorrhage       Assessment:  1. Normocytic anemia  2. Heme positive stool  3. Anticoagulant therapy  4. Coronary artery disease  5. CKD      Plan:  · Patient to follow-up in outpatient setting, can see Dr. Villavicencio her gastroenterologist of record from last studies done in 2018.  · Will sign off  ·   I discussed the patients findings and my recommendations with patient and family.    Darin Tinajero MD

## 2020-07-03 NOTE — PROGRESS NOTES
Continued Stay Note  [facility]     Patient Name: Kacy Mistry  MRN: [id number]  Today's Date: 7/3/2020    Admit Date: 6/24/2020    Discharge Plan     Row Name 07/03/20 1502       Plan    Plan  Plan return to [facility] for skilled care.  PAZ Chu RN    Patient/Family in Agreement with Plan  yes    Plan Comments  Per [place] - daughter has appealed pt's discharge.  Detailed Notice of Non Coverage given to daughter.  Plan return to [facility] for skilled care.   PAZ Chu RN        Discharge Codes    No documentation.             Radha Chu, RN

## 2020-07-03 NOTE — PROGRESS NOTES
"Daily progress note    Chief complaint  Doing better  No new complaints  Still refusing endoscopy  Family at bedside    History of present illness  80-year-old female with history of diabetes mellitus hypertension hyperlipidemia lymphedema DVT on anticoagulation who is a nursing home resident brought to the emergency room with decreased hemoglobin and generalized weakness.  Patient has no chest pain shortness of breath fever cough abdominal pain nausea vomiting diarrhea.  Patient is taking anticoagulation due to history of DVT and previous stroke.  Patient work-up in ER revealed symptomatic anemia with heme positive stool and acute kidney injury admit for management.     REVIEW OF SYSTEMS  Unremarkable     PHYSICAL EXAM  Blood pressure 143/81, pulse 92, temperature 99.8 °F (37.7 °C), temperature source Oral, resp. rate 20, height 170.2 cm (67\"), weight 116 kg (255 lb 6.4 oz), SpO2 94 %.    GENERAL: No acute distress  HENT: NCAT, PERRL, Nares patent  EYES: no scleral icterus  NECK: trachea midline, no ROM limitations  CV: regular rhythm, regular rate  RESPIRATORY: normal effort, crackles at bilateral lung bases  ABDOMEN: soft  : deferred  MUSCULOSKELETAL: no deformity  NEURO: alert, left-sided deficits, follows commands  SKIN: warm, dry, pitting and nonpitting edema bilateral lower extremities, scarring on bilateral anterior shins, chronic atrophic skin changes, no signs of cellulitis     LAB RESULTS  Lab Results (last 24 hours)     Procedure Component Value Units Date/Time    POC Glucose Once [[id number]]  (Abnormal) Collected:  07/03/20 1433    Specimen:  Blood Updated:  07/03/20 1435     Glucose 175 mg/dL     COVID PRE-OP / PRE-PROCEDURE SCREENING ORDER (NO ISOLATION) - Swab, Nasopharynx [[id number]] Collected:  07/03/20 1234    Specimen:  Swab from Nasopharynx Updated:  07/03/20 1321    Narrative:       The following orders were created for panel order COVID PRE-OP / PRE-PROCEDURE SCREENING ORDER (NO ISOLATION) - " Swab, Nasopharynx.  Procedure                               Abnormality         Status                     ---------                               -----------         ------                     COVID-19,[facility] IN-HOUSE...[[id number]]                      In process                   Please view results for these tests on the individual orders.    COVID-19,[facility]-HOU[facility], NP SWAB IN TRANSPORT MEDIA 8-12 HR TAT - Swab, Nasopharynx [[id number]] Collected:  07/03/20 1234    Specimen:  Swab from Nasopharynx Updated:  07/03/20 1321    POC Glucose Once [[id number]]  (Abnormal) Collected:  07/03/20 1116    Specimen:  Blood Updated:  07/03/20 1118     Glucose 160 mg/dL     Basic Metabolic Panel [[id number]]  (Abnormal) Collected:  07/03/20 0540    Specimen:  Blood Updated:  07/03/20 0653     Glucose 135 mg/dL      BUN 37 mg/dL      Creatinine 2.62 mg/dL      Sodium 138 mmol/L      Potassium 4.9 mmol/L      Chloride 104 mmol/L      CO2 27.1 mmol/L      Calcium 8.2 mg/dL      eGFR  African Amer 21 mL/min/1.73      BUN/Creatinine Ratio 14.1     Anion Gap 6.9 mmol/L     Narrative:       GFR Normal >60  Chronic Kidney Disease <60  Kidney Failure <15      Urine Culture - Urine, Urine, Catheter In/Out [[id number]]  (Abnormal)  (Susceptibility) Collected:  07/01/20 1838    Specimen:  Urine, Catheter In/Out Updated:  07/03/20 0630     Urine Culture >100,000 CFU/mL Escherichia coli    Susceptibility      Escherichia coli     JC     Ampicillin Susceptible     Ampicillin + Sulbactam Susceptible     Cefazolin Susceptible     Cefepime Susceptible     Ceftazidime Susceptible     Ceftriaxone Susceptible     Gentamicin Susceptible     Levofloxacin Susceptible     Nitrofurantoin Susceptible     Piperacillin + Tazobactam Susceptible     Tetracycline Susceptible     Trimethoprim + Sulfamethoxazole Susceptible                    CBC & Differential [[id number]] Collected:  07/03/20 0540    Specimen:  Blood Updated:  07/03/20 0625    Narrative:        The following orders were created for panel order CBC & Differential.  Procedure                               Abnormality         Status                     ---------                               -----------         ------                     CBC Auto Differential[[id number]]        Abnormal            Final result                 Please view results for these tests on the individual orders.    CBC Auto Differential [[id number]]  (Abnormal) Collected:  07/03/20 0540    Specimen:  Blood Updated:  07/03/20 0625     WBC 7.69 10*3/mm3      RBC 2.70 10*6/mm3      Hemoglobin 8.6 g/dL      Hematocrit 26.6 %      MCV 98.5 fL      MCH 31.9 pg      MCHC 32.3 g/dL      RDW 13.6 %      RDW-SD 48.7 fl      MPV 10.4 fL      Platelets 209 10*3/mm3      Neutrophil % 36.0 %      Lymphocyte % 48.9 %      Monocyte % 10.3 %      Eosinophil % 4.0 %      Basophil % 0.5 %      Immature Grans % 0.3 %      Neutrophils, Absolute 2.77 10*3/mm3      Lymphocytes, Absolute 3.76 10*3/mm3      Monocytes, Absolute 0.79 10*3/mm3      Eosinophils, Absolute 0.31 10*3/mm3      Basophils, Absolute 0.04 10*3/mm3      Immature Grans, Absolute 0.02 10*3/mm3      nRBC 0.0 /100 WBC     POC Glucose Once [[id number]]  (Abnormal) Collected:  07/03/20 0600    Specimen:  Blood Updated:  07/03/20 0603     Glucose 137 mg/dL     POC Glucose Once [[id number]]  (Abnormal) Collected:  07/02/20 2110    Specimen:  Blood Updated:  07/02/20 2113     Glucose 151 mg/dL     POC Glucose Once [[id number]]  (Abnormal) Collected:  07/02/20 1626    Specimen:  Blood Updated:  07/02/20 1628     Glucose 167 mg/dL         Imaging Results (Last 24 Hours)     ** No results found for the last 24 hours. **        ECG 12 Lead          HEART RATE= 125  bpm  RR Interval= 480  ms  TX Interval= 112  ms  P Horizontal Axis= 209  deg  P Front Axis= -60  deg  QRSD Interval= 82  ms  QT Interval= 358  ms  QRS Axis= -2  deg  T Wave Axis= 107  deg  - ABNORMAL ECG -  Sinus or ectopic atrial  tachycardia  Prolonged QT interval  Rate faster, ectopic rhythm appears to be new versus previous ECG  Nonspecific ST-T wave changes new versus previous ECG           Lower extremity Doppler study negative for DVT  Stress Cardiolite showed ischemia    Current Facility-Administered Medications:   •  acetaminophen (TYLENOL) tablet 650 mg, 650 mg, Oral, Q4H PRN, Josias Razo MD, 650 mg at 07/03/20 0014  •  allopurinol (ZYLOPRIM) tablet 100 mg, 100 mg, Oral, Daily, Josias Razo MD, 100 mg at 07/03/20 0830  •  amLODIPine (NORVASC) tablet 10 mg, 10 mg, Oral, Q24H, Josias Razo MD, 10 mg at 07/03/20 0830  •  aspirin chewable tablet 81 mg, 81 mg, Oral, Daily, Josias Razo MD, 81 mg at 07/03/20 0830  •  cefTRIAXone (ROCEPHIN) IVPB 1 g, 1 g, Intravenous, Q24H, Josias Razo MD, Last Rate: 100 mL/hr at 07/03/20 1439, 1 g at 07/03/20 1439  •  cholecalciferol (VITAMIN D3) tablet 1,000 Units, 1,000 Units, Oral, Daily, Josias Razo MD, 1,000 Units at 07/03/20 0830  •  clopidogrel (PLAVIX) tablet 75 mg, 75 mg, Oral, Daily, Josias Razo MD, 75 mg at 07/03/20 0830  •  [START ON 7/4/2020] furosemide (LASIX) tablet 40 mg, 40 mg, Oral, Daily, Sourav Doe MD  •  hydrALAZINE (APRESOLINE) tablet 100 mg, 100 mg, Oral, Q8H, Josias Razo MD, 100 mg at 07/03/20 1434  •  HYDROcodone-acetaminophen (NORCO) 5-325 MG per tablet 1 tablet, 1 tablet, Oral, Q6H PRN, Josias Razo MD, 1 tablet at 07/03/20 1439  •  insulin glargine (LANTUS) injection 30 Units, 30 Units, Subcutaneous, Nightly, Josias Razo MD, 30 Units at 07/02/20 2206  •  insulin lispro (humaLOG) injection 0-7 Units, 0-7 Units, Subcutaneous, TID AC, Josias Razo MD, 2 Units at 07/03/20 1234  •  insulin lispro (humaLOG) injection 7 Units, 7 Units, Subcutaneous, TID With Meals, Josias Razo MD, 7 Units at 07/03/20 1234  •  melatonin tablet 3 mg, 3 mg, Oral, Nightly PRN, Josias Razo MD, 3 mg at 06/28/20 2131  •  metoprolol tartrate (LOPRESSOR) tablet 37.5 mg, 37.5 mg,  Oral, Q12H, Melody Razo MD, 37.5 mg at 07/03/20 0829  •  ondansetron (ZOFRAN) injection 4 mg, 4 mg, Intravenous, Q4H PRN, Melody Razo MD  •  pantoprazole (PROTONIX) EC tablet 40 mg, 40 mg, Oral, BID AC, Melody Razo MD, 40 mg at 07/03/20 0543  •  phenol (CHLORASEPTIC) 1.4 % liquid 1 spray, 1 spray, Mouth/Throat, Q2H PRN, Melody Razo MD  •  polyethylene glycol (MIRALAX) packet 17 g, 17 g, Oral, Daily, Melody Razo MD, 17 g at 07/03/20 0830  •  rosuvastatin (CRESTOR) tablet 5 mg, 5 mg, Oral, Nightly, Melody Razo MD, 5 mg at 07/02/20 2033  •  sennosides-docusate (PERICOLACE) 8.6-50 MG per tablet 1 tablet, 1 tablet, Oral, BID, Melody Razo MD, 1 tablet at 07/03/20 0830     ASSESSMENT  Acute E. coli UTI  Symptomatic anemia with heme positive stool  GI bleed no endoscopy per family request  Hypertension  Diabetes mellitus  Hyperlipidemia  History of DVT and   Elevated troponin with positive stress Cardiolite   History of CVA  Acute kidney injury  Chronic kidney disease stage III  Gastroesophageal reflux disease    PLAN  CPM   IV antibiotics  Resume aspirin Plavix   No need for Coumadin as she has no evidence of DVT  Protonix p.o. twice daily  Resume Lasix  GI input appreciated  Cardiology to follow patient  Accu-Chek with sliding scale insulin  Adjust nursing home medications  Stress ulcer DVT prophylaxis  Supportive care  PT/OT  Discussed with nursing staff and family  Discharge planning    MELODY RAZO MD

## 2020-07-03 NOTE — PROGRESS NOTES
Continued Stay Note  [facility]     Patient Name: Kacy Mistry  MRN: [id number]  Today's Date: 7/3/2020    Admit Date: 6/24/2020    Discharge Plan     Row Name 07/03/20 1124       Plan    Plan  Plan return to [facility] for skilled care.  PAZ Chu RN    Patient/Family in Agreement with Plan  yes    Plan Comments  Per Adelia  ( [phone]) pt will require another negative COVID test.  [facility] has a bed and can accept pt.  Plan return to [facility] for skilled care.   PAZ Chu RN    Row Name 07/03/20 8838       Plan    Plan  Plan return to [facility] for skilled care.  PAZ Chu RN    Patient/Family in Agreement with Plan  yes    Plan Comments  Called and spoke with pt's sister  ( Cindy Teague [phone]).  Per sister plan is to return to [facility].   Sister asking about appealing her discharge. Explained need for discharge order to be written.  Copy of IM letter to be mailed to pt's sister.  Adelia  ( [phone]) continues to follow for [facility].  Plan return to [facility] for skilled care.  PAZ Chu RN                                                                                     Discharge Codes    No documentation.             Radha Chu RN

## 2020-07-03 NOTE — PROGRESS NOTES
Continued Stay Note  [facility]     Patient Name: Kacy Mistry  MRN: [id number]  Today's Date: 7/3/2020    Admit Date: 6/24/2020    Discharge Plan     Row Name 07/03/20 1055       Plan    Plan  Plan return to [facility] for skilled care.  PAZ Chu RN    Patient/Family in Agreement with Plan  yes    Plan Comments  Called and spoke with pt's sister  ( Cindy Teague [phone]).  Per sister plan is to return to [facility].   Sister asking about appealing her discharge. Explained need for discharge order to be written.  Copy of IM letter to be mailed to pt's sister.  Adelia  ( [phone]) continues to follow for [facility].  Plan return to [facility] for skilled care.  PAZ Chu RN                                                                                                                                                                                                                                                                                                                                                                                                                                                                                                                                                                                                                                                                                                                                                                                                                                                                                                                                                                                                                                                                        Discharge Codes    No documentation.             Radha Chu RN

## 2020-07-03 NOTE — PLAN OF CARE
Medicated for pain, with relief. A&O to self, place, time, and situation (at times). Medications administered per orders. Q2 turn. Temp of 100.7, Tylenol administered with temp to 99.9, MD aware. Rest of VSS. BM this shift. No s/s of distress at this time. Will continue to monitor.      Problem: Skin Injury Risk (Adult)  Goal: Identify Related Risk Factors and Signs and Symptoms  Outcome: Ongoing (interventions implemented as appropriate)  Goal: Skin Health and Integrity  Outcome: Ongoing (interventions implemented as appropriate)     Problem: Fall Risk (Adult)  Goal: Identify Related Risk Factors and Signs and Symptoms  Outcome: Ongoing (interventions implemented as appropriate)  Goal: Absence of Fall  Outcome: Ongoing (interventions implemented as appropriate)     Problem: Patient Care Overview  Goal: Plan of Care Review  Outcome: Ongoing (interventions implemented as appropriate)  Flowsheets (Taken 7/3/2020 7899)  Progress: no change  Plan of Care Reviewed With: patient  Goal: Individualization and Mutuality  Outcome: Ongoing (interventions implemented as appropriate)  Goal: Discharge Needs Assessment  Outcome: Ongoing (interventions implemented as appropriate)  Goal: Interprofessional Rounds/Family Conf  Outcome: Ongoing (interventions implemented as appropriate)     Problem: Fluid Volume Excess (Adult)  Goal: Identify Related Risk Factors and Signs and Symptoms  Outcome: Ongoing (interventions implemented as appropriate)  Goal: Optimal Fluid Balance  Outcome: Ongoing (interventions implemented as appropriate)     Problem: Gastrointestinal Bleeding (Adult)  Goal: Signs and Symptoms of Listed Potential Problems Will be Absent, Minimized or Managed (Gastrointestinal Bleeding)  Outcome: Ongoing (interventions implemented as appropriate)

## 2020-07-03 NOTE — PROGRESS NOTES
"   LOS: 9 days     Chief Complaint/ Reason for encounter: Acute on chronic renal failure  Chief Complaint   Patient presents with   • Abnormal Lab         Subjective    No complaints, resting, largely bedridden  Edema about the same today  Denies any shortness of breath or chest pain  Marginal appetite but no nausea or vomiting  No fevers or chills, weight stable today      Medical history reviewed:  Abnormal Lab         Subjective    History taken from: Patient and chart    Vital Signs  Temp:  [98.3 °F (36.8 °C)-100.7 °F (38.2 °C)] 98.3 °F (36.8 °C)  Heart Rate:  [76-91] 86  Resp:  [18-20] 20  BP: (113-149)/(66-89) 137/74       Wt Readings from Last 1 Encounters:   07/03/20 0452 116 kg (255 lb 6.4 oz)   07/02/20 0500 118 kg (260 lb 11.2 oz)   07/01/20 0212 117 kg (258 lb 2.5 oz)   06/30/20 0501 121 kg (267 lb 6.4 oz)   06/29/20 0110 116 kg (256 lb 12.8 oz)   06/28/20 0410 116 kg (256 lb 3.2 oz)   06/27/20 0611 119 kg (261 lb 14.5 oz)   06/26/20 1045 119 kg (263 lb)   06/26/20 0559 120 kg (263 lb 12.8 oz)   06/25/20 0112 114 kg (251 lb 11.2 oz)   06/24/20 1803 117 kg (257 lb 15 oz)       Objective:  Vital signs: (most recent): Blood pressure 137/74, pulse 86, temperature 98.3 °F (36.8 °C), temperature source Oral, resp. rate 20, height 170.2 cm (67\"), weight 116 kg (255 lb 6.4 oz), SpO2 99 %.              Objective:  General Appearance:  Comfortable, obese, chronically ill-appearing, in no acute distress and not in pain.  Awake, alert, oriented  HEENT: Mucous membranes moist, no injury, oropharynx clear  Lungs:  Normal effort and normal respiratory rate.  Breath sounds clear to auscultation.  No  respiratory distress.  No rales, decreased breath sounds or rhonchi.    Heart: Normal rate.  Regular rhythm.  S1 normal.  No murmur.   Abdomen: Abdomen is soft.  Bowel sounds are normal, no abdominal tenderness.  There is no rebound or guarding  Extremities: Normal range of motion.  Decreased, 1+ doughy edema of bilateral " lower extremities, distal pulses intact  Neurological: No focal motor or sensory deficits, pupils reactive  Skin:  Warm and dry.  No rash or cyanosis.       Results Review:    Intake/Output:   No intake or output data in the 24 hours ending 07/03/20 1222      DATA:  Radiology and Labs:  The following labs independently reviewed by me. Additional labs ordered for tomorrow a.m.  Interval notes, chart personally reviewed by me.   Discussed with pt herself at bedside and with a family member at bedside        Labs:   Recent Results (from the past 24 hour(s))   POC Glucose Once    Collection Time: 07/02/20  4:26 PM   Result Value Ref Range    Glucose 167 (H) 70 - 130 mg/dL   POC Glucose Once    Collection Time: 07/02/20  9:10 PM   Result Value Ref Range    Glucose 151 (H) 70 - 130 mg/dL   Basic Metabolic Panel    Collection Time: 07/03/20  5:40 AM   Result Value Ref Range    Glucose 135 (H) 65 - 99 mg/dL    BUN 37 (H) 8 - 23 mg/dL    Creatinine 2.62 (H) 0.57 - 1.00 mg/dL    Sodium 138 136 - 145 mmol/L    Potassium 4.9 3.5 - 5.2 mmol/L    Chloride 104 98 - 107 mmol/L    CO2 27.1 22.0 - 29.0 mmol/L    Calcium 8.2 (L) 8.6 - 10.5 mg/dL    eGFR  African Amer 21 (L) >60 mL/min/1.73    BUN/Creatinine Ratio 14.1 7.0 - 25.0    Anion Gap 6.9 5.0 - 15.0 mmol/L   CBC Auto Differential    Collection Time: 07/03/20  5:40 AM   Result Value Ref Range    WBC 7.69 3.40 - 10.80 10*3/mm3    RBC 2.70 (L) 3.77 - 5.28 10*6/mm3    Hemoglobin 8.6 (L) 12.0 - 15.9 g/dL    Hematocrit 26.6 (L) 34.0 - 46.6 %    MCV 98.5 (H) 79.0 - 97.0 fL    MCH 31.9 26.6 - 33.0 pg    MCHC 32.3 31.5 - 35.7 g/dL    RDW 13.6 12.3 - 15.4 %    RDW-SD 48.7 37.0 - 54.0 fl    MPV 10.4 6.0 - 12.0 fL    Platelets 209 140 - 450 10*3/mm3    Neutrophil % 36.0 (L) 42.7 - 76.0 %    Lymphocyte % 48.9 (H) 19.6 - 45.3 %    Monocyte % 10.3 5.0 - 12.0 %    Eosinophil % 4.0 0.3 - 6.2 %    Basophil % 0.5 0.0 - 1.5 %    Immature Grans % 0.3 0.0 - 0.5 %    Neutrophils, Absolute 2.77 1.70 -  7.00 10*3/mm3    Lymphocytes, Absolute 3.76 (H) 0.70 - 3.10 10*3/mm3    Monocytes, Absolute 0.79 0.10 - 0.90 10*3/mm3    Eosinophils, Absolute 0.31 0.00 - 0.40 10*3/mm3    Basophils, Absolute 0.04 0.00 - 0.20 10*3/mm3    Immature Grans, Absolute 0.02 0.00 - 0.05 10*3/mm3    nRBC 0.0 0.0 - 0.2 /100 WBC   POC Glucose Once    Collection Time: 07/03/20  6:00 AM   Result Value Ref Range    Glucose 137 (H) 70 - 130 mg/dL   POC Glucose Once    Collection Time: 07/03/20 11:16 AM   Result Value Ref Range    Glucose 160 (H) 70 - 130 mg/dL       Radiology:  Imaging Results (Last 24 Hours)     ** No results found for the last 24 hours. **             Medications have been reviewed:  Current Facility-Administered Medications   Medication Dose Route Frequency Provider Last Rate Last Dose   • acetaminophen (TYLENOL) tablet 650 mg  650 mg Oral Q4H PRN Josias Razo MD   650 mg at 07/03/20 0014   • allopurinol (ZYLOPRIM) tablet 100 mg  100 mg Oral Daily Josias Razo MD   100 mg at 07/03/20 0830   • amLODIPine (NORVASC) tablet 10 mg  10 mg Oral Q24H Josias Razo MD   10 mg at 07/03/20 0830   • aspirin chewable tablet 81 mg  81 mg Oral Daily Josias Razo MD   81 mg at 07/03/20 0830   • cefTRIAXone (ROCEPHIN) IVPB 1 g  1 g Intravenous Q24H Josias Razo  mL/hr at 07/02/20 1455 1 g at 07/02/20 1455   • cholecalciferol (VITAMIN D3) tablet 1,000 Units  1,000 Units Oral Daily Josias Razo MD   1,000 Units at 07/03/20 0830   • clopidogrel (PLAVIX) tablet 75 mg  75 mg Oral Daily Josias Razo MD   75 mg at 07/03/20 0830   • [START ON 7/4/2020] furosemide (LASIX) tablet 40 mg  40 mg Oral Daily Sourav Doe MD       • hydrALAZINE (APRESOLINE) tablet 100 mg  100 mg Oral Q8H Josias Razo MD   100 mg at 07/03/20 0543   • HYDROcodone-acetaminophen (NORCO) 5-325 MG per tablet 1 tablet  1 tablet Oral Q6H PRN Josias Rzao MD   1 tablet at 07/02/20 2043   • insulin glargine (LANTUS) injection 30 Units  30 Units Subcutaneous  Nightly Josias Razo MD   30 Units at 07/02/20 2206   • insulin lispro (humaLOG) injection 0-7 Units  0-7 Units Subcutaneous TID AC Josias Razo MD   2 Units at 07/02/20 1744   • insulin lispro (humaLOG) injection 7 Units  7 Units Subcutaneous TID With Meals Josias Razo MD   7 Units at 07/03/20 0830   • melatonin tablet 3 mg  3 mg Oral Nightly PRN Josias Razo MD   3 mg at 06/28/20 2131   • metoprolol tartrate (LOPRESSOR) tablet 37.5 mg  37.5 mg Oral Q12H Josias Razo MD   37.5 mg at 07/03/20 0829   • ondansetron (ZOFRAN) injection 4 mg  4 mg Intravenous Q4H PRN Josias Razo MD       • pantoprazole (PROTONIX) EC tablet 40 mg  40 mg Oral BID AC Josias Razo MD   40 mg at 07/03/20 0543   • phenol (CHLORASEPTIC) 1.4 % liquid 1 spray  1 spray Mouth/Throat Q2H PRN Josias Razo MD       • polyethylene glycol (MIRALAX) packet 17 g  17 g Oral Daily Josias Razo MD   17 g at 07/03/20 0830   • rosuvastatin (CRESTOR) tablet 5 mg  5 mg Oral Nightly Josias Razo MD   5 mg at 07/02/20 2033   • sennosides-docusate (PERICOLACE) 8.6-50 MG per tablet 1 tablet  1 tablet Oral BID Josias Razo MD   1 tablet at 07/03/20 0830       ASSESSMENT:  Chronic kidney disease stage III  Acute kidney injury, stable  Worsening volume overload  Hypertension  Type 2 diabetes mellitus  Chronic anemia secondary to CKD  Gastrointestinal hemorrhage  History of DVT on Coumadin  New, UTI, growing E. Coli       PLAN:   Renal function is a bit worse after IV Lasix  Will hold diuretics today and restart oral Lasix tomorrow  Anticoagulation on hold, GI following, hemoglobin improved, 9.5  Noted positive stress test, eventually needs to go back on anticoagulation  Difficult situation  Blood pressure stable, continue current regimen plus oral vitamin D  Repeat labs in a.m. discussed with family at bedside and with Dr. Razo  Discharge planning      Sourav Doe MD   [facility]   Office phone number: [phone]  Answering service  phone number: [phone]    07/03/20  12:22    Dictation performed using Dragon dictation [employer]

## 2020-07-03 NOTE — PROGRESS NOTES
"[facility]  Cardiology Progress Note    Patient Identification:  Name: Kacy Mistry  Age: 80 y.o.  Sex: female  :  1940  MRN: [id number]                 Follow Up / Chief Complaint: Elevated troponin    Interval History: She had a positive stress test yesterday.  Will medical management.        Subjective: She denies chest pain, shortness of breath, and palpitations.       Objective:    Past Medical History:  Past Medical History:   Diagnosis Date   • Anemia    • Anxiety    • Arthritis    • Cellulitis of left lower extremity    • CHF (congestive heart failure) (CMS/HCC)    • Chronic kidney disease    • Depression    • Diabetes mellitus (CMS/HCC)    • DVT (deep venous thrombosis) (CMS/HCC)    • GERD (gastroesophageal reflux disease)    • Hyperlipidemia    • Hypertension    • Lymphedema    • Obesity    • Osteoporosis    • Renal disorder     chronic kidney disease   • Stroke (CMS/HCC)     with left hemiplegia and hemiparesis   • Venous insufficiency (chronic) (peripheral)      Past Surgical History:  Past Surgical History:   Procedure Laterality Date   • COLONOSCOPY N/A 2018    Procedure: COLONOSCOPY into cecum with cold polypectomy;  Surgeon: David Villavicencio MD;  Location: [facility] ENDOSCOPY;  Service:    • ENDOSCOPY  2018    Procedure: ESOPHAGOGASTRODUODENOSCOPY;  Surgeon: David Villavicencio MD;  Location: [facility] ENDOSCOPY;  Service:         Social History:   Social History     Tobacco Use   • Smoking status: Former Smoker     Types: Cigarettes   • Smokeless tobacco: Never Used   • Tobacco comment: \"Over 20 years\"   Substance Use Topics   • Alcohol use: No      Family History:  Family History   Problem Relation Age of Onset   • Heart disease Mother    • Heart disease Brother           Allergies:  Allergies   Allergen Reactions   • Contrast Dye Unknown (See Comments)     n/a   • Iodine Unknown (See Comments)     unknown     Scheduled Meds:    allopurinol 100 mg Daily " "  amLODIPine 10 mg Q24H   aspirin 81 mg Daily   cefTRIAXone 1 g Q24H   cholecalciferol 1,000 Units Daily   clopidogrel 75 mg Daily   hydrALAZINE 100 mg Q8H   insulin glargine 30 Units Nightly   insulin lispro 0-7 Units TID AC   insulin lispro 7 Units TID With Meals   metoprolol tartrate 37.5 mg Q12H   pantoprazole 40 mg BID AC   polyethylene glycol 17 g Daily   rosuvastatin 5 mg Nightly   sennosides-docusate 1 tablet BID           INTAKE AND OUTPUT:  No intake or output data in the 24 hours ending 07/03/20 0939               ROS  Constitutional: Awake and alert, no fever. No nosebleeds  Abdomen           no abdominal pain   Cardiac              no chest pain  Pulmonary          no shortness of breath      /74 (BP Location: Right arm, Patient Position: Lying)   Pulse 86   Temp 98.3 °F (36.8 °C) (Oral)   Resp 20   Ht 170.2 cm (67\")   Wt 116 kg (255 lb 6.4 oz)   SpO2 100%   BMI 40.00 kg/m²   General appearance: No acute changes   Neck: Trachea midline; NECK, supple, no thyromegaly or lymphadenopathy   Lungs: Normal size and shape, normal breath sounds, equal distribution of air, no rales and rhonchi   CV: S1-S2 regular, no murmurs, no rub, no gallop   Abdomen: Soft, non-tender; no masses , no abnormal abdominal sounds   Extremities: No deformity , normal color , no peripheral edema   Skin: Normal temperature, turgor and texture; no rash, ulcers        Cardiographics  Telemetry:       Sinus rhythm        ECG:     Echocardiogram:   Interpretation Summary     · Left ventricular wall thickness is consistent with mild concentric hypertrophy.  · Calculated EF = 62.0%  · There is no evidence of pericardial effusion          Lab Review           Results from last 7 days   Lab Units 07/03/20  0540   SODIUM mmol/L 138   POTASSIUM mmol/L 4.9   BUN mg/dL 37*   CREATININE mg/dL 2.62*   CALCIUM mg/dL 8.2*        Results from last 7 days   Lab Units 07/03/20  0540 07/02/20  0643 07/01/20  0550   WBC 10*3/mm3 7.69 7.35 " "6.77   HEMOGLOBIN g/dL 8.6* 9.5* 8.3*   HEMATOCRIT % 26.6* 30.1* 26.2*   PLATELETS 10*3/mm3 209 230 214     Results from last 7 days   Lab Units 06/27/20  0617   INR  1.42*       The following medical decision was discussed in detail with Dr. Miramontes    Assessment:  Elevated troponin  Gastrointestinal bleed  Acute renal failure  Anemia  Hypertension    Plan:  Cardiovascular remains stable we will see PRN over the weekend  Toma Miramontes MD            EMR Dragon/Transcription:   \"Dictated utilizing Dragon dictation\".     "

## 2020-07-04 NOTE — PROGRESS NOTES
"Daily progress note    Chief complaint  Doing better  No new complaints  Family at bedside    History of present illness  80-year-old female with history of diabetes mellitus hypertension hyperlipidemia lymphedema DVT on anticoagulation who is a nursing home resident brought to the emergency room with decreased hemoglobin and generalized weakness.  Patient has no chest pain shortness of breath fever cough abdominal pain nausea vomiting diarrhea.  Patient is taking anticoagulation due to history of DVT and previous stroke.  Patient work-up in ER revealed symptomatic anemia with heme positive stool and acute kidney injury admit for management.     REVIEW OF SYSTEMS  Unremarkable     PHYSICAL EXAM  Blood pressure 121/73, pulse 81, temperature 99 °F (37.2 °C), temperature source Oral, resp. rate 18, height 170.2 cm (67\"), weight 116 kg (255 lb 14.4 oz), SpO2 98 %.    GENERAL: No acute distress  HENT: NCAT, PERRL, Nares patent  EYES: no scleral icterus  NECK: trachea midline, no ROM limitations  CV: regular rhythm, regular rate  RESPIRATORY: normal effort, crackles at bilateral lung bases  ABDOMEN: soft  : deferred  MUSCULOSKELETAL: no deformity  NEURO: alert, left-sided deficits, follows commands  SKIN: warm, dry, pitting and nonpitting edema bilateral lower extremities, scarring on bilateral anterior shins, chronic atrophic skin changes, no signs of cellulitis     LAB RESULTS  Lab Results (last 24 hours)     Procedure Component Value Units Date/Time    POC Glucose Once [[id number]]  (Abnormal) Collected:  07/04/20 1106    Specimen:  Blood Updated:  07/04/20 1108     Glucose 145 mg/dL     POC Glucose Once [[id number]]  (Normal) Collected:  07/04/20 0636    Specimen:  Blood Updated:  07/04/20 0638     Glucose 120 mg/dL     Basic Metabolic Panel [[id number]]  (Abnormal) Collected:  07/04/20 0542    Specimen:  Blood Updated:  07/04/20 0627     Glucose 128 mg/dL      BUN 37 mg/dL      Creatinine 2.42 mg/dL      Sodium 135 " mmol/L      Potassium 4.8 mmol/L      Chloride 103 mmol/L      CO2 25.1 mmol/L      Calcium 8.6 mg/dL      eGFR  African Amer 23 mL/min/1.73      BUN/Creatinine Ratio 15.3     Anion Gap 6.9 mmol/L     Narrative:       GFR Normal >60  Chronic Kidney Disease <60  Kidney Failure <15      CBC & Differential [[id number]] Collected:  07/04/20 0542    Specimen:  Blood Updated:  07/04/20 0608    Narrative:       The following orders were created for panel order CBC & Differential.  Procedure                               Abnormality         Status                     ---------                               -----------         ------                     CBC Auto Differential[[id number]]        Abnormal            Final result                 Please view results for these tests on the individual orders.    CBC Auto Differential [[id number]]  (Abnormal) Collected:  07/04/20 0542    Specimen:  Blood Updated:  07/04/20 0608     WBC 6.39 10*3/mm3      RBC 2.63 10*6/mm3      Hemoglobin 8.2 g/dL      Hematocrit 26.0 %      MCV 98.9 fL      MCH 31.2 pg      MCHC 31.5 g/dL      RDW 13.9 %      RDW-SD 50.9 fl      MPV 10.2 fL      Platelets 204 10*3/mm3      Neutrophil % 43.1 %      Lymphocyte % 40.5 %      Monocyte % 10.3 %      Eosinophil % 5.0 %      Basophil % 0.9 %      Immature Grans % 0.2 %      Neutrophils, Absolute 2.75 10*3/mm3      Lymphocytes, Absolute 2.59 10*3/mm3      Monocytes, Absolute 0.66 10*3/mm3      Eosinophils, Absolute 0.32 10*3/mm3      Basophils, Absolute 0.06 10*3/mm3      Immature Grans, Absolute 0.01 10*3/mm3      nRBC 0.0 /100 WBC     POC Glucose Once [[id number]]  (Abnormal) Collected:  07/03/20 2053    Specimen:  Blood Updated:  07/03/20 2054     Glucose 152 mg/dL     POC Glucose Once [[id number]]  (Abnormal) Collected:  07/03/20 1648    Specimen:  Blood Updated:  07/03/20 1649     Glucose 169 mg/dL     COVID PRE-OP / PRE-PROCEDURE SCREENING ORDER (NO ISOLATION) - Swab, Nasopharynx [[id number]] Collected:   07/03/20 1234    Specimen:  Swab from Nasopharynx Updated:  07/03/20 1527    Narrative:       The following orders were created for panel order COVID PRE-OP / PRE-PROCEDURE SCREENING ORDER (NO ISOLATION) - Swab, Nasopharynx.  Procedure                               Abnormality         Status                     ---------                               -----------         ------                     COVID-19,[facility]...[[id number]]  Normal              Final result                 Please view results for these tests on the individual orders.    COVID-19,[facility], NP SWAB IN TRANSPORT MEDIA 8-12 HR TAT - Swab, Nasopharynx [[id number]]  (Normal) Collected:  07/03/20 1234    Specimen:  Swab from Nasopharynx Updated:  07/03/20 1527     COVID19 Not Detected    Narrative:       Fact sheet for providers: https://www.fda.gov/media/675956/download     Fact sheet for patients: https://www.fda.gov/media/675207/download    POC Glucose Once [[id number]]  (Abnormal) Collected:  07/03/20 1433    Specimen:  Blood Updated:  07/03/20 1435     Glucose 175 mg/dL         Imaging Results (Last 24 Hours)     ** No results found for the last 24 hours. **        ECG 12 Lead          HEART RATE= 125  bpm  RR Interval= 480  ms  AK Interval= 112  ms  P Horizontal Axis= 209  deg  P Front Axis= -60  deg  QRSD Interval= 82  ms  QT Interval= 358  ms  QRS Axis= -2  deg  T Wave Axis= 107  deg  - ABNORMAL ECG -  Sinus or ectopic atrial tachycardia  Prolonged QT interval  Rate faster, ectopic rhythm appears to be new versus previous ECG  Nonspecific ST-T wave changes new versus previous ECG           Lower extremity Doppler study negative for DVT  Stress Cardiolite showed ischemia    Current Facility-Administered Medications:   •  acetaminophen (TYLENOL) tablet 650 mg, 650 mg, Oral, Q4H PRN, Josias Razo MD, 650 mg at 07/03/20 0014  •  allopurinol (ZYLOPRIM) tablet 100 mg, 100 mg, Oral, Daily, Josias Razo MD, 100 mg at 07/04/20 0821  •   amLODIPine (NORVASC) tablet 10 mg, 10 mg, Oral, Q24H, Josias Razo MD, 10 mg at 07/04/20 0821  •  aspirin chewable tablet 81 mg, 81 mg, Oral, Daily, Josias Razo MD, 81 mg at 07/04/20 0821  •  cefTRIAXone (ROCEPHIN) IVPB 1 g, 1 g, Intravenous, Q24H, Josias Razo MD, Last Rate: 100 mL/hr at 07/04/20 1341, 1 g at 07/04/20 1341  •  cholecalciferol (VITAMIN D3) tablet 1,000 Units, 1,000 Units, Oral, Daily, Josias Razo MD, 1,000 Units at 07/04/20 0821  •  clopidogrel (PLAVIX) tablet 75 mg, 75 mg, Oral, Daily, Josias Razo MD, 75 mg at 07/04/20 0821  •  furosemide (LASIX) tablet 40 mg, 40 mg, Oral, Daily, Sourav Doe MD, 40 mg at 07/04/20 0821  •  hydrALAZINE (APRESOLINE) tablet 100 mg, 100 mg, Oral, Q8H, Josias Razo MD, 100 mg at 07/04/20 1340  •  HYDROcodone-acetaminophen (NORCO) 5-325 MG per tablet 1 tablet, 1 tablet, Oral, Q6H PRN, Josias Razo MD, 1 tablet at 07/04/20 1220  •  insulin glargine (LANTUS) injection 30 Units, 30 Units, Subcutaneous, Nightly, Josias Razo MD, 30 Units at 07/03/20 2200  •  insulin lispro (humaLOG) injection 0-7 Units, 0-7 Units, Subcutaneous, TID AC, Josias Razo MD, 2 Units at 07/03/20 1753  •  insulin lispro (humaLOG) injection 8 Units, 8 Units, Subcutaneous, TID With Meals, Josias Razo MD, 8 Units at 07/04/20 1215  •  melatonin tablet 3 mg, 3 mg, Oral, Nightly PRN, Josias Razo MD, 3 mg at 06/28/20 2131  •  metoprolol tartrate (LOPRESSOR) tablet 37.5 mg, 37.5 mg, Oral, Q12H, Josias Razo MD, 37.5 mg at 07/04/20 0821  •  ondansetron (ZOFRAN) injection 4 mg, 4 mg, Intravenous, Q4H PRN, Josias Razo MD  •  pantoprazole (PROTONIX) EC tablet 40 mg, 40 mg, Oral, BID AC, Josias Razo MD, 40 mg at 07/04/20 0821  •  phenol (CHLORASEPTIC) 1.4 % liquid 1 spray, 1 spray, Mouth/Throat, Q2H PRN, Josias Razo MD  •  polyethylene glycol (MIRALAX) packet 17 g, 17 g, Oral, Daily, Josias Razo MD, 17 g at 07/04/20 0821  •  rosuvastatin (CRESTOR) tablet 5 mg, 5 mg, Oral,  Nightly, Melody Razo MD, 5 mg at 07/03/20 2052  •  sennosides-docusate (PERICOLACE) 8.6-50 MG per tablet 1 tablet, 1 tablet, Oral, BID, Melody Razo MD, 1 tablet at 07/04/20 0821     ASSESSMENT  Acute E. coli UTI  Symptomatic anemia with heme positive stool  GI bleed no endoscopy per family request  Hypertension  Diabetes mellitus  Hyperlipidemia  History of DVT and   Elevated troponin with positive stress Cardiolite   History of CVA  Acute kidney injury  Chronic kidney disease stage III  Gastroesophageal reflux disease    PLAN  CPM   IV antibiotics  Resume aspirin Plavix   No need for Coumadin  Protonix p.o. twice daily  Resume Lasix  GI input appreciated  Cardiology to follow patient  Accu-Chek with sliding scale insulin  Adjust nursing home medications  Stress ulcer DVT prophylaxis  Supportive care  PT/OT  Discussed with nursing staff and family  Discharge back to nursing home in a.m. if okay with all    MELODY RAZO MD

## 2020-07-04 NOTE — PLAN OF CARE
Pt medicated prn for leg pain, given iv and po abx, turned q 2 hours, given insulin with meals, given tylenol once for low grade temp ,will ctm.      Problem: Patient Care Overview  Goal: Plan of Care Review  Outcome: Ongoing (interventions implemented as appropriate)

## 2020-07-04 NOTE — PLAN OF CARE
Patient resting in bed this shift without c/o pain except for with turning and repositioning every two hours. HOB is up 10 degrees, and bilateral legs up on pillows for offloading also with protective heel boots. Patient continues with 4+ edema in her bilateral legs and feet with pulses palpable. She also has generalized edema all over at 3+ and continues to use oxygen therapy. Lungs are clear to diminished in the bases with some congestion noted. Patient is voiding well AEB heavily saturated briefs with turns Q 2 hrs. Abdomen remains large, soft, and with +bowel sounds in all quadrants. Patient did have a small, soft, formed stool this shift that was brown and without s/s of occult blood/hematochezia .

## 2020-07-05 NOTE — CONSULTS
Patient Identification:  NAME:  Kacy Mistry  Age:  80 y.o.   Sex:  female   :  1940   MRN:  [id number]       Chief complaint: She does not have one, reason for consult possible stroke    History of present illness: This patient is 80-year-old right-handed black female with a history of obesity renal disease history of stroke in  with left face arm and leg weakness and spasticity she comes to the hospital now with generalized weakness and a GI bleed.  Blood thinners in the form of Coumadin and Eliquis have been held.  Last night the patient had what appeared to be the rather sudden onset of right facial weakness right arm weakness and difficulty speaking duration not known quality as described context the patient who has had previous right hemisphere stroke in the past.  Location as described right face and arm.  She is apparently back to normal today the patient herself does not think she has had a new stroke      Past medical history:  Past Medical History:   Diagnosis Date   • Anemia    • Anxiety    • Arthritis    • Cellulitis of left lower extremity    • CHF (congestive heart failure) (CMS/HCC)    • Chronic kidney disease    • Depression    • Diabetes mellitus (CMS/HCC)    • DVT (deep venous thrombosis) (CMS/HCC)    • GERD (gastroesophageal reflux disease)    • Hyperlipidemia    • Hypertension    • Lymphedema    • Obesity    • Osteoporosis    • Renal disorder     chronic kidney disease   • Stroke (CMS/HCC)     with left hemiplegia and hemiparesis   • Venous insufficiency (chronic) (peripheral)        Past surgical history:  Past Surgical History:   Procedure Laterality Date   • COLONOSCOPY N/A 2018    Procedure: COLONOSCOPY into cecum with cold polypectomy;  Surgeon: David Villavicencio MD;  Location: [facility] ENDOSCOPY;  Service:    • ENDOSCOPY  2018    Procedure: ESOPHAGOGASTRODUODENOSCOPY;  Surgeon: David Villavicencio MD;  Location: [facility] ENDOSCOPY;  Service:               Allergies:  Contrast dye and Iodine    Home medications:  Medications Prior to Admission   Medication Sig Dispense Refill Last Dose   • allopurinol (ZYLOPRIM) 100 MG tablet Take 100 mg by mouth Daily.   6/24/2020 at 0856   • amLODIPine (NORVASC) 10 MG tablet Take 10 mg by mouth Daily. Hold for SBP <110 or HR <60   6/23/2020   • bisacodyl (DULCOLAX) 5 MG EC tablet Take 10 mg by mouth Daily As Needed for Constipation (suppository).      • bumetanide (BUMEX) 1 MG tablet Take 1 mg by mouth 2 (Two) Times a Day.   6/24/2020 at Unknown time   • cholecalciferol (VITAMIN D3) 1000 units tablet Take 50,000 Units by mouth Daily.   3/22/2018 at 0847   • clopidogrel (PLAVIX) 75 MG tablet Take 75 mg by mouth Every Night.      • diclofenac (VOLTAREN) 1 % gel gel Apply 4 g topically to the appropriate area as directed 4 (Four) Times a Day As Needed.      • hydrALAZINE (APRESOLINE) 10 MG tablet Take 100 mg by mouth 3 (Three) Times a Day. Hold for SBP<110 or HR<60      • HYDROcodone-acetaminophen (NORCO) 5-325 MG per tablet Take 1 tablet by mouth Every 6 (Six) Hours As Needed for Moderate Pain .   3/22/2018 at 1714   • insulin glargine (LANTUS) 100 UNIT/ML injection Inject 18 Units under the skin into the appropriate area as directed Every Night.   3/22/2018 at 1930   • Loratadine 10 MG capsule Take 10 mg by mouth Daily.      • metoprolol tartrate (LOPRESSOR) 25 MG tablet Take 25 mg by mouth 2 (Two) Times a Day. Hold for SBP<110 or HR<60      • polyethylene glycol (MIRALAX) packet Take 17 g by mouth Daily.      • rosuvastatin (CRESTOR) 10 MG tablet Take 10 mg by mouth Every Night.   6/23/2020   • senna-docusate (SENNALAX-S) 8.6-50 MG per tablet Take 1 tablet by mouth 2 (Two) Times a Day.   3/22/2018 at 0847   • warfarin (COUMADIN) 5 MG tablet Take 5 mg by mouth Daily.      • bumetanide (BUMEX) 2 MG tablet Take 2 mg by mouth Daily. In AM   3/22/2018 at 0847   • CloNIDine (CATAPRES) 0.3 MG tablet Take 0.3 mg by mouth 3 (Three)  "Times a Day.   3/22/2018 at 1615   • docusate sodium (COLACE) 250 MG capsule Take 250 mg by mouth Daily.   3/22/2018 at 0847   • potassium chloride (K-DUR) 10 MEQ CR tablet Take 20 mEq by mouth 3 (Three) Times a Day With Meals.   3/22/2018 at 1615   • sertraline (ZOLOFT) 50 MG tablet Take 75 mg by mouth Daily.   3/22/2018 at 0847        Hospital medications:    allopurinol 100 mg Oral Daily   amLODIPine 10 mg Oral Q24H   aspirin 81 mg Oral Daily   cephalexin 500 mg Oral Q12H   cholecalciferol 1,000 Units Oral Daily   clopidogrel 75 mg Oral Daily   furosemide 40 mg Oral BID   hydrALAZINE 100 mg Oral Q8H   insulin glargine 30 Units Subcutaneous Nightly   insulin lispro 0-7 Units Subcutaneous TID AC   insulin lispro 10 Units Subcutaneous TID With Meals   insulin regular 8 Units Intravenous TID AC   metoprolol tartrate 37.5 mg Oral Q12H   polyethylene glycol 17 g Oral Daily   rosuvastatin 5 mg Oral Nightly   sennosides-docusate 1 tablet Oral BID   sodium polystyrene 15 g Oral Once       sodium chloride 75 mL/hr Last Rate: 75 mL/hr (07/05/20 1331)     •  acetaminophen  •  acetaminophen  •  dextrose  •  HYDROcodone-acetaminophen  •  melatonin  •  ondansetron  •  phenol    Family history:  Family History   Problem Relation Age of Onset   • Heart disease Mother    • Heart disease Brother        Social history:  Social History     Tobacco Use   • Smoking status: Former Smoker     Types: Cigarettes   • Smokeless tobacco: Never Used   • Tobacco comment: \"Over 20 years\"   Substance Use Topics   • Alcohol use: No   • Drug use: Defer       Review of systems:    She cannot give me any pertinent review of systems family notes she was on Eliquis later Coumadin has had now this GI bleed.  Niece is able to give history about the stroke in 2008 which affected her left face arm and leg she had a stent placed at that time and that is all she knows no other review of systems possible from the family or the patient and I reviewed the chart " fully    Objective:  Vitals Ranges:   Temp:  [98.9 °F (37.2 °C)-99.6 °F (37.6 °C)] 99.3 °F (37.4 °C)  Heart Rate:  [] 83  Resp:  [20] 20  BP: (136-172)/(78-90) 147/89      Physical Exam: Lethargic but answers my questions able to comprehend name and repeat fund of knowledge poor attention span concentration fair recent remote memory cannot be tested she is lethargic falls back to sleep cranial nerves she has decreased left nasolabial fold when I get her to smile the right nasolabial fold looks okay pupils 2 and half constricting to 2 eyes are conjugate she could not protrude her tongue no other cranial nerves could be tested right arm she does give  and can lift and hold the arm up she is spastic on the entire left side she gives good right toe wiggle.  Obviously weakness from the old stroke in the left face arm and leg with spasticity of that entire side there is bradykinesia no rigidity or atrophy reflexes increased on the left side compared to the right toes mute bilaterally sensation coordination station and gait she could not perform heart is regular without murmur neck supple without bruits extremities no clubbing cyanosis some peripheral edema she is obese visual acuity she is able to count fingers    Results review:   I reviewed the patient's new clinical results.    Data review:  Lab Results (last 24 hours)     Procedure Component Value Units Date/Time    POC Glucose Once [[id number]]  (Abnormal) Collected:  07/05/20 1151    Specimen:  Blood Updated:  07/05/20 1153     Glucose 157 mg/dL     Basic Metabolic Panel [[id number]]  (Abnormal) Collected:  07/05/20 0532    Specimen:  Blood Updated:  07/05/20 0702     Glucose 168 mg/dL      BUN 40 mg/dL      Creatinine 2.31 mg/dL      Sodium 135 mmol/L      Potassium 5.7 mmol/L      Chloride 101 mmol/L      CO2 27.6 mmol/L      Calcium 8.6 mg/dL      eGFR  African Amer 25 mL/min/1.73      BUN/Creatinine Ratio 17.3     Anion Gap 6.4 mmol/L     Narrative:        GFR Normal >60  Chronic Kidney Disease <60  Kidney Failure <15      Magnesium [[id number]]  (Normal) Collected:  07/05/20 0532    Specimen:  Blood Updated:  07/05/20 0702     Magnesium 2.3 mg/dL     CBC & Differential [[id number]] Collected:  07/05/20 0532    Specimen:  Blood Updated:  07/05/20 0636    Narrative:       The following orders were created for panel order CBC & Differential.  Procedure                               Abnormality         Status                     ---------                               -----------         ------                     CBC Auto Differential[[id number]]        Abnormal            Final result                 Please view results for these tests on the individual orders.    CBC Auto Differential [[id number]]  (Abnormal) Collected:  07/05/20 0532    Specimen:  Blood Updated:  07/05/20 0636     WBC 6.92 10*3/mm3      RBC 2.87 10*6/mm3      Hemoglobin 9.0 g/dL      Hematocrit 28.3 %      MCV 98.6 fL      MCH 31.4 pg      MCHC 31.8 g/dL      RDW 13.1 %      RDW-SD 47.3 fl      MPV 10.2 fL      Platelets 206 10*3/mm3      Neutrophil % 48.4 %      Lymphocyte % 34.5 %      Monocyte % 10.7 %      Eosinophil % 5.3 %      Basophil % 0.7 %      Immature Grans % 0.4 %      Neutrophils, Absolute 3.34 10*3/mm3      Lymphocytes, Absolute 2.39 10*3/mm3      Monocytes, Absolute 0.74 10*3/mm3      Eosinophils, Absolute 0.37 10*3/mm3      Basophils, Absolute 0.05 10*3/mm3      Immature Grans, Absolute 0.03 10*3/mm3      nRBC 0.3 /100 WBC     POC Glucose Once [[id number]]  (Abnormal) Collected:  07/05/20 0620    Specimen:  Blood Updated:  07/05/20 0622     Glucose 163 mg/dL     POC Glucose Once [[id number]]  (Abnormal) Collected:  07/04/20 2344    Specimen:  Blood Updated:  07/04/20 2346     Glucose 170 mg/dL     POC Glucose Once [[id number]]  (Abnormal) Collected:  07/04/20 2024    Specimen:  Blood Updated:  07/04/20 2026     Glucose 163 mg/dL     POC Glucose Once [[id number]]  (Abnormal) Collected:   07/04/20 1636    Specimen:  Blood Updated:  07/04/20 1638     Glucose 149 mg/dL            Imaging:  Imaging Results (Last 24 Hours)     ** No results found for the last 24 hours. **             Assessment and Plan:       Gastrointestinal hemorrhage    Last night she had an episode of difficulty speaking right facial droop and right arm weakness I will call this a TIA.  I doubt that she has had a fixed new stroke.  Our hands are tied this is someone who has not tolerated Eliquis and then Coumadin and has had GI hemorrhage at this point she is now going to be on Plavix.  I will add a tiny dose of Lovenox 30 mg subcu every 24 hours and I will discontinue aspirin we will check a plain CT of the head now.  I will also check carotid Dopplers  Thanks      Kwasi Kang MD  07/05/20  14:05

## 2020-07-05 NOTE — PROGRESS NOTES
"Daily progress note    Chief complaint  Events noted  Developed facial droop and neuro changes last p.m.  Back to baseline this morning with no specific complaints  Family at bedside    History of present illness  80-year-old female with history of diabetes mellitus hypertension hyperlipidemia lymphedema DVT on anticoagulation who is a nursing home resident brought to the emergency room with decreased hemoglobin and generalized weakness.  Patient has no chest pain shortness of breath fever cough abdominal pain nausea vomiting diarrhea.  Patient is taking anticoagulation due to history of DVT and previous stroke.  Patient work-up in ER revealed symptomatic anemia with heme positive stool and acute kidney injury admit for management.     REVIEW OF SYSTEMS  Unremarkable     PHYSICAL EXAM  Blood pressure 147/89, pulse 83, temperature 99.3 °F (37.4 °C), temperature source Oral, resp. rate 20, height 170.2 cm (67\"), weight 116 kg (255 lb 14.4 oz), SpO2 100 %.    GENERAL: No acute distress  HENT: NCAT, PERRL, Nares patent  EYES: no scleral icterus  NECK: trachea midline, no ROM limitations  CV: regular rhythm, regular rate  RESPIRATORY: normal effort, crackles at bilateral lung bases  ABDOMEN: soft  : deferred  MUSCULOSKELETAL: no deformity  NEURO: alert, left-sided deficits, follows commands  SKIN: warm, dry, pitting and nonpitting edema bilateral lower extremities, scarring on bilateral anterior shins, chronic atrophic skin changes, no signs of cellulitis     LAB RESULTS  Lab Results (last 24 hours)     Procedure Component Value Units Date/Time    POC Glucose Once [[id number]]  (Abnormal) Collected:  07/05/20 1151    Specimen:  Blood Updated:  07/05/20 1153     Glucose 157 mg/dL     Basic Metabolic Panel [[id number]]  (Abnormal) Collected:  07/05/20 0532    Specimen:  Blood Updated:  07/05/20 0702     Glucose 168 mg/dL      BUN 40 mg/dL      Creatinine 2.31 mg/dL      Sodium 135 mmol/L      Potassium 5.7 mmol/L      " Chloride 101 mmol/L      CO2 27.6 mmol/L      Calcium 8.6 mg/dL      eGFR  African Amer 25 mL/min/1.73      BUN/Creatinine Ratio 17.3     Anion Gap 6.4 mmol/L     Narrative:       GFR Normal >60  Chronic Kidney Disease <60  Kidney Failure <15      Magnesium [[id number]]  (Normal) Collected:  07/05/20 0532    Specimen:  Blood Updated:  07/05/20 0702     Magnesium 2.3 mg/dL     CBC & Differential [[id number]] Collected:  07/05/20 0532    Specimen:  Blood Updated:  07/05/20 0636    Narrative:       The following orders were created for panel order CBC & Differential.  Procedure                               Abnormality         Status                     ---------                               -----------         ------                     CBC Auto Differential[[id number]]        Abnormal            Final result                 Please view results for these tests on the individual orders.    CBC Auto Differential [[id number]]  (Abnormal) Collected:  07/05/20 0532    Specimen:  Blood Updated:  07/05/20 0636     WBC 6.92 10*3/mm3      RBC 2.87 10*6/mm3      Hemoglobin 9.0 g/dL      Hematocrit 28.3 %      MCV 98.6 fL      MCH 31.4 pg      MCHC 31.8 g/dL      RDW 13.1 %      RDW-SD 47.3 fl      MPV 10.2 fL      Platelets 206 10*3/mm3      Neutrophil % 48.4 %      Lymphocyte % 34.5 %      Monocyte % 10.7 %      Eosinophil % 5.3 %      Basophil % 0.7 %      Immature Grans % 0.4 %      Neutrophils, Absolute 3.34 10*3/mm3      Lymphocytes, Absolute 2.39 10*3/mm3      Monocytes, Absolute 0.74 10*3/mm3      Eosinophils, Absolute 0.37 10*3/mm3      Basophils, Absolute 0.05 10*3/mm3      Immature Grans, Absolute 0.03 10*3/mm3      nRBC 0.3 /100 WBC     POC Glucose Once [[id number]]  (Abnormal) Collected:  07/05/20 0620    Specimen:  Blood Updated:  07/05/20 0622     Glucose 163 mg/dL     POC Glucose Once [[id number]]  (Abnormal) Collected:  07/04/20 2344    Specimen:  Blood Updated:  07/04/20 2346     Glucose 170 mg/dL     POC Glucose  Once [[id number]]  (Abnormal) Collected:  07/04/20 2024    Specimen:  Blood Updated:  07/04/20 2026     Glucose 163 mg/dL     POC Glucose Once [[id number]]  (Abnormal) Collected:  07/04/20 1636    Specimen:  Blood Updated:  07/04/20 1638     Glucose 149 mg/dL         Imaging Results (Last 24 Hours)     Procedure Component Value Units Date/Time    CT Head Without Contrast [[id number]] Collected:  07/05/20 1523     Updated:  07/05/20 1523    Narrative:       CT OF THE BRAIN WITHOUT CONTRAST 07/05/2020     HISTORY: Syncope, fainting.     FINDINGS: Axial images were obtained through the brain without  intravenous contrast. There is mild diffuse atrophy. There is mild  decreased attenuation of the periventricular white matter bilaterally  consistent with mild small vessel white matter ischemic disease.  Bilateral basal ganglia calcifications are seen.     There is no evidence of acute infarction, hemorrhage, midline shift or  mass effect. Small old lacunar infarction is seen in the head of the  caudate nucleus on the right.. No bony abnormalities are seen.       Impression:       No acute intracranial process identified.     Radiation dose reduction techniques were utilized, including automated  exposure control and exposure modulation based on body size.              ECG 12 Lead          HEART RATE= 125  bpm  RR Interval= 480  ms  KS Interval= 112  ms  P Horizontal Axis= 209  deg  P Front Axis= -60  deg  QRSD Interval= 82  ms  QT Interval= 358  ms  QRS Axis= -2  deg  T Wave Axis= 107  deg  - ABNORMAL ECG -  Sinus or ectopic atrial tachycardia  Prolonged QT interval  Rate faster, ectopic rhythm appears to be new versus previous ECG  Nonspecific ST-T wave changes new versus previous ECG           Lower extremity Doppler study negative for DVT  Stress Cardiolite showed ischemia    Current Facility-Administered Medications:   •  acetaminophen (TYLENOL) suppository 650 mg, 650 mg, Rectal, Q4H PRN, Josias Razo MD, 650 mg at  07/05/20 0100  •  acetaminophen (TYLENOL) tablet 650 mg, 650 mg, Oral, Q4H PRN, Josias Razo MD, 650 mg at 07/04/20 1428  •  allopurinol (ZYLOPRIM) tablet 100 mg, 100 mg, Oral, Daily, Josias Razo MD, 100 mg at 07/04/20 0821  •  amLODIPine (NORVASC) tablet 10 mg, 10 mg, Oral, Q24H, Josias Razo MD, 10 mg at 07/04/20 0821  •  atorvastatin (LIPITOR) tablet 80 mg, 80 mg, Oral, Nightly, Kwasi Kang MD  •  cephalexin (KEFLEX) capsule 500 mg, 500 mg, Oral, Q12H, Josias Razo MD, 500 mg at 07/04/20 1752  •  cholecalciferol (VITAMIN D3) tablet 1,000 Units, 1,000 Units, Oral, Daily, Josias Razo MD, 1,000 Units at 07/04/20 0821  •  clopidogrel (PLAVIX) tablet 75 mg, 75 mg, Oral, Daily, Josias Razo MD, 75 mg at 07/04/20 0821  •  dextrose (D50W) 25 g/ 50mL Intravenous Solution 50 mL, 50 mL, Intravenous, Q1H PRN, Kraig Farley MD  •  enoxaparin (LOVENOX) syringe 30 mg, 30 mg, Subcutaneous, Q24H, Kwasi Kang MD  •  furosemide (LASIX) tablet 40 mg, 40 mg, Oral, BID, Kraig Farley MD  •  hydrALAZINE (APRESOLINE) tablet 100 mg, 100 mg, Oral, Q8H, Josias Razo MD, 100 mg at 07/04/20 2106  •  HYDROcodone-acetaminophen (NORCO) 5-325 MG per tablet 1 tablet, 1 tablet, Oral, Q6H PRN, Josias Razo MD, 1 tablet at 07/04/20 1808  •  insulin glargine (LANTUS) injection 30 Units, 30 Units, Subcutaneous, Nightly, Josias Razo MD, 30 Units at 07/04/20 2334  •  insulin lispro (humaLOG) injection 0-7 Units, 0-7 Units, Subcutaneous, TID ISAMAR, Josias Razo MD, 2 Units at 07/05/20 0906  •  insulin lispro (humaLOG) injection 10 Units, 10 Units, Subcutaneous, TID With Meals, Josias Razo MD, 10 Units at 07/05/20 1217  •  insulin regular (humuLIN R,novoLIN R) injection 8 Units, 8 Units, Intravenous, TID Miguelito PENN Abdul, MD  •  melatonin tablet 3 mg, 3 mg, Oral, Nightly PRN, Josias Razo MD, 3 mg at 06/28/20 2131  •  metoprolol tartrate (LOPRESSOR) tablet 37.5 mg, 37.5 mg, Oral, Q12H, Josias Razo MD, 37.5 mg at 07/04/20 2106  •   ondansetron (ZOFRAN) injection 4 mg, 4 mg, Intravenous, Q4H PRN, Melody Johnson MD, 4 mg at 07/04/20 1510  •  phenol (CHLORASEPTIC) 1.4 % liquid 1 spray, 1 spray, Mouth/Throat, Q2H PRN, Melody Johnson MD  •  polyethylene glycol (MIRALAX) packet 17 g, 17 g, Oral, Daily, Melody Johnson MD, 17 g at 07/04/20 0821  •  rosuvastatin (CRESTOR) tablet 5 mg, 5 mg, Oral, Nightly, Melody Johnson MD, 5 mg at 07/04/20 2106  •  sennosides-docusate (PERICOLACE) 8.6-50 MG per tablet 1 tablet, 1 tablet, Oral, BID, Melody Johnson MD, 1 tablet at 07/04/20 2106  •  sodium chloride 0.9 % flush 10 mL, 10 mL, Intravenous, Q12H, Kwasi Kang MD  •  sodium chloride 0.9 % flush 10 mL, 10 mL, Intravenous, PRN, Kawsi Kang MD  •  sodium chloride 0.9 % infusion, 75 mL/hr, Intravenous, Continuous, Kraig Farley MD, Last Rate: 75 mL/hr at 07/05/20 1331, 75 mL/hr at 07/05/20 1331  •  sodium polystyrene (KAYEXALATE) 15 GM/60ML suspension 15 g, 15 g, Oral, Once, Kraig Farley MD     ASSESSMENT  Right facial droop with mental status changes  Acute E. coli UTI  Symptomatic anemia with heme positive stool  GI bleed no endoscopy per family request  Hypertension  Diabetes mellitus  Hyperlipidemia  History of DVT and   Elevated troponin with positive stress Cardiolite   History of CVA  Acute kidney injury  Chronic kidney disease stage III  Gastroesophageal reflux disease    PLAN  CPM   IV antibiotics  Aspirin Plavix   Check CT head and neuro consult  Protonix p.o. twice daily  Lasix per nephrology  GI input appreciated  Cardiology to follow patient  Accu-Chek with sliding scale insulin  Adjust nursing home medications  Stress ulcer DVT prophylaxis  Supportive care  PT/OT  Discussed with nursing staff and family  Hold discharge today and reassess in a.m.    MELODY JOHNSON MD

## 2020-07-05 NOTE — CODE DOCUMENTATION
Spoke w Dr Kaur, on call for stroke. Told of NIH of 20 of new onset of aphagia, confusion, right weakness, on blood thinners, of baseline NIH of 11 as reported by primary RN, of fever. No new orders. Primary RN to follow up w Dr Razo for fever and orders for fever.

## 2020-07-05 NOTE — PROGRESS NOTES
: the pt. With out complains , developing hyperkalemia     Vital Signs  Vitals:    07/05/20 0735   BP: 136/79   Pulse: 85   Resp: 20   Temp: 98.9 °F (37.2 °C)   SpO2: 100%     I/O this shift:  In: -   Out: 100 [Urine:100]  I/O last 3 completed shifts:  In: 720 [P.O.:720]  Out: 650 [Urine:650]      Physical Exam:    General: in NAD  HEENT:  Normo cephalic, Atraumatic, EOMI, PERRLA, NO icterus,  Neck:  Supple, No JVD, No Carotid artery bruit, No lymphadenopathy  Chest: Clear to auscultation bilaterally,   Cardiovascular: Regular rate and rhythm. Positive S1 & S2, No rub, murmur or gallop.  Abdominal: Soft, non tender, no palpable organomegaly, no abdominal bruit, positive bowel sounds  Musculoskeletal: No joint tenderness or swelling, good range of motion, Adequate muscle strength on both sides, no cyanosis, clubbing or edema on lower extremities.  Neuro: Alert oriented x3, CN1 - 12 intact, No focal sensory or motor deficit.      Review of Systems:   CNS: Denied headaches, blurred vision, tingling or numbness in any part of body. No weakness or any impaired speech.  CVS: No chest pain or shortness of breath, No orthopnea or PND or exertional dyspnea,  Pulmonary: Denies shortness of breath, coughs, hematemesis, night sweats or weight loss.  GI: Denies diarrhea, nausea, vomiting. Denies weight loss, hematemesis, melena.  : Denies dysuria, frequency or hesitancy of urination  Vascular: Denies claudication, resting pain, tingling numbness or weakness in any part of the body.  Musculoskeletal: Denies Joint tenderness or pain, denies stiffness in joints, denies fever or weight loss, or skin rashes.    Current Labs  [unfilled]  Lab Results (last 24 hours)     Procedure Component Value Units Date/Time    POC Glucose Once [[id number]]  (Abnormal) Collected:  07/05/20 1151    Specimen:  Blood Updated:  07/05/20 1153     Glucose 157 mg/dL     Basic Metabolic Panel [[id number]]  (Abnormal) Collected:  07/05/20 0532     Specimen:  Blood Updated:  07/05/20 0702     Glucose 168 mg/dL      BUN 40 mg/dL      Creatinine 2.31 mg/dL      Sodium 135 mmol/L      Potassium 5.7 mmol/L      Chloride 101 mmol/L      CO2 27.6 mmol/L      Calcium 8.6 mg/dL      eGFR  African Amer 25 mL/min/1.73      BUN/Creatinine Ratio 17.3     Anion Gap 6.4 mmol/L     Narrative:       GFR Normal >60  Chronic Kidney Disease <60  Kidney Failure <15      Magnesium [[id number]]  (Normal) Collected:  07/05/20 0532    Specimen:  Blood Updated:  07/05/20 0702     Magnesium 2.3 mg/dL     CBC & Differential [[id number]] Collected:  07/05/20 0532    Specimen:  Blood Updated:  07/05/20 0636    Narrative:       The following orders were created for panel order CBC & Differential.  Procedure                               Abnormality         Status                     ---------                               -----------         ------                     CBC Auto Differential[[id number]]        Abnormal            Final result                 Please view results for these tests on the individual orders.    CBC Auto Differential [[id number]]  (Abnormal) Collected:  07/05/20 0532    Specimen:  Blood Updated:  07/05/20 0636     WBC 6.92 10*3/mm3      RBC 2.87 10*6/mm3      Hemoglobin 9.0 g/dL      Hematocrit 28.3 %      MCV 98.6 fL      MCH 31.4 pg      MCHC 31.8 g/dL      RDW 13.1 %      RDW-SD 47.3 fl      MPV 10.2 fL      Platelets 206 10*3/mm3      Neutrophil % 48.4 %      Lymphocyte % 34.5 %      Monocyte % 10.7 %      Eosinophil % 5.3 %      Basophil % 0.7 %      Immature Grans % 0.4 %      Neutrophils, Absolute 3.34 10*3/mm3      Lymphocytes, Absolute 2.39 10*3/mm3      Monocytes, Absolute 0.74 10*3/mm3      Eosinophils, Absolute 0.37 10*3/mm3      Basophils, Absolute 0.05 10*3/mm3      Immature Grans, Absolute 0.03 10*3/mm3      nRBC 0.3 /100 WBC     POC Glucose Once [[id number]]  (Abnormal) Collected:  07/05/20 0620    Specimen:  Blood Updated:  07/05/20 0622     Glucose 163  mg/dL     POC Glucose Once [[id number]]  (Abnormal) Collected:  07/04/20 2344    Specimen:  Blood Updated:  07/04/20 2346     Glucose 170 mg/dL     POC Glucose Once [[id number]]  (Abnormal) Collected:  07/04/20 2024    Specimen:  Blood Updated:  07/04/20 2026     Glucose 163 mg/dL     POC Glucose Once [[id number]]  (Abnormal) Collected:  07/04/20 1636    Specimen:  Blood Updated:  07/04/20 1638     Glucose 149 mg/dL         Current Radiology  Imaging Results (Last 24 Hours)     ** No results found for the last 24 hours. **        Current Meds    Current Facility-Administered Medications:   •  acetaminophen (TYLENOL) suppository 650 mg, 650 mg, Rectal, Q4H PRN, Josias Razo MD, 650 mg at 07/05/20 0100  •  acetaminophen (TYLENOL) tablet 650 mg, 650 mg, Oral, Q4H PRN, Josias Razo MD, 650 mg at 07/04/20 1428  •  allopurinol (ZYLOPRIM) tablet 100 mg, 100 mg, Oral, Daily, Josias Razo MD, 100 mg at 07/04/20 0821  •  amLODIPine (NORVASC) tablet 10 mg, 10 mg, Oral, Q24H, Josias Razo MD, 10 mg at 07/04/20 0821  •  aspirin chewable tablet 81 mg, 81 mg, Oral, Daily, Josias Razo MD, 81 mg at 07/04/20 0821  •  cephalexin (KEFLEX) capsule 500 mg, 500 mg, Oral, Q12H, Josias Razo MD, 500 mg at 07/04/20 1752  •  cholecalciferol (VITAMIN D3) tablet 1,000 Units, 1,000 Units, Oral, Daily, Josias Razo MD, 1,000 Units at 07/04/20 0821  •  clopidogrel (PLAVIX) tablet 75 mg, 75 mg, Oral, Daily, Josias Razo MD, 75 mg at 07/04/20 0821  •  furosemide (LASIX) tablet 40 mg, 40 mg, Oral, Daily, Sourav Doe MD, 40 mg at 07/04/20 0821  •  hydrALAZINE (APRESOLINE) tablet 100 mg, 100 mg, Oral, Q8H, Josias Razo MD, 100 mg at 07/04/20 2106  •  HYDROcodone-acetaminophen (NORCO) 5-325 MG per tablet 1 tablet, 1 tablet, Oral, Q6H PRN, Josias Razo MD, 1 tablet at 07/04/20 1808  •  insulin glargine (LANTUS) injection 30 Units, 30 Units, Subcutaneous, Nightly, Josias Razo MD, 30 Units at 07/04/20 2334  •  insulin lispro (humaLOG)  injection 0-7 Units, 0-7 Units, Subcutaneous, TID AC, Josias Razo MD, 2 Units at 07/05/20 0906  •  insulin lispro (humaLOG) injection 10 Units, 10 Units, Subcutaneous, TID With Meals, Josias Razo MD, 10 Units at 07/05/20 0906  •  melatonin tablet 3 mg, 3 mg, Oral, Nightly PRN, Josias Razo MD, 3 mg at 06/28/20 2131  •  metoprolol tartrate (LOPRESSOR) tablet 37.5 mg, 37.5 mg, Oral, Q12H, Josias Razo MD, 37.5 mg at 07/04/20 2106  •  ondansetron (ZOFRAN) injection 4 mg, 4 mg, Intravenous, Q4H PRN, Josias Razo MD, 4 mg at 07/04/20 1510  •  pantoprazole (PROTONIX) EC tablet 40 mg, 40 mg, Oral, BID AC, Josias Razo MD, 40 mg at 07/04/20 1752  •  phenol (CHLORASEPTIC) 1.4 % liquid 1 spray, 1 spray, Mouth/Throat, Q2H PRN, Josias Razo MD  •  polyethylene glycol (MIRALAX) packet 17 g, 17 g, Oral, Daily, Josias Razo MD, 17 g at 07/04/20 0821  •  rosuvastatin (CRESTOR) tablet 5 mg, 5 mg, Oral, Nightly, Josias Razo MD, 5 mg at 07/04/20 2106  •  sennosides-docusate (PERICOLACE) 8.6-50 MG per tablet 1 tablet, 1 tablet, Oral, BID, Josias Razo MD, 1 tablet at 07/04/20 2106              Patient Active Problem List   Diagnosis   • Acute renal failure superimposed on chronic kidney disease (CMS/HCC)   • Anemia   • Gastrointestinal hemorrhage   ACUTE on ckd , with hyperkalemia , d/c ppi, expend volume to improve distal delivery of sodium for k excretion, and add. Loop diuretics bid, give kayexlate dose today        Kraig Farley MD FACP, FASN.  07/05/20  12:16 PM

## 2020-07-05 NOTE — PLAN OF CARE
Patient is resting abed with her daughter at bedside at this time related to change of condition around midnight. Patient found to be slow to respond, with right facial droop and drooling, aphasic, decreased function in right upper extemity, confusion, chill/tremor, and elevated temp. At 101.2 rectally. Rapid team assisted with stroke assessment and NIH found to be elevated from baseline of 11 up to 20 now. Neurologist notified of changes, and due to restart of anticoagulants and aspirin with patient also allergic to contrast/iodine ordered to monitor patient only at this time. Attending also notified of changes, and ordered tylenol suppository PRN for elevated temperature and am. Labs. Patient with some return of speech clarity at this time, tremor/chills have subdued, she still has some confusion but recognizes this nurse and her daughter. Patient is voiding via external catheter with clear, yellow urine return. She continues with severe 4+ pitting edema to bilateral lower extremities and generalized 3+ edema all over her body. Pulse are palpable, nursing continues with turning, repositioning, and monitoring for improvement in condition.

## 2020-07-05 NOTE — PLAN OF CARE
Lovenox and IVF started. AC/HS, SSI. CT/head obtained. 02-2L, no SOA. Dr Kang consulted/TIA. NPO. RUE/RLE weakness improved from RR. Speech garbled but understandable. Monitoring.   Problem: Skin Injury Risk (Adult)  Goal: Identify Related Risk Factors and Signs and Symptoms  Outcome: Ongoing (interventions implemented as appropriate)  Goal: Skin Health and Integrity  Outcome: Ongoing (interventions implemented as appropriate)     Problem: Fall Risk (Adult)  Goal: Identify Related Risk Factors and Signs and Symptoms  Outcome: Ongoing (interventions implemented as appropriate)  Goal: Absence of Fall  Outcome: Ongoing (interventions implemented as appropriate)     Problem: Patient Care Overview  Goal: Plan of Care Review  Outcome: Ongoing (interventions implemented as appropriate)  Goal: Individualization and Mutuality  Outcome: Ongoing (interventions implemented as appropriate)  Goal: Discharge Needs Assessment  Outcome: Ongoing (interventions implemented as appropriate)  Goal: Interprofessional Rounds/Family Conf  Outcome: Ongoing (interventions implemented as appropriate)     Problem: Fluid Volume Excess (Adult)  Goal: Identify Related Risk Factors and Signs and Symptoms  Outcome: Ongoing (interventions implemented as appropriate)  Goal: Optimal Fluid Balance  Outcome: Ongoing (interventions implemented as appropriate)     Problem: Gastrointestinal Bleeding (Adult)  Goal: Signs and Symptoms of Listed Potential Problems Will be Absent, Minimized or Managed (Gastrointestinal Bleeding)  Outcome: Ongoing (interventions implemented as appropriate)

## 2020-07-06 NOTE — PROGRESS NOTES
Continued Stay Note  [facility]     Patient Name: Kacy Mistry  MRN: [id number]  Today's Date: 7/6/2020    Admit Date: 6/24/2020    Discharge Plan     Row Name 07/06/20 1502       Plan    Plan  Plan return to [facility] for skilled care.  PAZ Chu RN    Patient/Family in Agreement with Plan  yes    Plan Comments  Called and spoke with pt's sister  ( Cindy Teague [phone]).  Per sister plan is to return to [facility].  Plan return to [facility].  PAZ Chu RN         Discharge Codes    No documentation.             Radha Chu, RN

## 2020-07-06 NOTE — PROGRESS NOTES
"[facility]  Cardiology Progress Note    Patient Identification:  Name: Kacy Mistry  Age: 80 y.o.  Sex: female  :  1940  MRN: [id number]                 Follow Up / Chief Complaint: Elevated troponin    Interval History: She had a positive stress test yesterday.  Will medical management.        Subjective: She denies chest pain, shortness of breath, and palpitations.       Objective:    Past Medical History:  Past Medical History:   Diagnosis Date   • Anemia    • Anxiety    • Arthritis    • Cellulitis of left lower extremity    • CHF (congestive heart failure) (CMS/HCC)    • Chronic kidney disease    • Depression    • Diabetes mellitus (CMS/HCC)    • DVT (deep venous thrombosis) (CMS/HCC)    • GERD (gastroesophageal reflux disease)    • Hyperlipidemia    • Hypertension    • Lymphedema    • Obesity    • Osteoporosis    • Renal disorder     chronic kidney disease   • Stroke (CMS/HCC)     with left hemiplegia and hemiparesis   • Venous insufficiency (chronic) (peripheral)      Past Surgical History:  Past Surgical History:   Procedure Laterality Date   • COLONOSCOPY N/A 2018    Procedure: COLONOSCOPY into cecum with cold polypectomy;  Surgeon: David Villavicencio MD;  Location: [facility] ENDOSCOPY;  Service:    • ENDOSCOPY  2018    Procedure: ESOPHAGOGASTRODUODENOSCOPY;  Surgeon: David Villavicencio MD;  Location: [facility] ENDOSCOPY;  Service:         Social History:   Social History     Tobacco Use   • Smoking status: Former Smoker     Types: Cigarettes   • Smokeless tobacco: Never Used   • Tobacco comment: \"Over 20 years\"   Substance Use Topics   • Alcohol use: No      Family History:  Family History   Problem Relation Age of Onset   • Heart disease Mother    • Heart disease Brother           Allergies:  Allergies   Allergen Reactions   • Contrast Dye Unknown (See Comments)     n/a   • Iodine Unknown (See Comments)     unknown     Scheduled Meds:    allopurinol 100 mg Daily " "  amLODIPine 10 mg Q24H   atorvastatin 80 mg Nightly   cephalexin 500 mg Q12H   cholecalciferol 1,000 Units Daily   clopidogrel 75 mg Daily   enoxaparin 30 mg Q24H   furosemide 40 mg BID   hydrALAZINE 100 mg Q8H   insulin glargine 30 Units Nightly   insulin lispro 0-7 Units TID AC   insulin lispro 10 Units TID With Meals   metoprolol tartrate 37.5 mg Q12H   polyethylene glycol 17 g Daily   sennosides-docusate 1 tablet BID   sodium chloride 10 mL Q12H           INTAKE AND OUTPUT:    Intake/Output Summary (Last 24 hours) at 7/6/2020 1718  Last data filed at 7/6/2020 0530  Gross per 24 hour   Intake 1198.75 ml   Output 600 ml   Net 598.75 ml                  ROS  Constitutional: Awake and alert, no fever. No nosebleeds  Abdomen           no abdominal pain   Cardiac              no chest pain  Pulmonary          no shortness of breath      /70   Pulse 78   Temp 98.3 °F (36.8 °C) (Oral)   Resp 18   Ht 170.2 cm (67.01\")   Wt 103 kg (228 lb)   SpO2 96%   BMI 35.70 kg/m²   General appearance: No acute changes   Neck: Trachea midline; NECK, supple, no thyromegaly or lymphadenopathy   Lungs: Normal size and shape, normal breath sounds, equal distribution of air, no rales and rhonchi   CV: S1-S2 regular, no murmurs, no rub, no gallop   Abdomen: Soft, non-tender; no masses , no abnormal abdominal sounds   Extremities: No deformity , normal color , no peripheral edema   Skin: Normal temperature, turgor and texture; no rash, ulcers        Cardiographics  Telemetry:       Sinus rhythm        ECG:     Echocardiogram:   Interpretation Summary     · Left ventricular wall thickness is consistent with mild concentric hypertrophy.  · Calculated EF = 62.0%  · There is no evidence of pericardial effusion          Lab Review       Results from last 7 days   Lab Units 07/06/20  0555   MAGNESIUM mg/dL 2.2     Results from last 7 days   Lab Units 07/06/20  0555   SODIUM mmol/L 138   POTASSIUM mmol/L 5.6*   BUN mg/dL 42*   " "  CREATININE mg/dL 2.47*   CALCIUM mg/dL 8.5*        Results from last 7 days   Lab Units 07/06/20  0555 07/05/20  0532 07/04/20  0542   WBC 10*3/mm3 5.47 6.92 6.39   HEMOGLOBIN g/dL 8.9* 9.0* 8.2*   HEMATOCRIT % 28.5* 28.3* 26.0*   PLATELETS 10*3/mm3 199 206 204           The following medical decision was discussed in detail with Dr. Miramontes    Assessment:  Elevated troponin  Gastrointestinal bleed  Acute renal failure  Anemia  Hypertension    Plan:  Patient with positive stress test remains chest pain-free with vital signs normal    Vital signs are normal    No change cardiac  Toma Miramontes MD            EMR Dragon/Transcription:   \"Dictated utilizing Dragon dictation\".     "

## 2020-07-06 NOTE — PLAN OF CARE
Eyes closed at long interval, awakens easily but quickly returns to sleep, denies pain, pur wick remains in use, VSS, NSR on monitor, NIH score 18,resting quielty is room, will continue to monitor

## 2020-07-06 NOTE — PROGRESS NOTES
"Daily progress note    Chief complaint  Doing little better this morning  No new complaints  Talking and following commands  Denies any headache dizziness chest pain shortness of breath    History of present illness  80-year-old female with history of diabetes mellitus hypertension hyperlipidemia lymphedema DVT on anticoagulation who is a nursing home resident brought to the emergency room with decreased hemoglobin and generalized weakness.  Patient has no chest pain shortness of breath fever cough abdominal pain nausea vomiting diarrhea.  Patient is taking anticoagulation due to history of DVT and previous stroke.  Patient work-up in ER revealed symptomatic anemia with heme positive stool and acute kidney injury admit for management.     REVIEW OF SYSTEMS  Unremarkable     PHYSICAL EXAM  Blood pressure 123/73, pulse 88, temperature 98.5 °F (36.9 °C), temperature source Oral, resp. rate 18, height 170.2 cm (67.01\"), weight 103 kg (228 lb), SpO2 97 %.    GENERAL: No acute distress  HENT: NCAT, PERRL, Nares patent  EYES: no scleral icterus  NECK: trachea midline, no ROM limitations  CV: regular rhythm, regular rate  RESPIRATORY: normal effort, crackles at bilateral lung bases  ABDOMEN: soft  : deferred  MUSCULOSKELETAL: no deformity  NEURO: alert, left-sided deficits, follows commands  SKIN: warm, dry, pitting and nonpitting edema bilateral lower extremities, scarring on bilateral anterior shins, chronic atrophic skin changes, no signs of cellulitis     LAB RESULTS  Lab Results (last 24 hours)     Procedure Component Value Units Date/Time    POC Glucose Once [[id number]]  (Abnormal) Collected:  07/06/20 1123    Specimen:  Blood Updated:  07/06/20 1133     Glucose 138 mg/dL     POC Glucose Once [[id number]]  (Normal) Collected:  07/06/20 0653    Specimen:  Blood Updated:  07/06/20 0654     Glucose 126 mg/dL     Basic Metabolic Panel [[id number]]  (Abnormal) Collected:  07/06/20 0555    Specimen:  Blood Updated:  " 07/06/20 0640     Glucose 124 mg/dL      BUN 42 mg/dL      Creatinine 2.47 mg/dL      Sodium 138 mmol/L      Potassium 5.6 mmol/L      Comment: Specimen hemolyzed.  Results may be affected.        Chloride 104 mmol/L      CO2 27.5 mmol/L      Calcium 8.5 mg/dL      eGFR  African Amer 23 mL/min/1.73      BUN/Creatinine Ratio 17.0     Anion Gap 6.5 mmol/L     Narrative:       GFR Normal >60  Chronic Kidney Disease <60  Kidney Failure <15      Magnesium [[id number]]  (Normal) Collected:  07/06/20 0555    Specimen:  Blood Updated:  07/06/20 0640     Magnesium 2.2 mg/dL     Lipid Panel [[id number]]  (Abnormal) Collected:  07/06/20 0555    Specimen:  Blood Updated:  07/06/20 0640     Total Cholesterol 175 mg/dL      Triglycerides 194 mg/dL      HDL Cholesterol 30 mg/dL      LDL Cholesterol  106 mg/dL      VLDL Cholesterol 38.8 mg/dL      LDL/HDL Ratio 3.54    Narrative:       Cholesterol Reference Ranges  (U.S. Department of Health and Human Services ATP III Classifications)    Desirable          <200 mg/dL  Borderline High    200-239 mg/dL  High Risk          >240 mg/dL      Triglyceride Reference Ranges  (U.S. Department of Health and Human Services ATP III Classifications)    Normal           <150 mg/dL  Borderline High  150-199 mg/dL  High             200-499 mg/dL  Very High        >500 mg/dL    HDL Reference Ranges  (U.S. Department of Health and Human Services ATP III Classifcations)    Low     <40 mg/dl (major risk factor for CHD)  High    >60 mg/dl ('negative' risk factor for CHD)        LDL Reference Ranges  (U.S. Department of Health and Human Services ATP III Classifcations)    Optimal          <100 mg/dL  Near Optimal     100-129 mg/dL  Borderline High  130-159 mg/dL  High             160-189 mg/dL  Very High        >189 mg/dL    CBC & Differential [[id number]] Collected:  07/06/20 0555    Specimen:  Blood Updated:  07/06/20 0621    Narrative:       The following orders were created for panel order CBC &  Differential.  Procedure                               Abnormality         Status                     ---------                               -----------         ------                     CBC Auto Differential[[id number]]        Abnormal            Final result                 Please view results for these tests on the individual orders.    CBC Auto Differential [[id number]]  (Abnormal) Collected:  07/06/20 0555    Specimen:  Blood Updated:  07/06/20 0621     WBC 5.47 10*3/mm3      RBC 2.88 10*6/mm3      Hemoglobin 8.9 g/dL      Hematocrit 28.5 %      MCV 99.0 fL      MCH 30.9 pg      MCHC 31.2 g/dL      RDW 13.2 %      RDW-SD 48.5 fl      MPV 9.9 fL      Platelets 199 10*3/mm3      Neutrophil % 55.3 %      Lymphocyte % 28.2 %      Monocyte % 11.0 %      Eosinophil % 3.7 %      Basophil % 0.9 %      Immature Grans % 0.9 %      Neutrophils, Absolute 3.03 10*3/mm3      Lymphocytes, Absolute 1.54 10*3/mm3      Monocytes, Absolute 0.60 10*3/mm3      Eosinophils, Absolute 0.20 10*3/mm3      Basophils, Absolute 0.05 10*3/mm3      Immature Grans, Absolute 0.05 10*3/mm3      nRBC 0.4 /100 WBC     Hemoglobin A1c [[id number]]  (Abnormal) Collected:  07/06/20 0555    Specimen:  Blood Updated:  07/06/20 0619     Hemoglobin A1C 7.20 %     Narrative:       Hemoglobin A1C Ranges:    Increased Risk for Diabetes  5.7% to 6.4%  Diabetes                     >= 6.5%  Diabetic Goal                < 7.0%    POC Glucose Once [[id number]]  (Normal) Collected:  07/05/20 2027    Specimen:  Blood Updated:  07/05/20 2029     Glucose 120 mg/dL     Basic Metabolic Panel [[id number]]  (Abnormal) Collected:  07/05/20 1849    Specimen:  Blood Updated:  07/05/20 1924     Glucose 145 mg/dL      BUN 41 mg/dL      Creatinine 2.28 mg/dL      Sodium 138 mmol/L      Potassium 5.4 mmol/L      Chloride 103 mmol/L      CO2 24.8 mmol/L      Calcium 8.7 mg/dL      eGFR  African Amer 25 mL/min/1.73      BUN/Creatinine Ratio 18.0     Anion Gap 10.2 mmol/L      Narrative:       GFR Normal >60  Chronic Kidney Disease <60  Kidney Failure <15      POC Glucose Once [[id number]]  (Abnormal) Collected:  07/05/20 1702    Specimen:  Blood Updated:  07/05/20 1702     Glucose 142 mg/dL         Imaging Results (Last 24 Hours)     Procedure Component Value Units Date/Time    CT Head Without Contrast [[id number]] Collected:  07/05/20 1523     Updated:  07/06/20 0821    Narrative:       CT OF THE BRAIN WITHOUT CONTRAST 07/05/2020     HISTORY: Syncope, fainting.     FINDINGS: Axial images were obtained through the brain without  intravenous contrast. There is mild diffuse atrophy. There is mild  decreased attenuation of the periventricular white matter bilaterally  consistent with mild small vessel white matter ischemic disease.  Bilateral basal ganglia calcifications are seen.     There is no evidence of acute infarction, hemorrhage, midline shift or  mass effect. Small old lacunar infarction is seen in the head of the  caudate nucleus on the right.. No bony abnormalities are seen.       Impression:       No acute intracranial process identified.     Radiation dose reduction techniques were utilized, including automated  exposure control and exposure modulation based on body size.     This report was finalized on 7/6/2020 8:18 AM by Dr. Maico Aquino M.D.           ECG 12 Lead          HEART RATE= 125  bpm  RR Interval= 480  ms  IN Interval= 112  ms  P Horizontal Axis= 209  deg  P Front Axis= -60  deg  QRSD Interval= 82  ms  QT Interval= 358  ms  QRS Axis= -2  deg  T Wave Axis= 107  deg  - ABNORMAL ECG -  Sinus or ectopic atrial tachycardia  Prolonged QT interval  Rate faster, ectopic rhythm appears to be new versus previous ECG  Nonspecific ST-T wave changes new versus previous ECG           Lower extremity Doppler study negative for DVT  Stress Cardiolite showed ischemia    Current Facility-Administered Medications:   •  acetaminophen (TYLENOL) suppository 650 mg, 650 mg, Rectal,  Q4H PRN, Josias Razo MD, 650 mg at 07/05/20 0100  •  acetaminophen (TYLENOL) tablet 650 mg, 650 mg, Oral, Q4H PRN, Josias Razo MD, 650 mg at 07/04/20 1428  •  allopurinol (ZYLOPRIM) tablet 100 mg, 100 mg, Oral, Daily, Josias Razo MD, 100 mg at 07/06/20 1204  •  amLODIPine (NORVASC) tablet 10 mg, 10 mg, Oral, Q24H, Josias Razo MD, 10 mg at 07/06/20 1203  •  atorvastatin (LIPITOR) tablet 80 mg, 80 mg, Oral, Nightly, Kwasi Kang MD  •  cephalexin (KEFLEX) capsule 500 mg, 500 mg, Oral, Q12H, Josias Razo MD, 500 mg at 07/06/20 0556  •  cholecalciferol (VITAMIN D3) tablet 1,000 Units, 1,000 Units, Oral, Daily, Josias Razo MD, 1,000 Units at 07/06/20 1201  •  clopidogrel (PLAVIX) tablet 75 mg, 75 mg, Oral, Daily, Josias Razo MD, 75 mg at 07/06/20 1203  •  dextrose (D50W) 25 g/ 50mL Intravenous Solution 50 mL, 50 mL, Intravenous, Q1H PRN, Kraig Farley MD  •  enoxaparin (LOVENOX) syringe 30 mg, 30 mg, Subcutaneous, Q24H, Kwasi Kang MD, 30 mg at 07/06/20 1413  •  furosemide (LASIX) tablet 40 mg, 40 mg, Oral, BID, Kraig Farley MD, 40 mg at 07/06/20 1203  •  hydrALAZINE (APRESOLINE) tablet 100 mg, 100 mg, Oral, Q8H, Josias Razo MD, 100 mg at 07/06/20 1414  •  HYDROcodone-acetaminophen (NORCO) 5-325 MG per tablet 1 tablet, 1 tablet, Oral, Q6H PRN, Josias Razo MD, 1 tablet at 07/06/20 1414  •  insulin glargine (LANTUS) injection 30 Units, 30 Units, Subcutaneous, Nightly, Josias Razo MD, Stopped at 07/05/20 2113  •  insulin lispro (humaLOG) injection 0-7 Units, 0-7 Units, Subcutaneous, TID Dung PENN Aftab, MD, 2 Units at 07/05/20 0906  •  insulin lispro (humaLOG) injection 10 Units, 10 Units, Subcutaneous, TID With Meals, Josias Razo MD, 10 Units at 07/05/20 1714  •  insulin regular (humuLIN R,novoLIN R) injection 8 Units, 8 Units, Intravenous, TID Miguelito PENN Abdul, MD, 8 Units at 07/05/20 1345  •  melatonin tablet 3 mg, 3 mg, Oral, Nightly PRN, Josias Razo MD, 3 mg at 06/28/20 2131  •   metoprolol tartrate (LOPRESSOR) tablet 37.5 mg, 37.5 mg, Oral, Q12H, Melody Johnson MD, 37.5 mg at 07/06/20 1200  •  ondansetron (ZOFRAN) injection 4 mg, 4 mg, Intravenous, Q4H PRN, Melody Johnson MD, 4 mg at 07/04/20 1510  •  phenol (CHLORASEPTIC) 1.4 % liquid 1 spray, 1 spray, Mouth/Throat, Q2H PRN, Melody Johnson MD  •  polyethylene glycol (MIRALAX) packet 17 g, 17 g, Oral, Daily, Melody Johnson MD, 17 g at 07/04/20 0821  •  sennosides-docusate (PERICOLACE) 8.6-50 MG per tablet 1 tablet, 1 tablet, Oral, BID, Melody Johnson MD, 1 tablet at 07/06/20 1200  •  sodium chloride 0.9 % flush 10 mL, 10 mL, Intravenous, Q12H, Kwasi Kang MD, 10 mL at 07/05/20 2134  •  sodium chloride 0.9 % flush 10 mL, 10 mL, Intravenous, PRN, Kwasi Kang MD     ASSESSMENT  Right facial droop with mental status changes  Acute E. coli UTI  Symptomatic anemia with heme positive stool  GI bleed no endoscopy per family request  Hypertension  Diabetes mellitus  Hyperlipidemia  History of DVT and   Elevated troponin with positive stress Cardiolite medical management  History of CVA  Acute kidney injury  Chronic kidney disease stage III  Gastroesophageal reflux disease    PLAN  CPM   IV antibiotics  Aspirin Plavix   Check carotid Doppler  Protonix p.o. twice daily  Lasix per nephrology  GI input appreciated  Cardiology to follow patient  Accu-Chek with sliding scale insulin  Adjust nursing home medications  Stress ulcer DVT prophylaxis  Supportive care  PT/OT  Discussed with nursing staff and family  Discharge planning    MELODY JOHNSON MD

## 2020-07-06 NOTE — PROGRESS NOTES
"Adult Nutrition  Assessment/PES    Patient Name:  Kacy Mistry  YOB: 1940  MRN: [id number]  Admit Date:  6/24/2020    Assessment Date:  7/6/2020    Comments:  Nutrition assessment triggered by LOS x 12 days.  Edema noted.  Admitted with GI bleed.  Acute on chronic kidney failure.  On oxygen therapy.  New TIA 7/5 per Dr. Kang's notes.  Needs SLP evaluation.  Currently NPO.  D/c held yesterday d/t change in condition.    RD to follow as diet advanced.    Reason for Assessment     Row Name 07/06/20 1059          Reason for Assessment    Reason For Assessment  identified at risk by screening criteria     Diagnosis  gastrointestinal disease;renal disease;hematological/related complications GI bleed, acute on chronic kidney failure, anemia     Identified At Risk by Screening Criteria  other (see comments) LOS x 12 days         Nutrition/Diet History     Row Name 07/06/20 1059          Nutrition/Diet History    Typical Food/Fluid Intake  NPO, new TIA overnight 7/5         Anthropometrics     Row Name 07/06/20 1100          Anthropometrics    Height  170.2 cm (67.01\")        Admit Weight    Admit Weight  -- 228# 7/6        Ideal Body Weight (IBW)    Ideal Body Weight (IBW) (kg)  61.88        Usual Body Weight (UBW)    Weight Loss Time Frame  228# now, 255# 2 days ago - unsure of accuracy        Body Mass Index (BMI)    BMI Assessment  BMI 35-39.9: obesity grade II 35.49         Labs/Tests/Procedures/Meds     Row Name 07/06/20 1100          Labs/Procedures/Meds    Lab Results Reviewed  reviewed, pertinent     Lab Results Comments  K, Creat, BUN, Ca, GFR, Hgb, Hct        Diagnostic Tests/Procedures    Diagnostic Test/Procedure Reviewed  reviewed, pertinent     Diagnostic Test/Procedures Comments  needs SLP evaluation        Medications    Pertinent Medications Reviewed  reviewed, pertinent     Pertinent Medications Comments  vit D3, lasix, lantus, humalog, humulin R, miralax, IVFs         Physical Findings  " "   Row Name 07/06/20 1102          Physical Findings    Overall Physical Appearance  obese;on oxygen therapy;edematous     Skin  edema B=13, bruised         Estimated/Assessed Needs     Row Name 07/06/20 1102 07/06/20 1100       Calculation Measurements    Weight Used For Calculations  103 kg (227 lb 1.2 oz)  --    Height  --  170.2 cm (67.01\")       Estimated/Assessed Needs       KCAL/KG    KCAL/KG  15 Kcal/Kg (kcal);20 Kcal/Kg (kcal)  --    15 Kcal/Kg (kcal)  1545  --    20 Kcal/Kg (kcal)  2060  --       Protein Requirements    Weight Used For Protein Calculations  103 kg (227 lb 1.2 oz)  --    Est Protein Requirement Amount (gms/kg)  0.8 gm protein  --    Estimated Protein Requirements (gms/day)  82.4  --       Fluid Requirements    Estimated Fluid Requirements (mL/day)  2060  --        Nutrition Prescription Ordered     Row Name 07/06/20 1102          Nutrition Prescription PO    Current PO Diet  NPO         Problem/Interventions:  Problem 1     Row Name 07/06/20 1102          Nutrition Diagnoses Problem 1    Problem 1  Inadequate Intake/Infusion     Inadequate Intake Type  Oral     Macronutrient  Kcal;Protein     Etiology (related to)  MNT for Treatment/Condition     Signs/Symptoms (evidenced by)  NPO;SLP/Swallow eval     Swallow eval status  Pending     Type of SLP Evaluation  Bedside         Intervention Goal     Row Name 07/06/20 1103          Intervention Goal    General  Maintain nutrition;Reduce/improve symptoms;Disease management/therapy     PO  Initiate feeding     Weight  Appropriate weight loss         Nutrition Intervention     Row Name 07/06/20 1103          Nutrition Intervention    RD/Tech Action  Follow Tx progress;Care plan reviewd;Await begin PO         Education/Evaluation     Row Name 07/06/20 1103          Education    Education  Will Instruct as appropriate        Monitor/Evaluation    Monitor  I&O;Per protocol;Pertinent labs;Weight;Skin status;Symptoms     Education Follow-up  Reinforce " PRN           Electronically signed by:  Aida Roman RD  07/06/20 11:03

## 2020-07-06 NOTE — PLAN OF CARE
Problem: Patient Care Overview  Goal: Plan of Care Review  Flowsheets (Taken 7/6/2020 1151)  Plan of Care Reviewed With: patient  Outcome Summary: Clinical swallow eval completed. Intermittent/occasional wet voicing and multiple swallows noted with thin. No s/s with any other PO. Feel lethargy combined with positioning increases risk of aspiration with thin liquids. REC regular diet/NTL. Meds whole with puree. Position as upright as pt able to tolerate with all PO. Ensure adequate alertness with meals, with breaks taken PRN. Small bites and straw sips. Will re-eval at bedside Tues/Wed if alertness improved.

## 2020-07-06 NOTE — PROGRESS NOTES
"DOS: 2020  NAME: Kacy Mistry   : 1940  PCP: Yousuf Corrigan MD  Chief Complaint   Patient presents with   • Abnormal Lab     Patient seen in follow-up today; new to me          Subjective: No events overnight; lower facial/chin tremor noted along with left upper extremity tremor with mild to moderate left upper extremity cogwheeling along with persistent right sided weakness present on my exam.  Patient denies any new complaints and/or concerns.      No family at bedside.    Objective:  Vital signs: /73 (BP Location: Right leg, Patient Position: Lying)   Pulse 88   Temp 98.3 °F (36.8 °C) (Oral)   Resp 18   Ht 170.2 cm (67.01\")   Wt 103 kg (228 lb)   SpO2 96%   BMI 35.70 kg/m²       HEENT: Normocephalic, atraumatic.  Chin tremor noted  COR: RRR  Resp: Even and unlabored  Extremities: Equal pulses, nondistal embolization    Neurological:   MS: AO. Language normal. No neglect. Higher integrative function normal  CN: II-XII normal  Motor: 5/5, normal tone on left with evidence of left upper extremity cogwheeling along with left upper extremity tremor.  Right upper extremity 4-/5 and right lower extremity 4-/5  Reflexes: Toes down going   Sensory: Intact  Coordination: Normal (finger-to-nose)    Laboratory results:  Lab Results   Component Value Date    GLUCOSE 124 (H) 2020    CALCIUM 8.5 (L) 2020     2020    K 5.6 (H) 2020    CO2 27.5 2020     2020    BUN 42 (H) 2020    CREATININE 2.47 (H) 2020    EGFRIFAFRI 23 (L) 2020    BCR 17.0 2020    ANIONGAP 6.5 2020     Lab Results   Component Value Date    WBC 5.47 2020    HGB 8.9 (L) 2020    HCT 28.5 (L) 2020    MCV 99.0 (H) 2020     2020     Lab Results   Component Value Date    CHOL 175 2020    CHOL 149 2020    CHOL 235 (H) 2018     Lab Results   Component Value Date    HDL 30 (L) 2020    HDL 31 (L) " 06/26/2020    HDL 27 (L) 02/11/2018     Lab Results   Component Value Date     (H) 07/06/2020    LDL 86 06/26/2020     (H) 02/11/2018     Lab Results   Component Value Date    TRIG 194 (H) 07/06/2020    TRIG 158 (H) 06/26/2020    TRIG 272 (H) 02/11/2018     Lab Results   Component Value Date    BWCIEIZD91 983 (H) 06/26/2020     Lab Results   Component Value Date    HGBA1C 7.20 (H) 07/06/2020     Lab Results   Component Value Date    CHOL 175 07/06/2020    CHOL 149 06/26/2020    CHOL 235 (H) 02/11/2018     Lab Results   Component Value Date    TRIG 194 (H) 07/06/2020    TRIG 158 (H) 06/26/2020    TRIG 272 (H) 02/11/2018     Lab Results   Component Value Date    HDL 30 (L) 07/06/2020    HDL 31 (L) 06/26/2020    HDL 27 (L) 02/11/2018     Lab Results   Component Value Date     (H) 07/06/2020    LDL 86 06/26/2020     (H) 02/11/2018     Lab Results   Component Value Date    TSH 1.490 06/26/2020     No results found for: BLOODCX  Urine Culture   Date Value Ref Range Status   07/01/2020 >100,000 CFU/mL Escherichia coli (A)  Final       Review and interpretation of imaging:  Interpretation Summary     · Normal bilateral lower extremity venous duplex scan.        Interpretation Summary     · Proximal right internal carotid artery plaque without significant stenosis.  · Proximal left internal carotid artery plaque without significant stenosis.     Interpretation Summary     · Left ventricular wall thickness is consistent with mild concentric hypertrophy.  · Calculated EF = 62.0%  · There is no evidence of pericardial effusion     CT OF THE BRAIN WITHOUT CONTRAST 07/05/2020     HISTORY: Syncope, fainting.     FINDINGS: Axial images were obtained through the brain without  intravenous contrast. There is mild diffuse atrophy. There is mild  decreased attenuation of the periventricular white matter bilaterally  consistent with mild small vessel white matter ischemic disease.  Bilateral basal ganglia  calcifications are seen.     There is no evidence of acute infarction, hemorrhage, midline shift or  mass effect. Small old lacunar infarction is seen in the head of the  caudate nucleus on the right.. No bony abnormalities are seen.     IMPRESSION:  No acute intracranial process identified.     Radiation dose reduction techniques were utilized, including automated  exposure control and exposure modulation based on body size.     This report was finalized on 7/6/2020 8:18 AM by Dr. aMico Aquino M.D.  STAT PORTABLE RADIOGRAPHIC VIEW OF THE CHEST     CLINICAL HISTORY: COVID evaluation. Cough and fever.     Portable radiographic views of the chest demonstrate subtle increased  lung markings within the right lung base that are nonspecific in nature.  These findings could represent atelectatic changes or infiltrate  although I cannot exclude the possibility of COVID pneumonia. These  could be further characterized with a chest CT, as clinically indicated.  The cardiomediastinal silhouette is enlarged with a tortuous thoracic  aorta. However, these findings are stable when compared to prior study  of 03/23/2018. These findings were discussed with Dr. Davis Fernandez on  06/24/2020 at approximately 6:40 PM.     This report was finalized on 6/24/2020 6:56 PM by Dr. Cliff Weiner M.D.       Impression: This is a 79yo female with history of CKD, diabetes mellitus, prior DVT, hypertension, hyperlipidemia, obesity (BMI 35.70), CHF and depression who presented to [facility] on 6/24 due to generalized weakness and GI bleed our service was consulted due to sudden onset of right-sided facial/arm weakness along with associated difficulty speaking for unknown duration.  Work-up to date below.      Neurologically, patient with left sided upper extremity arm and chin tremor along with cogwheeling and right-sided arm and leg weakness.  Will recommend MRI of the brain be completed due to persistent right-sided  weakness still present. PT/OT/ST. CCP to assist with discharge planning. Call RRT for any acute neurological changes and/or concerns. We will continue to follow and advise.         Neurology work-up to date:  CT head 7/5: Negative for acute findings.  TTE 6/26: EF 62%.  LV/LA normal.  Left atrial volume index 24.2   Stress test 6/30 LV 60%.;  Intermediate risk study.    Lower extremity duplex 6/28: Normal no DVT  Carotid duplex 7/6: Bilateral plaque noted without significant stenosis  Labs: COVID test negative, , A1c 7.20, B12 983, TSH 1.49        Plan:  MRI of the brain without contrast  Plavix 75 mg daily (on prior to arrival)  Lipitor 80 mg daily (on Crestor prior to arrival) ()   Neurochecks  BP control  Stroke Education  ELVIRA/SCDs  PT/OT/ST    Case reviewed with  and he agrees with plan above.     DANISH Medina

## 2020-07-06 NOTE — PROGRESS NOTES
"   LOS: 12 days     Chief Complaint/ Reason for encounter: Acute on chronic renal failure  Chief Complaint   Patient presents with   • Abnormal Lab         Subjective    No complaints, resting, largely bedridden  Feels about the same today with no new complaints  Edema about the same   Denies any shortness of breath or chest pain  Marginal appetite but no nausea or vomiting  No fevers or chills, weight stable today      Medical history reviewed:  Abnormal Lab         Subjective    History taken from: Patient and chart    Vital Signs  Temp:  [98.3 °F (36.8 °C)-99.3 °F (37.4 °C)] 98.3 °F (36.8 °C)  Heart Rate:  [79-94] 88  Resp:  [18] 18  BP: (123-144)/(73-86) 123/73       Wt Readings from Last 1 Encounters:   07/06/20 0215 103 kg (228 lb)   07/04/20 0646 116 kg (255 lb 14.4 oz)   07/03/20 0452 116 kg (255 lb 6.4 oz)   07/02/20 0500 118 kg (260 lb 11.2 oz)   07/01/20 0212 117 kg (258 lb 2.5 oz)   06/30/20 0501 121 kg (267 lb 6.4 oz)   06/29/20 0110 116 kg (256 lb 12.8 oz)   06/28/20 0410 116 kg (256 lb 3.2 oz)   06/27/20 0611 119 kg (261 lb 14.5 oz)   06/26/20 1045 119 kg (263 lb)   06/26/20 0559 120 kg (263 lb 12.8 oz)   06/25/20 0112 114 kg (251 lb 11.2 oz)   06/24/20 1803 117 kg (257 lb 15 oz)       Objective:  Vital signs: (most recent): Blood pressure 123/73, pulse 88, temperature 98.3 °F (36.8 °C), temperature source Oral, resp. rate 18, height 170.2 cm (67.01\"), weight 103 kg (228 lb), SpO2 96 %.              Objective:  General Appearance:  Comfortable, obese, chronically ill-appearing, in no acute distress and not in pain.  Awake, alert, oriented  HEENT: Mucous membranes moist, no injury, oropharynx clear  Lungs:  Normal effort and normal respiratory rate.  Breath sounds clear to auscultation.  No  respiratory distress.  No rales, decreased breath sounds or rhonchi.    Heart: Normal rate.  Regular rhythm.  S1 normal.  No murmur.   Abdomen: Abdomen is soft.  Bowel sounds are normal, no abdominal tenderness.  " There is no rebound or guarding  Extremities: Normal range of motion.  Decreased, 1+ doughy edema of bilateral lower extremities, distal pulses intact  Neurological: No focal motor or sensory deficits, pupils reactive  Skin:  Warm and dry.  No rash or cyanosis.       Results Review:    Intake/Output:     Intake/Output Summary (Last 24 hours) at 7/6/2020 1422  Last data filed at 7/6/2020 0530  Gross per 24 hour   Intake 1198.75 ml   Output 600 ml   Net 598.75 ml         DATA:  Radiology and Labs:  The following labs independently reviewed by me. Additional labs ordered for tomorrow a.m.  Interval notes, chart personally reviewed by me.   Discussed with pt herself at bedside and with a family member at bedside        Labs:   Recent Results (from the past 24 hour(s))   POC Glucose Once    Collection Time: 07/05/20  5:02 PM   Result Value Ref Range    Glucose 142 (H) 70 - 130 mg/dL   Basic Metabolic Panel    Collection Time: 07/05/20  6:49 PM   Result Value Ref Range    Glucose 145 (H) 65 - 99 mg/dL    BUN 41 (H) 8 - 23 mg/dL    Creatinine 2.28 (H) 0.57 - 1.00 mg/dL    Sodium 138 136 - 145 mmol/L    Potassium 5.4 (H) 3.5 - 5.2 mmol/L    Chloride 103 98 - 107 mmol/L    CO2 24.8 22.0 - 29.0 mmol/L    Calcium 8.7 8.6 - 10.5 mg/dL    eGFR  African Amer 25 (L) >60 mL/min/1.73    BUN/Creatinine Ratio 18.0 7.0 - 25.0    Anion Gap 10.2 5.0 - 15.0 mmol/L   POC Glucose Once    Collection Time: 07/05/20  8:27 PM   Result Value Ref Range    Glucose 120 70 - 130 mg/dL   Basic Metabolic Panel    Collection Time: 07/06/20  5:55 AM   Result Value Ref Range    Glucose 124 (H) 65 - 99 mg/dL    BUN 42 (H) 8 - 23 mg/dL    Creatinine 2.47 (H) 0.57 - 1.00 mg/dL    Sodium 138 136 - 145 mmol/L    Potassium 5.6 (H) 3.5 - 5.2 mmol/L    Chloride 104 98 - 107 mmol/L    CO2 27.5 22.0 - 29.0 mmol/L    Calcium 8.5 (L) 8.6 - 10.5 mg/dL    eGFR  African Amer 23 (L) >60 mL/min/1.73    BUN/Creatinine Ratio 17.0 7.0 - 25.0    Anion Gap 6.5 5.0 - 15.0  mmol/L   Magnesium    Collection Time: 07/06/20  5:55 AM   Result Value Ref Range    Magnesium 2.2 1.6 - 2.4 mg/dL   Hemoglobin A1c    Collection Time: 07/06/20  5:55 AM   Result Value Ref Range    Hemoglobin A1C 7.20 (H) 4.80 - 5.60 %   Lipid Panel    Collection Time: 07/06/20  5:55 AM   Result Value Ref Range    Total Cholesterol 175 0 - 200 mg/dL    Triglycerides 194 (H) 0 - 150 mg/dL    HDL Cholesterol 30 (L) 40 - 60 mg/dL    LDL Cholesterol  106 (H) 0 - 100 mg/dL    VLDL Cholesterol 38.8 mg/dL    LDL/HDL Ratio 3.54    CBC Auto Differential    Collection Time: 07/06/20  5:55 AM   Result Value Ref Range    WBC 5.47 3.40 - 10.80 10*3/mm3    RBC 2.88 (L) 3.77 - 5.28 10*6/mm3    Hemoglobin 8.9 (L) 12.0 - 15.9 g/dL    Hematocrit 28.5 (L) 34.0 - 46.6 %    MCV 99.0 (H) 79.0 - 97.0 fL    MCH 30.9 26.6 - 33.0 pg    MCHC 31.2 (L) 31.5 - 35.7 g/dL    RDW 13.2 12.3 - 15.4 %    RDW-SD 48.5 37.0 - 54.0 fl    MPV 9.9 6.0 - 12.0 fL    Platelets 199 140 - 450 10*3/mm3    Neutrophil % 55.3 42.7 - 76.0 %    Lymphocyte % 28.2 19.6 - 45.3 %    Monocyte % 11.0 5.0 - 12.0 %    Eosinophil % 3.7 0.3 - 6.2 %    Basophil % 0.9 0.0 - 1.5 %    Immature Grans % 0.9 (H) 0.0 - 0.5 %    Neutrophils, Absolute 3.03 1.70 - 7.00 10*3/mm3    Lymphocytes, Absolute 1.54 0.70 - 3.10 10*3/mm3    Monocytes, Absolute 0.60 0.10 - 0.90 10*3/mm3    Eosinophils, Absolute 0.20 0.00 - 0.40 10*3/mm3    Basophils, Absolute 0.05 0.00 - 0.20 10*3/mm3    Immature Grans, Absolute 0.05 0.00 - 0.05 10*3/mm3    nRBC 0.4 (H) 0.0 - 0.2 /100 WBC   POC Glucose Once    Collection Time: 07/06/20  6:53 AM   Result Value Ref Range    Glucose 126 70 - 130 mg/dL   Duplex Carotid Ultrasound CAR    Collection Time: 07/06/20  9:49 AM   Result Value Ref Range    Prox CCA PSV -70.9 cm/sec    Prox CCA PSV -73.9 cm/sec    Prox CCA EDV -19.3 cm/sec    Prox CCA EDV -28.7 cm/sec    Dist CCA PSV 55.0 cm/sec    Dist CCA PSV -75.6 cm/sec    Dist CCA EDV 15.7 cm/sec    Dist CCA EDV -17.0  cm/sec    Prox ECA  cm/sec    Prox ECA PSV 80.9 cm/sec    Prox ECA EDV 17 cm/sec    Prox ECA EDV 24.6 cm/sec    Prox ICA PSV -42.2 cm/sec    Prox ICA PSV -47.5 cm/sec    Prox ICA EDV -21.1 cm/sec    Prox ICA EDV -15.2 cm/sec    Mid ICA PSV -72.1 cm/sec    Mid ICA PSV -57.5 cm/sec    Mid ICA EDV -27.6 cm/sec    Mid ICA EDV -31.1 cm/sec    Dist ICA PSV -68.0 cm/sec    Dist ICA PSV -67.9 cm/sec    Dist ICA EDV -29.9 cm/sec    Dist ICA EDV -29.1 cm/sec    Vertebral A PSV 49.2 cm/sec    Vertebral A PSV -46.0 cm/sec    Vertebral A EDV 15.8 cm/sec    Prox SCLA PSV 65.1 cm/sec    Prox SCLA .0 cm/sec    Prox SCLA EDV 14.9 cm/sec    Vertebral A EDV 0 cm/sec    ICA/CCA ratio 0.76     ICA/CCA ratio 1.3     Prox SCLA EDV 0 cm/sec    Left arm /86 mmHg    Right arm BP not obtained IV mmHg   POC Glucose Once    Collection Time: 07/06/20 11:23 AM   Result Value Ref Range    Glucose 138 (H) 70 - 130 mg/dL       Radiology:  Imaging Results (Last 24 Hours)     Procedure Component Value Units Date/Time    CT Head Without Contrast [[id number]] Collected:  07/05/20 1523     Updated:  07/06/20 0821    Narrative:       CT OF THE BRAIN WITHOUT CONTRAST 07/05/2020     HISTORY: Syncope, fainting.     FINDINGS: Axial images were obtained through the brain without  intravenous contrast. There is mild diffuse atrophy. There is mild  decreased attenuation of the periventricular white matter bilaterally  consistent with mild small vessel white matter ischemic disease.  Bilateral basal ganglia calcifications are seen.     There is no evidence of acute infarction, hemorrhage, midline shift or  mass effect. Small old lacunar infarction is seen in the head of the  caudate nucleus on the right.. No bony abnormalities are seen.       Impression:       No acute intracranial process identified.     Radiation dose reduction techniques were utilized, including automated  exposure control and exposure modulation based on body size.     This  report was finalized on 7/6/2020 8:18 AM by Dr. Maico Aquino M.D.                Medications have been reviewed:  Current Facility-Administered Medications   Medication Dose Route Frequency Provider Last Rate Last Dose   • acetaminophen (TYLENOL) tablet 650 mg  650 mg Oral Q4H PRN Josias Razo MD   650 mg at 07/04/20 1428   • allopurinol (ZYLOPRIM) tablet 100 mg  100 mg Oral Daily Josias Razo MD   100 mg at 07/06/20 1204   • amLODIPine (NORVASC) tablet 10 mg  10 mg Oral Q24H Josias Razo MD   10 mg at 07/06/20 1203   • atorvastatin (LIPITOR) tablet 80 mg  80 mg Oral Nightly Kwasi Kang MD       • cephalexin (KEFLEX) capsule 500 mg  500 mg Oral Q12H Josias Razo MD   500 mg at 07/06/20 0556   • cholecalciferol (VITAMIN D3) tablet 1,000 Units  1,000 Units Oral Daily Josias Razo MD   1,000 Units at 07/06/20 1201   • clopidogrel (PLAVIX) tablet 75 mg  75 mg Oral Daily Josias Razo MD   75 mg at 07/06/20 1203   • enoxaparin (LOVENOX) syringe 30 mg  30 mg Subcutaneous Q24H Kwasi Kang MD   30 mg at 07/06/20 1413   • furosemide (LASIX) tablet 40 mg  40 mg Oral BID Kraig Farley MD   40 mg at 07/06/20 1203   • hydrALAZINE (APRESOLINE) tablet 100 mg  100 mg Oral Q8H Josias Razo MD   100 mg at 07/06/20 1414   • HYDROcodone-acetaminophen (NORCO) 5-325 MG per tablet 1 tablet  1 tablet Oral Q6H PRN Josias Razo MD   1 tablet at 07/06/20 1414   • insulin glargine (LANTUS) injection 30 Units  30 Units Subcutaneous Nightly Josias Razo MD   Stopped at 07/05/20 2113   • insulin lispro (humaLOG) injection 0-7 Units  0-7 Units Subcutaneous TID AC Josias Razo MD   2 Units at 07/05/20 0906   • insulin lispro (humaLOG) injection 10 Units  10 Units Subcutaneous TID With Meals Josias Razo MD   10 Units at 07/05/20 1714   • insulin regular (humuLIN R,novoLIN R) injection 8 Units  8 Units Intravenous TID Kraig Clarke MD   8 Units at 07/05/20 1345   • melatonin tablet 3 mg  3 mg Oral Nightly PRN Dung,  "MD Josias   3 mg at 06/28/20 2131   • metoprolol tartrate (LOPRESSOR) tablet 37.5 mg  37.5 mg Oral Q12H Josias Razo MD   37.5 mg at 07/06/20 1200   • ondansetron (ZOFRAN) injection 4 mg  4 mg Intravenous Q4H PRN Josias Razo MD   4 mg at 07/04/20 1510   • phenol (CHLORASEPTIC) 1.4 % liquid 1 spray  1 spray Mouth/Throat Q2H PRN Josias Razo MD       • polyethylene glycol (MIRALAX) packet 17 g  17 g Oral Daily Josias Razo MD   17 g at 07/04/20 0821   • sennosides-docusate (PERICOLACE) 8.6-50 MG per tablet 1 tablet  1 tablet Oral BID Josias Razo MD   1 tablet at 07/06/20 1200   • sodium chloride 0.9 % flush 10 mL  10 mL Intravenous Q12H Kwasi Kang MD   10 mL at 07/05/20 2134   • sodium chloride 0.9 % flush 10 mL  10 mL Intravenous PRN Kwasi Kang MD           ASSESSMENT:  Chronic kidney disease stage III  Acute kidney injury, stable  Worsening volume overload  Hypertension  Type 2 diabetes mellitus  Chronic anemia secondary to CKD  Gastrointestinal hemorrhage  History of DVT on Coumadin  New, UTI, growing E. Coli       PLAN:   Her renal function remained stable.  Creatinine has been between 2.0 and 2.4 since 6/14 so she is likely very near her baseline  Volume status about the same  Discussed with her family that much of her edema is due to immobility and poor nutrition with low albumin  Family is aware that her edema is unlikely to significantly improved with diuretics and that her current volume status is likely her \"baseline\"  Stop IV fluids today  Place potassium restriction on her diet  Continue current oral Lasix dosing which I explained to the family that she will need to be on most likely for the rest of her life    Blood pressure stable, continue current regimen plus oral vitamin D  Repeat labs in a.m. discussed with family at bedside at length  Discharge planning      Sourav Doe MD   [facility]   Office phone number: [phone]  Answering service phone number: " [phone]    07/06/20  14:22    Dictation performed using Dragon dictation software

## 2020-07-06 NOTE — THERAPY EVALUATION
Acute Care - Speech Language Pathology   Swallow Initial Evaluation [facility]     Patient Name: Kacy Mistry  : 1940  MRN: [id number]  Today's Date: 2020               Admit Date: 2020    Visit Dx:     ICD-10-CM ICD-9-CM   1. Gastrointestinal hemorrhage, unspecified gastrointestinal hemorrhage type K92.2 578.9   2. PEGGY (acute kidney injury) (CMS/[facility]) N17.9 584.9   3. Anticoagulated Z79.01 V58.61   4. Elevated troponin R79.89 790.6     Patient Active Problem List   Diagnosis   • Acute renal failure superimposed on chronic kidney disease (CMS/[facility])   • Anemia   • Gastrointestinal hemorrhage     Past Medical History:   Diagnosis Date   • Anemia    • Anxiety    • Arthritis    • Cellulitis of left lower extremity    • CHF (congestive heart failure) (CMS/[facility])    • Chronic kidney disease    • Depression    • Diabetes mellitus (CMS/[facility])    • DVT (deep venous thrombosis) (CMS/[facility])    • GERD (gastroesophageal reflux disease)    • Hyperlipidemia    • Hypertension    • Lymphedema    • Obesity    • Osteoporosis    • Renal disorder     chronic kidney disease   • Stroke (CMS/[facility])     with left hemiplegia and hemiparesis   • Venous insufficiency (chronic) (peripheral)      Past Surgical History:   Procedure Laterality Date   • COLONOSCOPY N/A 2018    Procedure: COLONOSCOPY into cecum with cold polypectomy;  Surgeon: David Villavicencio MD;  Location: [facility] ENDOSCOPY;  Service:    • ENDOSCOPY  2018    Procedure: ESOPHAGOGASTRODUODENOSCOPY;  Surgeon: David Villavicencio MD;  Location: [facility] ENDOSCOPY;  Service:         SWALLOW EVALUATION (last 72 hours)      SLP Adult Swallow Evaluation     Row Name 20 [facility]                   Rehab Evaluation    Document Type  evaluation  -CP        Subjective Information  no complaints  -CP        Patient Observations  lethargic;cooperative  -CP        Patient Effort  good  -CP        Symptoms Noted During/After Treatment  none  -CP            General Information    Patient Profile Reviewed  yes  -CP        Pertinent History Of Current Problem  Admitted with GIB, UTI, AMS, TIA. Hx of CVA w/L jaja. Facility resident.   -CP        Current Method of Nutrition  NPO  -CP        Precautions/Limitations, Vision  WFL;for purposes of eval  -CP        Precautions/Limitations, Hearing  WFL;for purposes of eval  -CP        Prior Level of Function-Swallowing  no diet consistency restrictions  -CP        Plans/Goals Discussed with  patient;family;agreed upon  -CP        Barriers to Rehab  none identified  -CP        Patient's Goals for Discharge  return to regular diet  -CP           Pain Assessment    Additional Documentation  --  -CP           Pain Scale: Numbers Pre/Post-Treatment    Pain Scale: Numbers, Pretreatment  0/10 - no pain  -CP        Pain Scale: Numbers, Post-Treatment  0/10 - no pain  -CP           Oral Motor and Function    Dentition Assessment  edentulous, does not have dentures  -CP        Secretion Management  WNL/WFL  -CP        Mucosal Quality  moist, healthy  -CP           Oral Musculature and Cranial Nerve Assessment    Oral Motor General Assessment  oral labial or buccal impairment;lingual impairment  -CP        Oral Labial or Buccal Impairment, Detail, Cranial Nerve VII (Facial):  left labial droop  -CP        Lingual Impairment, Detail. Cranial Nerves IX, XII (Glossopharyngeal and Hypoglossal)  reduced strength;bilaterally  -CP        Oral Motor, Comment  unable to fully participate in exam given lethargy; did stay awake for PO trials  -CP           General Eating/Swallowing Observations    Respiratory Support Currently in Use  room air  -CP        Eating/Swallowing Skills  fed by SLP  -CP        Positioning During Eating  semi-reclined;other (see comments) always eats this way per RN and daughter present due to pain  -CP        Utensils Used  spoon;straw  -CP        Consistencies Trialed  thin liquids;nectar/syrup-thick liquids;pureed;regular  textures  -CP        Pre SpO2 (%)  96  -CP        Post SpO2 (%)  96  -CP           Respiratory    Respiratory Status  WFL  -CP           Clinical Swallow Eval    Oral Prep Phase  WFL  -CP        Oral Transit  WFL  -CP        Oral Residue  WFL  -CP        Pharyngeal Phase  suspected pharyngeal impairment  -CP        Esophageal Phase  unremarkable  -CP        Clinical Swallow Evaluation Summary  Intermittent/occasional wet voicing and multiple swallows noted with thin. No s/s with any other PO. Feel lethargy combined with positioning increases risk of aspiration with thin liquids. REC regular diet/NTL. Meds whole with puree. Position as upright as pt able to tolerate with all PO. Ensure adequate alertness with meals, with breaks taken PRN. Small bites and straw sips. Will re-eval at bedside Tues/Wed if alertness improved.  -CP           Clinical Impression    SLP Swallowing Diagnosis  suspected pharyngeal dysfunction  -CP        Functional Impact  risk of aspiration/pneumonia  -CP        Rehab Potential/Prognosis, Swallowing  good, to achieve stated therapy goals  -CP        Swallow Criteria for Skilled Therapeutic Interventions Met  demonstrates skilled criteria  -CP           Recommendations    Therapy Frequency (Swallow)  PRN  -CP        Predicted Duration Therapy Intervention (Days)  until discharge  -CP        SLP Diet Recommendation  regular textures;nectar thick liquids  -CP        Recommended Diagnostics  reassess via clinical swallow evaluation  -CP        Recommended Precautions and Strategies  upright posture during/after eating;small bites of food and sips of liquid;other (see comments) as upright as tolerated  -CP        SLP Rec. for Method of Medication Administration  meds whole;with pudding or applesauce;as tolerated  -CP        Monitor for Signs of Aspiration  yes;notify SLP if any concerns  -CP        Anticipated Dischage Disposition (SLP)  skilled nursing facility  -CP           Swallow Goals (SLP)     Oral Nutrition/Hydration Goal Selection (SLP)  oral nutrition/hydration, SLP goal 1  -CP           Oral Nutrition/Hydration Goal 1 (SLP)    Oral Nutrition/Hydration Goal 1, SLP  Tolerate regular/NTL diet  -CP        Time Frame (Oral Nutrition/Hydration Goal 1, SLP)  short term goal (STG);by discharge  -CP          User Key  (r) = Recorded By, (t) = Taken By, (c) = Cosigned By    Initials Name Effective Dates    CP Iesha Rodrigues MS CCC-SLP 06/08/18 -           EDUCATION  The patient has been educated in the following areas:   Dysphagia (Swallowing Impairment) Oral Care/Hydration Modified Diet Instruction.    SLP Recommendation and Plan  SLP Swallowing Diagnosis: suspected pharyngeal dysfunction  SLP Diet Recommendation: regular textures, nectar thick liquids  Recommended Precautions and Strategies: upright posture during/after eating, small bites of food and sips of liquid, other (see comments)(as upright as tolerated)  SLP Rec. for Method of Medication Administration: meds whole, with pudding or applesauce, as tolerated     Monitor for Signs of Aspiration: yes, notify SLP if any concerns  Recommended Diagnostics: reassess via clinical swallow evaluation  Swallow Criteria for Skilled Therapeutic Interventions Met: demonstrates skilled criteria  Anticipated Dischage Disposition (SLP): skilled nursing facility  Rehab Potential/Prognosis, Swallowing: good, to achieve stated therapy goals  Therapy Frequency (Swallow): PRN  Predicted Duration Therapy Intervention (Days): until discharge       Plan of Care Reviewed With: patient  Outcome Summary: Clinical swallow eval completed. Intermittent/occasional wet voicing and multiple swallows noted with thin. No s/s with any other PO. Feel lethargy combined with positioning increases risk of aspiration with thin liquids. REC regular diet/NTL. Meds whole with puree. Position as upright as pt able to tolerate with all PO. Ensure adequate alertness with meals, with breaks  taken PRN. Small bites and straw sips. Will re-eval at bedside Tues/Wed if alertness improved.    SLP GOALS     Row Name 07/06/20 1100             Oral Nutrition/Hydration Goal 1 (SLP)    Oral Nutrition/Hydration Goal 1, SLP  Tolerate regular/NTL diet  -CP      Time Frame (Oral Nutrition/Hydration Goal 1, SLP)  short term goal (STG);by discharge  -CP        User Key  (r) = Recorded By, (t) = Taken By, (c) = Cosigned By    Initials Name Provider Type    Iesha Robins MS CCC-NIURKA Speech and Language Pathologist           SLP Outcome Measures (last 72 hours)      SLP Outcome Measures     Row Name 07/06/20 1100             SLP Outcome Measures    Outcome Measure Used?  Adult NOMS  -CP         Adult FCM Scores    FCM Chosen  Swallowing  -CP      Swallowing FCM Score  5  -CP        User Key  (r) = Recorded By, (t) = Taken By, (c) = Cosigned By    Initials Name Effective Dates    Iesha Robins MS CCC-SLP 06/08/18 -            Time Calculation:   Time Calculation- SLP     Row Name 07/06/20 1152             Time Calculation- SLP    SLP Start Time  1030  -CP      SLP Stop Time  1130  -CP      SLP Time Calculation (min)  60 min  -CP      SLP Received On  07/06/20  -CP        User Key  (r) = Recorded By, (t) = Taken By, (c) = Cosigned By    Initials Name Provider Type    Iesha Robins MS CCC-SLP Speech and Language Pathologist          Therapy Charges for Today     Code Description Service Date Service Provider Modifiers Qty    [id number]  ST EVAL ORAL PHARYNG SWALLOW 4 7/6/2020 Iesha Rodrigues MS CCC-SLP GN 1               MS DOMONIQUE Paz  7/6/2020

## 2020-07-06 NOTE — PLAN OF CARE
Doppler Carotid showed plaque but no stenosis.  MRI for brain ordered today but family could not tell me about any surgeries or metals that could be in pts body.  I got an order in the afternoon regarding getting medical records but will have to wait since their office closed at 1630.  Explained to family regarding the wait.  VSS.  Will cont to monitor.    Problem: Skin Injury Risk (Adult)  Goal: Identify Related Risk Factors and Signs and Symptoms  Outcome: Ongoing (interventions implemented as appropriate)  Goal: Skin Health and Integrity  Outcome: Ongoing (interventions implemented as appropriate)     Problem: Fall Risk (Adult)  Goal: Identify Related Risk Factors and Signs and Symptoms  Outcome: Ongoing (interventions implemented as appropriate)  Goal: Absence of Fall  Outcome: Ongoing (interventions implemented as appropriate)     Problem: Patient Care Overview  Goal: Plan of Care Review  Outcome: Ongoing (interventions implemented as appropriate)  Goal: Individualization and Mutuality  Outcome: Ongoing (interventions implemented as appropriate)  Goal: Discharge Needs Assessment  Outcome: Ongoing (interventions implemented as appropriate)  Goal: Interprofessional Rounds/Family Conf  Outcome: Ongoing (interventions implemented as appropriate)     Problem: Fluid Volume Excess (Adult)  Goal: Identify Related Risk Factors and Signs and Symptoms  Outcome: Ongoing (interventions implemented as appropriate)  Goal: Optimal Fluid Balance  Outcome: Ongoing (interventions implemented as appropriate)     Problem: Gastrointestinal Bleeding (Adult)  Goal: Signs and Symptoms of Listed Potential Problems Will be Absent, Minimized or Managed (Gastrointestinal Bleeding)  Outcome: Ongoing (interventions implemented as appropriate)

## 2020-07-07 NOTE — NURSING NOTE
Medical Records received from [facility] regarding CVA and cerebral stent placement dates.  MRI paperwork filled out and telephone permission from sister (POA) in chart.  Information called to Heike in MRI at 0937.

## 2020-07-07 NOTE — PLAN OF CARE
Alert, talkative, med x 1 for leg pain with some relief stated, Hydralazine held due to low b/p, NSR on monitor, dozing short intervals, resting quietly, will continue to monitor

## 2020-07-07 NOTE — PLAN OF CARE
Pt has been more verbal in the beginning of the shift and fell asleep but easily aroused.  MRI done, Neuro does not see anything of concern but to have a 3 month follow-up is suggested.  Scheduled DC tomorrow back to facility. VSS.  Will cont to monitor.    Problem: Skin Injury Risk (Adult)  Goal: Identify Related Risk Factors and Signs and Symptoms  Outcome: Ongoing (interventions implemented as appropriate)  Goal: Skin Health and Integrity  Outcome: Ongoing (interventions implemented as appropriate)     Problem: Fall Risk (Adult)  Goal: Identify Related Risk Factors and Signs and Symptoms  Outcome: Ongoing (interventions implemented as appropriate)  Goal: Absence of Fall  Outcome: Ongoing (interventions implemented as appropriate)     Problem: Patient Care Overview  Goal: Plan of Care Review  Outcome: Ongoing (interventions implemented as appropriate)  Goal: Individualization and Mutuality  Outcome: Ongoing (interventions implemented as appropriate)  Goal: Discharge Needs Assessment  Outcome: Ongoing (interventions implemented as appropriate)  Goal: Interprofessional Rounds/Family Conf  Outcome: Ongoing (interventions implemented as appropriate)     Problem: Fluid Volume Excess (Adult)  Goal: Identify Related Risk Factors and Signs and Symptoms  Outcome: Ongoing (interventions implemented as appropriate)  Goal: Optimal Fluid Balance  Outcome: Ongoing (interventions implemented as appropriate)     Problem: Gastrointestinal Bleeding (Adult)  Goal: Signs and Symptoms of Listed Potential Problems Will be Absent, Minimized or Managed (Gastrointestinal Bleeding)  Outcome: Ongoing (interventions implemented as appropriate)

## 2020-07-07 NOTE — NURSING NOTE
Spoke to DANISH Reed with Neuro to express the results.  She will talk to the MD on call today about results.

## 2020-07-07 NOTE — PROGRESS NOTES
"DOS: 2020  NAME: Kacy Mistry   : 1940  PCP: Yousuf Corrigan MD  Chief Complaint   Patient presents with   • Abnormal Lab     Patient seen in follow-up today; new to me          Subjective: No event overnight. Planned MRI Brain today; chronic left-sided weakness (chronic); mild right-sided weakness present.       No family at bedside.     Objective:  Vital signs: /64 (BP Location: Right arm, Patient Position: Lying)   Pulse 92   Temp 97.9 °F (36.6 °C) (Oral)   Resp 18   Ht 170.2 cm (67.01\")   Wt 117 kg (257 lb 1.6 oz)   SpO2 96%   BMI 40.26 kg/m²       HEENT: Normocephalic, atraumatic.  Chin tremor noted  COR: RRR  Resp: Even and unlabored  Extremities: Equal pulses, nondistal embolization    Neurological:   MS: AO. Language normal; low-toned voice.  No obv. neglect.   CN: II-XII normal  Motor: LUE 1-/5 LLE 1/5 RUE 4-/5 RLE 4-/5   Reflexes: Toes down going   Sensory: Intact   Coordination: Normal (finger-to-nose) on right; unable on left     Laboratory results:  Lab Results   Component Value Date    GLUCOSE 111 (H) 2020    CALCIUM 8.6 2020     2020    K 5.0 2020    CO2 27.1 2020     2020    BUN 43 (H) 2020    CREATININE 2.27 (H) 2020    EGFRIFAFRI 25 (L) 2020    BCR 18.9 2020    ANIONGAP 7.9 2020     Lab Results   Component Value Date    WBC 5.81 2020    HGB 7.9 (L) 2020    HCT 25.1 (L) 2020    MCV 98.4 (H) 2020     2020     Lab Results   Component Value Date    CHOL 175 2020    CHOL 149 2020    CHOL 235 (H) 2018     Lab Results   Component Value Date    HDL 30 (L) 2020    HDL 31 (L) 2020    HDL 27 (L) 2018     Lab Results   Component Value Date     (H) 2020    LDL 86 2020     (H) 2018     Lab Results   Component Value Date    TRIG 194 (H) 2020    TRIG 158 (H) 2020    TRIG 272 (H) 2018   "     Lab Results   Component Value Date    PYLRILKI05 983 (H) 06/26/2020     Lab Results   Component Value Date    HGBA1C 7.20 (H) 07/06/2020     Lab Results   Component Value Date    CHOL 175 07/06/2020    CHOL 149 06/26/2020    CHOL 235 (H) 02/11/2018     Lab Results   Component Value Date    TRIG 194 (H) 07/06/2020    TRIG 158 (H) 06/26/2020    TRIG 272 (H) 02/11/2018     Lab Results   Component Value Date    HDL 30 (L) 07/06/2020    HDL 31 (L) 06/26/2020    HDL 27 (L) 02/11/2018     Lab Results   Component Value Date     (H) 07/06/2020    LDL 86 06/26/2020     (H) 02/11/2018     Lab Results   Component Value Date    TSH 1.490 06/26/2020     No results found for: BLOODCX  Urine Culture   Date Value Ref Range Status   07/01/2020 >100,000 CFU/mL Escherichia coli (A)  Final       Review and interpretation of imaging:  Interpretation Summary     · Normal bilateral lower extremity venous duplex scan.        Interpretation Summary     · Proximal right internal carotid artery plaque without significant stenosis.  · Proximal left internal carotid artery plaque without significant stenosis.     Interpretation Summary     · Left ventricular wall thickness is consistent with mild concentric hypertrophy.  · Calculated EF = 62.0%  · There is no evidence of pericardial effusion     CT OF THE BRAIN WITHOUT CONTRAST 07/05/2020     HISTORY: Syncope, fainting.     FINDINGS: Axial images were obtained through the brain without  intravenous contrast. There is mild diffuse atrophy. There is mild  decreased attenuation of the periventricular white matter bilaterally  consistent with mild small vessel white matter ischemic disease.  Bilateral basal ganglia calcifications are seen.     There is no evidence of acute infarction, hemorrhage, midline shift or  mass effect. Small old lacunar infarction is seen in the head of the  caudate nucleus on the right.. No bony abnormalities are seen.     IMPRESSION:  No acute  intracranial process identified.     Radiation dose reduction techniques were utilized, including automated  exposure control and exposure modulation based on body size.     This report was finalized on 7/6/2020 8:18 AM by Dr. Maico Aquino M.D.  STAT PORTABLE RADIOGRAPHIC VIEW OF THE CHEST     CLINICAL HISTORY: COVID evaluation. Cough and fever.     Portable radiographic views of the chest demonstrate subtle increased  lung markings within the right lung base that are nonspecific in nature.  These findings could represent atelectatic changes or infiltrate  although I cannot exclude the possibility of COVID pneumonia. These  could be further characterized with a chest CT, as clinically indicated.  The cardiomediastinal silhouette is enlarged with a tortuous thoracic  aorta. However, these findings are stable when compared to prior study  of 03/23/2018. These findings were discussed with Dr. Davis Fernandez on  06/24/2020 at approximately 6:40 PM.     This report was finalized on 6/24/2020 6:56 PM by Dr. Cliff Weiner M.D.       Impression: This is a 81yo female with history of CKD, diabetes mellitus, prior DVT, hypertension, hyperlipidemia, obesity (BMI 35.70), CHF and depression who presented to [facility] on 6/24 due to generalized weakness and GI bleed our service was consulted due to sudden onset of right-sided facial/arm weakness along with associated difficulty speaking for unknown duration.  Work-up to date below.      Neurologically, patient still w/   Will recommend MRI of the brain be completed due to persistent right-sided weakness still present. PT/OT/ST. CCP to assist with discharge planning. Call RRT for any acute neurological changes and/or concerns. We will continue to follow and advise.         Neurology work-up to date:  CT head 7/5: Negative for acute findings.  TTE 6/26: EF 62%.  LV/LA normal.  Left atrial volume index 24.2   Stress test 6/30 LV 60%.;  Intermediate risk study.     Lower extremity duplex 6/28: Normal no DVT  Carotid duplex 7/6: Bilateral plaque noted without significant stenosis  MRI Brain 07/07: Pending   Labs: COVID test negative, , A1c 7.20, B12 983, TSH 1.49        Plan:  MRI of the brain without contrast  Plavix 75 mg daily (on prior to arrival)  Lipitor 80 mg daily (on Crestor prior to arrival) ()   Neurochecks  BP control  Stroke Education  ELVIRA/SCDs  PT/OT/ST    Case reviewed with  7/7 and he agrees with plan above.   Recommendations discussed w/ Primary Team attending; Dung Voss, APRN     2:35 PM  Addenda:   MRI Brain showed subacute Left frontal lobe infarct. Patient on Plavix and statin; will obtain P2Y12. Ok from neurology perspective to d/c to home thereafter and follow-up in clinic in 3 months; sooner if needed.     MRI EXAMINATION OF THE BRAIN WITHOUT CONTRAST     HISTORY: Right-sided weakness, facial tremor.     COMPARISON: CT head 07/05/2020.     TECHNIQUE: A MRI examination of the brain was performed utilizing  sagittal T1, axial diffusion, T1, T2 and T2 FLAIR sequences. The study  is hampered by patient motion. There is increased signal intensity  involving the white matter cerebral hemispheres bilaterally suggesting  mild-to-moderate small vessel ischemic disease. There is a faint area of  cortical diffusion hyperintensity involving the left frontal lobe  superiorly and posteriorly measuring approximately 3-4 mm in size. No  corresponding area of signal loss on the ADC map is identified. This may  represent an area of T2 shine through effect. A small acute to subacute  infarct cannot be entirely excluded. There is no evidence of mass or  mass effect on this noncontrasted MRI examination of brain. There is  expected flow-void in the basilar artery and in the distal aspect of the  internal carotid arteries bilaterally on the axial T2 sequence. Severe  vascular calcification is appreciated involving the  intracranial  vasculature on the CT examination of 07/05/2020.     There is fluid throughout the mastoid air cells bilaterally.     IMPRESSION:  1.  The study is hampered by patient motion.  2.  There is a questionable area of cortical increased signal intensity  involving the left frontal lobe superiorly and posteriorly measuring  approximately 3-4 mm in size. This potentially may represent an acute to  subacute infarct. No convincing signal loss on ADC map is noted.  3.  Severe vascular calcification is noted intracranially on the CT  angiogram of 07/05/2020. Further evaluation with a MRA or CTA is  recommended.

## 2020-07-07 NOTE — PROGRESS NOTES
Clinical Pharmacy Services: Medication History    Kacy Mistry is a 80 y.o. female presenting to [facility] for Elevated troponin [R79.89]  Anticoagulated [Z79.01]  PEGGY (acute kidney injury) (CMS/[facility]) [N17.9]  Gastrointestinal hemorrhage, unspecified gastrointestinal hemorrhage type [K92.2]    She  has a past medical history of Anemia, Anxiety, Arthritis, Cellulitis of left lower extremity, CHF (congestive heart failure) (CMS/[facility]), Chronic kidney disease, Depression, Diabetes mellitus (CMS/[facility]), DVT (deep venous thrombosis) (CMS/[facility]), GERD (gastroesophageal reflux disease), Hyperlipidemia, Hypertension, Lymphedema, Obesity, Osteoporosis, Renal disorder, Stroke (CMS/[facility]), and Venous insufficiency (chronic) (peripheral).    Allergies as of 06/24/2020 - Reviewed 06/24/2020   Allergen Reaction Noted   • Contrast dye Unknown (See Comments) 02/09/2018   • Iodine Unknown (See Comments) 02/09/2018     Medication information was obtained from: [facility]       Prior to Admission Medications     Prescriptions Last Dose Informant Patient Reported? Taking?    allopurinol (ZYLOPRIM) 100 MG tablet 6/24/2020  Yes Yes    Take 100 mg by mouth Daily.    amLODIPine (NORVASC) 10 MG tablet 6/23/2020  Yes Yes    Take 10 mg by mouth Daily. Hold for SBP <110 or HR <60    bisacodyl (DULCOLAX) 10 MG suppository  Nursing Home Yes Yes    Insert 10 mg into the rectum Daily As Needed for Constipation.    bumetanide (BUMEX) 1 MG tablet 6/24/2020  Yes Yes    Take 1 mg by mouth 2 (Two) Times a Day.    cholecalciferol (VITAMIN D3) 1000 units tablet  Nursing Home Yes Yes    Take 1,000 Units by mouth Daily.    clopidogrel (PLAVIX) 75 MG tablet   Yes Yes    Take 75 mg by mouth Every Night.    diclofenac (VOLTAREN) 1 % gel gel  Nursing Home Yes Yes    Apply 4 g topically to the appropriate area as directed Every Night. Apply to right hip and behind right knee    hydrALAZINE (APRESOLINE) 10 MG tablet   Yes Yes    Take  100 mg by mouth 3 (Three) Times a Day. Hold for SBP<110 or HR<60    HYDROcodone-acetaminophen (NORCO) 5-325 MG per tablet   Yes Yes    Take 1 tablet by mouth Every 6 (Six) Hours As Needed for Moderate Pain .    insulin glargine (LANTUS) 100 UNIT/ML injection   Yes Yes    Inject 18 Units under the skin into the appropriate area as directed Every Night.    Loratadine 10 MG capsule   Yes Yes    Take 10 mg by mouth Daily.    metoprolol tartrate (LOPRESSOR) 25 MG tablet   Yes Yes    Take 25 mg by mouth 2 (Two) Times a Day. Hold for SBP<110 or HR<60    polyethylene glycol (MIRALAX) packet   Yes Yes    Take 17 g by mouth Daily.    rosuvastatin (CRESTOR) 10 MG tablet 6/23/2020  Yes Yes    Take 10 mg by mouth Every Night.    senna-docusate (SENNALAX-S) 8.6-50 MG per tablet  Nursing Home Yes Yes    Take 1 tablet by mouth Every Night.    warfarin (COUMADIN) 5 MG tablet   Yes Yes    Take 5 mg by mouth Daily.    CloNIDine (CATAPRES) 0.3 MG tablet   Yes No    Take 0.3 mg by mouth 3 (Three) Times a Day.    docusate sodium (COLACE) 250 MG capsule   Yes No    Take 250 mg by mouth Daily.    potassium chloride (K-DUR) 10 MEQ CR tablet   Yes No    Take 20 mEq by mouth 3 (Three) Times a Day With Meals.        Medication notes: On the admission medication reconciliation:  Bisacodyl oral order was changed to bisacodyl 10mg supp per the nursing home chart.   Senna-docusate order was changed to senna-docusate 8.6-50mg at bedtime per the nursing home chart. Diclofenac order was changed to diclofenac sodium 1% at bedtime per nursing home chart.   Sertraline was removed from the medication list because it was noted to end on 6/23/20.    This medication list is complete to the best of my knowledge as of 7/7/2020    Please call if questions.    Thanks. Mehul Alex Pharmacy Intern  7/7/2020 09:02

## 2020-07-07 NOTE — PROGRESS NOTES
"[facility]  Cardiology Progress Note    Patient Identification:  Name: Kacy Mistry  Age: 80 y.o.  Sex: female  :  1940  MRN: [id number]                 Follow Up / Chief Complaint: Elevated troponin    Interval History: She had a positive stress test yesterday.  She is CV stable at this time. We will medical management.        Subjective: No chest pains or tightness in the chest    Objective:    Past Medical History:  Past Medical History:   Diagnosis Date   • Anemia    • Anxiety    • Arthritis    • Cellulitis of left lower extremity    • CHF (congestive heart failure) (CMS/HCC)    • Chronic kidney disease    • Depression    • Diabetes mellitus (CMS/HCC)    • DVT (deep venous thrombosis) (CMS/HCC)    • GERD (gastroesophageal reflux disease)    • Hyperlipidemia    • Hypertension    • Lymphedema    • Obesity    • Osteoporosis    • Renal disorder     chronic kidney disease   • Stroke (CMS/HCC)     with left hemiplegia and hemiparesis   • Venous insufficiency (chronic) (peripheral)      Past Surgical History:  Past Surgical History:   Procedure Laterality Date   • COLONOSCOPY N/A 2018    Procedure: COLONOSCOPY into cecum with cold polypectomy;  Surgeon: David Villavicencio MD;  Location: [facility] ENDOSCOPY;  Service:    • ENDOSCOPY  2018    Procedure: ESOPHAGOGASTRODUODENOSCOPY;  Surgeon: David Villavicencio MD;  Location: [facility] ENDOSCOPY;  Service:         Social History:   Social History     Tobacco Use   • Smoking status: Former Smoker     Types: Cigarettes   • Smokeless tobacco: Never Used   • Tobacco comment: \"Over 20 years\"   Substance Use Topics   • Alcohol use: No      Family History:  Family History   Problem Relation Age of Onset   • Heart disease Mother    • Heart disease Brother           Allergies:  Allergies   Allergen Reactions   • Contrast Dye Unknown (See Comments)     n/a   • Iodine Unknown (See Comments)     unknown     Scheduled Meds:    allopurinol " "100 mg Daily   amLODIPine 10 mg Q24H   atorvastatin 80 mg Nightly   cephalexin 500 mg Q12H   cholecalciferol 1,000 Units Daily   clopidogrel 75 mg Daily   enoxaparin 30 mg Q24H   furosemide 40 mg BID   hydrALAZINE 100 mg Q8H   insulin glargine 30 Units Nightly   insulin lispro 0-7 Units TID AC   insulin lispro 10 Units TID With Meals   metoprolol tartrate 37.5 mg Q12H   polyethylene glycol 17 g Daily   sennosides-docusate 1 tablet BID   sodium chloride 10 mL Q12H           INTAKE AND OUTPUT:    Intake/Output Summary (Last 24 hours) at 7/7/2020 1356  Last data filed at 7/7/2020 1326  Gross per 24 hour   Intake 700 ml   Output 600 ml   Net 100 ml                ROS  Constitutional: Awake and alert, no fever. No nosebleeds  Abdomen           no abdominal pain   Cardiac              no chest pain  Pulmonary          no shortness of breath      /70   Pulse 91   Temp 99.2 °F (37.3 °C) (Oral)   Resp 18   Ht 170.2 cm (67.01\")   Wt 117 kg (257 lb 1.6 oz)   SpO2 97%   BMI 40.26 kg/m²   General appearance: No acute changes   Neck: Trachea midline; NECK, supple, no thyromegaly or lymphadenopathy   Lungs: Normal size and shape, normal breath sounds, equal distribution of air, no rales and rhonchi   CV: S1-S2 regular, no murmurs, no rub, no gallop   Abdomen: Soft, non-tender; no masses , no abnormal abdominal sounds   Extremities: No deformity , normal color , no peripheral edema   Skin: Normal temperature, turgor and texture; no rash, ulcers          Cardiographics  Telemetry:   SR          Sinus rhythm        ECG:     Echocardiogram:   Interpretation Summary     · Left ventricular wall thickness is consistent with mild concentric hypertrophy.  · Calculated EF = 62.0%  · There is no evidence of pericardial effusion          Lab Review       Results from last 7 days   Lab Units 07/06/20  0555   MAGNESIUM mg/dL 2.2     Results from last 7 days   Lab Units 07/07/20  0622   SODIUM mmol/L 140   POTASSIUM mmol/L 5.0   BUN " "mg/dL 43*   CREATININE mg/dL 2.27*   CALCIUM mg/dL 8.6        Results from last 7 days   Lab Units 07/07/20  0622 07/06/20  0555 07/05/20  0532   WBC 10*3/mm3 5.81 5.47 6.92   HEMOGLOBIN g/dL 7.9* 8.9* 9.0*   HEMATOCRIT % 25.1* 28.5* 28.3*   PLATELETS 10*3/mm3 181 199 206           The following medical decision was discussed in detail with Dr. Miramontes    Assessment:  Elevated troponin  Gastrointestinal bleed  Acute renal failure  Anemia  Hypertension    Plan:  Her stress test was positive and she will be treated medically.  From a cardiovascular standpoint she is stable. Neurology ordered an MRI, which is pending. Sinus rhythm on the monitor.  Denies any chest pain and her vital signs remain WNL.        DANISH Steward    Patient remains chest pain-free    Positive stress test being treated medically    Toma Miramontes MD          [facility] Dragon/Transcription:   \"Dictated utilizing Dragon dictation\".     "

## 2020-07-07 NOTE — PROGRESS NOTES
"   LOS: 13 days     Chief Complaint/ Reason for encounter: Acute on chronic renal failure  Chief Complaint   Patient presents with   • Abnormal Lab         Subjective    No complaints, resting, largely bedridden  Feels about the same today with no new complaints  Edema about the same   Denies any shortness of breath or chest pain  Marginal appetite but no nausea or vomiting  No fevers or chills, weight stable today      Medical history reviewed:  Abnormal Lab         Subjective    History taken from: Patient and chart    Vital Signs  Temp:  [97.9 °F (36.6 °C)-100.8 °F (38.2 °C)] 97.9 °F (36.6 °C)  Heart Rate:  [78-92] 92  Resp:  [18] 18  BP: (111-136)/(64-97) 119/64       Wt Readings from Last 1 Encounters:   07/07/20 0500 117 kg (257 lb 1.6 oz)   07/06/20 0215 103 kg (228 lb)   07/04/20 0646 116 kg (255 lb 14.4 oz)   07/03/20 0452 116 kg (255 lb 6.4 oz)   07/02/20 0500 118 kg (260 lb 11.2 oz)   07/01/20 0212 117 kg (258 lb 2.5 oz)   06/30/20 0501 121 kg (267 lb 6.4 oz)   06/29/20 0110 116 kg (256 lb 12.8 oz)   06/28/20 0410 116 kg (256 lb 3.2 oz)   06/27/20 0611 119 kg (261 lb 14.5 oz)   06/26/20 1045 119 kg (263 lb)   06/26/20 0559 120 kg (263 lb 12.8 oz)   06/25/20 0112 114 kg (251 lb 11.2 oz)   06/24/20 1803 117 kg (257 lb 15 oz)       Objective:  Vital signs: (most recent): Blood pressure 119/64, pulse 92, temperature 97.9 °F (36.6 °C), temperature source Oral, resp. rate 18, height 170.2 cm (67.01\"), weight 117 kg (257 lb 1.6 oz), SpO2 96 %.              Objective:  General Appearance:  Comfortable, obese, chronically ill-appearing, in no acute distress and not in pain.  Awake, alert, oriented  HEENT: Mucous membranes moist, no injury, oropharynx clear  Lungs:  Normal effort and normal respiratory rate.  Breath sounds clear to auscultation.  No  respiratory distress.  No rales, decreased breath sounds or rhonchi.    Heart: Normal rate.  Regular rhythm.  S1 normal.  No murmur.   Abdomen: Abdomen is soft.  Bowel " sounds are normal, no abdominal tenderness.  There is no rebound or guarding  Extremities: Normal range of motion.  Decreased, 1+ doughy edema of bilateral lower extremities, distal pulses intact  Neurological: No focal motor or sensory deficits, pupils reactive  Skin:  Warm and dry.  No rash or cyanosis.       Results Review:    Intake/Output:     Intake/Output Summary (Last 24 hours) at 7/7/2020 0951  Last data filed at 7/7/2020 0833  Gross per 24 hour   Intake 250 ml   Output 450 ml   Net -200 ml         DATA:  Radiology and Labs:  The following labs independently reviewed by me. Additional labs ordered for tomorrow a.m.  Interval notes, chart personally reviewed by me.   Discussed with pt herself at bedside and with a family member at bedside        Labs:   Recent Results (from the past 24 hour(s))   POC Glucose Once    Collection Time: 07/06/20 11:23 AM   Result Value Ref Range    Glucose 138 (H) 70 - 130 mg/dL   POC Glucose Once    Collection Time: 07/06/20  4:17 PM   Result Value Ref Range    Glucose 140 (H) 70 - 130 mg/dL   POC Glucose Once    Collection Time: 07/06/20  8:51 PM   Result Value Ref Range    Glucose 167 (H) 70 - 130 mg/dL   POC Glucose Once    Collection Time: 07/07/20  6:14 AM   Result Value Ref Range    Glucose 113 70 - 130 mg/dL   Renal Function Panel    Collection Time: 07/07/20  6:22 AM   Result Value Ref Range    Glucose 111 (H) 65 - 99 mg/dL    BUN 43 (H) 8 - 23 mg/dL    Creatinine 2.27 (H) 0.57 - 1.00 mg/dL    Sodium 140 136 - 145 mmol/L    Potassium 5.0 3.5 - 5.2 mmol/L    Chloride 105 98 - 107 mmol/L    CO2 27.1 22.0 - 29.0 mmol/L    Calcium 8.6 8.6 - 10.5 mg/dL    Albumin 2.90 (L) 3.50 - 5.20 g/dL    Phosphorus 4.3 2.5 - 4.5 mg/dL    Anion Gap 7.9 5.0 - 15.0 mmol/L    BUN/Creatinine Ratio 18.9 7.0 - 25.0    eGFR  African Amer 25 (L) >60 mL/min/1.73   CBC Auto Differential    Collection Time: 07/07/20  6:22 AM   Result Value Ref Range    WBC 5.81 3.40 - 10.80 10*3/mm3    RBC 2.55 (L)  3.77 - 5.28 10*6/mm3    Hemoglobin 7.9 (L) 12.0 - 15.9 g/dL    Hematocrit 25.1 (L) 34.0 - 46.6 %    MCV 98.4 (H) 79.0 - 97.0 fL    MCH 31.0 26.6 - 33.0 pg    MCHC 31.5 31.5 - 35.7 g/dL    RDW 13.3 12.3 - 15.4 %    RDW-SD 48.2 37.0 - 54.0 fl    MPV 10.0 6.0 - 12.0 fL    Platelets 181 140 - 450 10*3/mm3    Neutrophil % 46.3 42.7 - 76.0 %    Lymphocyte % 35.3 19.6 - 45.3 %    Monocyte % 11.7 5.0 - 12.0 %    Eosinophil % 5.7 0.3 - 6.2 %    Basophil % 0.7 0.0 - 1.5 %    Immature Grans % 0.3 0.0 - 0.5 %    Neutrophils, Absolute 2.69 1.70 - 7.00 10*3/mm3    Lymphocytes, Absolute 2.05 0.70 - 3.10 10*3/mm3    Monocytes, Absolute 0.68 0.10 - 0.90 10*3/mm3    Eosinophils, Absolute 0.33 0.00 - 0.40 10*3/mm3    Basophils, Absolute 0.04 0.00 - 0.20 10*3/mm3    Immature Grans, Absolute 0.02 0.00 - 0.05 10*3/mm3    nRBC 0.3 (H) 0.0 - 0.2 /100 WBC       Radiology:  Imaging Results (Last 24 Hours)     ** No results found for the last 24 hours. **             Medications have been reviewed:  Current Facility-Administered Medications   Medication Dose Route Frequency Provider Last Rate Last Dose   • acetaminophen (TYLENOL) tablet 650 mg  650 mg Oral Q4H PRN Josias Razo MD   650 mg at 07/04/20 1428   • allopurinol (ZYLOPRIM) tablet 100 mg  100 mg Oral Daily Josias Razo MD   100 mg at 07/07/20 0826   • amLODIPine (NORVASC) tablet 10 mg  10 mg Oral Q24H Josias Razo MD   10 mg at 07/07/20 0826   • atorvastatin (LIPITOR) tablet 80 mg  80 mg Oral Nightly Kwasi Kang MD   80 mg at 07/06/20 2132   • cephalexin (KEFLEX) capsule 500 mg  500 mg Oral Q12H Josias Razo MD   500 mg at 07/07/20 0630   • cholecalciferol (VITAMIN D3) tablet 1,000 Units  1,000 Units Oral Daily Josias Razo MD   1,000 Units at 07/07/20 0825   • clopidogrel (PLAVIX) tablet 75 mg  75 mg Oral Daily Josias Razo MD   75 mg at 07/07/20 0826   • enoxaparin (LOVENOX) syringe 30 mg  30 mg Subcutaneous Q24H Kwasi Kang MD   30 mg at 07/06/20 1413   • furosemide  (LASIX) tablet 40 mg  40 mg Oral BID Kraig Farley MD   40 mg at 07/07/20 0826   • hydrALAZINE (APRESOLINE) tablet 100 mg  100 mg Oral Q8H Josias Razo MD   100 mg at 07/07/20 0633   • HYDROcodone-acetaminophen (NORCO) 5-325 MG per tablet 1 tablet  1 tablet Oral Q6H PRN Josias Razo MD   1 tablet at 07/07/20 0630   • insulin glargine (LANTUS) injection 30 Units  30 Units Subcutaneous Nightly Josias Razo MD   30 Units at 07/06/20 2132   • insulin lispro (humaLOG) injection 0-7 Units  0-7 Units Subcutaneous TID AC Josias Razo MD   2 Units at 07/05/20 0906   • insulin lispro (humaLOG) injection 10 Units  10 Units Subcutaneous TID With Meals Josias Razo MD   10 Units at 07/05/20 1714   • melatonin tablet 3 mg  3 mg Oral Nightly PRN Josias Razo MD   3 mg at 06/28/20 2131   • metoprolol tartrate (LOPRESSOR) tablet 37.5 mg  37.5 mg Oral Q12H Josias Razo MD   37.5 mg at 07/07/20 0825   • ondansetron (ZOFRAN) injection 4 mg  4 mg Intravenous Q4H PRN Josias Razo MD   4 mg at 07/04/20 1510   • phenol (CHLORASEPTIC) 1.4 % liquid 1 spray  1 spray Mouth/Throat Q2H PRN Josias Razo MD       • polyethylene glycol (MIRALAX) packet 17 g  17 g Oral Daily Josias Razo MD   17 g at 07/07/20 0825   • sennosides-docusate (PERICOLACE) 8.6-50 MG per tablet 1 tablet  1 tablet Oral BID Josias Razo MD   1 tablet at 07/07/20 0826   • sodium chloride 0.9 % flush 10 mL  10 mL Intravenous Q12H Kwasi Kang MD   10 mL at 07/07/20 0826   • sodium chloride 0.9 % flush 10 mL  10 mL Intravenous PRN Kwasi Kang MD           ASSESSMENT:  Chronic kidney disease stage III  Acute kidney injury, stable  Worsening volume overload  Hypertension  Type 2 diabetes mellitus  Chronic anemia secondary to CKD  Gastrointestinal hemorrhage  History of DVT on Coumadin  New, UTI, growing E. Coli       PLAN:   Renal function fairly stable today  Creatinine has been between 2.0 and 2.4 since 6/14 so she is likely very near her baseline  Volume  status about the same  Continue current diuretics, off IV fluids  Potassium improved, 5.0  Maintain potassium restriction on her diet  Continue current oral Lasix dosing which I explained to the family that she will need to be on most likely for the rest of her life  Blood pressure stable, continue current regimen plus oral vitamin D  Repeat labs in a.m. discussed with family at bedside at length yesterday  Hemoglobin 7.9 today, ?  Transfuse  Discharge planning      Sourav Doe MD   [facility]   Office phone number: [phone]  Answering service phone number: [phone]    07/07/20  09:51    Dictation performed using Dragon dictation software

## 2020-07-07 NOTE — PROGRESS NOTES
"Daily progress note    Chief complaint  Doing better  No new complaints  Denies any headache dizziness chest pain shortness of breath    History of present illness  80-year-old female with history of diabetes mellitus hypertension hyperlipidemia lymphedema DVT on anticoagulation who is a nursing home resident brought to the emergency room with decreased hemoglobin and generalized weakness.  Patient has no chest pain shortness of breath fever cough abdominal pain nausea vomiting diarrhea.  Patient is taking anticoagulation due to history of DVT and previous stroke.  Patient work-up in ER revealed symptomatic anemia with heme positive stool and acute kidney injury admit for management.     REVIEW OF SYSTEMS  Unremarkable     PHYSICAL EXAM  Blood pressure 130/79, pulse 92, temperature 99.2 °F (37.3 °C), temperature source Oral, resp. rate 18, height 170.2 cm (67.01\"), weight 117 kg (257 lb 1.6 oz), SpO2 97 %.    GENERAL: No acute distress  HENT: NCAT, PERRL, Nares patent  EYES: no scleral icterus  NECK: trachea midline, no ROM limitations  CV: regular rhythm, regular rate  RESPIRATORY: normal effort, crackles at bilateral lung bases  ABDOMEN: soft  : deferred  MUSCULOSKELETAL: no deformity  NEURO: alert, left-sided deficits, follows commands  SKIN: warm, dry, pitting and nonpitting edema bilateral lower extremities, scarring on bilateral anterior shins, chronic atrophic skin changes, no signs of cellulitis     LAB RESULTS  Lab Results (last 24 hours)     Procedure Component Value Units Date/Time    POC Glucose Once [[id number]]  (Abnormal) Collected:  07/07/20 1621    Specimen:  Blood Updated:  07/07/20 1622     Glucose 185 mg/dL     P2Y12 Platelet Inhibition [[id number]] Collected:  07/07/20 1506    Specimen:  Blood Updated:  07/07/20 1512    POC Glucose Once [[id number]]  (Normal) Collected:  07/07/20 1120    Specimen:  Blood Updated:  07/07/20 1125     Glucose 119 mg/dL     Renal Function Panel [[id number]]  " (Abnormal) Collected:  07/07/20 0622    Specimen:  Blood from Hand, Right Updated:  07/07/20 0715     Glucose 111 mg/dL      BUN 43 mg/dL      Creatinine 2.27 mg/dL      Sodium 140 mmol/L      Potassium 5.0 mmol/L      Chloride 105 mmol/L      CO2 27.1 mmol/L      Calcium 8.6 mg/dL      Albumin 2.90 g/dL      Phosphorus 4.3 mg/dL      Anion Gap 7.9 mmol/L      BUN/Creatinine Ratio 18.9     eGFR  African Amer 25 mL/min/1.73     Narrative:       GFR Normal >60  Chronic Kidney Disease <60  Kidney Failure <15      CBC & Differential [[id number]] Collected:  07/07/20 0622    Specimen:  Blood Updated:  07/07/20 0644    Narrative:       The following orders were created for panel order CBC & Differential.  Procedure                               Abnormality         Status                     ---------                               -----------         ------                     CBC Auto Differential[[id number]]        Abnormal            Final result                 Please view results for these tests on the individual orders.    CBC Auto Differential [[id number]]  (Abnormal) Collected:  07/07/20 0622    Specimen:  Blood Updated:  07/07/20 0644     WBC 5.81 10*3/mm3      RBC 2.55 10*6/mm3      Hemoglobin 7.9 g/dL      Hematocrit 25.1 %      MCV 98.4 fL      MCH 31.0 pg      MCHC 31.5 g/dL      RDW 13.3 %      RDW-SD 48.2 fl      MPV 10.0 fL      Platelets 181 10*3/mm3      Neutrophil % 46.3 %      Lymphocyte % 35.3 %      Monocyte % 11.7 %      Eosinophil % 5.7 %      Basophil % 0.7 %      Immature Grans % 0.3 %      Neutrophils, Absolute 2.69 10*3/mm3      Lymphocytes, Absolute 2.05 10*3/mm3      Monocytes, Absolute 0.68 10*3/mm3      Eosinophils, Absolute 0.33 10*3/mm3      Basophils, Absolute 0.04 10*3/mm3      Immature Grans, Absolute 0.02 10*3/mm3      nRBC 0.3 /100 WBC     POC Glucose Once [[id number]]  (Normal) Collected:  07/07/20 0614    Specimen:  Blood Updated:  07/07/20 0616     Glucose 113 mg/dL     POC Glucose  Once [[id number]]  (Abnormal) Collected:  07/06/20 2051    Specimen:  Blood Updated:  07/06/20 2052     Glucose 167 mg/dL         Imaging Results (Last 24 Hours)     Procedure Component Value Units Date/Time    MRI Brain Without Contrast [[id number]] Collected:  07/07/20 1145     Updated:  07/07/20 1145    Narrative:       MRI EXAMINATION OF THE BRAIN WITHOUT CONTRAST     HISTORY: Right-sided weakness, facial tremor.     COMPARISON: CT head 07/05/2020.     TECHNIQUE: A MRI examination of the brain was performed utilizing  sagittal T1, axial diffusion, T1, T2 and T2 FLAIR sequences. The study  is hampered by patient motion. There is increased signal intensity  involving the white matter cerebral hemispheres bilaterally suggesting  mild-to-moderate small vessel ischemic disease. There is a faint area of  cortical diffusion hyperintensity involving the left frontal lobe  superiorly and posteriorly measuring approximately 3-4 mm in size. No  corresponding area of signal loss on the ADC map is identified. This may  represent an area of T2 shine through effect. A small acute to subacute  infarct cannot be entirely excluded. There is no evidence of mass or  mass effect on this noncontrasted MRI examination of brain. There is  expected flow-void in the basilar artery and in the distal aspect of the  internal carotid arteries bilaterally on the axial T2 sequence. Severe  vascular calcification is appreciated involving the intracranial  vasculature on the CT examination of 07/05/2020.     There is fluid throughout the mastoid air cells bilaterally.       Impression:       1.  The study is hampered by patient motion.  2.  There is a questionable area of cortical increased signal intensity  involving the left frontal lobe superiorly and posteriorly measuring  approximately 3-4 mm in size. This potentially may represent an acute to  subacute infarct. No convincing signal loss on ADC map is noted.  3.  Severe vascular calcification  is noted intracranially on the CT  angiogram of 07/05/2020. Further evaluation with a MRA or CTA is  recommended.           ECG 12 Lead          HEART RATE= 125  bpm  RR Interval= 480  ms  [place] Interval= 112  ms  P Horizontal Axis= 209  deg  P Front Axis= -60  deg  QRSD Interval= 82  ms  QT Interval= 358  ms  QRS Axis= -2  deg  T Wave Axis= 107  deg  - ABNORMAL ECG -  Sinus or ectopic atrial tachycardia  Prolonged QT interval  Rate faster, ectopic rhythm appears to be new versus previous ECG  Nonspecific ST-T wave changes new versus previous ECG           Lower extremity Doppler study negative for DVT  Stress Cardiolite showed ischemia    Current Facility-Administered Medications:   •  acetaminophen (TYLENOL) tablet 650 mg, 650 mg, Oral, Q4H PRN, Josias Razo MD, 650 mg at 07/04/20 1428  •  allopurinol (ZYLOPRIM) tablet 100 mg, 100 mg, Oral, Daily, Josias Razo MD, 100 mg at 07/07/20 0826  •  amLODIPine (NORVASC) tablet 10 mg, 10 mg, Oral, Q24H, Josias Razo MD, 10 mg at 07/07/20 0826  •  atorvastatin (LIPITOR) tablet 80 mg, 80 mg, Oral, Nightly, Kwasi Kang MD, 80 mg at 07/06/20 2132  •  cephalexin (KEFLEX) capsule 500 mg, 500 mg, Oral, Q12H, Josias Razo MD, 500 mg at 07/07/20 0630  •  cholecalciferol (VITAMIN D3) tablet 1,000 Units, 1,000 Units, Oral, Daily, Josias Razo MD, 1,000 Units at 07/07/20 0825  •  clopidogrel (PLAVIX) tablet 75 mg, 75 mg, Oral, Daily, Josias Rzao MD, 75 mg at 07/07/20 0826  •  enoxaparin (LOVENOX) syringe 30 mg, 30 mg, Subcutaneous, Q24H, Kwasi Kang MD, 30 mg at 07/07/20 1428  •  furosemide (LASIX) tablet 40 mg, 40 mg, Oral, BID, Kraig Farley MD, 40 mg at 07/07/20 0826  •  hydrALAZINE (APRESOLINE) tablet 100 mg, 100 mg, Oral, Q8H, Josias Razo MD, 100 mg at 07/07/20 1428  •  HYDROcodone-acetaminophen (NORCO) 5-325 MG per tablet 1 tablet, 1 tablet, Oral, Q6H PRN, Josias Razo MD, 1 tablet at 07/07/20 0630  •  hydrophor (AQUAPHOR) ointment, , Topical, BID, Josias Razo,  MD  •  insulin glargine (LANTUS) injection 30 Units, 30 Units, Subcutaneous, Nightly, Josias Razo MD, 30 Units at 07/06/20 2132  •  insulin lispro (humaLOG) injection 0-7 Units, 0-7 Units, Subcutaneous, TID AC, Josias Razo MD, 2 Units at 07/05/20 0906  •  insulin lispro (humaLOG) injection 10 Units, 10 Units, Subcutaneous, TID With Meals, Josias Razo MD, 10 Units at 07/05/20 1714  •  melatonin tablet 3 mg, 3 mg, Oral, Nightly PRN, Josias Razo MD, 3 mg at 06/28/20 2131  •  metoprolol tartrate (LOPRESSOR) tablet 37.5 mg, 37.5 mg, Oral, Q12H, Josias Razo MD, 37.5 mg at 07/07/20 0825  •  ondansetron (ZOFRAN) injection 4 mg, 4 mg, Intravenous, Q4H PRN, Josias Razo MD, 4 mg at 07/04/20 1510  •  phenol (CHLORASEPTIC) 1.4 % liquid 1 spray, 1 spray, Mouth/Throat, Q2H PRN, Josias Razo MD  •  polyethylene glycol (MIRALAX) packet 17 g, 17 g, Oral, Daily, Josias Razo MD, 17 g at 07/07/20 0825  •  sennosides-docusate (PERICOLACE) 8.6-50 MG per tablet 1 tablet, 1 tablet, Oral, BID, Josias Razo MD, 1 tablet at 07/07/20 0826  •  sodium chloride 0.9 % flush 10 mL, 10 mL, Intravenous, Q12H, Kwasi Kang MD, 10 mL at 07/07/20 0826  •  sodium chloride 0.9 % flush 10 mL, 10 mL, Intravenous, PRN, Kwasi Kang MD     ASSESSMENT  Acute to subacute left frontal infarct  Acute E. coli UTI  Symptomatic anemia with heme positive stool  GI bleed no endoscopy per family request  Hypertension  Diabetes mellitus  Hyperlipidemia  History of DVT and   Elevated troponin with positive stress Cardiolite medical management  History of CVA  Acute kidney injury  Chronic kidney disease stage III  Gastroesophageal reflux disease    PLAN  CPM   IV antibiotics  Aspirin Plavix   Transfuse as needed  Protonix p.o. twice daily  GI input appreciated  Cardiology to follow patient  Accu-Chek with sliding scale insulin  Adjust nursing home medications  Stress ulcer DVT prophylaxis  Supportive care  PT/OT  Discussed with nursing staff and  family  Discharge planning    MELODY JOHNSON MD

## 2020-07-07 NOTE — NURSING NOTE
CWOCN consult for BLE. Patient has chronic skin color changes to her legs and some scabbing scattered on the legs. There is also a small blister on the right shin. Her legs are dry but the skin is thin. Sister reports that sometimes the facility wraps her legs. Patient is taking lasix. Patient's legs will be elevated as she is bedbound. I don't recommend compression as it could potentially cause more damage to the skin, as thin as it is, and the edema is controlled. Recommend to moisturize legs. Also, her left great toe is thick from fungus and coming loose/off- the facility was applying Santyl per their MAR. I do not believe this is appropriate and it is just building up in the crease around the toe nail. I cleansed this with water. Betadine can be applied daily to keep the toe dry. I ordered patient a low air loss mattress from [employer]- spoke with Lisa. Sister agrees with all of the above.

## 2020-07-08 NOTE — PROGRESS NOTES
"Adult Nutrition  Assessment/PES    Patient Name:  Kacy Mistry  YOB: 1940  MRN: [id number]  Admit Date:  6/24/2020    Assessment Date:  7/8/2020    Comments:  Nutrition follow up.  MRI done, neuro doesn't see anything of concern.  Diuresing.  Disoriented x 3.  2L O2.  Eating well, 92% x 3 meals per chart PO data.  Possible d/c soon per chart review.    RD to continue to follow.    Reason for Assessment     Row Name 07/08/20 1518          Reason for Assessment    Reason For Assessment  follow-up protocol         Anthropometrics     Row Name 07/08/20 1518          Anthropometrics    Height  170.2 cm (67.01\")        Admit Weight    Admit Weight  -- 268# 7/8        Ideal Body Weight (IBW)    Ideal Body Weight (IBW) (kg)  61.88        Body Mass Index (BMI)    BMI Assessment  BMI 40 or greater: obesity grade III 42.03         Labs/Tests/Procedures/Meds     Row Name 07/08/20 1519          Labs/Procedures/Meds    Lab Results Reviewed  reviewed, pertinent     Lab Results Comments  Gluc, Creat, BUN, GFR, Hgb, Hct        Diagnostic Tests/Procedures    Diagnostic Test/Procedure Reviewed  reviewed, pertinent        Medications    Pertinent Medications Reviewed  reviewed, pertinent     Pertinent Medications Comments  vit D3, lasix, lantus, humalog, miralax, pericolace         Physical Findings     Row Name 07/08/20 1520          Physical Findings    Overall Physical Appearance  obese;on oxygen therapy;edematous     Skin  edema;other (see comments) B=13, bruised         Estimated/Assessed Needs     Row Name 07/08/20 1518          Calculation Measurements    Height  170.2 cm (67.01\")         Nutrition Prescription Ordered     Row Name 07/08/20 1521          Nutrition Prescription PO    Current PO Diet  Regular     Fluid Consistency  Nectar/syrup thick     Common Modifiers  Consistent Carbohydrate;Low Potassium         Evaluation of Received Nutrient/Fluid Intake     Row Name 07/08/20 1522          PO Evaluation    " Number of Meals  3     % PO Intake  92         Problem/Interventions:    Problem 2     Row Name 07/08/20 1523          Nutrition Diagnoses Problem 2    Problem 2  Nutrition Appropriate for Condition at this Time     Etiology (related to)  MNT for Treatment/Condition     Signs/Symptoms (evidenced by)  PO Intake     Percent (%) intake recorded  92 %     Over number of meals  3         Intervention Goal     Row Name 07/08/20 1523          Intervention Goal    General  Maintain nutrition;Reduce/improve symptoms;Disease management/therapy     PO  Maintain intake     Weight  Appropriate weight loss         Nutrition Intervention     Row Name 07/08/20 1523          Nutrition Intervention    RD/Tech Action  Care plan reviewd;Follow Tx progress         Education/Evaluation     Row Name 07/08/20 1523          Education    Education  Will Instruct as appropriate        Monitor/Evaluation    Monitor  Per protocol;PO intake;Pertinent labs;Weight;Skin status;Symptoms           Electronically signed by:  Aida Roman RD  07/08/20 15:23

## 2020-07-08 NOTE — PLAN OF CARE
Med x 1 for pain with relief stated, tolerated 1 unit of blood, Bed changed to air mattress, tolerated well, NSR on monitor, temp max 99.8, Eyes closed at long interval, resting quietly, will continue to monitor

## 2020-07-08 NOTE — PLAN OF CARE
HGB stable this AM at 9.8.  Pt received pain medication.  Diet changed back to thins.  Sometimes, crush the pp in applesauce r/t size.  VSS.  DC tomorrow as long as stable.  COVID test sent to lab. Will cont to monitor.    Problem: Skin Injury Risk (Adult)  Goal: Identify Related Risk Factors and Signs and Symptoms  Outcome: Ongoing (interventions implemented as appropriate)  Goal: Skin Health and Integrity  Outcome: Ongoing (interventions implemented as appropriate)     Problem: Fall Risk (Adult)  Goal: Identify Related Risk Factors and Signs and Symptoms  Outcome: Ongoing (interventions implemented as appropriate)  Goal: Absence of Fall  Outcome: Ongoing (interventions implemented as appropriate)     Problem: Patient Care Overview  Goal: Plan of Care Review  Outcome: Ongoing (interventions implemented as appropriate)  Goal: Individualization and Mutuality  Outcome: Ongoing (interventions implemented as appropriate)  Goal: Discharge Needs Assessment  Outcome: Ongoing (interventions implemented as appropriate)  Goal: Interprofessional Rounds/Family Conf  Outcome: Ongoing (interventions implemented as appropriate)     Problem: Fluid Volume Excess (Adult)  Goal: Identify Related Risk Factors and Signs and Symptoms  Outcome: Ongoing (interventions implemented as appropriate)  Goal: Optimal Fluid Balance  Outcome: Ongoing (interventions implemented as appropriate)     Problem: Gastrointestinal Bleeding (Adult)  Goal: Signs and Symptoms of Listed Potential Problems Will be Absent, Minimized or Managed (Gastrointestinal Bleeding)  Outcome: Ongoing (interventions implemented as appropriate)

## 2020-07-08 NOTE — THERAPY EVALUATION
Acute Care - Speech Language Pathology   Swallow Initial Evaluation [facility]     Patient Name: Kacy Mistry  : 1940  MRN: [id number]  Today's Date: 2020               Admit Date: 2020    Visit Dx:     ICD-10-CM ICD-9-CM   1. Gastrointestinal hemorrhage, unspecified gastrointestinal hemorrhage type K92.2 578.9   2. PEGGY (acute kidney injury) (CMS/[facility]) N17.9 584.9   3. Anticoagulated Z79.01 V58.61   4. Elevated troponin R79.89 790.6     Patient Active Problem List   Diagnosis   • Acute renal failure superimposed on chronic kidney disease (CMS/[facility])   • Anemia   • Gastrointestinal hemorrhage     Past Medical History:   Diagnosis Date   • Anemia    • Anxiety    • Arthritis    • Cellulitis of left lower extremity    • CHF (congestive heart failure) (CMS/[facility])    • Chronic kidney disease    • Depression    • Diabetes mellitus (CMS/[facility])    • DVT (deep venous thrombosis) (CMS/HCC)    • GERD (gastroesophageal reflux disease)    • Hyperlipidemia    • Hypertension    • Lymphedema    • Obesity    • Osteoporosis    • Renal disorder     chronic kidney disease   • Stroke (CMS/[facility])     with left hemiplegia and hemiparesis   • Venous insufficiency (chronic) (peripheral)      Past Surgical History:   Procedure Laterality Date   • COLONOSCOPY N/A 2018    Procedure: COLONOSCOPY into cecum with cold polypectomy;  Surgeon: David Villavicencio MD;  Location: [facility] ENDOSCOPY;  Service:    • ENDOSCOPY  2018    Procedure: ESOPHAGOGASTRODUODENOSCOPY;  Surgeon: David Villavicencio MD;  Location: [facility] ENDOSCOPY;  Service:         SWALLOW EVALUATION (last 72 hours)      SLP Adult Swallow Evaluation     Row Name 20 1330 20 1100          Document Type  evaluation  -OC  evaluation  -CP    Subjective Information  no complaints  -OC  no complaints  -CP    Patient Observations  alert;cooperative;agree to therapy  -OC  lethargic;cooperative  -CP    Patient Effort  good  -OC  good  -CP     Symptoms Noted During/After Treatment  none  -OC  none  -CP          Patient Profile Reviewed  yes  -OC  yes  -CP    Pertinent History Of Current Problem  --  Admitted with GIB, UTI, AMS, TIA. Hx of CVA w/L jaja. Facility resident.   -CP    Current Method of Nutrition  regular textures;nectar/syrup-thick liquids  -OC  NPO  -CP    Precautions/Limitations, Vision  WFL;for purposes of eval  -OC  WFL;for purposes of eval  -CP    Precautions/Limitations, Hearing  WFL;for purposes of eval  -OC  WFL;for purposes of eval  -CP    Prior Level of Function-Swallowing  no diet consistency restrictions  -OC  no diet consistency restrictions  -CP    Plans/Goals Discussed with  patient;family;agreed upon  -OC  patient;family;agreed upon  -CP    Barriers to Rehab  none identified  -OC  none identified  -CP    Patient's Goals for Discharge  return to regular diet  -OC  return to regular diet  -CP          Additional Documentation  --  --  -CP          Pain Scale: Numbers, Pretreatment  0/10 - no pain  -OC  0/10 - no pain  -CP    Pain Scale: Numbers, Post-Treatment  0/10 - no pain  -OC  0/10 - no pain  -CP          Dentition Assessment  --  edentulous, does not have dentures  -CP    Secretion Management  WNL/WFL  -OC  WNL/WFL  -CP    Mucosal Quality  moist, healthy  -OC  moist, healthy  -CP          Oral Motor General Assessment  generalized oral motor weakness  -OC  oral labial or buccal impairment;lingual impairment  -CP    Oral Labial or Buccal Impairment, Detail, Cranial Nerve VII (Facial):  --  left labial droop  -CP    Lingual Impairment, Detail. Cranial Nerves IX, XII (Glossopharyngeal and Hypoglossal)  --  reduced strength;bilaterally  -CP    Oral Motor, Comment  improved alertness this date  -OC  unable to fully participate in exam given lethargy; did stay awake for PO trials  -CP          Respiratory Support Currently in Use  --  room air  -CP    Eating/Swallowing Skills  --  fed by SLP  -CP    Positioning During Eating  --   semi-reclined;other (see comments) always eats this way per RN and daughter present due to pain  -CP    Utensils Used  --  spoon;straw  -CP    Consistencies Trialed  --  thin liquids;nectar/syrup-thick liquids;pureed;regular textures  -CP    Pre SpO2 (%)  --  96  -CP    Post SpO2 (%)  --  96  -CP          Respiratory Status  --  WFL  -CP          Oral Prep Phase  --  WFL  -CP    Oral Transit  --  WFL  -CP    Oral Residue  --  WFL  -CP    Pharyngeal Phase  --  suspected pharyngeal impairment  -CP    Esophageal Phase  --  unremarkable  -CP    Clinical Swallow Evaluation Summary  Improved swallow function and no overt s/s aspiration with ice chips, thin via cup/straw, puree, mechanical soft, and regular textures.  Able to clear min oral residue with liquid wash  -OC  Intermittent/occasional wet voicing and multiple swallows noted with thin. No s/s with any other PO. Feel lethargy combined with positioning increases risk of aspiration with thin liquids. REC regular diet/NTL. Meds whole with puree. Position as upright as pt able to tolerate with all PO. Ensure adequate alertness with meals, with breaks taken PRN. Small bites and straw sips. Will re-eval at bedside Tues/Wed if alertness improved.  -CP          SLP Swallowing Diagnosis  functional oral phase;functional pharyngeal phase  -OC  suspected pharyngeal dysfunction  -CP    Functional Impact  no impact on function  -OC  risk of aspiration/pneumonia  -CP    Rehab Potential/Prognosis, Swallowing  good, to achieve stated therapy goals  -OC  good, to achieve stated therapy goals  -CP    Swallow Criteria for Skilled Therapeutic Interventions Met  baseline status;no problems identified which require skilled intervention  -OC  demonstrates skilled criteria  -CP          Therapy Frequency (Swallow)  evaluation only  -OC  PRN  -CP    Predicted Duration Therapy Intervention (Days)  until discharge  -OC  until discharge  -CP    SLP Diet Recommendation  regular textures;thin  liquids  -OC  regular textures;nectar thick liquids  -CP    Recommended Diagnostics  --  reassess via clinical swallow evaluation  -CP    Recommended Precautions and Strategies  upright posture during/after eating;small bites of food and sips of liquid  -OC  upright posture during/after eating;small bites of food and sips of liquid;other (see comments) as upright as tolerated  -CP    SLP Rec. for Method of Medication Administration  meds whole;with pudding or applesauce;as tolerated  -OC  meds whole;with pudding or applesauce;as tolerated  -CP    Monitor for Signs of Aspiration  yes;notify SLP if any concerns  -OC  yes;notify SLP if any concerns  -CP    Anticipated Dischage Disposition (SLP)  skilled nursing facility  -OC  skilled nursing facility  -CP          Oral Nutrition/Hydration Goal Selection (SLP)  --  oral nutrition/hydration, SLP goal 1  -CP          Oral Nutrition/Hydration Goal 1, SLP  --  Tolerate regular/NTL diet  -CP    Time Frame (Oral Nutrition/Hydration Goal 1, SLP)  --  short term goal (STG);by discharge  -CP      User Key  (r) = Recorded By, (t) = Taken By, (c) = Cosigned By    Initials Name Effective Dates    OC Vanessa Gandhi MA,[facility]-SLP 06/08/18 -     CP Iesha Rodrigues MS CCC-SLP 06/08/18 -           EDUCATION  The patient has been educated in the following areas:   Dysphagia (Swallowing Impairment).    SLP Recommendation and Plan  SLP Swallowing Diagnosis: functional oral phase, functional pharyngeal phase  SLP Diet Recommendation: regular textures, thin liquids  Recommended Precautions and Strategies: upright posture during/after eating, small bites of food and sips of liquid  SLP Rec. for Method of Medication Administration: meds whole, with pudding or applesauce, as tolerated     Monitor for Signs of Aspiration: yes, notify SLP if any concerns     Swallow Criteria for Skilled Therapeutic Interventions Met: baseline status, no problems identified which require skilled  intervention  Anticipated Dischage Disposition (SLP): skilled nursing facility  Rehab Potential/Prognosis, Swallowing: good, to achieve stated therapy goals  Therapy Frequency (Swallow): evaluation only  Predicted Duration Therapy Intervention (Days): until discharge       Outcome Summary: Clinical re-eval of swallow completed. Recommend upgrade to thin liquids with regular textures. Patient with poor upright positioning. Recommend slow rate, small sips, 1:1 supervision/assist. Meds crushed with puree. ST to s/o at this time.    SLP GOALS     Row Name 07/06/20 1100             Oral Nutrition/Hydration Goal 1 (SLP)    Oral Nutrition/Hydration Goal 1, SLP  Tolerate regular/NTL diet  -CP      Time Frame (Oral Nutrition/Hydration Goal 1, SLP)  short term goal (STG);by discharge  -CP        User Key  (r) = Recorded By, (t) = Taken By, (c) = Cosigned By    Initials Name Provider Type    CP Iesha Rodrigues MS CCC-SLP Speech and Language Pathologist           SLP Outcome Measures (last 72 hours)      SLP Outcome Measures     Row Name 07/08/20 1330 07/06/20 1100          SLP Outcome Measures    Outcome Measure Used?  Adult NOMS  -OC  Adult NOMS  -CP        Adult FCM Scores    FCM Chosen  Swallowing  -OC  Swallowing  -CP     Swallowing FCM Score  6  -OC  5  -CP       User Key  (r) = Recorded By, (t) = Taken By, (c) = Cosigned By    Initials Name Effective Dates    Vanessa Rush MA,CCC-SLP 06/08/18 -     CP Iesha Rodrigues MS CCC-SLP 06/08/18 -            Time Calculation:   Time Calculation- SLP     Row Name 07/08/20 1535             Time Calculation- SLP    SLP Start Time  1330  -OC      SLP Received On  07/08/20  -OC        User Key  (r) = Recorded By, (t) = Taken By, (c) = Cosigned By    Initials Name Provider Type    Vanessa Rush MA,CCC-SLP Speech and Language Pathologist          Therapy Charges for Today     Code Description Service Date Service Provider Modifiers Qty    [id number]  ST TREATMENT SWALLOW  4 7/8/2020 Vanessa Gandhi MA,CCC-SLP GN 1               Vanessa Gandhi MA,YUMIKO-SLP  7/8/2020

## 2020-07-08 NOTE — PROGRESS NOTES
Continued Stay Note  [facility]     Patient Name: Kacy Mistry  MRN: [id number]  Today's Date: 7/8/2020    Admit Date: 6/24/2020    Discharge Plan     Row Name 07/08/20 1218       Plan    Plan  Plan return to [facility] for skilled care.  PAZ Chu RN    Patient/Family in Agreement with Plan  yes    Plan Comments  Spoke with pt and pt's daughter (Madhavi Mistry) at bedside.  Plan continues for pt to return to [facility].  Pt will need ambulance transport.  Pt's family aware ambulance transport payment cannot be guaranteed.   Plan return to [facility] for skilled care.   PAZ Chu RN        Discharge Codes    No documentation.             Radha Chu, RN

## 2020-07-08 NOTE — PROGRESS NOTES
"Daily progress note    Chief complaint  Doing better  No new complaints  Agreeable for blood transfusion    History of present illness  80-year-old female with history of diabetes mellitus hypertension hyperlipidemia lymphedema DVT on anticoagulation who is a nursing home resident brought to the emergency room with decreased hemoglobin and generalized weakness.  Patient has no chest pain shortness of breath fever cough abdominal pain nausea vomiting diarrhea.  Patient is taking anticoagulation due to history of DVT and previous stroke.  Patient work-up in ER revealed symptomatic anemia with heme positive stool and acute kidney injury admit for management.     REVIEW OF SYSTEMS  Unremarkable     PHYSICAL EXAM  Blood pressure 128/73, pulse 91, temperature 99 °F (37.2 °C), temperature source Oral, resp. rate 18, height 170.2 cm (67.01\"), weight 122 kg (268 lb 4.8 oz), SpO2 98 %.    GENERAL: No acute distress  HENT: NCAT, PERRL, Nares patent  EYES: no scleral icterus  NECK: trachea midline, no ROM limitations  CV: regular rhythm, regular rate  RESPIRATORY: normal effort, crackles at bilateral lung bases  ABDOMEN: soft  : deferred  MUSCULOSKELETAL: no deformity  NEURO: alert, left-sided deficits, follows commands  SKIN: warm, dry, pitting and nonpitting edema bilateral lower extremities, scarring on bilateral anterior shins, chronic atrophic skin changes, no signs of cellulitis     LAB RESULTS  Lab Results (last 24 hours)     Procedure Component Value Units Date/Time    POC Glucose Once [[id number]]  (Abnormal) Collected:  07/08/20 1110    Specimen:  Blood Updated:  07/08/20 1114     Glucose 150 mg/dL     POC Glucose Once [[id number]]  (Abnormal) Collected:  07/08/20 0653    Specimen:  Blood Updated:  07/08/20 0655     Glucose 146 mg/dL     Basic Metabolic Panel [[id number]]  (Abnormal) Collected:  07/08/20 0543    Specimen:  Blood Updated:  07/08/20 0648     Glucose 136 mg/dL      BUN 44 mg/dL      Creatinine 2.20 mg/dL  "     Sodium 141 mmol/L      Potassium 5.0 mmol/L      Chloride 103 mmol/L      CO2 27.4 mmol/L      Calcium 8.7 mg/dL      eGFR  African Amer 26 mL/min/1.73      BUN/Creatinine Ratio 20.0     Anion Gap 10.6 mmol/L     Narrative:       GFR Normal >60  Chronic Kidney Disease <60  Kidney Failure <15      CBC & Differential [[id number]] Collected:  07/08/20 0543    Specimen:  Blood Updated:  07/08/20 0617    Narrative:       The following orders were created for panel order CBC & Differential.  Procedure                               Abnormality         Status                     ---------                               -----------         ------                     CBC Auto Differential[[id number]]        Abnormal            Final result                 Please view results for these tests on the individual orders.    CBC Auto Differential [[id number]]  (Abnormal) Collected:  07/08/20 0543    Specimen:  Blood Updated:  07/08/20 0617     WBC 6.87 10*3/mm3      RBC 3.11 10*6/mm3      Hemoglobin 9.8 g/dL      Hematocrit 29.8 %      MCV 95.8 fL      MCH 31.5 pg      MCHC 32.9 g/dL      RDW 14.4 %      RDW-SD 50.2 fl      MPV 10.1 fL      Platelets 198 10*3/mm3      Neutrophil % 49.7 %      Lymphocyte % 32.2 %      Monocyte % 10.6 %      Eosinophil % 6.3 %      Basophil % 0.6 %      Immature Grans % 0.6 %      Neutrophils, Absolute 3.42 10*3/mm3      Lymphocytes, Absolute 2.21 10*3/mm3      Monocytes, Absolute 0.73 10*3/mm3      Eosinophils, Absolute 0.43 10*3/mm3      Basophils, Absolute 0.04 10*3/mm3      Immature Grans, Absolute 0.04 10*3/mm3      nRBC 0.3 /100 WBC     POC Glucose Once [[id number]]  (Abnormal) Collected:  07/07/20 2101    Specimen:  Blood Updated:  07/07/20 2103     Glucose 198 mg/dL     P2Y12 Platelet Inhibition [[id number]]  (Normal) Collected:  07/07/20 1506    Specimen:  Blood Updated:  07/07/20 1634     P2Y12 Platelet Inhibition 354 PRU     Narrative:       Test results are in P2Y12 reaction units (PRU).  This measures the extent of platelet aggregation in the presence of P2Y12 inhibitor drugs such as clopidrogrel (Plavix), prasugrel (Effient), ticagrelor (Brilinta), and ticlopidine (Ticlid).   Pre-Drug normal reference range: 194 - 418 PRU   P2Y12 values <194 (low end of reference range) are specific evidence of a P2Y12 inhibitor effect   Patients who have been treated with Glycoprotein IIb/IIIa inhibitors should not be tested until platelet function has recovered. This time period is approximately 14 days after discontinuation of abciximab (ReoPro) and up to 48 hours after discontinuation of eptifibatide (Integrillin) and tirofiban (Aggratstat).   Results should be interpreted in conjuction with other laboratory and clinical data available to the clinician.    POC Glucose Once [[id number]]  (Abnormal) Collected:  07/07/20 1621    Specimen:  Blood Updated:  07/07/20 1622     Glucose 185 mg/dL         Imaging Results (Last 24 Hours)     ** No results found for the last 24 hours. **        ECG 12 Lead          HEART RATE= 125  bpm  RR Interval= 480  ms  AK Interval= 112  ms  P Horizontal Axis= 209  deg  P Front Axis= -60  deg  QRSD Interval= 82  ms  QT Interval= 358  ms  QRS Axis= -2  deg  T Wave Axis= 107  deg  - ABNORMAL ECG -  Sinus or ectopic atrial tachycardia  Prolonged QT interval  Rate faster, ectopic rhythm appears to be new versus previous ECG  Nonspecific ST-T wave changes new versus previous ECG           Lower extremity Doppler study negative for DVT  Stress Cardiolite showed ischemia    Current Facility-Administered Medications:   •  acetaminophen (TYLENOL) tablet 650 mg, 650 mg, Oral, Q4H PRN, Josias Razo MD, 650 mg at 07/04/20 1428  •  allopurinol (ZYLOPRIM) tablet 100 mg, 100 mg, Oral, Daily, Josias Razo MD, 100 mg at 07/08/20 0829  •  amLODIPine (NORVASC) tablet 10 mg, 10 mg, Oral, Q24H, Josias Razo MD, 10 mg at 07/08/20 0829  •  atorvastatin (LIPITOR) tablet 80 mg, 80 mg, Oral, Nightly, Kang,  Kwasi ORTIZ MD, 80 mg at 07/07/20 2154  •  cephalexin (KEFLEX) capsule 500 mg, 500 mg, Oral, Q12H, Josias Razo MD, 500 mg at 07/08/20 0558  •  cholecalciferol (VITAMIN D3) tablet 1,000 Units, 1,000 Units, Oral, Daily, Josias Razo MD, 1,000 Units at 07/08/20 0830  •  clopidogrel (PLAVIX) tablet 75 mg, 75 mg, Oral, Daily, Josias Razo MD, 75 mg at 07/08/20 0829  •  furosemide (LASIX) tablet 40 mg, 40 mg, Oral, BID, Kraig Farley MD, 40 mg at 07/08/20 0830  •  hydrALAZINE (APRESOLINE) tablet 100 mg, 100 mg, Oral, Q8H, Josias Razo MD, 100 mg at 07/08/20 0557  •  HYDROcodone-acetaminophen (NORCO) 5-325 MG per tablet 1 tablet, 1 tablet, Oral, Q6H PRN, Josias Razo MD, 1 tablet at 07/08/20 1223  •  insulin glargine (LANTUS) injection 30 Units, 30 Units, Subcutaneous, Nightly, Josias Razo MD, 30 Units at 07/07/20 2154  •  insulin lispro (humaLOG) injection 0-7 Units, 0-7 Units, Subcutaneous, TID AC, Josias Razo MD, 2 Units at 07/07/20 1725  •  insulin lispro (humaLOG) injection 10 Units, 10 Units, Subcutaneous, TID With Meals, Josias Razo MD, 10 Units at 07/07/20 1725  •  melatonin tablet 3 mg, 3 mg, Oral, Nightly PRN, Josias Razo MD, 3 mg at 06/28/20 2131  •  metoprolol tartrate (LOPRESSOR) tablet 37.5 mg, 37.5 mg, Oral, Q12H, Josias Razo MD, 37.5 mg at 07/08/20 0829  •  ondansetron (ZOFRAN) injection 4 mg, 4 mg, Intravenous, Q4H PRN, Josias Razo MD, 4 mg at 07/04/20 1510  •  Petrolatum ointment, , Topical, Q12H, Josias Razo MD, 100 application at 07/08/20 0837  •  phenol (CHLORASEPTIC) 1.4 % liquid 1 spray, 1 spray, Mouth/Throat, Q2H PRN, Josias Razo MD  •  polyethylene glycol (MIRALAX) packet 17 g, 17 g, Oral, Daily, Josias Razo MD, 17 g at 07/08/20 0829  •  sennosides-docusate (PERICOLACE) 8.6-50 MG per tablet 1 tablet, 1 tablet, Oral, BID, Josias Razo MD, 1 tablet at 07/08/20 0829  •  sodium chloride 0.9 % flush 10 mL, 10 mL, Intravenous, Q12H, Kwasi Kang MD, 10 mL at 07/08/20 0830  •   sodium chloride 0.9 % flush 10 mL, 10 mL, Intravenous, PRN, Kwasi Kang MD     ASSESSMENT  Acute to subacute left frontal infarct  Acute E. coli UTI  Symptomatic anemia with heme positive stool  GI bleed no endoscopy per family request  Hypertension  Diabetes mellitus  Hyperlipidemia  History of DVT and   Elevated troponin with positive stress Cardiolite medical management  History of CVA  Acute kidney injury  Chronic kidney disease stage III  Gastroesophageal reflux disease    PLAN  CPM   Aspirin Plavix and Lipitor  Oral antibiotics  Transfuse as needed  Protonix p.o. twice daily  GI input appreciated  Cardiology to follow patient  Accu-Chek with sliding scale insulin  Adjust nursing home medications  Stress ulcer DVT prophylaxis  Supportive care  PT/OT  Discussed with nursing staff and family  Discharge back to nursing home in a.m. if okay with all    MELODYCONNOR JOHNSON MD

## 2020-07-08 NOTE — PROGRESS NOTES
"[facility]  Cardiology Progress Note    Patient Identification:  Name: Kacy Mistry  Age: 80 y.o.  Sex: female  :  1940  MRN: [id number]                 Follow Up / Chief Complaint: Elevated troponin    Interval History: She had a positive stress test, she will be treated medically.        Subjective: no cp    Objective:    Past Medical History:  Past Medical History:   Diagnosis Date   • Anemia    • Anxiety    • Arthritis    • Cellulitis of left lower extremity    • CHF (congestive heart failure) (CMS/[facility])    • Chronic kidney disease    • Depression    • Diabetes mellitus (CMS/HCC)    • DVT (deep venous thrombosis) (CMS/HCC)    • GERD (gastroesophageal reflux disease)    • Hyperlipidemia    • Hypertension    • Lymphedema    • Obesity    • Osteoporosis    • Renal disorder     chronic kidney disease   • Stroke (CMS/[facility])     with left hemiplegia and hemiparesis   • Venous insufficiency (chronic) (peripheral)      Past Surgical History:  Past Surgical History:   Procedure Laterality Date   • COLONOSCOPY N/A 2018    Procedure: COLONOSCOPY into cecum with cold polypectomy;  Surgeon: David Villavicencio MD;  Location: [facility] ENDOSCOPY;  Service:    • ENDOSCOPY  2018    Procedure: ESOPHAGOGASTRODUODENOSCOPY;  Surgeon: David Villavicencio MD;  Location: [facility] ENDOSCOPY;  Service:         Social History:   Social History     Tobacco Use   • Smoking status: Former Smoker     Types: Cigarettes   • Smokeless tobacco: Never Used   • Tobacco comment: \"Over 20 years\"   Substance Use Topics   • Alcohol use: No      Family History:  Family History   Problem Relation Age of Onset   • Heart disease Mother    • Heart disease Brother           Allergies:  Allergies   Allergen Reactions   • Contrast Dye Unknown (See Comments)     n/a   • Iodine Unknown (See Comments)     unknown     Scheduled Meds:    allopurinol 100 mg Daily   amLODIPine 10 mg Q24H   atorvastatin 80 mg Nightly   " "  cephalexin 500 mg Q12H   cholecalciferol 1,000 Units Daily   clopidogrel 75 mg Daily   furosemide 40 mg BID   hydrALAZINE 100 mg Q8H   insulin glargine 30 Units Nightly   insulin lispro 0-7 Units TID AC   insulin lispro 10 Units TID With Meals   metoprolol tartrate 37.5 mg Q12H   Petrolatum  Q12H   polyethylene glycol 17 g Daily   sennosides-docusate 1 tablet BID   sodium chloride 10 mL Q12H           INTAKE AND OUTPUT:    Intake/Output Summary (Last 24 hours) at 7/8/2020 1518  Last data filed at 7/8/2020 1223  Gross per 24 hour   Intake 948.7 ml   Output 1000 ml   Net -51.3 ml                ROS  Constitutional: Awake and alert, no fever. No nosebleeds  Abdomen           no abdominal pain   Cardiac              no chest pain  Pulmonary          no shortness of breath      /73 (BP Location: Right arm, Patient Position: Lying)   Pulse 87   Temp 99.8 °F (37.7 °C) (Oral)   Resp 18   Ht 170.2 cm (67.01\")   Wt 129 kg (284 lb 6.3 oz)   SpO2 97%   BMI 44.53 kg/m²   General appearance: No acute changes   Neck: Trachea midline; NECK, supple, no thyromegaly or lymphadenopathy   Lungs: Normal size and shape, normal breath sounds, equal distribution of air, no rales and rhonchi   CV: S1-S2 regular, no murmurs, no rub, no gallop   Abdomen: Soft, non-tender; no masses , no abnormal abdominal sounds   Extremities: No deformity , normal color , no peripheral edema   Skin: Normal temperature, turgor and texture; no rash, ulcers            Cardiographics  Telemetry:   SR    SR          Sinus rhythm        ECG:     Echocardiogram:   Interpretation Summary     · Left ventricular wall thickness is consistent with mild concentric hypertrophy.  · Calculated EF = 62.0%  · There is no evidence of pericardial effusion        MRI 7/7/2020  IMPRESSION:  1.  The study is hampered by patient motion.  2.  There is a questionable area of cortical increased signal intensity  involving the left frontal lobe superiorly and posteriorly " measuring  approximately 3-4 mm in size. This potentially may represent an acute to  subacute infarct. No convincing signal loss on ADC map is noted.  3.  Severe vascular calcification is noted intracranially on the CT  examination of the head performed on 07/05/2020. Further evaluation with  a MRA or CTA is recommended.     This report was finalized on 7/8/2020 2:11 PM by Dr. aDvid Brenner M.D.       Lab Review       Results from last 7 days   Lab Units 07/06/20  0555   MAGNESIUM mg/dL 2.2     Results from last 7 days   Lab Units 07/08/20  0543   SODIUM mmol/L 141   POTASSIUM mmol/L 5.0   BUN mg/dL 44*   CREATININE mg/dL 2.20*   CALCIUM mg/dL 8.7        Results from last 7 days   Lab Units 07/08/20  0543 07/07/20  0622 07/06/20  0555   WBC 10*3/mm3 6.87 5.81 5.47   HEMOGLOBIN g/dL 9.8* 7.9* 8.9*   HEMATOCRIT % 29.8* 25.1* 28.5*   PLATELETS 10*3/mm3 198 181 199           The following medical decision was discussed in detail with Dr. Miramontes    Assessment:  1. Elevated troponin  2. Gastrointestinal bleed  3. Acute renal failure  4. Anemia  5. Hypertension    Plan:  VS remained stable.  Nephrology managing diuresing.  Per MRI report, MRI showed questionable area of cortical increased signal intensity involving the left frontal lobe superiorly and posteriorly measuring approximately 3 to 4 mm in size.  This potentially may represent an acute subacute infarct, see full report as above.  Neurology following.       Cardiovascular stable, will be treated medically.     DANISH Steward        Patient personally interviewed and above subjective findings personally confirmed during a face to face contact with patient today  All findings of physical examination confirmed  All pertinent and performed labs, cardiac procedures ,  radiographs of the last 24 hours personally reviewed  Impression and plans discussed/elaborated and implemented jointly as described above     Toma Miramontes MD              EMR  "Dragon/Transcription:   \"Dictated utilizing Dragon dictation\".     "

## 2020-07-08 NOTE — PROGRESS NOTES
"   LOS: 14 days     Chief Complaint/ Reason for encounter: Acute on chronic renal failure  Chief Complaint   Patient presents with   • Abnormal Lab         Subjective    No complaints, resting, largely bedridden  Feels about the same today with no new complaints  Edema about the same   Denies any shortness of breath or chest pain  Marginal appetite but no nausea or vomiting  No fevers or chills, weight stable today      Medical history reviewed:  Abnormal Lab         Subjective    History taken from: Patient and chart    Vital Signs  Temp:  [99 °F (37.2 °C)-99.8 °F (37.7 °C)] 99 °F (37.2 °C)  Heart Rate:  [88-93] 91  Resp:  [18] 18  BP: (118-152)/(67-92) 128/73       Wt Readings from Last 1 Encounters:   07/08/20 0527 122 kg (268 lb 4.8 oz)   07/07/20 0500 117 kg (257 lb 1.6 oz)   07/06/20 0215 103 kg (228 lb)   07/04/20 0646 116 kg (255 lb 14.4 oz)   07/03/20 0452 116 kg (255 lb 6.4 oz)   07/02/20 0500 118 kg (260 lb 11.2 oz)   07/01/20 0212 117 kg (258 lb 2.5 oz)   06/30/20 0501 121 kg (267 lb 6.4 oz)   06/29/20 0110 116 kg (256 lb 12.8 oz)   06/28/20 0410 116 kg (256 lb 3.2 oz)   06/27/20 0611 119 kg (261 lb 14.5 oz)   06/26/20 1045 119 kg (263 lb)   06/26/20 0559 120 kg (263 lb 12.8 oz)   06/25/20 0112 114 kg (251 lb 11.2 oz)   06/24/20 1803 117 kg (257 lb 15 oz)       Objective:  Vital signs: (most recent): Blood pressure 128/73, pulse 91, temperature 99 °F (37.2 °C), temperature source Oral, resp. rate 18, height 170.2 cm (67.01\"), weight 122 kg (268 lb 4.8 oz), SpO2 98 %.              Objective:  General Appearance:  Comfortable, obese, chronically ill-appearing, in no acute distress and not in pain.  Awake, alert, oriented  HEENT: Mucous membranes moist, no injury, oropharynx clear  Lungs:  Normal effort and normal respiratory rate.  Breath sounds clear to auscultation.  No  respiratory distress.  No rales, decreased breath sounds or rhonchi.    Heart: Normal rate.  Regular rhythm.  S1 normal.  No murmur. "   Abdomen: Abdomen is soft.  Bowel sounds are normal, no abdominal tenderness.  There is no rebound or guarding  Extremities: Normal range of motion.  Decreased, 1+ doughy edema of bilateral lower extremities, distal pulses intact  Neurological: No focal motor or sensory deficits, pupils reactive  Skin:  Warm and dry.  No rash or cyanosis.       Results Review:    Intake/Output:     Intake/Output Summary (Last 24 hours) at 7/8/2020 1022  Last data filed at 7/8/2020 0654  Gross per 24 hour   Intake 708.7 ml   Output 800 ml   Net -91.3 ml         DATA:  Radiology and Labs:  The following labs independently reviewed by me. Additional labs ordered for tomorrow a.m.  Interval notes, chart personally reviewed by me.   Discussed with pt herself at bedside and with a family member at bedside        Labs:   Recent Results (from the past 24 hour(s))   POC Glucose Once    Collection Time: 07/07/20 11:20 AM   Result Value Ref Range    Glucose 119 70 - 130 mg/dL   P2Y12 Platelet Inhibition    Collection Time: 07/07/20  3:06 PM   Result Value Ref Range    P2Y12 Platelet Inhibition 354 194 - 418 PRU   POC Glucose Once    Collection Time: 07/07/20  4:21 PM   Result Value Ref Range    Glucose 185 (H) 70 - 130 mg/dL   Type & Screen    Collection Time: 07/07/20  5:57 PM   Result Value Ref Range    ABO Type A     RH type Negative     Antibody Screen Negative     T&S Expiration Date 7/10/2020 11:59:59 PM    Prepare RBC, 1 Units    Collection Time: 07/07/20  7:25 PM   Result Value Ref Range    Product Code Y0601M75     Unit Number [id number]     UNIT  ABO A     UNIT  RH NEG     Crossmatch Interpretation Compatible     Dispense Status IS     Blood Expiration Date [id number]     Blood Type Barcode 0600    POC Glucose Once    Collection Time: 07/07/20  9:01 PM   Result Value Ref Range    Glucose 198 (H) 70 - 130 mg/dL   Basic Metabolic Panel    Collection Time: 07/08/20  5:43 AM   Result Value Ref Range    Glucose 136 (H) 65 - 99  mg/dL    BUN 44 (H) 8 - 23 mg/dL    Creatinine 2.20 (H) 0.57 - 1.00 mg/dL    Sodium 141 136 - 145 mmol/L    Potassium 5.0 3.5 - 5.2 mmol/L    Chloride 103 98 - 107 mmol/L    CO2 27.4 22.0 - 29.0 mmol/L    Calcium 8.7 8.6 - 10.5 mg/dL    eGFR  African Amer 26 (L) >60 mL/min/1.73    BUN/Creatinine Ratio 20.0 7.0 - 25.0    Anion Gap 10.6 5.0 - 15.0 mmol/L   CBC Auto Differential    Collection Time: 07/08/20  5:43 AM   Result Value Ref Range    WBC 6.87 3.40 - 10.80 10*3/mm3    RBC 3.11 (L) 3.77 - 5.28 10*6/mm3    Hemoglobin 9.8 (L) 12.0 - 15.9 g/dL    Hematocrit 29.8 (L) 34.0 - 46.6 %    MCV 95.8 79.0 - 97.0 fL    MCH 31.5 26.6 - 33.0 pg    MCHC 32.9 31.5 - 35.7 g/dL    RDW 14.4 12.3 - 15.4 %    RDW-SD 50.2 37.0 - 54.0 fl    MPV 10.1 6.0 - 12.0 fL    Platelets 198 140 - 450 10*3/mm3    Neutrophil % 49.7 42.7 - 76.0 %    Lymphocyte % 32.2 19.6 - 45.3 %    Monocyte % 10.6 5.0 - 12.0 %    Eosinophil % 6.3 (H) 0.3 - 6.2 %    Basophil % 0.6 0.0 - 1.5 %    Immature Grans % 0.6 (H) 0.0 - 0.5 %    Neutrophils, Absolute 3.42 1.70 - 7.00 10*3/mm3    Lymphocytes, Absolute 2.21 0.70 - 3.10 10*3/mm3    Monocytes, Absolute 0.73 0.10 - 0.90 10*3/mm3    Eosinophils, Absolute 0.43 (H) 0.00 - 0.40 10*3/mm3    Basophils, Absolute 0.04 0.00 - 0.20 10*3/mm3    Immature Grans, Absolute 0.04 0.00 - 0.05 10*3/mm3    nRBC 0.3 (H) 0.0 - 0.2 /100 WBC   POC Glucose Once    Collection Time: 07/08/20  6:53 AM   Result Value Ref Range    Glucose 146 (H) 70 - 130 mg/dL       Radiology:  Imaging Results (Last 24 Hours)     Procedure Component Value Units Date/Time    MRI Brain Without Contrast [[id number]] Collected:  07/07/20 1145     Updated:  07/07/20 1145    Narrative:       MRI EXAMINATION OF THE BRAIN WITHOUT CONTRAST     HISTORY: Right-sided weakness, facial tremor.     COMPARISON: CT head 07/05/2020.     TECHNIQUE: A MRI examination of the brain was performed utilizing  sagittal T1, axial diffusion, T1, T2 and T2 FLAIR sequences. The study  is  hampered by patient motion. There is increased signal intensity  involving the white matter cerebral hemispheres bilaterally suggesting  mild-to-moderate small vessel ischemic disease. There is a faint area of  cortical diffusion hyperintensity involving the left frontal lobe  superiorly and posteriorly measuring approximately 3-4 mm in size. No  corresponding area of signal loss on the ADC map is identified. This may  represent an area of T2 shine through effect. A small acute to subacute  infarct cannot be entirely excluded. There is no evidence of mass or  mass effect on this noncontrasted MRI examination of brain. There is  expected flow-void in the basilar artery and in the distal aspect of the  internal carotid arteries bilaterally on the axial T2 sequence. Severe  vascular calcification is appreciated involving the intracranial  vasculature on the CT examination of 07/05/2020.     There is fluid throughout the mastoid air cells bilaterally.       Impression:       1.  The study is hampered by patient motion.  2.  There is a questionable area of cortical increased signal intensity  involving the left frontal lobe superiorly and posteriorly measuring  approximately 3-4 mm in size. This potentially may represent an acute to  subacute infarct. No convincing signal loss on ADC map is noted.  3.  Severe vascular calcification is noted intracranially on the CT  angiogram of 07/05/2020. Further evaluation with a MRA or CTA is  recommended.                Medications have been reviewed:  Current Facility-Administered Medications   Medication Dose Route Frequency Provider Last Rate Last Dose   • acetaminophen (TYLENOL) tablet 650 mg  650 mg Oral Q4H PRN Josias Rzao MD   650 mg at 07/04/20 1428   • allopurinol (ZYLOPRIM) tablet 100 mg  100 mg Oral Daily Josias Razo MD   100 mg at 07/08/20 0829   • amLODIPine (NORVASC) tablet 10 mg  10 mg Oral Q24H Josias Razo MD   10 mg at 07/08/20 0829   • atorvastatin (LIPITOR)  tablet 80 mg  80 mg Oral Nightly Kwasi Kang MD   80 mg at 07/07/20 2154   • cephalexin (KEFLEX) capsule 500 mg  500 mg Oral Q12H Josias Razo MD   500 mg at 07/08/20 0558   • cholecalciferol (VITAMIN D3) tablet 1,000 Units  1,000 Units Oral Daily Josias Razo MD   1,000 Units at 07/08/20 0830   • clopidogrel (PLAVIX) tablet 75 mg  75 mg Oral Daily Josias Razo MD   75 mg at 07/08/20 0829   • furosemide (LASIX) tablet 40 mg  40 mg Oral BID Kraig Farley MD   40 mg at 07/08/20 0830   • hydrALAZINE (APRESOLINE) tablet 100 mg  100 mg Oral Q8H Josias Razo MD   100 mg at 07/08/20 0557   • HYDROcodone-acetaminophen (NORCO) 5-325 MG per tablet 1 tablet  1 tablet Oral Q6H PRN Josias Razo MD   1 tablet at 07/08/20 0558   • insulin glargine (LANTUS) injection 30 Units  30 Units Subcutaneous Nightly Josias Razo MD   30 Units at 07/07/20 2154   • insulin lispro (humaLOG) injection 0-7 Units  0-7 Units Subcutaneous TID AC Josias Razo MD   2 Units at 07/07/20 1725   • insulin lispro (humaLOG) injection 10 Units  10 Units Subcutaneous TID With Meals Josias Razo MD   10 Units at 07/07/20 1725   • melatonin tablet 3 mg  3 mg Oral Nightly PRN Josias Razo MD   3 mg at 06/28/20 2131   • metoprolol tartrate (LOPRESSOR) tablet 37.5 mg  37.5 mg Oral Q12H Josias Razo MD   37.5 mg at 07/08/20 0829   • ondansetron (ZOFRAN) injection 4 mg  4 mg Intravenous Q4H PRN Josias Razo MD   4 mg at 07/04/20 1510   • Petrolatum ointment   Topical Q12H Josias Razo MD   100 application at 07/08/20 0837   • phenol (CHLORASEPTIC) 1.4 % liquid 1 spray  1 spray Mouth/Throat Q2H PRN Josias Razo MD       • polyethylene glycol (MIRALAX) packet 17 g  17 g Oral Daily Josias Razo MD   17 g at 07/08/20 0829   • sennosides-docusate (PERICOLACE) 8.6-50 MG per tablet 1 tablet  1 tablet Oral BID Josias Razo MD   1 tablet at 07/08/20 0829   • sodium chloride 0.9 % flush 10 mL  10 mL Intravenous Q12H Kwasi Kang MD   10 mL at 07/08/20  0830   • sodium chloride 0.9 % flush 10 mL  10 mL Intravenous PRN Kwasi Kang MD           ASSESSMENT:  Chronic kidney disease stage III  Acute kidney injury, stable  Worsening volume overload  Hypertension  Type 2 diabetes mellitus  Chronic anemia secondary to CKD  Gastrointestinal hemorrhage  History of DVT on Coumadin  New, UTI, growing E. Coli       PLAN:   Renal function fairly stable today, near baseline of 2.0-2.4  Volume status about the same  Continue current oral diuretics  Potassium stable today at 5.0  Maintain potassium restriction on her diet  Blood pressure stable, continue current regimen plus oral vitamin D  Repeat labs in a.m. discussed with family at bedside at length yesterday  Hemoglobin improved posttransfusion      Sourav Doe MD   [facility]   Office phone number: [phone]  Answering service phone number: [phone]    07/08/20  10:22    Dictation performed using Dragon dictation software

## 2020-07-08 NOTE — PLAN OF CARE
Problem: Patient Care Overview  Goal: Plan of Care Review  Outcome: Ongoing (interventions implemented as appropriate)  Flowsheets  Taken 7/8/2020 1403 by Babar Garcia RN  Plan of Care Reviewed With: patient  Taken 7/8/2020 1520 by Vanessa Gandhi MA,CCC-SLP  Outcome Summary: Clinical re-eval of swallow completed. Recommend upgrade to thin liquids with regular textures. Patient with poor upright positioning. Recommend slow rate, small sips, 1:1 supervision/assist. Meds crushed with puree. ST to s/o at this time, please re-consult as warranted.

## 2020-07-09 NOTE — NURSING NOTE
Acute rehab referral received via stroke order set. Anticipated discharge back to nursing home noted. Not appropriate for acute rehab at this time. Will sign off.     Thank you!    Stephen Betancourt RN  p

## 2020-07-09 NOTE — PROGRESS NOTES
"Daily progress note    Chief complaint  Doing same  No new complaints    History of present illness  80-year-old female with history of diabetes mellitus hypertension hyperlipidemia lymphedema DVT on anticoagulation who is a nursing home resident brought to the emergency room with decreased hemoglobin and generalized weakness.  Patient has no chest pain shortness of breath fever cough abdominal pain nausea vomiting diarrhea.  Patient is taking anticoagulation due to history of DVT and previous stroke.  Patient work-up in ER revealed symptomatic anemia with heme positive stool and acute kidney injury admit for management.     REVIEW OF SYSTEMS  Unremarkable     PHYSICAL EXAM  Blood pressure 148/86, pulse 84, temperature 97.7 °F (36.5 °C), temperature source Oral, resp. rate 18, height 170.2 cm (67.01\"), weight 129 kg (284 lb 6.3 oz), SpO2 97 %.    GENERAL: No acute distress  HENT: NCAT, PERRL, Nares patent  EYES: no scleral icterus  NECK: trachea midline, no ROM limitations  CV: regular rhythm, regular rate  RESPIRATORY: normal effort, crackles at bilateral lung bases  ABDOMEN: soft  : deferred  MUSCULOSKELETAL: no deformity  NEURO: alert, left-sided deficits, follows commands  SKIN: warm, dry, pitting and nonpitting edema bilateral lower extremities, scarring on bilateral anterior shins, chronic atrophic skin changes, no signs of cellulitis     LAB RESULTS  Lab Results (last 24 hours)     Procedure Component Value Units Date/Time    POC Glucose Once [[id number]]  (Normal) Collected:  07/09/20 1123    Specimen:  Blood Updated:  07/09/20 1132     Glucose 124 mg/dL     Basic Metabolic Panel [[id number]]  (Abnormal) Collected:  07/09/20 0557    Specimen:  Blood Updated:  07/09/20 0652     Glucose 117 mg/dL      BUN 41 mg/dL      Creatinine 2.26 mg/dL      Sodium 138 mmol/L      Potassium 4.5 mmol/L      Chloride 103 mmol/L      CO2 27.8 mmol/L      Calcium 8.8 mg/dL      eGFR  African Amer 25 mL/min/1.73      " BUN/Creatinine Ratio 18.1     Anion Gap 7.2 mmol/L     Narrative:       GFR Normal >60  Chronic Kidney Disease <60  Kidney Failure <15      CBC & Differential [[id number]] Collected:  07/09/20 0557    Specimen:  Blood Updated:  07/09/20 0636    Narrative:       The following orders were created for panel order CBC & Differential.  Procedure                               Abnormality         Status                     ---------                               -----------         ------                     CBC Auto Differential[[id number]]        Abnormal            Final result                 Please view results for these tests on the individual orders.    CBC Auto Differential [[id number]]  (Abnormal) Collected:  07/09/20 0557    Specimen:  Blood Updated:  07/09/20 0636     WBC 6.79 10*3/mm3      RBC 3.04 10*6/mm3      Hemoglobin 9.5 g/dL      Hematocrit 29.5 %      MCV 97.0 fL      MCH 31.3 pg      MCHC 32.2 g/dL      RDW 14.6 %      RDW-SD 51.6 fl      MPV 10.2 fL      Platelets 190 10*3/mm3      Neutrophil % 47.4 %      Lymphocyte % 36.1 %      Monocyte % 10.0 %      Eosinophil % 5.4 %      Basophil % 0.7 %      Immature Grans % 0.4 %      Neutrophils, Absolute 3.21 10*3/mm3      Lymphocytes, Absolute 2.45 10*3/mm3      Monocytes, Absolute 0.68 10*3/mm3      Eosinophils, Absolute 0.37 10*3/mm3      Basophils, Absolute 0.05 10*3/mm3      Immature Grans, Absolute 0.03 10*3/mm3      nRBC 0.3 /100 WBC     POC Glucose Once [[id number]]  (Normal) Collected:  07/09/20 0619    Specimen:  Blood Updated:  07/09/20 0621     Glucose 119 mg/dL     POC Glucose Once [[id number]]  (Abnormal) Collected:  07/08/20 2157    Specimen:  Blood Updated:  07/08/20 2158     Glucose 132 mg/dL     COVID PRE-OP / PRE-PROCEDURE SCREENING ORDER (NO ISOLATION) - Swab, Nasopharynx [[id number]] Collected:  07/08/20 1430    Specimen:  Swab from Nasopharynx Updated:  07/08/20 1722    Narrative:       The following orders were created for panel order COVID  PRE-OP / PRE-PROCEDURE SCREENING ORDER (NO ISOLATION) - Swab, Nasopharynx.  Procedure                               Abnormality         Status                     ---------                               -----------         ------                     COVID-19,[facility]...[[id number]]  Normal              Final result                 Please view results for these tests on the individual orders.    COVID-19,[facility], NP SWAB IN TRANSPORT MEDIA 8-12 HR TAT - Swab, Nasopharynx [[id number]]  (Normal) Collected:  07/08/20 1430    Specimen:  Swab from Nasopharynx Updated:  07/08/20 1722     COVID19 Not Detected    Narrative:       Fact sheet for providers: https://www.fda.gov/media/187388/download     Fact sheet for patients: https://www.fda.gov/media/424388/download    POC Glucose Once [[id number]]  (Abnormal) Collected:  07/08/20 1645    Specimen:  Blood Updated:  07/08/20 1648     Glucose 141 mg/dL         Imaging Results (Last 24 Hours)     Procedure Component Value Units Date/Time    MRI Brain Without Contrast [[id number]] Collected:  07/07/20 1145     Updated:  07/08/20 1414    Narrative:       MRI EXAMINATION OF THE BRAIN WITHOUT CONTRAST     HISTORY: Right-sided weakness, facial tremor.     COMPARISON: CT head 07/05/2020.     TECHNIQUE: A MRI examination of the brain was performed utilizing  sagittal T1, axial diffusion, T1, T2 and T2 FLAIR sequences. The study  is hampered by patient motion. There is increased signal intensity  involving the white matter cerebral hemispheres bilaterally suggesting  mild-to-moderate small vessel ischemic disease. There is a faint area of  cortical diffusion hyperintensity involving the left frontal lobe  superiorly and posteriorly measuring approximately 3-4 mm in size. No  corresponding area of signal loss on the ADC map is identified. This may  represent an area of T2 shine through effect. A small acute to subacute  infarct cannot be entirely excluded. There is no  evidence of mass or  mass effect on this noncontrasted MRI examination of brain. There is  expected flow-void in the basilar artery and in the distal aspect of the  internal carotid arteries bilaterally on the axial T2 sequence. Severe  vascular calcification is appreciated involving the intracranial  vasculature on the CT examination of the head from 07/05/2020.     There is fluid throughout the mastoid air cells bilaterally.       Impression:       1.  The study is hampered by patient motion.  2.  There is a questionable area of cortical increased signal intensity  involving the left frontal lobe superiorly and posteriorly measuring  approximately 3-4 mm in size. This potentially may represent an acute to  subacute infarct. No convincing signal loss on ADC map is noted.  3.  Severe vascular calcification is noted intracranially on the CT  examination of the head performed on 07/05/2020. Further evaluation with  a MRA or CTA is recommended.     This report was finalized on 7/8/2020 2:11 PM by Dr. David Brenner M.D.           ECG 12 Lead          HEART RATE= 125  bpm  RR Interval= 480  ms  NM Interval= 112  ms  P Horizontal Axis= 209  deg  P Front Axis= -60  deg  QRSD Interval= 82  ms  QT Interval= 358  ms  QRS Axis= -2  deg  T Wave Axis= 107  deg  - ABNORMAL ECG -  Sinus or ectopic atrial tachycardia  Prolonged QT interval  Rate faster, ectopic rhythm appears to be new versus previous ECG  Nonspecific ST-T wave changes new versus previous ECG           Lower extremity Doppler study negative for DVT  Stress Cardiolite showed ischemia    Current Facility-Administered Medications:   •  acetaminophen (TYLENOL) tablet 650 mg, 650 mg, Oral, Q4H PRN, Josisa Razo MD, 650 mg at 07/04/20 1428  •  allopurinol (ZYLOPRIM) tablet 100 mg, 100 mg, Oral, Daily, Josias Razo MD, 100 mg at 07/09/20 0843  •  amLODIPine (NORVASC) tablet 10 mg, 10 mg, Oral, Q24H, Josias Razo MD, 10 mg at 07/09/20 0843  •  atorvastatin (LIPITOR)  tablet 80 mg, 80 mg, Oral, Nightly, Kwasi Kang MD, 80 mg at 07/08/20 2128  •  cephalexin (KEFLEX) capsule 500 mg, 500 mg, Oral, Q12H, Josias Razo MD, 500 mg at 07/09/20 0741  •  cholecalciferol (VITAMIN D3) tablet 1,000 Units, 1,000 Units, Oral, Daily, Josias Razo MD, 1,000 Units at 07/09/20 0843  •  clopidogrel (PLAVIX) tablet 75 mg, 75 mg, Oral, Daily, Josias Razo MD, 75 mg at 07/09/20 0843  •  furosemide (LASIX) tablet 40 mg, 40 mg, Oral, BID, Kraig Farley MD, 40 mg at 07/09/20 0842  •  hydrALAZINE (APRESOLINE) tablet 100 mg, 100 mg, Oral, Q8H, Josias Razo MD, 100 mg at 07/09/20 0648  •  HYDROcodone-acetaminophen (NORCO) 5-325 MG per tablet 1 tablet, 1 tablet, Oral, Q6H PRN, Josias Razo MD, 1 tablet at 07/09/20 0040  •  insulin glargine (LANTUS) injection 30 Units, 30 Units, Subcutaneous, Nightly, Josias Razo MD, 30 Units at 07/08/20 2129  •  insulin lispro (humaLOG) injection 0-7 Units, 0-7 Units, Subcutaneous, TID AC, Josias Razo MD, 2 Units at 07/07/20 1725  •  insulin lispro (humaLOG) injection 10 Units, 10 Units, Subcutaneous, TID With Meals, Josias Razo MD, 10 Units at 07/09/20 1227  •  melatonin tablet 3 mg, 3 mg, Oral, Nightly PRN, Josias Razo MD, 3 mg at 06/28/20 2131  •  metoprolol tartrate (LOPRESSOR) tablet 37.5 mg, 37.5 mg, Oral, Q12H, Josias Razo MD, 37.5 mg at 07/09/20 0843  •  ondansetron (ZOFRAN) injection 4 mg, 4 mg, Intravenous, Q4H PRN, Josias Razo MD, 4 mg at 07/04/20 1510  •  Petrolatum ointment, , Topical, Q12H, Josias Razo MD, 100 application at 07/09/20 0843  •  phenol (CHLORASEPTIC) 1.4 % liquid 1 spray, 1 spray, Mouth/Throat, Q2H PRN, Josias Razo MD  •  polyethylene glycol (MIRALAX) packet 17 g, 17 g, Oral, Daily, Josias Razo MD, 17 g at 07/09/20 1228  •  sennosides-docusate (PERICOLACE) 8.6-50 MG per tablet 1 tablet, 1 tablet, Oral, BID, Josias Razo MD, 1 tablet at 07/09/20 0845  •  sodium chloride 0.9 % flush 10 mL, 10 mL, Intravenous, Q12H,  Kwasi Kang MD, 10 mL at 07/09/20 0843  •  sodium chloride 0.9 % flush 10 mL, 10 mL, Intravenous, PRN, Kwasi Kang MD     ASSESSMENT  Acute to subacute left frontal infarct  Acute E. coli UTI  Symptomatic anemia with heme positive stool  GI bleed no endoscopy per family request  Hypertension  Diabetes mellitus  Hyperlipidemia  History of DVT and   Elevated troponin with positive stress Cardiolite medical management  History of CVA  Acute kidney injury  Chronic kidney disease stage III  Gastroesophageal reflux disease    PLAN  Discharge back to nursing home  Discharge summary dictated    MELODY JOHNSON MD

## 2020-07-09 NOTE — PROGRESS NOTES
"Physicians Statement of Medical Necessity for  Ambulance Transportation    GENERAL INFORMATION     Name: Kacy Mistry  YOB: 1940  Medicare #: [id number]  Transport Date: 7/9/2020  (Valid for round trips this date, or for scheduled repetitive trips for 60 days from the date signed below.)  Origin: [facility]   Destination: [facility]  Is the Patient's stay covered under Medicare Part A (PPS/DRG?)Yes   Closest appropriate facility? Yes  If this a hosp-hosp transfer? No  Is this a hospice patient? No    MEDICAL NECESSITY QUESTIONAIRE    Ambulance Transportation is medically necessary only if other means of transportation are contraindicated or would be potentially harmful to the patient.  To meet this requirement, the patient must be either \"bed confined\" or suffer from a condition such that transport by means other than an ambulance is contraindicated by the patient's condition.  The following questions must be answered by the healthcare professional signing below for this form to be valid:     1) Describe the MEDICAL CONDITION (physical and/or mental) of this patient AT THE TIME OF AMBULANCE TRANSPORT that requires the patient to be transported in an ambulance, and why transport by other means is contraindicated by the patient's condition: LEFT SIDED PARALYSIS  Past Medical History:   Diagnosis Date   • Anemia    • Anxiety    • Arthritis    • Cellulitis of left lower extremity    • CHF (congestive heart failure) (CMS/HCC)    • Chronic kidney disease    • Depression    • Diabetes mellitus (CMS/HCC)    • DVT (deep venous thrombosis) (CMS/HCC)    • GERD (gastroesophageal reflux disease)    • Hyperlipidemia    • Hypertension    • Lymphedema    • Obesity    • Osteoporosis    • Renal disorder     chronic kidney disease   • Stroke (CMS/HCC)     with left hemiplegia and hemiparesis   • Venous insufficiency (chronic) (peripheral)       Past Surgical History:   Procedure Laterality Date " "  • COLONOSCOPY N/A 2/17/2018    Procedure: COLONOSCOPY into cecum with cold polypectomy;  Surgeon: David Villavicencio MD;  Location: [facility] ENDOSCOPY;  Service:    • ENDOSCOPY  2/17/2018    Procedure: ESOPHAGOGASTRODUODENOSCOPY;  Surgeon: David Villavicencio MD;  Location: [facility] ENDOSCOPY;  Service:       2) Is this patient \"bed confined\" as defined below?Yes    To be \"bed confined\" the patient must satisfy all three of the following criteria:  (1) unable to get up from bed without assistance; AND (2) unable to ambulate;  AND (3) unable to sit in a chair or wheelchair.  3) Can this patient safely be transported by car or wheelchair van (I.e., may safely sit during transport, without an attendant or monitoring?)No   4. In addition to completing questions 1-3 above, please check any of the following conditions that apply*:          *Note: supporting documentation for any boxes checked must be maintained in the patient's medical records Unable to tolerate seated position for time needed to transport      SIGNATURE OF PHYSICIAN OR OTHER AUTHORIZED HEALTHCARE PROFESSIONAL    I certify that the above information is true and correct based on my evaluation of this patient, and represent that the patient requires transport by ambulance and that other forms of transport are contraindicated.  I understand that this information will be used by the Centers for Medicare and Medicaid Services (CMS) to support the determiniation of medical necessity for ambulance services, and I represent that I have personal knowledge of the patient's condition at the time of transport.       If this box is checked, I also certify that the patient is physically or mentally incapable of signing the ambulance service's claim form and that the institution with which I am affiliated has furnished care, services or assistance to the patient.  My signature below is made on behalf of the patient pursuant to 42 .36(b)(4). In " accordance with 42 .37, the specific reason(s) that the patient is physically or mentally incapable of signing the claim for is as follows: N/A    Signature of Physician or Healthcare Professional  Date/Time:   7/9/2020     (For Scheduled repetitive transport, this form is not valid for transports performed more than 60 days after this date).                ELIANE SAM RN                                                                                                                            --------------------------------------------------------------------------------------------  Printed Name and Credentials of Physician or Authorized Healthcare Professional     *Form must be signed by patient's attending physician for scheduled, repetitive transports,.  For non-repetitive ambulance transports, if unable to obtain the signature of the attending physician, any of the following may sign (please select below):     Physician  Clinical Nurse Specialist XX Registered Nurse     Physician Assistant XX Discharge Planner  Licensed Practical Nurse     Nurse Practitioner XX

## 2020-07-09 NOTE — PLAN OF CARE
Problem: Skin Injury Risk (Adult)  Goal: Identify Related Risk Factors and Signs and Symptoms  Outcome: Ongoing (interventions implemented as appropriate)  Note:   Alert and oriented x4, able to voice needs,. Pt slept on and off this shift, received x1 prn pain medication for c/o L knee pain,and was effective, low grade temp, came down to 98.4 after prn pain med, position changed with complete assist, yells out when position changed, heel boots on bilat, special mattress in use. Safety precautions in place, call light within reach. NAD. VSS.      Problem: Skin Injury Risk (Adult)  Goal: Skin Health and Integrity  Outcome: Ongoing (interventions implemented as appropriate)     Problem: Fall Risk (Adult)  Goal: Identify Related Risk Factors and Signs and Symptoms  Outcome: Ongoing (interventions implemented as appropriate)     Problem: Patient Care Overview  Goal: Plan of Care Review  Outcome: Ongoing (interventions implemented as appropriate)     Problem: Gastrointestinal Bleeding (Adult)  Goal: Signs and Symptoms of Listed Potential Problems Will be Absent, Minimized or Managed (Gastrointestinal Bleeding)  Outcome: Ongoing (interventions implemented as appropriate)

## 2020-07-09 NOTE — DISCHARGE SUMMARY
Discharge summary    Date of admission 6/24/2020  Date of discharge 7/9/2020    Final diagnosis  Acute to subacute left frontal infarct  Acute E. coli UTI  Symptomatic anemia with heme positive stool  GI bleed no endoscopy per family request  Hypertension  Diabetes mellitus  Hyperlipidemia  History of DVT and   Elevated troponin with positive stress Cardiolite medical management  History of CVA  Acute kidney injury  Chronic kidney disease stage III  Gastroesophageal reflux disease    Discharge medications    Current Facility-Administered Medications:   •  acetaminophen (TYLENOL) tablet 650 mg, 650 mg, Oral, Q4H PRN, Josias Razo MD, 650 mg at 07/04/20 1428  •  allopurinol (ZYLOPRIM) tablet 100 mg, 100 mg, Oral, Daily, Josias Razo MD, 100 mg at 07/09/20 0843  •  amLODIPine (NORVASC) tablet 10 mg, 10 mg, Oral, Q24H, Josias Razo MD, 10 mg at 07/09/20 0843  •  atorvastatin (LIPITOR) tablet 80 mg, 80 mg, Oral, Nightly, Kwasi Kang MD, 80 mg at 07/08/20 2128  •  cephalexin (KEFLEX) capsule 500 mg, 500 mg, Oral, Q12H, Josias Razo MD, 500 mg at 07/09/20 0741  •  cholecalciferol (VITAMIN D3) tablet 1,000 Units, 1,000 Units, Oral, Daily, Josias Razo MD, 1,000 Units at 07/09/20 0843  •  clopidogrel (PLAVIX) tablet 75 mg, 75 mg, Oral, Daily, Josias Razo MD, 75 mg at 07/09/20 0843  •  furosemide (LASIX) tablet 40 mg, 40 mg, Oral, BID, Kraig Farley MD, 40 mg at 07/09/20 0842  •  hydrALAZINE (APRESOLINE) tablet 100 mg, 100 mg, Oral, Q8H, Josias Razo MD, 100 mg at 07/09/20 0648  •  HYDROcodone-acetaminophen (NORCO) 5-325 MG per tablet 1 tablet, 1 tablet, Oral, Q6H PRN, Josias Razo MD, 1 tablet at 07/09/20 0040  •  insulin glargine (LANTUS) injection 30 Units, 30 Units, Subcutaneous, Nightly, Josias Razo MD, 30 Units at 07/08/20 2129  •  insulin lispro (humaLOG) injection 0-7 Units, 0-7 Units, Subcutaneous, TID AC, Josias Razo MD, 2 Units at 07/07/20 1725  •  insulin lispro (humaLOG) injection 10 Units, 10  Units, Subcutaneous, TID With Meals, Josias Razo MD, 10 Units at 07/09/20 1227  •  melatonin tablet 3 mg, 3 mg, Oral, Nightly PRN, Josias Razo MD, 3 mg at 06/28/20 2131  •  metoprolol tartrate (LOPRESSOR) tablet 37.5 mg, 37.5 mg, Oral, Q12H, Josias Raoz MD, 37.5 mg at 07/09/20 0843  •  ondansetron (ZOFRAN) injection 4 mg, 4 mg, Intravenous, Q4H PRN, Josias Razo MD, 4 mg at 07/04/20 1510  •  Petrolatum ointment, , Topical, Q12H, Josias Razo MD, 100 application at 07/09/20 0843  •  phenol (CHLORASEPTIC) 1.4 % liquid 1 spray, 1 spray, Mouth/Throat, Q2H PRN, Josias Razo MD  •  polyethylene glycol (MIRALAX) packet 17 g, 17 g, Oral, Daily, Josias Razo MD, 17 g at 07/09/20 1228  •  sennosides-docusate (PERICOLACE) 8.6-50 MG per tablet 1 tablet, 1 tablet, Oral, BID, Josias Razo MD, 1 tablet at 07/09/20 0845  •  sodium chloride 0.9 % flush 10 mL, 10 mL, Intravenous, Q12H, Kwasi Kang MD, 10 mL at 07/09/20 0843  •  sodium chloride 0.9 % flush 10 mL, 10 mL, Intravenous, PRN, Kwasi Kang MD     Consults obtained  Gastroenterology  Nephrology  Cardiology  Neurology    Procedures  Stress Cardiolite which showed ischemia  2D echo showed ejection fraction 62%    Hospital course  80-year-old -American female with very complex past medical history who is morbidly obese and bedridden also has history of diabetes hypertension hyperlipidemia lymphedema DVT on anticoagulation who is a nursing home resident brought to the emergency room with low hemoglobin generalized weakness.  Patient work-up revealed heme positive stools and she was aspirin Plavix Coumadin which were held and transfuse as needed and GI recommend endoscopy which patient and family refused.  Patient also followed by nephrology and cardiology.  Patient restarted on aspirin Plavix but she continued to have drop in hemoglobin and then cardiology recommend no need for aspirin just keep the Plavix.  Patient did have mental status changes and  work-up revealed that she has acute to subacute left frontal infarct and neurology on the case and they agree with Plavix and Lipitor at this time.  Patient hemoglobin finally stabilized it is 9.5 and her kidney function also stabilized with a BUN of 41 creatinine 2.26.  Other medical problems stable.  Patient does have decreased hearing but needs outpatient work-up with audiology and may require hearing aid.  Patient clear from neurology cardiology nephrology and gastroenterology to be discharged.  Gastroenterology did recommend highly that she should get endoscopy which is she still refusing.  Patient remained full code throughout hospital course.    Discharge diet regular    Activity per PT OT    Medication as above    Further care per accepting physician at nursing home and follow-up with cardiology nephrology gastroenterology and neurology per the instruction and take medication as directed    MELODY JOHNSON MD

## 2020-07-09 NOTE — PROGRESS NOTES
Continued Stay Note  [facility]     Patient Name: Kacy Mistry  MRN: [id number]  Today's Date: 7/9/2020    Admit Date: 6/24/2020    Discharge Plan     Row Name 07/09/20 5101       Plan    Plan  Plan return to [facility] for skilled care.  PAZ Chu RN    Patient/Family in Agreement with Plan  yes    Plan Comments  Per Deja  ( [phone]) [facility] has a bed and can accept pt today.   Called pt's sister ( Cindy Teague [phone]) and informed her of pt's discharge.  Discharge summary faxed to [facility].  Discharge summary in packet.  Prescription in packet.  Packet to RN. [facility] scheduled to transport pt at 1700.   Plan return to [facility] for skilled care.   PAZ Chu RN        Discharge Codes    No documentation.       Expected Discharge Date and Time     Expected Discharge Date Expected Discharge Time    Jul 9, 2020             Radha Chu RN

## 2020-07-09 NOTE — PROGRESS NOTES
"[facility]  Cardiology Progress Note    Patient Identification:  Name: Kacy Mistry  Age: 80 y.o.  Sex: female  :  1940  MRN: [id number]                 Follow Up / Chief Complaint: Elevated troponin    Interval History: She had a positive stress test yesterday.  She remained CV stable.  She will medical manage.          Subjective: Denies chest pain, and tightness     Objective:    Past Medical History:  Past Medical History:   Diagnosis Date   • Anemia    • Anxiety    • Arthritis    • Cellulitis of left lower extremity    • CHF (congestive heart failure) (CMS/HCC)    • Chronic kidney disease    • Depression    • Diabetes mellitus (CMS/HCC)    • DVT (deep venous thrombosis) (CMS/HCC)    • GERD (gastroesophageal reflux disease)    • Hyperlipidemia    • Hypertension    • Lymphedema    • Obesity    • Osteoporosis    • Renal disorder     chronic kidney disease   • Stroke (CMS/HCC)     with left hemiplegia and hemiparesis   • Venous insufficiency (chronic) (peripheral)      Past Surgical History:  Past Surgical History:   Procedure Laterality Date   • COLONOSCOPY N/A 2018    Procedure: COLONOSCOPY into cecum with cold polypectomy;  Surgeon: David Villavicencio MD;  Location: [facility] ENDOSCOPY;  Service:    • ENDOSCOPY  2018    Procedure: ESOPHAGOGASTRODUODENOSCOPY;  Surgeon: David Villavicencio MD;  Location: [facility] ENDOSCOPY;  Service:         Social History:   Social History     Tobacco Use   • Smoking status: Former Smoker     Types: Cigarettes   • Smokeless tobacco: Never Used   • Tobacco comment: \"Over 20 years\"   Substance Use Topics   • Alcohol use: No      Family History:  Family History   Problem Relation Age of Onset   • Heart disease Mother    • Heart disease Brother           Allergies:  Allergies   Allergen Reactions   • Contrast Dye Unknown (See Comments)     n/a   • Iodine Unknown (See Comments)     unknown     Scheduled Meds:    allopurinol 100 mg Daily   " "  amLODIPine 10 mg Q24H   atorvastatin 80 mg Nightly   cephalexin 500 mg Q12H   cholecalciferol 1,000 Units Daily   clopidogrel 75 mg Daily   furosemide 40 mg BID   hydrALAZINE 100 mg Q8H   insulin glargine 30 Units Nightly   insulin lispro 0-7 Units TID AC   insulin lispro 10 Units TID With Meals   metoprolol tartrate 37.5 mg Q12H   Petrolatum  Q12H   polyethylene glycol 17 g Daily   sennosides-docusate 1 tablet BID   sodium chloride 10 mL Q12H           INTAKE AND OUTPUT:    Intake/Output Summary (Last 24 hours) at 7/9/2020 1526  Last data filed at 7/8/2020 1827  Gross per 24 hour   Intake --   Output 300 ml   Net -300 ml                ROS  Constitutional: Awake and alert, no fever.  No nosebleeds   Abdomen          no abdominal pain   Cardiac              no chest pain  Pulmonary          No shortness of breath      /86 (BP Location: Right arm, Patient Position: Lying)   Pulse 84   Temp 97.7 °F (36.5 °C) (Oral)   Resp 18   Ht 170.2 cm (67.01\")   Wt 129 kg (284 lb 6.3 oz)   SpO2 97%   BMI 44.53 kg/m²      Physical Exam:  General:  Appears in no acute distress, resting in bed  Eyes: EOM normal no conjunctival drainage  HEENT:  No JVD. Thyroid not visibly enlarged. No mucosal pallor or cyanosis  Respiratory: Respirations regular and unlabored at rest. BBS with good air anteriorly and laterly. No crackles, rubs or wheezes auscultated  Cardiovascular: S1S2 Regular rate and rhythm. No murmur, rub or gallop. No carotid bruits. DP/PT pulses +2    . No pretibial pitting edema  Gastrointestinal: Abdomen soft, flat, non tender. Bowel sounds present. No hepatosplenomegaly. No ascites  Musculoskeletal: CEE x4. No abnormal movements   Neuro: AAO x3 CN II-XII grossly intact  Psych: Mood and affect normal, pleasant and cooperative          Cardiographics  Telemetry:   Sinus rhythm      SR          Sinus rhythm        ECG:     Echocardiogram:   Interpretation Summary     · Left ventricular wall thickness is " "consistent with mild concentric hypertrophy.  · Calculated EF = 62.0%  · There is no evidence of pericardial effusion          Lab Review       Results from last 7 days   Lab Units 07/06/20  0555   MAGNESIUM mg/dL 2.2     Results from last 7 days   Lab Units 07/09/20  0557   SODIUM mmol/L 138   POTASSIUM mmol/L 4.5   BUN mg/dL 41*   CREATININE mg/dL 2.26*   CALCIUM mg/dL 8.8        Results from last 7 days   Lab Units 07/09/20  0557 07/08/20  0543 07/07/20  0622   WBC 10*3/mm3 6.79 6.87 5.81   HEMOGLOBIN g/dL 9.5* 9.8* 7.9*   HEMATOCRIT % 29.5* 29.8* 25.1*   PLATELETS 10*3/mm3 190 198 181           The following medical decision was discussed in detail with Dr. Miramontes    Assessment:  Elevated troponin  Gastrointestinal bleed  Acute renal failure  Anemia  Hypertension    Plan:  Vital signs stable.  Sinus rhythm on monitor.  Her stress test was positive, and she will be treated medically.  Neurology has signed off at this point.  She is going to rehab today.  Cardiovascular stable.  Denies any chest pain.    She will see Dr. Kulwinder Hunt on July 29 at 2 PM.         DANISH Steward/Transcription:   \"Dictated utilizing Dragon dictation\".     "

## 2020-07-09 NOTE — PROGRESS NOTES
"   LOS: 15 days     Chief Complaint/ Reason for encounter: Acute on chronic renal failure  Chief Complaint   Patient presents with   • Abnormal Lab         Subjective    No complaints, resting, largely bedridden  Feels well anxious to go to rehab today  Discussed with family at bedside  Feels about the same today with no new complaints  Edema about the same   Denies any shortness of breath or chest pain  Marginal appetite but no nausea or vomiting  No fevers or chills, weight stable today      Medical history reviewed:  Abnormal Lab         Subjective    History taken from: Patient and chart    Vital Signs  Temp:  [97.7 °F (36.5 °C)-100 °F (37.8 °C)] 97.7 °F (36.5 °C)  Heart Rate:  [84-93] 84  Resp:  [18-20] 18  BP: (130-148)/(70-86) 148/86       Wt Readings from Last 1 Encounters:   07/09/20 0600 129 kg (284 lb 6.3 oz)   07/09/20 0500 129 kg (284 lb 9.8 oz)   07/08/20 0527 122 kg (268 lb 4.8 oz)   07/07/20 0500 117 kg (257 lb 1.6 oz)   07/06/20 0215 103 kg (228 lb)   07/04/20 0646 116 kg (255 lb 14.4 oz)   07/03/20 0452 116 kg (255 lb 6.4 oz)   07/02/20 0500 118 kg (260 lb 11.2 oz)   07/01/20 0212 117 kg (258 lb 2.5 oz)   06/30/20 0501 121 kg (267 lb 6.4 oz)   06/29/20 0110 116 kg (256 lb 12.8 oz)   06/28/20 0410 116 kg (256 lb 3.2 oz)   06/27/20 0611 119 kg (261 lb 14.5 oz)   06/26/20 1045 119 kg (263 lb)   06/26/20 0559 120 kg (263 lb 12.8 oz)   06/25/20 0112 114 kg (251 lb 11.2 oz)   06/24/20 1803 117 kg (257 lb 15 oz)       Objective:  Vital signs: (most recent): Blood pressure 148/86, pulse 84, temperature 97.7 °F (36.5 °C), temperature source Oral, resp. rate 18, height 170.2 cm (67.01\"), weight 129 kg (284 lb 6.3 oz), SpO2 97 %.              Objective:  General Appearance:  Comfortable, obese, chronically ill-appearing, in no acute distress and not in pain.  Awake, alert, oriented  HEENT: Mucous membranes moist, no injury, oropharynx clear  Lungs:  Normal effort and normal respiratory rate.  Breath sounds " clear to auscultation.  No  respiratory distress.  No rales, decreased breath sounds or rhonchi.    Heart: Normal rate.  Regular rhythm.  S1 normal.  No murmur.   Abdomen: Abdomen is soft.  Bowel sounds are normal, no abdominal tenderness.  There is no rebound or guarding  Extremities: Normal range of motion.  Decreased, 1+ doughy edema of bilateral lower extremities, distal pulses intact  Neurological: No focal motor or sensory deficits, pupils reactive  Skin:  Warm and dry.  No rash or cyanosis.       Results Review:    Intake/Output:     Intake/Output Summary (Last 24 hours) at 7/9/2020 1505  Last data filed at 7/8/2020 1827  Gross per 24 hour   Intake --   Output 300 ml   Net -300 ml         DATA:  Radiology and Labs:  The following labs independently reviewed by me. Additional labs ordered for tomorrow a.m.  Interval notes, chart personally reviewed by me.   Discussed with pt herself at bedside and with a family member at bedside        Labs:   Recent Results (from the past 24 hour(s))   POC Glucose Once    Collection Time: 07/08/20  4:45 PM   Result Value Ref Range    Glucose 141 (H) 70 - 130 mg/dL   Prepare RBC, 1 Units    Collection Time: 07/08/20  6:00 PM   Result Value Ref Range    Product Code A1494W53     Unit Number [id number]     UNIT  ABO A     UNIT  RH NEG     Crossmatch Interpretation Compatible     Dispense Status PT     Blood Expiration Date [id number]     Blood Type Barcode 0600    POC Glucose Once    Collection Time: 07/08/20  9:57 PM   Result Value Ref Range    Glucose 132 (H) 70 - 130 mg/dL   Basic Metabolic Panel    Collection Time: 07/09/20  5:57 AM   Result Value Ref Range    Glucose 117 (H) 65 - 99 mg/dL    BUN 41 (H) 8 - 23 mg/dL    Creatinine 2.26 (H) 0.57 - 1.00 mg/dL    Sodium 138 136 - 145 mmol/L    Potassium 4.5 3.5 - 5.2 mmol/L    Chloride 103 98 - 107 mmol/L    CO2 27.8 22.0 - 29.0 mmol/L    Calcium 8.8 8.6 - 10.5 mg/dL    eGFR  African Amer 25 (L) >60 mL/min/1.73     BUN/Creatinine Ratio 18.1 7.0 - 25.0    Anion Gap 7.2 5.0 - 15.0 mmol/L   CBC Auto Differential    Collection Time: 07/09/20  5:57 AM   Result Value Ref Range    WBC 6.79 3.40 - 10.80 10*3/mm3    RBC 3.04 (L) 3.77 - 5.28 10*6/mm3    Hemoglobin 9.5 (L) 12.0 - 15.9 g/dL    Hematocrit 29.5 (L) 34.0 - 46.6 %    MCV 97.0 79.0 - 97.0 fL    MCH 31.3 26.6 - 33.0 pg    MCHC 32.2 31.5 - 35.7 g/dL    RDW 14.6 12.3 - 15.4 %    RDW-SD 51.6 37.0 - 54.0 fl    MPV 10.2 6.0 - 12.0 fL    Platelets 190 140 - 450 10*3/mm3    Neutrophil % 47.4 42.7 - 76.0 %    Lymphocyte % 36.1 19.6 - 45.3 %    Monocyte % 10.0 5.0 - 12.0 %    Eosinophil % 5.4 0.3 - 6.2 %    Basophil % 0.7 0.0 - 1.5 %    Immature Grans % 0.4 0.0 - 0.5 %    Neutrophils, Absolute 3.21 1.70 - 7.00 10*3/mm3    Lymphocytes, Absolute 2.45 0.70 - 3.10 10*3/mm3    Monocytes, Absolute 0.68 0.10 - 0.90 10*3/mm3    Eosinophils, Absolute 0.37 0.00 - 0.40 10*3/mm3    Basophils, Absolute 0.05 0.00 - 0.20 10*3/mm3    Immature Grans, Absolute 0.03 0.00 - 0.05 10*3/mm3    nRBC 0.3 (H) 0.0 - 0.2 /100 WBC   POC Glucose Once    Collection Time: 07/09/20  6:19 AM   Result Value Ref Range    Glucose 119 70 - 130 mg/dL   POC Glucose Once    Collection Time: 07/09/20 11:23 AM   Result Value Ref Range    Glucose 124 70 - 130 mg/dL       Radiology:  Imaging Results (Last 24 Hours)     ** No results found for the last 24 hours. **             Medications have been reviewed:  Current Facility-Administered Medications   Medication Dose Route Frequency Provider Last Rate Last Dose   • acetaminophen (TYLENOL) tablet 650 mg  650 mg Oral Q4H PRN Josias Razo MD   650 mg at 07/04/20 1428   • allopurinol (ZYLOPRIM) tablet 100 mg  100 mg Oral Daily Josias Razo MD   100 mg at 07/09/20 0843   • amLODIPine (NORVASC) tablet 10 mg  10 mg Oral Q24H Josias Razo MD   10 mg at 07/09/20 0843   • atorvastatin (LIPITOR) tablet 80 mg  80 mg Oral Nightly Kwasi Kang MD   80 mg at 07/08/20 2128   • cephalexin  (KEFLEX) capsule 500 mg  500 mg Oral Q12H Josias Razo MD   500 mg at 07/09/20 0741   • cholecalciferol (VITAMIN D3) tablet 1,000 Units  1,000 Units Oral Daily Josias Razo MD   1,000 Units at 07/09/20 0843   • clopidogrel (PLAVIX) tablet 75 mg  75 mg Oral Daily Josias Razo MD   75 mg at 07/09/20 0843   • furosemide (LASIX) tablet 40 mg  40 mg Oral BID Kraig Farley MD   40 mg at 07/09/20 0842   • hydrALAZINE (APRESOLINE) tablet 100 mg  100 mg Oral Q8H Josias Razo MD   100 mg at 07/09/20 0648   • HYDROcodone-acetaminophen (NORCO) 5-325 MG per tablet 1 tablet  1 tablet Oral Q6H PRN Josias Razo MD   1 tablet at 07/09/20 0040   • insulin glargine (LANTUS) injection 30 Units  30 Units Subcutaneous Nightly Josias Razo MD   30 Units at 07/08/20 2129   • insulin lispro (humaLOG) injection 0-7 Units  0-7 Units Subcutaneous TID AC Josias Razo MD   2 Units at 07/07/20 1725   • insulin lispro (humaLOG) injection 10 Units  10 Units Subcutaneous TID With Meals Josias Razo MD   10 Units at 07/09/20 1227   • melatonin tablet 3 mg  3 mg Oral Nightly PRN Josias Razo MD   3 mg at 06/28/20 2131   • metoprolol tartrate (LOPRESSOR) tablet 37.5 mg  37.5 mg Oral Q12H Josias Razo MD   37.5 mg at 07/09/20 0843   • ondansetron (ZOFRAN) injection 4 mg  4 mg Intravenous Q4H PRN Josias Razo MD   4 mg at 07/04/20 1510   • Petrolatum ointment   Topical Q12H Josias Razo MD   100 application at 07/09/20 0843   • phenol (CHLORASEPTIC) 1.4 % liquid 1 spray  1 spray Mouth/Throat Q2H PRN Josias Razo MD       • polyethylene glycol (MIRALAX) packet 17 g  17 g Oral Daily Josias Razo MD   17 g at 07/09/20 1228   • sennosides-docusate (PERICOLACE) 8.6-50 MG per tablet 1 tablet  1 tablet Oral BID Josias Razo MD   1 tablet at 07/09/20 0845   • sodium chloride 0.9 % flush 10 mL  10 mL Intravenous Q12H Kwasi Kang MD   10 mL at 07/09/20 0843   • sodium chloride 0.9 % flush 10 mL  10 mL Intravenous PRN Kwasi Kang MD            ASSESSMENT:  Chronic kidney disease stage III  Acute kidney injury, stable  Worsening volume overload  Hypertension  Type 2 diabetes mellitus  Chronic anemia secondary to CKD  Gastrointestinal hemorrhage  History of DVT on Coumadin  New, UTI, growing E. Coli       PLAN:   Renal function fairly stable today, near baseline of 2.0-2.4.  Creatinine 2.2 today  Volume status about the same; Continue current oral diuretics  Potassium improved, 4.5, Maintain potassium restriction on her diet  Blood pressure stable, continue current regimen plus oral vitamin D  Repeat labs in a.m. discussed with family at bedside at length yesterday  Hemoglobin improved posttransfusion  Discharge planning to rehab soon      Sourav Doe MD   [facility]   Office phone number: [phone]  Answering service phone number: [phone]    07/09/20  15:05    Dictation performed using Dragon dictation software

## 2020-07-10 NOTE — PROGRESS NOTES
Case Management Discharge Note      Final Note: Pt discharged to [facility] for skilled care on 7/9.   AMY Taylor         Destination - Selection Complete      Service Provider Request Status Selected Services Address Phone Number Fax Number    [facility] [address] [place] [id number] [phone]      Durable Medical Equipment      No service has been selected for the patient.      Dialysis/Infusion      No service has been selected for the patient.      Home Medical Care      No service has been selected for the patient.      Therapy      No service has been selected for the patient.      Community Resources      No service has been selected for the patient.        Transportation Services  Ambulance: [facility]    Final Discharge Disposition Code: 03 - skilled nursing facility (SNF)

## 2020-07-27 ENCOUNTER — TELEPHONE (OUTPATIENT)
Dept: NEUROLOGY | Facility: CLINIC | Age: 80
End: 2020-07-27

## 2020-07-27 NOTE — TELEPHONE ENCOUNTER
Called patient's sister, Cindy Teague, for a hospital follow up call.  She states that the patient passed away 7/14/20.

## (undated) DEVICE — BITEBLOCK OMNI BLOC

## (undated) DEVICE — Device: Brand: DEFENDO AIR/WATER/SUCTION AND BIOPSY VALVE

## (undated) DEVICE — THE TORRENT IRRIGATION SCOPE CONNECTOR IS USED WITH THE TORRENT IRRIGATION TUBING TO PROVIDE IRRIGATION FLUIDS SUCH AS STERILE WATER DURING GASTROINTESTINAL ENDOSCOPIC PROCEDURES WHEN USED IN CONJUNCTION WITH AN IRRIGATION PUMP (OR ELECTROSURGICAL UNIT).: Brand: TORRENT

## (undated) DEVICE — CANN NASL CO2 TRULINK W/O2 A/

## (undated) DEVICE — TUBING, SUCTION, 1/4" X 10', STRAIGHT: Brand: MEDLINE

## (undated) DEVICE — SINGLE-USE BIOPSY FORCEPS: Brand: RADIAL JAW 4

## (undated) DEVICE — TBG 02 CRUSH RESIST LF CLR 7FT